# Patient Record
Sex: MALE | Race: OTHER | NOT HISPANIC OR LATINO | URBAN - METROPOLITAN AREA
[De-identification: names, ages, dates, MRNs, and addresses within clinical notes are randomized per-mention and may not be internally consistent; named-entity substitution may affect disease eponyms.]

---

## 2019-01-12 ENCOUNTER — EMERGENCY (EMERGENCY)
Facility: HOSPITAL | Age: 84
LOS: 1 days | Discharge: HOME | End: 2019-01-12
Attending: EMERGENCY MEDICINE | Admitting: EMERGENCY MEDICINE
Payer: COMMERCIAL

## 2019-01-12 VITALS
RESPIRATION RATE: 19 BRPM | OXYGEN SATURATION: 97 % | TEMPERATURE: 99 F | DIASTOLIC BLOOD PRESSURE: 83 MMHG | SYSTOLIC BLOOD PRESSURE: 128 MMHG | HEART RATE: 97 BPM

## 2019-01-12 VITALS
HEART RATE: 87 BPM | TEMPERATURE: 99 F | OXYGEN SATURATION: 99 % | RESPIRATION RATE: 18 BRPM | SYSTOLIC BLOOD PRESSURE: 147 MMHG | DIASTOLIC BLOOD PRESSURE: 65 MMHG

## 2019-01-12 LAB
ALBUMIN SERPL ELPH-MCNC: 3.2 G/DL — LOW (ref 3.3–5)
ALP SERPL-CCNC: 80 U/L — SIGNIFICANT CHANGE UP (ref 40–120)
ALT FLD-CCNC: 24 U/L — SIGNIFICANT CHANGE UP (ref 4–41)
ANION GAP SERPL CALC-SCNC: 10 MEQ/L — SIGNIFICANT CHANGE UP (ref 7–14)
APPEARANCE UR: CLEAR — SIGNIFICANT CHANGE UP
APTT BLD: 25.7 SEC — LOW (ref 27.5–36.3)
AST SERPL-CCNC: 20 U/L — SIGNIFICANT CHANGE UP (ref 4–40)
B PERT DNA SPEC QL NAA+PROBE: NOT DETECTED — SIGNIFICANT CHANGE UP
BASE EXCESS BLDV CALC-SCNC: 3.2 MMOL/L — SIGNIFICANT CHANGE UP
BASOPHILS # BLD AUTO: 0.01 K/UL — SIGNIFICANT CHANGE UP (ref 0–0.2)
BASOPHILS NFR BLD AUTO: 0.1 % — SIGNIFICANT CHANGE UP (ref 0–2)
BILIRUB SERPL-MCNC: 0.6 MG/DL — SIGNIFICANT CHANGE UP (ref 0.2–1.2)
BILIRUB UR-MCNC: NEGATIVE — SIGNIFICANT CHANGE UP
BLOOD GAS VENOUS - CREATININE: 0.84 MG/DL — SIGNIFICANT CHANGE UP (ref 0.5–1.3)
BLOOD UR QL VISUAL: NEGATIVE — SIGNIFICANT CHANGE UP
BUN SERPL-MCNC: 18 MG/DL — SIGNIFICANT CHANGE UP (ref 7–23)
C PNEUM DNA SPEC QL NAA+PROBE: NOT DETECTED — SIGNIFICANT CHANGE UP
CALCIUM SERPL-MCNC: 8.4 MG/DL — SIGNIFICANT CHANGE UP (ref 8.4–10.5)
CHLORIDE BLDV-SCNC: 112 MMOL/L — HIGH (ref 96–108)
CHLORIDE SERPL-SCNC: 107 MMOL/L — SIGNIFICANT CHANGE UP (ref 98–107)
CO2 SERPL-SCNC: 24 MMOL/L — SIGNIFICANT CHANGE UP (ref 22–31)
COLOR SPEC: SIGNIFICANT CHANGE UP
CREAT SERPL-MCNC: 1.03 MG/DL — SIGNIFICANT CHANGE UP (ref 0.5–1.3)
EOSINOPHIL # BLD AUTO: 0.19 K/UL — SIGNIFICANT CHANGE UP (ref 0–0.5)
EOSINOPHIL NFR BLD AUTO: 2.1 % — SIGNIFICANT CHANGE UP (ref 0–6)
FLUAV H1 2009 PAND RNA SPEC QL NAA+PROBE: NOT DETECTED — SIGNIFICANT CHANGE UP
FLUAV H1 RNA SPEC QL NAA+PROBE: NOT DETECTED — SIGNIFICANT CHANGE UP
FLUAV H3 RNA SPEC QL NAA+PROBE: NOT DETECTED — SIGNIFICANT CHANGE UP
FLUAV SUBTYP SPEC NAA+PROBE: NOT DETECTED — SIGNIFICANT CHANGE UP
FLUBV RNA SPEC QL NAA+PROBE: NOT DETECTED — SIGNIFICANT CHANGE UP
GAS PNL BLDV: 136 MMOL/L — SIGNIFICANT CHANGE UP (ref 136–146)
GLUCOSE BLDV-MCNC: 96 — SIGNIFICANT CHANGE UP (ref 70–99)
GLUCOSE SERPL-MCNC: 97 MG/DL — SIGNIFICANT CHANGE UP (ref 70–99)
GLUCOSE UR-MCNC: NEGATIVE — SIGNIFICANT CHANGE UP
HADV DNA SPEC QL NAA+PROBE: NOT DETECTED — SIGNIFICANT CHANGE UP
HCO3 BLDV-SCNC: 27 MMOL/L — SIGNIFICANT CHANGE UP (ref 20–27)
HCOV PNL SPEC NAA+PROBE: SIGNIFICANT CHANGE UP
HCT VFR BLD CALC: 39 % — SIGNIFICANT CHANGE UP (ref 39–50)
HCT VFR BLDV CALC: 39.5 % — SIGNIFICANT CHANGE UP (ref 39–51)
HGB BLD-MCNC: 13.1 G/DL — SIGNIFICANT CHANGE UP (ref 13–17)
HGB BLDV-MCNC: 12.8 G/DL — LOW (ref 13–17)
HMPV RNA SPEC QL NAA+PROBE: NOT DETECTED — SIGNIFICANT CHANGE UP
HPIV1 RNA SPEC QL NAA+PROBE: NOT DETECTED — SIGNIFICANT CHANGE UP
HPIV2 RNA SPEC QL NAA+PROBE: NOT DETECTED — SIGNIFICANT CHANGE UP
HPIV3 RNA SPEC QL NAA+PROBE: NOT DETECTED — SIGNIFICANT CHANGE UP
HPIV4 RNA SPEC QL NAA+PROBE: NOT DETECTED — SIGNIFICANT CHANGE UP
IMM GRANULOCYTES NFR BLD AUTO: 0.6 % — SIGNIFICANT CHANGE UP (ref 0–1.5)
INR BLD: 1.1 — SIGNIFICANT CHANGE UP (ref 0.88–1.17)
KETONES UR-MCNC: NEGATIVE — SIGNIFICANT CHANGE UP
LACTATE BLDV-MCNC: 1.4 MMOL/L — SIGNIFICANT CHANGE UP (ref 0.5–2)
LEUKOCYTE ESTERASE UR-ACNC: NEGATIVE — SIGNIFICANT CHANGE UP
LYMPHOCYTES # BLD AUTO: 1.5 K/UL — SIGNIFICANT CHANGE UP (ref 1–3.3)
LYMPHOCYTES # BLD AUTO: 16.9 % — SIGNIFICANT CHANGE UP (ref 13–44)
MCHC RBC-ENTMCNC: 31 PG — SIGNIFICANT CHANGE UP (ref 27–34)
MCHC RBC-ENTMCNC: 33.6 % — SIGNIFICANT CHANGE UP (ref 32–36)
MCV RBC AUTO: 92.2 FL — SIGNIFICANT CHANGE UP (ref 80–100)
MONOCYTES # BLD AUTO: 0.9 K/UL — SIGNIFICANT CHANGE UP (ref 0–0.9)
MONOCYTES NFR BLD AUTO: 10.2 % — SIGNIFICANT CHANGE UP (ref 2–14)
NEUTROPHILS # BLD AUTO: 6.2 K/UL — SIGNIFICANT CHANGE UP (ref 1.8–7.4)
NEUTROPHILS NFR BLD AUTO: 70.1 % — SIGNIFICANT CHANGE UP (ref 43–77)
NITRITE UR-MCNC: NEGATIVE — SIGNIFICANT CHANGE UP
NRBC # FLD: 0 K/UL — LOW (ref 25–125)
PCO2 BLDV: 37 MMHG — LOW (ref 41–51)
PH BLDV: 7.47 PH — HIGH (ref 7.32–7.43)
PH UR: 8 — SIGNIFICANT CHANGE UP (ref 5–8)
PLATELET # BLD AUTO: 113 K/UL — LOW (ref 150–400)
PMV BLD: 9.4 FL — SIGNIFICANT CHANGE UP (ref 7–13)
PO2 BLDV: 41 MMHG — HIGH (ref 35–40)
POTASSIUM BLDV-SCNC: 3.7 MMOL/L — SIGNIFICANT CHANGE UP (ref 3.4–4.5)
POTASSIUM SERPL-MCNC: 4.1 MMOL/L — SIGNIFICANT CHANGE UP (ref 3.5–5.3)
POTASSIUM SERPL-SCNC: 4.1 MMOL/L — SIGNIFICANT CHANGE UP (ref 3.5–5.3)
PROT SERPL-MCNC: 6.5 G/DL — SIGNIFICANT CHANGE UP (ref 6–8.3)
PROT UR-MCNC: NEGATIVE — SIGNIFICANT CHANGE UP
PROTHROM AB SERPL-ACNC: 12.3 SEC — SIGNIFICANT CHANGE UP (ref 9.8–13.1)
RBC # BLD: 4.23 M/UL — SIGNIFICANT CHANGE UP (ref 4.2–5.8)
RBC # FLD: 13.4 % — SIGNIFICANT CHANGE UP (ref 10.3–14.5)
RSV RNA SPEC QL NAA+PROBE: NOT DETECTED — SIGNIFICANT CHANGE UP
RV+EV RNA SPEC QL NAA+PROBE: NOT DETECTED — SIGNIFICANT CHANGE UP
SAO2 % BLDV: 75.3 % — SIGNIFICANT CHANGE UP (ref 60–85)
SODIUM SERPL-SCNC: 141 MMOL/L — SIGNIFICANT CHANGE UP (ref 135–145)
SP GR SPEC: 1.01 — SIGNIFICANT CHANGE UP (ref 1–1.04)
TROPONIN T, HIGH SENSITIVITY: 16 NG/L — SIGNIFICANT CHANGE UP (ref ?–14)
TROPONIN T, HIGH SENSITIVITY: 17 NG/L — SIGNIFICANT CHANGE UP (ref ?–14)
UROBILINOGEN FLD QL: NORMAL — SIGNIFICANT CHANGE UP
WBC # BLD: 8.85 K/UL — SIGNIFICANT CHANGE UP (ref 3.8–10.5)
WBC # FLD AUTO: 8.85 K/UL — SIGNIFICANT CHANGE UP (ref 3.8–10.5)

## 2019-01-12 PROCEDURE — 99284 EMERGENCY DEPT VISIT MOD MDM: CPT

## 2019-01-12 PROCEDURE — 71046 X-RAY EXAM CHEST 2 VIEWS: CPT | Mod: 26

## 2019-01-12 RX ORDER — SODIUM CHLORIDE 9 MG/ML
1000 INJECTION INTRAMUSCULAR; INTRAVENOUS; SUBCUTANEOUS ONCE
Qty: 0 | Refills: 0 | Status: COMPLETED | OUTPATIENT
Start: 2019-01-12 | End: 2019-01-12

## 2019-01-12 RX ADMIN — SODIUM CHLORIDE 500 MILLILITER(S): 9 INJECTION INTRAMUSCULAR; INTRAVENOUS; SUBCUTANEOUS at 18:51

## 2019-01-12 NOTE — ED PROVIDER NOTE - PROGRESS NOTE DETAILS
Patient feels improved. All labs EKG CXR reviewed. After ED evaluation, there is no evidence of acute emergency pathology which would necessitate emergency hospital admission. Patient was advised to follow up with primary care physician as soon as possible and have the doctor review all ED results and arrange appropriate follow up. Patient advised to return to ED at once if symptoms worsen. Patient feels improved. All VS normal. Pt eating and walking to bathroom without problems. All labs EKG CXR reviewed. After ED evaluation, there is no evidence of acute emergency pathology which would necessitate emergency hospital admission. Patient was advised to follow up with primary care physician as soon as possible and have the doctor review all ED results and arrange appropriate follow up. Patient advised to return to ED at once if symptoms worsen. Copies of all test results given to patient.

## 2019-01-12 NOTE — ED PROVIDER NOTE - ATTENDING CONTRIBUTION TO CARE
84 y M with PMH prostate ca dx 2004 presenting with cough, chills, lightheadedness on standing, sharp chest pain worse with inspiration, nausea with diarrhea no vomiting x 1 week, comes to ED bc chest pain is much worse, denies cardiac hx or hx of recent cardiac testing. Completed course of abx several days ago for URI.  On exam: afebrile, VSS, lungs scattered exp rhonchi, heart sounds normal,abdo soft non tender, ext neg. EKG SR with left axis otherwise normal. IMP: Cp productive cough, likely infectious in origin. Plan: Labs cultures EKG CXR nebs troponin reassess

## 2019-01-12 NOTE — ED ADULT NURSE NOTE - OBJECTIVE STATEMENT
Patient is an 85 y/o male a&ox4 BIB EMS with a c/c of cough, chills, lightheadedness and left sided intermittent sharp chest pain that worsens with inspirations.  Patient endorses nausea with diarrhea x1 week.  Patient reported seeking care today as chest pain has worsened.  Patient reports completing a course of abx recently for symptoms of URI.  Patient noted to be tachypneic, airway patent, Sp02 100% on room air.  18 gauge PIV in right MD marie at bedside.

## 2019-01-12 NOTE — ED PROVIDER NOTE - OBJECTIVE STATEMENT
Patient is 84 y M with PMH prostate ca dx 2004 presenting with cough, chills, lightheadedness on standing, sharp chest pain worse with inspiration, nausea with diarrhea no vomiting x 1 week, comes to ED bc chest pain is much worse, denies cardiac hx or hx of recent cardiac testing. Completed course of abx several days ago for URI.   Reports being treated for asthma in the past but denies using breathing treatments  No long plane trips, no recent hospital stays, no recent surgery, no exogenous hormones, no coughing, no personal or family history of clotting problems.     PMD: ly Golden    ROS: Denies fever, palpitations, recent sickness, HA, vision changes,  abdominal pain, dysuria, hematuria, rash, new joint aches, sick contacts, and recent travel. Patient is 84 y M with PMH prostate ca dx 2004 presenting with cough, chills, lightheadedness on standing, sharp chest pain worse with inspiration, nausea with diarrhea no vomiting x 1 week, comes to ED bc chest pain is much worse, denies cardiac hx or hx of recent cardiac testing. Completed course of abx several days ago for URI.   Reports being treated for asthma in the past but denies using breathing treatments  No long plane trips, no recent hospital stays, no recent surgery, no exogenous hormones, no personal or family history of clotting problems.     PMD: ly Golden    ROS: Denies fever, palpitations, recent sickness, HA, vision changes,  abdominal pain, dysuria, hematuria, rash, new joint aches, sick contacts, and recent travel.

## 2019-01-12 NOTE — ED ADULT TRIAGE NOTE - CHIEF COMPLAINT QUOTE
Pt brought in by EMS for chest pain/cough/SOB/nausea/vomiting for the past week.  Pt endorsing dizziness/lightheadedness and chills at home.  PMHx:  prostate CA, sinus surgery

## 2019-01-12 NOTE — ED PROVIDER NOTE - NSFOLLOWUPINSTRUCTIONS_ED_ALL_ED_FT
Rest at home   Drink plenty of liquids  Take tylenol for pain or fever  Return to ER if worse  Follow up with your doctor within 3 days

## 2019-01-12 NOTE — ED ADULT NURSE NOTE - NSIMPLEMENTINTERV_GEN_ALL_ED
Implemented All Fall Risk Interventions:  [place] to call system. Call bell, personal items and telephone within reach. Instruct patient to call for assistance. Room bathroom lighting operational. Non-slip footwear when patient is off stretcher. Physically safe environment: no spills, clutter or unnecessary equipment. Stretcher in lowest position, wheels locked, appropriate side rails in place. Provide visual cue, wrist band, yellow gown, etc. Monitor gait and stability. Monitor for mental status changes and reorient to person, place, and time. Review medications for side effects contributing to fall risk. Reinforce activity limits and safety measures with patient and family.

## 2019-01-14 LAB
BACTERIA UR CULT: SIGNIFICANT CHANGE UP
SPECIMEN SOURCE: SIGNIFICANT CHANGE UP

## 2019-03-29 NOTE — ED ADULT TRIAGE NOTE - NS ED NOTE AC HIGH RISK COUNTRIES
Additional Notes: Patient says he recently took an oral ABx (DOXY) for congestion and this rash occurred after being in the sun. No Detail Level: Detailed Additional Notes: Additional Notes: Cont KETO shampoo TIW. Detail Level: Simple

## 2021-01-17 ENCOUNTER — INPATIENT (INPATIENT)
Facility: HOSPITAL | Age: 86
LOS: 6 days | Discharge: HOME HEALTH | End: 2021-01-24
Attending: HOSPITALIST | Admitting: HOSPITALIST
Payer: MEDICARE

## 2021-01-17 VITALS
OXYGEN SATURATION: 98 % | SYSTOLIC BLOOD PRESSURE: 133 MMHG | DIASTOLIC BLOOD PRESSURE: 75 MMHG | RESPIRATION RATE: 18 BRPM | HEART RATE: 63 BPM

## 2021-01-17 DIAGNOSIS — R41.82 ALTERED MENTAL STATUS, UNSPECIFIED: ICD-10-CM

## 2021-01-17 LAB
ALBUMIN SERPL ELPH-MCNC: 4.1 G/DL — SIGNIFICANT CHANGE UP (ref 3.3–5)
ALP SERPL-CCNC: 116 U/L — SIGNIFICANT CHANGE UP (ref 40–120)
ALT FLD-CCNC: 28 U/L — SIGNIFICANT CHANGE UP (ref 4–41)
ANION GAP SERPL CALC-SCNC: 11 MMOL/L — SIGNIFICANT CHANGE UP (ref 7–14)
APPEARANCE UR: CLEAR — SIGNIFICANT CHANGE UP
APTT BLD: 27.4 SEC — SIGNIFICANT CHANGE UP (ref 27–36.3)
AST SERPL-CCNC: 28 U/L — SIGNIFICANT CHANGE UP (ref 4–40)
BASOPHILS # BLD AUTO: 0.02 K/UL — SIGNIFICANT CHANGE UP (ref 0–0.2)
BASOPHILS NFR BLD AUTO: 0.2 % — SIGNIFICANT CHANGE UP (ref 0–2)
BILIRUB SERPL-MCNC: 0.3 MG/DL — SIGNIFICANT CHANGE UP (ref 0.2–1.2)
BILIRUB UR-MCNC: NEGATIVE — SIGNIFICANT CHANGE UP
BLD GP AB SCN SERPL QL: NEGATIVE — SIGNIFICANT CHANGE UP
BLOOD GAS VENOUS COMPREHENSIVE RESULT: SIGNIFICANT CHANGE UP
BUN SERPL-MCNC: 16 MG/DL — SIGNIFICANT CHANGE UP (ref 7–23)
CALCIUM SERPL-MCNC: 9 MG/DL — SIGNIFICANT CHANGE UP (ref 8.4–10.5)
CHLORIDE SERPL-SCNC: 104 MMOL/L — SIGNIFICANT CHANGE UP (ref 98–107)
CO2 SERPL-SCNC: 25 MMOL/L — SIGNIFICANT CHANGE UP (ref 22–31)
COLOR SPEC: SIGNIFICANT CHANGE UP
CREAT SERPL-MCNC: 1.04 MG/DL — SIGNIFICANT CHANGE UP (ref 0.5–1.3)
DIFF PNL FLD: NEGATIVE — SIGNIFICANT CHANGE UP
EOSINOPHIL # BLD AUTO: 0.45 K/UL — SIGNIFICANT CHANGE UP (ref 0–0.5)
EOSINOPHIL NFR BLD AUTO: 5.6 % — SIGNIFICANT CHANGE UP (ref 0–6)
GLUCOSE SERPL-MCNC: 86 MG/DL — SIGNIFICANT CHANGE UP (ref 70–99)
GLUCOSE UR QL: NEGATIVE — SIGNIFICANT CHANGE UP
HCT VFR BLD CALC: 43.2 % — SIGNIFICANT CHANGE UP (ref 39–50)
HGB BLD-MCNC: 13.7 G/DL — SIGNIFICANT CHANGE UP (ref 13–17)
IANC: 3.37 K/UL — SIGNIFICANT CHANGE UP (ref 1.5–8.5)
IMM GRANULOCYTES NFR BLD AUTO: 0.4 % — SIGNIFICANT CHANGE UP (ref 0–1.5)
INR BLD: 1.02 RATIO — SIGNIFICANT CHANGE UP (ref 0.88–1.16)
KETONES UR-MCNC: NEGATIVE — SIGNIFICANT CHANGE UP
LEUKOCYTE ESTERASE UR-ACNC: NEGATIVE — SIGNIFICANT CHANGE UP
LYMPHOCYTES # BLD AUTO: 3.41 K/UL — HIGH (ref 1–3.3)
LYMPHOCYTES # BLD AUTO: 42.3 % — SIGNIFICANT CHANGE UP (ref 13–44)
MAGNESIUM SERPL-MCNC: 2.2 MG/DL — SIGNIFICANT CHANGE UP (ref 1.6–2.6)
MCHC RBC-ENTMCNC: 29.3 PG — SIGNIFICANT CHANGE UP (ref 27–34)
MCHC RBC-ENTMCNC: 31.7 GM/DL — LOW (ref 32–36)
MCV RBC AUTO: 92.5 FL — SIGNIFICANT CHANGE UP (ref 80–100)
MONOCYTES # BLD AUTO: 0.79 K/UL — SIGNIFICANT CHANGE UP (ref 0–0.9)
MONOCYTES NFR BLD AUTO: 9.8 % — SIGNIFICANT CHANGE UP (ref 2–14)
NEUTROPHILS # BLD AUTO: 3.37 K/UL — SIGNIFICANT CHANGE UP (ref 1.8–7.4)
NEUTROPHILS NFR BLD AUTO: 41.7 % — LOW (ref 43–77)
NITRITE UR-MCNC: NEGATIVE — SIGNIFICANT CHANGE UP
NRBC # BLD: 0 /100 WBCS — SIGNIFICANT CHANGE UP
NRBC # FLD: 0 K/UL — SIGNIFICANT CHANGE UP
PCP SPEC-MCNC: SIGNIFICANT CHANGE UP
PH UR: 8 — SIGNIFICANT CHANGE UP (ref 5–8)
PHOSPHATE SERPL-MCNC: 2.5 MG/DL — SIGNIFICANT CHANGE UP (ref 2.5–4.5)
PLATELET # BLD AUTO: 186 K/UL — SIGNIFICANT CHANGE UP (ref 150–400)
POTASSIUM SERPL-MCNC: 3.7 MMOL/L — SIGNIFICANT CHANGE UP (ref 3.5–5.3)
POTASSIUM SERPL-SCNC: 3.7 MMOL/L — SIGNIFICANT CHANGE UP (ref 3.5–5.3)
PROT SERPL-MCNC: 7.3 G/DL — SIGNIFICANT CHANGE UP (ref 6–8.3)
PROT UR-MCNC: ABNORMAL
PROTHROM AB SERPL-ACNC: 11.7 SEC — SIGNIFICANT CHANGE UP (ref 10.6–13.6)
RBC # BLD: 4.67 M/UL — SIGNIFICANT CHANGE UP (ref 4.2–5.8)
RBC # FLD: 13.1 % — SIGNIFICANT CHANGE UP (ref 10.3–14.5)
RH IG SCN BLD-IMP: POSITIVE — SIGNIFICANT CHANGE UP
SARS-COV-2 RNA SPEC QL NAA+PROBE: SIGNIFICANT CHANGE UP
SODIUM SERPL-SCNC: 140 MMOL/L — SIGNIFICANT CHANGE UP (ref 135–145)
SP GR SPEC: 1.02 — SIGNIFICANT CHANGE UP (ref 1.01–1.02)
TOXICOLOGY SCREEN, DRUGS OF ABUSE, SERUM RESULT: SIGNIFICANT CHANGE UP
TROPONIN T, HIGH SENSITIVITY RESULT: 12 NG/L — SIGNIFICANT CHANGE UP
UROBILINOGEN FLD QL: SIGNIFICANT CHANGE UP
WBC # BLD: 8.07 K/UL — SIGNIFICANT CHANGE UP (ref 3.8–10.5)
WBC # FLD AUTO: 8.07 K/UL — SIGNIFICANT CHANGE UP (ref 3.8–10.5)

## 2021-01-17 PROCEDURE — 70450 CT HEAD/BRAIN W/O DYE: CPT | Mod: 26,59

## 2021-01-17 PROCEDURE — 70498 CT ANGIOGRAPHY NECK: CPT | Mod: 26

## 2021-01-17 PROCEDURE — 99285 EMERGENCY DEPT VISIT HI MDM: CPT

## 2021-01-17 PROCEDURE — 70496 CT ANGIOGRAPHY HEAD: CPT | Mod: 26

## 2021-01-17 NOTE — ED ADULT NURSE REASSESSMENT NOTE - NS ED NURSE REASSESS COMMENT FT1
Pt received from QUIRINO Frederick. Pt a&ox2, unaware to situation. No distress noted. Pt does not recall events that occurred today. Family to be called. Will monitor.

## 2021-01-17 NOTE — ED ADULT NURSE NOTE - OBJECTIVE STATEMENT
Pt. brought in by EMS from home s/p unwitnessed fall at home. As per EMS, pt. family states "he had a fit" then fell on the ground and became altered. As per family, pt. is alert and oriented at baseline. Pt. placed in TRC, placed on CM, NSR at this time. 18G IV obtained in RAC, blood obtained, flushing without resistance, dressing dry and intact. Pt. placed on Zole monitor, brought to CT scan. Report given to primary RN Angelic. Safety precautions obtained.

## 2021-01-17 NOTE — ED PROVIDER NOTE - ATTENDING CONTRIBUTION TO CARE
Attending Attestation: Dr. Malik  I have personally performed a history and physical examination of the patient and discussed management with the resident as well as the patient.  I reviewed the resident's note and agree with the documented findings and plan of care.  I have authored and modified critical sections of the Provider Note, including but not limited to HPI, Physical Exam and MDM. 87yo M presenting after episode of AMS at home.  Screamed then fell off of couch.  Exam reveals lethargic but arousable man, initially entirely unresponsive, with no lateralizing signs.  Otherwise unremarkable. Mental status gradual improving during initial eval. Consider seizure likely, possible CVA, though other causes of encephalopathy need to be ruled out as well.  Will send labs, obtain imaging, and reassess.  neuro eval, likely admit.

## 2021-01-17 NOTE — ED PROVIDER NOTE - OBJECTIVE STATEMENT
87yo here after episode of AMS    Collateral from son:  sitting on chair, started screaming and fell over. No shaking movements. Confused after this fall.  Same thing happened before, received vancomycin for a stomach virus. +Diarrhea.   Walks and Talks at baseline without issue performing ADL's     PCP: Dr. Braulio Jensen  Surgery for prostate cancer 16 years ago, now with incontinince after this 87yo here after episode of AMS    Pt evaluated immediately at bedside upon arrival, minimal response to sternal rub. brought to CT immediately with no significant findings, pt slowly improved during this time now saying name and age. neuro consulted and bloodwork obtained for evaluation of possible seizure.     Collateral from son:  sitting on chair, started screaming and fell over. No shaking movements. Confused after this fall.  Same thing happened last week and went to outside ED, received vancomycin for a stomach virus. +Diarrhea.   Walks and Talks at baseline without issue performing ADL's     PCP: Dr. Braulio Jensen  Surgery for prostate cancer 16 years ago, now with incontinince after this 85 yo M here d/t episode of AMS    Pt evaluated immediately at bedside upon arrival, minimal response to sternal rub. brought to CT immediately with no significant findings, pt slowly improved during this time now saying name and age. neuro consulted and bloodwork obtained for evaluation of possible seizure.     Collateral from son:  While sitting on chair, screamed and fell over. No shaking movements. Confused after this fall, did not normalize at home.  Same thing happened last week and went to outside ED, received "vancomycin" (oral?) for a stomach virus. +Diarrhea.   Walks and Talks at baseline without issue performing ADL's     PCP: Dr. Braulio Jensen  Surgery for prostate cancer 16 years ago, now with incontinince after this 85 yo M here d/t episode of AMS    Pt evaluated immediately at bedside upon arrival, minimal response to sternal rub. brought to CT immediately with no significant findings, pt slowly improved during this time now saying name and age. neuro consulted and bloodwork obtained for evaluation of possible seizure.     Collateral from son:  While sitting on chair, screamed and fell over. No shaking movements. Confused after this fall, did not normalize at home.  Same thing happened last week and went to outside ED, received "vancomycin" (oral?) for a stomach virus. +Diarrhea.   Walks and Talks at baseline without issue performing ADL's  May have had seizure activity last time.     PCP: Dr. Braulio Jensen  Surgery for prostate cancer 16 years ago, now with incontinince after this

## 2021-01-17 NOTE — ED PROVIDER NOTE - NEUROLOGICAL, MLM
Alert and oriented, no focal deficits, no motor or sensory deficits noted. GENERALLYnon focal, moving extremities, but unable to assess fully given AMS.

## 2021-01-17 NOTE — ED ADULT NURSE REASSESSMENT NOTE - NS ED NURSE REASSESS COMMENT FT1
Patient arousable when spoken to. Respirations even and unlabored. Per MAR [id number], Patient okay for transport off tele monitor. Transporter at bedside for transport to floor.

## 2021-01-17 NOTE — ED ADULT NURSE REASSESSMENT NOTE - NS ED NURSE REASSESS COMMENT FT1
Pt back from CT scan, alert to voice, able to converse with staff, stating is feet is cold, given socks and more blankets.

## 2021-01-17 NOTE — CONSULT NOTE ADULT - ASSESSMENT
87yo man with PMH of prostate cancer (s/p resection 16 years ago, now with incontinence) presents to the ED after an episode of AMS with syncope. Patient had a similar event a week ago and was started on vancomycin for diarrhea. Physical exam remarkable for AMS and lethargy. Labs remarkable for elevated lactate and trace proteinuria. 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    Impression: AMS with syncope possibly due to seizure activity with post-ictal confusion, r/o infectious and metabolic triggers or metastatic disease vs. r/o cardiogenic etiology including arrhythmia vs. competing delirium in the setting of post-traumatic pain.    Recommendations:  [] Continuous vEEG  [] MRI brain w/w/o contrast w/epilepsy protocol  [] Telemetry monitoring  [] TTE w/bubble study  [] Will defer starting AEDs pending further workup as seizure-like events may have been provoked  [] Can consider Ativan 2mg for GTCs or seizure activity >3mins  [] Monitor CBC, BMP, lactate, CPK, TSH, B12, folate, Mg, P, Ca, LFTs  [] UA/UCx  [] Rest of infectious/metabolic and cardiac workup as per primary team  [] Pain management as per primary team  [] BP goal normotension  [] BG   [] DVT ppx  [] Frequent reorientation  [] Seizure/fall precautions    Case to be seen and discussed with neurology attending Dr. Schoenberg.

## 2021-01-17 NOTE — ED ADULT TRIAGE NOTE - CHIEF COMPLAINT QUOTE
pt brought in by ems as a notification, change in mental status, responsive to pain following a fall. pt taken directly to trauma c

## 2021-01-17 NOTE — CONSULT NOTE ADULT - SUBJECTIVE AND OBJECTIVE BOX
Neurology  Consult Note  01-17-21    Name:  TENNILLE MAHMOOD; 86y (10-Celestine-1935)    Reason for Admission: Altered mental status    Neurology consult: 85yo man with PMH of prostate cancer (s/p resection 16 years ago, now with incontinence) presents to the ED after an episode of AMS. Patient is a poor historian. Patient reports that earlier in the day he was sitting on the couch when he suddenly had LOC with a fall resulting in head trauma. Patient does not recall events after he regained consciousness. Patient states he had a similar event a week ago, was evaluated at [facility], and was started on vancomycin for a stomach virus and episodes of diarrhea. Patient at this time reports bifrontal headache due to the fall and generalized weakness. 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 Rest of ROS and history was limited as patient states "I don't know, I don't remember."    Review of Systems:  As states in HPI.        PMHx:   Prostate cancer      PFHx:     PSuHx:   No significant past surgical history        Medications:  MEDICATIONS  (STANDING):    MEDICATIONS  (PRN):      Vitals:  T(C): 36.4 (01-17-21 @ 20:45), Max: 36.9 (01-17-21 @ 18:09)  HR: 71 (01-17-21 @ 20:45) (57 - 71)  BP: 144/73 (01-17-21 @ 20:45) (133/75 - 145/67)  RR: 16 (01-17-21 @ 20:45) (16 - 18)  SpO2: 100% (01-17-21 @ 20:45) (98% - 100%)    Physical Examination: limited due to lethargy and pain  Neurologic:  - Mental Status: Alert, awake, oriented to person, place, but not time; Speech is very hypophonic, fluent with intact comprehension  - Cranial Nerves: Visual fields are full to confrontation; Pupils are equal, round, and reactive to light; Extraocular movements are intact without nystagmus, primary gaze; Face appears grossly symmetric; Hearing diminished in the R;   - Motor: Strength is 3/5 with bicep flexion b/l, 2/5 with hip flexion b/l but possibly effort-limited.  - Reflexes: 2+ and symmetric at the biceps and knees. Plantar responses down bilaterally.  - Sensory: Intact throughout to light touch  - Coordination: Patient unable to complete task  - Gait: Deferred    Labs:                        13.7   8.07  )-----------( 186      ( 17 Jan 2021 16:57 )             43.2     01-17    140  |  104  |  16  ----------------------------<  86  3.7   |  25  |  1.04    Ca    9.0      17 Jan 2021 17:00  Phos  2.5     01-17  Mg     2.2     01-17    TPro  7.3  /  Alb  4.1  /  TBili  0.3  /  DBili  x   /  AST  28  /  ALT  28  /  AlkPhos  116  01-17    CAPILLARY BLOOD GLUCOSE        LIVER FUNCTIONS - ( 17 Jan 2021 17:00 )  Alb: 4.1 g/dL / Pro: 7.3 g/dL / ALK PHOS: 116 U/L / ALT: 28 U/L / AST: 28 U/L / GGT: x             PT/INR - ( 17 Jan 2021 16:57 )   PT: 11.7 sec;   INR: 1.02 ratio    PTT - ( 17 Jan 2021 16:57 )  PTT:27.4 sec      Radiology:  CT Head No Cont:  (17 Jan 2021 16:54)    IMPRESSION:    CTA NECK:  No significant stenosis of the cervical carotid arteries based on NASCET criteria. 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 Vertebral arteries are otherwise patent to the skullbase.    CTA HEAD: No high-grade stenosis or occlusion of the major proximal arterial branches. No evidence of an intracranial arterial aneurysm or arteriovenous malformation on CTA.    CT BRAIN: No abnormal intracranial enhancement. 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    If there is continued concern for intracranial metastatic disease, contrast-enhanced MRI of the brain should be considered.     Neurology  Consult Note  01-17-21    Name:  TENNILLE AMHMOOD; 86y (10-Celestine-1935)    Reason for Admission: Altered mental status    Neurology consult: 85yo man with PMH of prostate cancer (s/p resection 16 years ago, now with incontinence) presents to the ED after an episode of AMS. Patient is a poor historian. Patient reports that earlier in the day he was sitting on the couch when he suddenly had LOC with a fall resulting in head trauma. Patient does not recall events after he regained consciousness. Patient states he had a similar event a week ago, was evaluated at [facility], and was started on vancomycin for a stomach virus and episodes of diarrhea. Patient at this time reports bifrontal headache due to the fall and generalized weakness. 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 Rest of ROS and history was limited as patient states "I don't know, I don't remember."    Review of Systems:  As states in HPI.        PMHx:   Prostate cancer      PFHx:     PSuHx:   No significant past surgical history        Medications:  MEDICATIONS  (STANDING):    MEDICATIONS  (PRN):      Vitals:  T(C): 36.4 (01-17-21 @ 20:45), Max: 36.9 (01-17-21 @ 18:09)  HR: 71 (01-17-21 @ 20:45) (57 - 71)  BP: 144/73 (01-17-21 @ 20:45) (133/75 - 145/67)  RR: 16 (01-17-21 @ 20:45) (16 - 18)  SpO2: 100% (01-17-21 @ 20:45) (98% - 100%)    Physical Examination: limited due to lethargy and pain  Neurologic:  - Mental Status: Alert, awake, oriented to person, place, but not time; Speech is very hypophonic, fluent with intact comprehension  - Cranial Nerves: Visual fields are full to confrontation; Pupils are equal, round, and reactive to light; Extraocular movements are intact without nystagmus, primary gaze; Face appears grossly symmetric; Hearing diminished in the R;   - Motor: Strength is 3/5 with bicep flexion b/l, 2/5 with hip flexion b/l but possibly effort-limited. Normal tone throughout. No tremors noted.  - Reflexes: 2+ and symmetric at the biceps and knees. Plantar responses down bilaterally.  - Sensory: Intact throughout to light touch  - Coordination: Patient unable to complete task  - Gait: Deferred    Labs:                        13.7   8.07  )-----------( 186      ( 17 Jan 2021 16:57 )             43.2     01-17    140  |  104  |  16  ----------------------------<  86  3.7   |  25  |  1.04    Ca    9.0      17 Jan 2021 17:00  Phos  2.5     01-17  Mg     2.2     01-17    TPro  7.3  /  Alb  4.1  /  TBili  0.3  /  DBili  x   /  AST  28  /  ALT  28  /  AlkPhos  116  01-17    CAPILLARY BLOOD GLUCOSE        LIVER FUNCTIONS - ( 17 Jan 2021 17:00 )  Alb: 4.1 g/dL / Pro: 7.3 g/dL / ALK PHOS: 116 U/L / ALT: 28 U/L / AST: 28 U/L / GGT: x             PT/INR - ( 17 Jan 2021 16:57 )   PT: 11.7 sec;   INR: 1.02 ratio    PTT - ( 17 Jan 2021 16:57 )  PTT:27.4 sec      Radiology:  CT Head No Cont:  (17 Jan 2021 16:54)    IMPRESSION:    CTA NECK:  No significant stenosis of the cervical carotid arteries based on NASCET criteria. 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 Vertebral arteries are otherwise patent to the skullbase.    CTA HEAD: No high-grade stenosis or occlusion of the major proximal arterial branches. No evidence of an intracranial arterial aneurysm or arteriovenous malformation on CTA.    CT BRAIN: No abnormal intracranial enhancement. 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    If there is continued concern for intracranial metastatic disease, contrast-enhanced MRI of the brain should be considered.

## 2021-01-17 NOTE — ED ADULT NURSE REASSESSMENT NOTE - NS ED NURSE REASSESS COMMENT FT1
Pt received from break coverage RN, AOx3, 20G noted to the RAC. VS as noted. Resting in bed,  breathing even and unlabored, saturating 100% on RA. Safety precautions maintained.

## 2021-01-17 NOTE — ED PROVIDER NOTE - CRITICAL CARE PROVIDED
lacerations direct patient care (not related to procedure)/additional history taking/interpretation of diagnostic studies/documentation/consultation with other physicians

## 2021-01-17 NOTE — ED ADULT NURSE REASSESSMENT NOTE - NS ED NURSE REASSESS COMMENT FT1
Report received from RN Do. Patient sleeping in stretcher, VS as noted. Respirations even and unlabored. Patient arousable when spoken to. Will monitor.

## 2021-01-17 NOTE — ED PROVIDER NOTE - PHYSICAL EXAMINATION
General: minimally responsive states name and age  HEENT: pupils equal and reactive, normal external ears bilaterally   Cardiac: RRR, no MRG appreciated  Resp: lungs clear to auscultation bilaterally, symmetric chest wall rise  Abd: soft, nontender, nondistended,   : no CVA tenderness  Neuro: Moving all extremities  Skin:  normal color for race

## 2021-01-17 NOTE — CONSULT NOTE ADULT - ATTENDING COMMENTS
Neurology consult: 87yo man with PMH of prostate cancer (s/p resection 16 years ago, now with incontinence) presents to the ED after an episode of AMS. Patient is a poor historian. Patient reports that earlier in the day he was sitting on the couch when he suddenly had LOC with a fall resulting in head trauma    mri    eeg   echo

## 2021-01-17 NOTE — ED PROVIDER NOTE - CLINICAL SUMMARY MEDICAL DECISION MAKING FREE TEXT BOX
Ganesh PGY-3:  87yo here after episode of AMS. Ddx includes seizure vs less likely cardiac eitology of syncope, will obtain bloodwork, CT head, consult neuro and reassess for dispo . 87yo M presenting after episode of AMS at home.  Screamed then fell off of couch.  Exam reveals lethargic but arousable man, initially entirely unresponsive, with no lateralizing signs.  Otherwise unremarkable. Mental status gradual improving during initial eval. Consider seizure likely, possible CVA, though other causes of encephalopathy need to be ruled out as well.  Will send labs, obtain imaging, and reassess.  neuro eval, likely admit.

## 2021-01-17 NOTE — ED PROVIDER NOTE - PROGRESS NOTE DETAILS
Kiko, PGY3- sign out from [facility] PGY3. Seen by neuro. Unclear why AMS (which is still present). Neuro imaging unremarkable. Leading differential sz, neuro recommending admission for EEG/MRI. No recommendations for AED at this time. No signs of seizure activity. Dr. LUISITO Gaines accepting.

## 2021-01-18 DIAGNOSIS — Z98.890 OTHER SPECIFIED POSTPROCEDURAL STATES: Chronic | ICD-10-CM

## 2021-01-18 DIAGNOSIS — Z02.9 ENCOUNTER FOR ADMINISTRATIVE EXAMINATIONS, UNSPECIFIED: ICD-10-CM

## 2021-01-18 DIAGNOSIS — G93.40 ENCEPHALOPATHY, UNSPECIFIED: ICD-10-CM

## 2021-01-18 DIAGNOSIS — Z29.9 ENCOUNTER FOR PROPHYLACTIC MEASURES, UNSPECIFIED: ICD-10-CM

## 2021-01-18 DIAGNOSIS — R19.7 DIARRHEA, UNSPECIFIED: ICD-10-CM

## 2021-01-18 DIAGNOSIS — R55 SYNCOPE AND COLLAPSE: ICD-10-CM

## 2021-01-18 LAB
A1C WITH ESTIMATED AVERAGE GLUCOSE RESULT: 5.8 % — HIGH (ref 4–5.6)
ANION GAP SERPL CALC-SCNC: 9 MMOL/L — SIGNIFICANT CHANGE UP (ref 7–14)
BASE EXCESS BLDV CALC-SCNC: 2.4 MMOL/L — HIGH (ref -3–2)
BASOPHILS # BLD AUTO: 0.02 K/UL — SIGNIFICANT CHANGE UP (ref 0–0.2)
BASOPHILS NFR BLD AUTO: 0.3 % — SIGNIFICANT CHANGE UP (ref 0–2)
BLOOD GAS VENOUS - CREATININE: 1 MG/DL — SIGNIFICANT CHANGE UP (ref 0.5–1.3)
BLOOD GAS VENOUS COMPREHENSIVE RESULT: SIGNIFICANT CHANGE UP
BUN SERPL-MCNC: 15 MG/DL — SIGNIFICANT CHANGE UP (ref 7–23)
CALCIUM SERPL-MCNC: 9.1 MG/DL — SIGNIFICANT CHANGE UP (ref 8.4–10.5)
CHLORIDE BLDV-SCNC: 109 MMOL/L — HIGH (ref 96–108)
CHLORIDE SERPL-SCNC: 105 MMOL/L — SIGNIFICANT CHANGE UP (ref 98–107)
CHOLEST SERPL-MCNC: 152 MG/DL — SIGNIFICANT CHANGE UP
CK MB BLD-MCNC: 3.1 % — HIGH (ref 0–2.5)
CK MB CFR SERPL CALC: 2.1 NG/ML — SIGNIFICANT CHANGE UP
CK SERPL-CCNC: 67 U/L — SIGNIFICANT CHANGE UP (ref 30–200)
CO2 SERPL-SCNC: 25 MMOL/L — SIGNIFICANT CHANGE UP (ref 22–31)
CREAT SERPL-MCNC: 0.79 MG/DL — SIGNIFICANT CHANGE UP (ref 0.5–1.3)
EOSINOPHIL # BLD AUTO: 0.33 K/UL — SIGNIFICANT CHANGE UP (ref 0–0.5)
EOSINOPHIL NFR BLD AUTO: 5.1 % — SIGNIFICANT CHANGE UP (ref 0–6)
ESTIMATED AVERAGE GLUCOSE: 120 MG/DL — HIGH (ref 68–114)
GAS PNL BLDV: 138 MMOL/L — SIGNIFICANT CHANGE UP (ref 136–146)
GLUCOSE BLDV-MCNC: 98 MG/DL — SIGNIFICANT CHANGE UP (ref 70–99)
GLUCOSE SERPL-MCNC: 98 MG/DL — SIGNIFICANT CHANGE UP (ref 70–99)
HCO3 BLDV-SCNC: 26 MMOL/L — SIGNIFICANT CHANGE UP (ref 20–27)
HCT VFR BLD CALC: 43.4 % — SIGNIFICANT CHANGE UP (ref 39–50)
HCT VFR BLDA CALC: 46.5 % — SIGNIFICANT CHANGE UP (ref 39–51)
HDLC SERPL-MCNC: 81 MG/DL — SIGNIFICANT CHANGE UP
HGB BLD CALC-MCNC: 15.2 G/DL — SIGNIFICANT CHANGE UP (ref 13–17)
HGB BLD-MCNC: 14 G/DL — SIGNIFICANT CHANGE UP (ref 13–17)
IANC: 3.88 K/UL — SIGNIFICANT CHANGE UP (ref 1.5–8.5)
IMM GRANULOCYTES NFR BLD AUTO: 0.3 % — SIGNIFICANT CHANGE UP (ref 0–1.5)
LACTATE BLDV-MCNC: 1.4 MMOL/L — SIGNIFICANT CHANGE UP (ref 0.5–2)
LIPID PNL WITH DIRECT LDL SERPL: 60 MG/DL — SIGNIFICANT CHANGE UP
LYMPHOCYTES # BLD AUTO: 1.6 K/UL — SIGNIFICANT CHANGE UP (ref 1–3.3)
LYMPHOCYTES # BLD AUTO: 24.8 % — SIGNIFICANT CHANGE UP (ref 13–44)
MAGNESIUM SERPL-MCNC: 2.2 MG/DL — SIGNIFICANT CHANGE UP (ref 1.6–2.6)
MCHC RBC-ENTMCNC: 29.4 PG — SIGNIFICANT CHANGE UP (ref 27–34)
MCHC RBC-ENTMCNC: 32.3 GM/DL — SIGNIFICANT CHANGE UP (ref 32–36)
MCV RBC AUTO: 91 FL — SIGNIFICANT CHANGE UP (ref 80–100)
MONOCYTES # BLD AUTO: 0.6 K/UL — SIGNIFICANT CHANGE UP (ref 0–0.9)
MONOCYTES NFR BLD AUTO: 9.3 % — SIGNIFICANT CHANGE UP (ref 2–14)
NEUTROPHILS # BLD AUTO: 3.88 K/UL — SIGNIFICANT CHANGE UP (ref 1.8–7.4)
NEUTROPHILS NFR BLD AUTO: 60.2 % — SIGNIFICANT CHANGE UP (ref 43–77)
NON HDL CHOLESTEROL: 71 MG/DL — SIGNIFICANT CHANGE UP
NRBC # BLD: 0 /100 WBCS — SIGNIFICANT CHANGE UP
NRBC # FLD: 0 K/UL — SIGNIFICANT CHANGE UP
PCO2 BLDV: 48 MMHG — SIGNIFICANT CHANGE UP (ref 41–51)
PH BLDV: 7.37 — SIGNIFICANT CHANGE UP (ref 7.32–7.43)
PHOSPHATE SERPL-MCNC: 3.9 MG/DL — SIGNIFICANT CHANGE UP (ref 2.5–4.5)
PLATELET # BLD AUTO: 168 K/UL — SIGNIFICANT CHANGE UP (ref 150–400)
PO2 BLDV: 48 MMHG — HIGH (ref 35–40)
POTASSIUM BLDV-SCNC: 3.6 MMOL/L — SIGNIFICANT CHANGE UP (ref 3.4–4.5)
POTASSIUM SERPL-MCNC: 3.8 MMOL/L — SIGNIFICANT CHANGE UP (ref 3.5–5.3)
POTASSIUM SERPL-SCNC: 3.8 MMOL/L — SIGNIFICANT CHANGE UP (ref 3.5–5.3)
RBC # BLD: 4.77 M/UL — SIGNIFICANT CHANGE UP (ref 4.2–5.8)
RBC # FLD: 13.2 % — SIGNIFICANT CHANGE UP (ref 10.3–14.5)
SAO2 % BLDV: 80.4 % — SIGNIFICANT CHANGE UP (ref 60–85)
SARS-COV-2 IGG SERPL QL IA: NEGATIVE — SIGNIFICANT CHANGE UP
SARS-COV-2 IGM SERPL IA-ACNC: <0.1 INDEX — SIGNIFICANT CHANGE UP
SODIUM SERPL-SCNC: 139 MMOL/L — SIGNIFICANT CHANGE UP (ref 135–145)
TRIGL SERPL-MCNC: 53 MG/DL — SIGNIFICANT CHANGE UP
TROPONIN T, HIGH SENSITIVITY RESULT: 16 NG/L — SIGNIFICANT CHANGE UP
TSH SERPL-MCNC: 4.18 UIU/ML — SIGNIFICANT CHANGE UP (ref 0.27–4.2)
WBC # BLD: 6.45 K/UL — SIGNIFICANT CHANGE UP (ref 3.8–10.5)
WBC # FLD AUTO: 6.45 K/UL — SIGNIFICANT CHANGE UP (ref 3.8–10.5)

## 2021-01-18 PROCEDURE — 99223 1ST HOSP IP/OBS HIGH 75: CPT

## 2021-01-18 PROCEDURE — 71045 X-RAY EXAM CHEST 1 VIEW: CPT | Mod: 26

## 2021-01-18 RX ORDER — VANCOMYCIN HCL 1 G
0 VIAL (EA) INTRAVENOUS
Qty: 0 | Refills: 0 | DISCHARGE

## 2021-01-18 RX ORDER — SODIUM CHLORIDE 9 MG/ML
3 INJECTION INTRAMUSCULAR; INTRAVENOUS; SUBCUTANEOUS EVERY 8 HOURS
Refills: 0 | Status: DISCONTINUED | OUTPATIENT
Start: 2021-01-18 | End: 2021-01-24

## 2021-01-18 RX ORDER — HEPARIN SODIUM 5000 [USP'U]/ML
5000 INJECTION INTRAVENOUS; SUBCUTANEOUS EVERY 12 HOURS
Refills: 0 | Status: DISCONTINUED | OUTPATIENT
Start: 2021-01-18 | End: 2021-01-24

## 2021-01-18 RX ORDER — ACETAMINOPHEN 500 MG
650 TABLET ORAL ONCE
Refills: 0 | Status: COMPLETED | OUTPATIENT
Start: 2021-01-18 | End: 2021-01-18

## 2021-01-18 RX ORDER — VANCOMYCIN HCL 1 G
125 VIAL (EA) INTRAVENOUS EVERY 6 HOURS
Refills: 0 | Status: COMPLETED | OUTPATIENT
Start: 2021-01-18 | End: 2021-01-18

## 2021-01-18 RX ADMIN — HEPARIN SODIUM 5000 UNIT(S): 5000 INJECTION INTRAVENOUS; SUBCUTANEOUS at 06:10

## 2021-01-18 RX ADMIN — HEPARIN SODIUM 5000 UNIT(S): 5000 INJECTION INTRAVENOUS; SUBCUTANEOUS at 16:50

## 2021-01-18 RX ADMIN — Medication 125 MILLIGRAM(S): at 06:15

## 2021-01-18 RX ADMIN — SODIUM CHLORIDE 3 MILLILITER(S): 9 INJECTION INTRAMUSCULAR; INTRAVENOUS; SUBCUTANEOUS at 22:11

## 2021-01-18 RX ADMIN — SODIUM CHLORIDE 3 MILLILITER(S): 9 INJECTION INTRAMUSCULAR; INTRAVENOUS; SUBCUTANEOUS at 13:58

## 2021-01-18 RX ADMIN — SODIUM CHLORIDE 3 MILLILITER(S): 9 INJECTION INTRAMUSCULAR; INTRAVENOUS; SUBCUTANEOUS at 06:10

## 2021-01-18 RX ADMIN — Medication 125 MILLIGRAM(S): at 10:43

## 2021-01-18 RX ADMIN — Medication 125 MILLIGRAM(S): at 22:11

## 2021-01-18 RX ADMIN — Medication 650 MILLIGRAM(S): at 10:41

## 2021-01-18 RX ADMIN — Medication 125 MILLIGRAM(S): at 16:57

## 2021-01-18 NOTE — H&P ADULT - HISTORY OF PRESENT ILLNESS
85 y/o M with hx of prostate ca, recent admission at [facility] (2 weeks ago) for diarrhea on vancomycin presents with syncope. Patient states he felt like the room was spinning. He then called out to his son and does not remember anything afterwards. Per son, he heard a noise and found the patient on the floor. Patient had LOC for 3-4 minutes. He was confused when he woke up and was not back to baseline by the time EMS arrived. Son did not notice any foaming at mouth or shaking. He states he is not sure whether patient had urinary incontinence because patient wears a diaper and has hx of incontinence. Patient was doing well prior to this episode. Patient was admitted [facility] for a similar fall about 2 weeks ago and was diagnosed with diarrhea. He is finishing the antibiotics and has one more day worth doses left. Denies fever, chills, cough, falls, LOc, chest pain, SOB, abdominal pain, nausea, vomiting, melena, hematochezia or LE edema.     Per RN, patient had a bowel movement which was fully formed.

## 2021-01-18 NOTE — H&P ADULT - ASSESSMENT
85 y/o M with hx of prostate ca, recent admission at [facility] (2 weeks ago) for diarrhea on vancomycin presents with syncope. 87 y/o M with hx of prostate ca, recent admission at [facility] (2 weeks ago) for diarrhea on vancomycin presents with syncope.

## 2021-01-18 NOTE — H&P ADULT - PROBLEM SELECTOR PLAN 2
Currently patient is A&Ox3, and seems to have improvement in mental status  check CXR  monitor fevers  Follow UC  Lactae: 3.0 --> repeat ordered Currently patient is A&Ox3, and seems to have improvement in mental status  Encephalopathy could have been secondary to possible seizure with post ictal phase. Will rule out infectious etiology.  check CXR  monitor for fevers  Follow UC  Lactae: 3.0 --> 1.4

## 2021-01-18 NOTE — H&P ADULT - NSHPLABSRESULTS_GEN_ALL_CORE
EKG s insu bradycardia TWI in AVR, V1, Flat T in III QTC: 447                          13.7   8.07  )-----------( 186      ( 17 Jan 2021 16:57 )             43.2       01-17    140  |  104  |  16  ----------------------------<  86  3.7   |  25  |  1.04    Ca    9.0      17 Jan 2021 17:00  Phos  2.5     01-17  Mg     2.2     01-17    TPro  7.3  /  Alb  4.1  /  TBili  0.3  /  DBili  x   /  AST  28  /  ALT  28  /  AlkPhos  116  01-17    Troponin T, High Sensitivity Result: 12: Rapid changes upward or downward in high-sensitivity troponin levels  strongly suggest acute myocardial injury. Hemolysis may falsely lower  results. Renal impairment may increase results.  Normal: <6 - 14 ng/L  Indeterminate: 15 - 51 ng/L  Elevated:>51 ng/L  Please see "[email or web address] on the [facility] intranet for  more information. ng/L (01.17.21 @ 17:00) I have personally reviewed this patient's labs below:                        13.7   8.07  )-----------( 186      ( 17 Jan 2021 16:57 )             43.2       01-17    140  |  104  |  16  ----------------------------<  86  3.7   |  25  |  1.04    Ca    9.0      17 Jan 2021 17:00  Phos  2.5     01-17  Mg     2.2     01-17    TPro  7.3  /  Alb  4.1  /  TBili  0.3  /  DBili  x   /  AST  28  /  ALT  28  /  AlkPhos  116  01-17    COVID negative     Troponin T, High Sensitivity Result: 12: Rapid changes upward or downward in high-sensitivity troponin levels  strongly suggest acute myocardial injury. Hemolysis may falsely lower  results. Renal impairment may increase results.  Normal: <6 - 14 ng/L  Indeterminate: 15 - 51 ng/L  Elevated:>51 ng/L  Please see "[email or web address] on the [employer] intranet for  more information. ng/L (01.17.21 @ 17:00)    I have personally reviewed this patient's EKG and my independent interpretation is sinus bradycardia @58bpm, q waves in III, AVF, no ischemic changes    CXR ordered and pending     Imaging reviewed below:  IMPRESSION:    CTA NECK:  No significant stenosis of the cervical carotid arteries based on NASCET criteria. 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 Vertebral arteries are otherwise patent to the skull base.    CTA HEAD: No high-grade stenosis or occlusion of the major proximal arterial branches. No evidence of an intracranial arterial aneurysm or arteriovenous malformation on CTA.    CT BRAIN: No abnormal intracranial enhancement. 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    If there is continued concern for intracranial metastatic disease, contrast-enhanced MRI of the brain should be considered.

## 2021-01-18 NOTE — H&P ADULT - ATTENDING COMMENTS
I have personally seen, examined, and participated in the care of this patient.  I have reviewed all pertinent clinical information, including history, physical exam, plan, and the PA's note and agree except as noted.    85 y/o M with hx of prostate ca, recent admission at [facility] (2 weeks ago) for diarrhea on vancomycin presents with syncope.    #Syncope  CTA head: No evidence of an intracranial arterial aneurysm or arteriovenous malformation on CTA.  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    Syncope likely related to orthostatics with continual diarrhea? vs seizure (postictal state) vs cardiac etiology   Pt denies prodromal symptoms of chest pain and SOB though complained of dizziness. Reportedly getting treated for C diff with vanc PO. Previously lethargic and disoriented but now A&Ox3 and more awake.  -f/u neuro recs  -MRI brain with and without contrast  -TTE  -check orthostatics   -monitor on tele  -EEG    #Encephalopathy  Mental status improving, CTH negative. UA negative, obtain CXR. Possible postictal phase. EEG, MRI brain    #Diarrhea  Formed stool today per RN. Per son on vanc PO for diarrhea likely related to C diff. Due for one more day of abx and will continue. Send C diff to confirm if stools meet requirement.    #Prostate Cancer  Prior resection. No longer on flomax or oxybutynin I have personally seen, examined, and participated in the care of this patient.  I have reviewed all pertinent clinical information, including history, physical exam, plan, and the PA's note and agree except as noted.    87 y/o M with hx of prostate ca, recent admission at [facility] (2 weeks ago) for diarrhea on vancomycin presents with syncope.    #Syncope  CTA head: No evidence of an intracranial arterial aneurysm or arteriovenous malformation on CTA.  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    Syncope likely related to orthostatics with continual diarrhea? vs seizure (postictal state) vs cardiac etiology   Pt denies prodromal symptoms of chest pain and SOB though complained of dizziness. Reportedly getting treated for C diff with vanc PO. Previously lethargic and disoriented but now A&Ox3 and more awake.  -f/u neuro recs  -MRI brain with and without contrast  -TTE  -check orthostatics   -monitor on tele  -EEG    #Encephalopathy  Mental status improving, CTH negative. UA negative, obtain CXR. Possible postictal phase. EEG, MRI brain    #Diarrhea  Formed stool today per RN. Per son on vanc PO for diarrhea likely related to C diff. Due for one more day of abx and will continue. Send C diff to confirm if stools meet requirement. Per nursing can put on contact iso for now pending infection control clarification as patient needs to be without 48 hours of diarrhea prior to coming off iso but patient is not sure if diarrhea has resolved when asked, despite having a formed stool here per RN    #Prostate Cancer  Prior resection. No longer on flomax or oxybutynin

## 2021-01-18 NOTE — H&P ADULT - PROBLEM SELECTOR PLAN 1
Admit to tele  check cbc, bmp, a1c, flp, tsh, trend CE  Echo ordered   MR brain with/without contrast ordered   neuro consult appreciated   seizure/fall precautions  CXR ordered  check orthostatics Admit to tele  check cbc, bmp, a1c, flp, tsh, trend CE  Syncope v seizure with post ictal phase. 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   CTH showed: old left occiptal and b/l cerebellar infarcts.   Neuro eval appreciated   Echo ordered   MR brain with/without contrast ordered   seizure/fall precautions  CXR ordered  check orthostatics    < from: CT Angio Head w/ IV Cont (01.17.21 @ 19:35) >        < end of copied text > Admit to tele  check cbc, bmp, a1c, flp, tsh, trend CE  Syncope v seizure with post ictal phase. 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   CTH showed: old left occiptal and b/l cerebellar infarcts.   Neuro eval appreciated   Echo ordered   MR brain with/without contrast ordered   seizure/fall precautions  CXR ordered  check orthostatics  EEG

## 2021-01-18 NOTE — PROGRESS NOTE ADULT - SUBJECTIVE AND OBJECTIVE BOX
Patient is a 86y old  Male who presents with a chief complaint of syncope (2021 01:48)      SUBJECTIVE / OVERNIGHT EVENTS:  patient reports pain in the back of his head where he fell. no other new complaints.   ADDITIONAL REVIEW OF SYSTEMS:    MEDICATIONS  (STANDING):  heparin   Injectable 5000 Unit(s) SubCutaneous every 12 hours  sodium chloride 0.9% lock flush 3 milliLiter(s) IV Push every 8 hours  vancomycin    Solution 125 milliGRAM(s) Oral every 6 hours    MEDICATIONS  (PRN):      CAPILLARY BLOOD GLUCOSE    I&O's Summary    2021 07:01  -  2021 15:48  --------------------------------------------------------  IN: 598 mL / OUT: 400 mL / NET: 198 mL    PHYSICAL EXAM:  Vital Signs Last 24 Hrs  T(C): 36.2 (2021 14:00), Max: 36.9 (2021 18:09)  T(F): 97.1 (2021 14:00), Max: 98.4 (2021 18:09)  HR: 72 (2021 14:00) (57 - 72)  BP: 118/76 (2021 14:00) (118/76 - 148/67)  BP(mean): 90 (2021 16:56) (90 - 90)  RR: 16 (2021 14:00) (16 - 18)  SpO2: 99% (2021 14:00) (98% - 100%)  CONSTITUTIONAL: NAD, well-developed  RESPIRATORY: Normal respiratory effort; lungs are clear to auscultation bilaterally  CARDIOVASCULAR: Regular rate and rhythm, normal S1 and S2, No lower extremity edema  ABDOMEN: Nontender to palpation, normoactive bowel sounds  MUSCLOSKELETAL: no clubbing or cyanosis of digits  PSYCH: A+O to person, place, and time; affect appropriate    LABS:                        14.0   6.45  )-----------( 168      ( 2021 03:08 )             43.4         139  |  105  |  15  ----------------------------<  98  3.8   |  25  |  0.79    Ca    9.1      2021 03:08  Phos  3.9       Mg     2.2         TPro  7.3  /  Alb  4.1  /  TBili  0.3  /  DBili  x   /  AST  28  /  ALT  28  /  AlkPhos  116  -    PT/INR - ( 2021 16:57 )   PT: 11.7 sec;   INR: 1.02 ratio         PTT - ( 2021 16:57 )  PTT:27.4 sec  CARDIAC MARKERS ( 2021 03:08 )  x     / x     / 67 U/L / x     / 2.1 ng/mL      Urinalysis Basic - ( 2021 17:13 )    Color: Light Yellow / Appearance: Clear / S.016 / pH: x  Gluc: x / Ketone: Negative  / Bili: Negative / Urobili: <2 mg/dL   Blood: x / Protein: Trace / Nitrite: Negative   Leuk Esterase: Negative / RBC: x / WBC x   Sq Epi: x / Non Sq Epi: x / Bacteria: Few          RADIOLOGY & ADDITIONAL TESTS:  Results Reviewed:   Imaging Personally Reviewed:  Electrocardiogram Personally Reviewed:    COORDINATION OF CARE:  Care Discussed with Consultants/Other Providers [Y/N]:  Prior or Outpatient Records Reviewed [Y/N]:   Patient is a 86y old  Male who presents with a chief complaint of syncope (2021 01:48)      SUBJECTIVE / OVERNIGHT EVENTS:  patient reports pain in the back of his head where he fell. no other new complaints.   ADDITIONAL REVIEW OF SYSTEMS:    MEDICATIONS  (STANDING):  heparin   Injectable 5000 Unit(s) SubCutaneous every 12 hours  sodium chloride 0.9% lock flush 3 milliLiter(s) IV Push every 8 hours  vancomycin    Solution 125 milliGRAM(s) Oral every 6 hours    MEDICATIONS  (PRN):      CAPILLARY BLOOD GLUCOSE    I&O's Summary    2021 07:01  -  2021 15:48  --------------------------------------------------------  IN: 598 mL / OUT: 400 mL / NET: 198 mL    PHYSICAL EXAM:  Vital Signs Last 24 Hrs  T(C): 36.2 (2021 14:00), Max: 36.9 (2021 18:09)  T(F): 97.1 (2021 14:00), Max: 98.4 (2021 18:09)  HR: 72 (2021 14:00) (57 - 72)  BP: 118/76 (2021 14:00) (118/76 - 148/67)  BP(mean): 90 (2021 16:56) (90 - 90)  RR: 16 (2021 14:00) (16 - 18)  SpO2: 99% (2021 14:00) (98% - 100%)  CONSTITUTIONAL: NAD, pleasant elderly male with EEG leads attached to head  RESPIRATORY: Normal respiratory effort; no wheezing  CARDIOVASCULAR: Regular rate and rhythm, normal S1 and S2, No lower extremity edema  ABDOMEN: Nontender to palpation, normoactive bowel sounds  MUSCLOSKELETAL: no clubbing or cyanosis of digits  NEURO: CN2-12 intact, strength and sensation grossly intact  PSYCH: A+O to person, place, and time; affect appropriate    LABS:                        14.0   6.45  )-----------( 168      ( 2021 03:08 )             43.4         139  |  105  |  15  ----------------------------<  98  3.8   |  25  |  0.79    Ca    9.1      2021 03:08  Phos  3.9       Mg     2.2         TPro  7.3  /  Alb  4.1  /  TBili  0.3  /  DBili  x   /  AST  28  /  ALT  28  /  AlkPhos  116      PT/INR - ( 2021 16:57 )   PT: 11.7 sec;   INR: 1.02 ratio         PTT - ( 2021 16:57 )  PTT:27.4 sec  CARDIAC MARKERS ( 2021 03:08 )  x     / x     / 67 U/L / x     / 2.1 ng/mL      Urinalysis Basic - ( 2021 17:13 )    Color: Light Yellow / Appearance: Clear / S.016 / pH: x  Gluc: x / Ketone: Negative  / Bili: Negative / Urobili: <2 mg/dL   Blood: x / Protein: Trace / Nitrite: Negative   Leuk Esterase: Negative / RBC: x / WBC x   Sq Epi: x / Non Sq Epi: x / Bacteria: Few          RADIOLOGY & ADDITIONAL TESTS:  Results Reviewed:   Imaging Personally Reviewed:  Electrocardiogram Personally Reviewed:    COORDINATION OF CARE:  Care Discussed with Consultants/Other Providers [Y/N]:  Prior or Outpatient Records Reviewed [Y/N]:

## 2021-01-18 NOTE — PROGRESS NOTE ADULT - SUBJECTIVE AND OBJECTIVE BOX
Neurology consult: 85yo man with PMH of prostate cancer (s/p resection 16 years ago, now with incontinence) presents to the ED after an episode of AMS. Patient is a poor historian. Patient reports that earlier in the day he was sitting on the couch when he suddenly had LOC with a fall resulting in head trauma. Patient does not recall events after he regained consciousness. Patient states he had a similar event a week ago, was evaluated at [facility], and was started on vancomycin for a stomach virus and episodes of diarrhea. Patient at this time reports bifrontal headache due to the fall and generalized weakness. 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 Rest of ROS and history was limited as patient states "I don't know, I don't remember."    Review of Systems:  As states in HPI.        PMHx:   Prostate cancer      PFHx:     PSuHx:   No significant past surgical history        Medications:  MEDICATIONS  (STANDING):    MEDICATIONS  (PRN):      Vitals:  T(C): 36.8  HR: 76  BP: 132/70  RR: 16    Physical Examination: limited due to lethargy and pain  Neurologic:  - Mental Status: Alert, awake, oriented to person, place, but not time; Speech is very hypophonic, fluent with intact comprehension  - Cranial Nerves: Visual fields are full to confrontation; Pupils are equal, round, and reactive to light; Extraocular movements are intact without nystagmus, primary gaze; Face appears grossly symmetric; Hearing diminished in the R;   - Motor: Strength is 3/5 with bicep flexion b/l, 2/5 with hip flexion b/l but possibly effort-limited. Normal tone throughout. No tremors noted.  - Reflexes: 2+ and symmetric at the biceps and knees. Plantar responses down bilaterally.  - Sensory: Intact throughout to light touch  - Coordination: Patient unable to complete task  - Gait: Deferred    Labs:                        13.7   8.07  )-----------( 186      ( 17 Jan 2021 16:57 )             43.2     01-17    140  |  104  |  16  ----------------------------<  86  3.7   |  25  |  1.04    Ca    9.0      17 Jan 2021 17:00  Phos  2.5     01-17  Mg     2.2     01-17    TPro  7.3  /  Alb  4.1  /  TBili  0.3  /  DBili  x   /  AST  28  /  ALT  28  /  AlkPhos  116  01-17    CAPILLARY BLOOD GLUCOSE        LIVER FUNCTIONS - ( 17 Jan 2021 17:00 )  Alb: 4.1 g/dL / Pro: 7.3 g/dL / ALK PHOS: 116 U/L / ALT: 28 U/L / AST: 28 U/L / GGT: x             PT/INR - ( 17 Jan 2021 16:57 )   PT: 11.7 sec;   INR: 1.02 ratio    PTT - ( 17 Jan 2021 16:57 )  PTT:27.4 sec      Radiology:  CT Head No Cont:  (17 Jan 2021 16:54)    IMPRESSION:    CTA NECK:  No significant stenosis of the cervical carotid arteries based on NASCET criteria. 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 Vertebral arteries are otherwise patent to the skullbase.    CTA HEAD: No high-grade stenosis or occlusion of the major proximal arterial branches. No evidence of an intracranial arterial aneurysm or arteriovenous malformation on CTA.    CT BRAIN: No abnormal intracranial enhancement. 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    If there is continued concern for intracranial metastatic disease, contrast-enhanced MRI of the brain should be considered.        Assessment and Recommendation:   · Assessment	  85yo man with PMH of prostate cancer (s/p resection 16 years ago, now with incontinence) presents to the ED after an episode of AMS with syncope. Patient had a similar event a week ago and was started on vancomycin for diarrhea. Physical exam remarkable for AMS and lethargy. Labs remarkable for elevated lactate and trace proteinuria. 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    Impression: AMS with syncope possibly due to seizure activity with post-ictal confusion, r/o infectious and metabolic triggers or metastatic disease vs. r/o cardiogenic etiology including arrhythmia vs. competing delirium in the setting of post-traumatic pain.    Recommendations:  [] Continuous vEEG  [] MRI brain w/w/o contrast w/epilepsy protocol  [] Telemetry monitoring  [] TTE w/bubble study  [] Will defer starting AEDs pending further workup as seizure-like events may have been provoked  [] Can consider Ativan 2mg for GTCs or seizure activity >3mins  [] Monitor CBC, BMP, lactate, CPK, TSH, B12, folate, Mg, P, Ca, LFTs  [] UA/UCx  [] Rest of infectious/metabolic and cardiac workup as per primary team  [] Pain management as per primary team  [] BP goal normotension  [] BG   [] DVT ppx  [] Frequent reorientation  [] Seizure/fall precautions    Schoenberg, Laura Gray (MD)  (Signed 18-Jan-2021 07:08)  	Authored: Attending Attestation  	Co-Signer: Consult Note, Referral/Consultation, Subjective and Objective, Assessment and Recommendation      Last Updated: 18-Jan-2021 07:08 by Schoenberg, Laura Gray (MD)

## 2021-01-18 NOTE — H&P ADULT - PROBLEM SELECTOR PLAN 3
Vancomycin started at [facility] for diarrhea two weeks ago (?C-diff)  cont vancomycin PO for 1 more day  per RN, patient's most recent BM was fully formed. Vancomycin started at [facility] for diarrhea two weeks ago/with possible C-diff (family unsure of exact diagnosis. most likely c-diff given antibiotic course)  cont vancomycin PO for 1 more day  per RN, patient's most recent BM was fully formed.  Nursing to send samples for c-diff if the samples meet criteria (loose stools, if patient develops fever). Contact isolation precautions for now

## 2021-01-19 LAB
ANION GAP SERPL CALC-SCNC: 8 MMOL/L — SIGNIFICANT CHANGE UP (ref 7–14)
BUN SERPL-MCNC: 24 MG/DL — HIGH (ref 7–23)
CALCIUM SERPL-MCNC: 8.9 MG/DL — SIGNIFICANT CHANGE UP (ref 8.4–10.5)
CHLORIDE SERPL-SCNC: 105 MMOL/L — SIGNIFICANT CHANGE UP (ref 98–107)
CO2 SERPL-SCNC: 26 MMOL/L — SIGNIFICANT CHANGE UP (ref 22–31)
CREAT SERPL-MCNC: 0.88 MG/DL — SIGNIFICANT CHANGE UP (ref 0.5–1.3)
CULTURE RESULTS: NO GROWTH — SIGNIFICANT CHANGE UP
GLUCOSE SERPL-MCNC: 93 MG/DL — SIGNIFICANT CHANGE UP (ref 70–99)
HCT VFR BLD CALC: 44.8 % — SIGNIFICANT CHANGE UP (ref 39–50)
HGB BLD-MCNC: 14.2 G/DL — SIGNIFICANT CHANGE UP (ref 13–17)
MAGNESIUM SERPL-MCNC: 2.1 MG/DL — SIGNIFICANT CHANGE UP (ref 1.6–2.6)
MCHC RBC-ENTMCNC: 29.3 PG — SIGNIFICANT CHANGE UP (ref 27–34)
MCHC RBC-ENTMCNC: 31.7 GM/DL — LOW (ref 32–36)
MCV RBC AUTO: 92.6 FL — SIGNIFICANT CHANGE UP (ref 80–100)
NRBC # BLD: 0 /100 WBCS — SIGNIFICANT CHANGE UP
NRBC # FLD: 0 K/UL — SIGNIFICANT CHANGE UP
PHOSPHATE SERPL-MCNC: 3.3 MG/DL — SIGNIFICANT CHANGE UP (ref 2.5–4.5)
PLATELET # BLD AUTO: 157 K/UL — SIGNIFICANT CHANGE UP (ref 150–400)
POTASSIUM SERPL-MCNC: 4.2 MMOL/L — SIGNIFICANT CHANGE UP (ref 3.5–5.3)
POTASSIUM SERPL-SCNC: 4.2 MMOL/L — SIGNIFICANT CHANGE UP (ref 3.5–5.3)
RBC # BLD: 4.84 M/UL — SIGNIFICANT CHANGE UP (ref 4.2–5.8)
RBC # FLD: 13.2 % — SIGNIFICANT CHANGE UP (ref 10.3–14.5)
SODIUM SERPL-SCNC: 139 MMOL/L — SIGNIFICANT CHANGE UP (ref 135–145)
SPECIMEN SOURCE: SIGNIFICANT CHANGE UP
WBC # BLD: 5.66 K/UL — SIGNIFICANT CHANGE UP (ref 3.8–10.5)
WBC # FLD AUTO: 5.66 K/UL — SIGNIFICANT CHANGE UP (ref 3.8–10.5)

## 2021-01-19 PROCEDURE — 95720 EEG PHY/QHP EA INCR W/VEEG: CPT

## 2021-01-19 PROCEDURE — 95718 EEG PHYS/QHP 2-12 HR W/VEEG: CPT

## 2021-01-19 PROCEDURE — 99233 SBSQ HOSP IP/OBS HIGH 50: CPT

## 2021-01-19 RX ORDER — ACETAMINOPHEN 500 MG
975 TABLET ORAL ONCE
Refills: 0 | Status: COMPLETED | OUTPATIENT
Start: 2021-01-19 | End: 2021-01-19

## 2021-01-19 RX ADMIN — HEPARIN SODIUM 5000 UNIT(S): 5000 INJECTION INTRAVENOUS; SUBCUTANEOUS at 16:41

## 2021-01-19 RX ADMIN — SODIUM CHLORIDE 3 MILLILITER(S): 9 INJECTION INTRAMUSCULAR; INTRAVENOUS; SUBCUTANEOUS at 05:39

## 2021-01-19 RX ADMIN — SODIUM CHLORIDE 3 MILLILITER(S): 9 INJECTION INTRAMUSCULAR; INTRAVENOUS; SUBCUTANEOUS at 14:28

## 2021-01-19 RX ADMIN — HEPARIN SODIUM 5000 UNIT(S): 5000 INJECTION INTRAVENOUS; SUBCUTANEOUS at 05:39

## 2021-01-19 RX ADMIN — Medication 975 MILLIGRAM(S): at 19:38

## 2021-01-19 RX ADMIN — SODIUM CHLORIDE 3 MILLILITER(S): 9 INJECTION INTRAMUSCULAR; INTRAVENOUS; SUBCUTANEOUS at 21:06

## 2021-01-19 NOTE — PROGRESS NOTE ADULT - SUBJECTIVE AND OBJECTIVE BOX
[facility] Division of Hospital Medicine  Angelic Ocasio MD  Pager [phone]    Patient is a 86y old  Male who presents with a chief complaint of syncope       SUBJECTIVE / OVERNIGHT EVENTS: reporting dizziness this AM; states had 3 BM, soft; vEEG in progress      MEDICATIONS  (STANDING):  heparin   Injectable 5000 Unit(s) SubCutaneous every 12 hours  sodium chloride 0.9% lock flush 3 milliLiter(s) IV Push every 8 hours        PHYSICAL EXAM:  Vital Signs Last 24 Hrs  T(F): 97.4 (19 Jan 2021 05:38), Max: 97.5 (18 Jan 2021 20:22)  HR: 60 (19 Jan 2021 05:38) (60 - 80)  BP: 121/83 (19 Jan 2021 05:38) (113/73 - 135/66)  RR: 16 (19 Jan 2021 05:38) (16 - 16)  SpO2: 99% (19 Jan 2021 05:38) (99% - 100%)    CONSTITUTIONAL: NAD  RESPIRATORY: Normal respiratory effort; lungs are clear to auscultation ant/lat  CARDIOVASCULAR: Regular rate and rhythm; No lower extremity edema;   ABDOMEN: Nontender to palpation, normoactive bowel sounds  MUSCULOSKELETAL: no clubbing or cyanosis of digits;   PSYCH: affect appropriate  NEUROLOGY: no gross sensory deficits       LABS:                        14.2   5.66  )-----------( 157      ( 19 Jan 2021 07:10 )             44.8     BMP P

## 2021-01-19 NOTE — EEG REPORT - NS EEG TEXT BOX
[facility]   COMPREHENSIVE EPILEPSY CENTER   REPORT OF CONTINUOUS VIDEO EEG     [facility]: [address], [place] [address], Ph#: [phone]  [facility]: [address], [place] [address], Ph#: [phone]  Office: [address], [place] [address], [place] [address] Ph#: [phone]    Patient Name: TENNILLE MAHMOOD  Age and : 86y ([birth year])  MRN #: [id number]  Location: [facility] [address]     Start Date: 12:31pm 21  End Date:  8am 21      _____________________________________________________________  TECHNICAL INFORMATION    EEG Recording Technique:  The patient underwent continuous Video-EEG monitoring, using Telemetry System hardware on the XLTek Digital System. EEG and video data were stored on a computer hard drive with important events saved in digital archive files. The material was reviewed by a physician (electroencephalographer / epileptologist) on a daily basis. Luigi and seizure detection algorithms were utilized and reviewed. An EEG Technician attended to the patient, and was available throughout daytime work hours.  The epilepsy center neurologist was available in person or on call 24-hours per day.    EEG Placement and Labeling of Electrodes:  The EEG was performed utilizing 20 channel referential EEG connections (coronal over temporal over parasagittal montage) using all standard 10-20 electrode placements with EKG, with additional electrodes placed in the inferior temporal region using the modified 10-10 montage electrode placements for elective admissions, or if deemed necessary. Recording was at a sampling rate of 256 samples per second per channel. Time synchronized digital video recording was done simultaneously with EEG recording. A low light infrared camera was used for low light recording.     _____________________________________________________________  HISTORY:  Patient is a 86y old  Male who presents with a chief complaint of syncope (2021 09:10)      MEDICATIONS  (STANDING):  heparin   Injectable 5000 Unit(s) SubCutaneous every 12 hours  sodium chloride 0.9% lock flush 3 milliLiter(s) IV Push every 8 hours    _____________________________________________________________  STUDY INTERPRETATION    Background:  The background was continuous, spontaneously variable, reactive, and predominantly consisted of alpha activities.  During wakefulness, the posteriorly dominant rhythm consisted of symmetric, well-modulated 8-9Hz activity,     Sleep:  Drowsiness was characterized by fragmentation, attenuation, and slowing of the background activity.    Sleep was characterized by the presence of symmetric and normal vertex waves, symmetric spindles, and K-complexes.    Background slowing  none (within broad limits of normal)    Focal slowing:   subtle intermittent left frontal theta/delta slowing, polymorphic    Non-epileptiform Paroxysmal Abnormalities:  None    Interictal Epileptiform Abnormalities:   None     Ictal Abnormalities:   No clinical events.  No electrographic seizures.    Artifacts:  Intermittent myogenic and movement artifacts were noted.    ECG:  The heart rate on single channel ECG was predominantly between 55-80BPM.    _____________________________________________________________  EEG CLASSIFICATION/SUMMARY:    Abnormal EEG in the awake, drowsy, and asleep states due to:  -subtle intermittent left frontal theta/delta slowing, polymorphic  _____________________________________________________________  CLINICAL IMPRESSION:    Structural or functional cerebral abnormality in the left frontal region.  No epileptic pattern or seizure seen.      Kiko Cartwright MD PhD  Epilepsy Attending  [facility] [place]

## 2021-01-19 NOTE — EEG REPORT - NS EEG TEXT BOX
[facility]   COMPREHENSIVE EPILEPSY CENTER   REPORT OF CONTINUOUS VIDEO EEG     [facility]: [address], [place] [address], Ph#: [phone]  [facility]: [address], [place] [address], Ph#: [phone]  Office: [address] [place] [address], [place] [address], [place] [address] Ph#: [phone]    Patient Name: TENNILLE MAHMOOD  Age and : 86y ([birth year])  MRN #: [id number]  Location: [place] [id number]     Start Date: 8AM 21  End Date:  2pm 21  Duration: 6hr      _____________________________________________________________  TECHNICAL INFORMATION    EEG Recording Technique:  The patient underwent continuous Video-EEG monitoring, using Telemetry System hardware on the XLTek Digital System. EEG and video data were stored on a computer hard drive with important events saved in digital archive files. The material was reviewed by a physician (electroencephalographer / epileptologist) on a daily basis. Luigi and seizure detection algorithms were utilized and reviewed. An EEG Technician attended to the patient, and was available throughout daytime work hours.  The epilepsy center neurologist was available in person or on call 24-hours per day.    EEG Placement and Labeling of Electrodes:  The EEG was performed utilizing 20 channel referential EEG connections (coronal over temporal over parasagittal montage) using all standard 10-20 electrode placements with EKG, with additional electrodes placed in the inferior temporal region using the modified 10-10 montage electrode placements for elective admissions, or if deemed necessary. Recording was at a sampling rate of 256 samples per second per channel. Time synchronized digital video recording was done simultaneously with EEG recording. A low light infrared camera was used for low light recording.     _____________________________________________________________  HISTORY:  Patient is a 86y old  Male who presents with a chief complaint of syncope (2021 09:10)      MEDICATIONS  (STANDING):  heparin   Injectable 5000 Unit(s) SubCutaneous every 12 hours  sodium chloride 0.9% lock flush 3 milliLiter(s) IV Push every 8 hours    _____________________________________________________________  STUDY INTERPRETATION    Background:  The background was continuous, spontaneously variable, reactive, and predominantly consisted of alpha activities.  During wakefulness, the posteriorly dominant rhythm consisted of symmetric, well-modulated 8-9Hz activity,     Sleep:  Drowsiness was characterized by fragmentation, attenuation, and slowing of the background activity.    Sleep was characterized by the presence of symmetric and normal vertex waves, symmetric spindles, and K-complexes.    Background slowing  none (within broad limits of normal)    Focal slowing:   subtle intermittent left frontal theta/delta slowing, polymorphic    Non-epileptiform Paroxysmal Abnormalities:  None    Interictal Epileptiform Abnormalities:   None     Ictal Abnormalities:   No clinical events.  No electrographic seizures.    Artifacts:  Intermittent myogenic and movement artifacts were noted.    ECG:  The heart rate on single channel ECG was predominantly between 55-80BPM.    _____________________________________________________________  EEG CLASSIFICATION/SUMMARY:    Abnormal EEG in the awake, drowsy, and asleep states due to:  -subtle intermittent left frontal theta/delta slowing, polymorphic  _____________________________________________________________  CLINICAL IMPRESSION:    Structural or functional cerebral abnormality in the left frontal region.  No epileptic pattern or seizure seen.    [place] MD

## 2021-01-19 NOTE — PROGRESS NOTE ADULT - SUBJECTIVE AND OBJECTIVE BOX
Neurology consult: 85yo man with PMH of prostate cancer (s/p resection 16 years ago, now with incontinence) presents to the ED after an episode of AMS. Patient is a poor historian. Patient reports that earlier in the day he was sitting on the couch when he suddenly had LOC with a fall resulting in head trauma. Patient does not recall events after he regained consciousness. Patient states he had a similar event a week ago, was evaluated at [facility], and was started on vancomycin for a stomach virus and episodes of diarrhea. Patient at this time reports bifrontal headache due to the fall and generalized weakness. 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 Rest of ROS and history was limited as patient states "I don't know, I don't remember."    Review of Systems:  As states in HPI.        PMHx:   Prostate cancer      PFHx:     PSuHx:   No significant past surgical history        Medications:  MEDICATIONS  (STANDING):    MEDICATIONS  (PRN):      Vitals:  T(C): 36.6  HR: 72  BP: 138/78  RR: 16    Physical Examination: limited due to lethargy and pain  Neurologic:  - Mental Status: Alert, awake, oriented to person, place, but not time; Speech is very hypophonic, fluent with intact comprehension  - Cranial Nerves: Visual fields are full to confrontation; Pupils are equal, round, and reactive to light; Extraocular movements are intact without nystagmus, primary gaze; Face appears grossly symmetric; Hearing diminished in the R;   - Motor: Strength is 3/5 with bicep flexion b/l, 2/5 with hip flexion b/l but possibly effort-limited. Normal tone throughout. No tremors noted.  - Reflexes: 2+ and symmetric at the biceps and knees. Plantar responses down bilaterally.  - Sensory: Intact throughout to light touch  - Coordination: Patient unable to complete task  - Gait: Deferred    Labs:                        13.7   8.07  )-----------( 186      ( 17 Jan 2021 16:57 )             43.2     01-17    140  |  104  |  16  ----------------------------<  86  3.7   |  25  |  1.04    Ca    9.0      17 Jan 2021 17:00  Phos  2.5     01-17  Mg     2.2     01-17    TPro  7.3  /  Alb  4.1  /  TBili  0.3  /  DBili  x   /  AST  28  /  ALT  28  /  AlkPhos  116  01-17    CAPILLARY BLOOD GLUCOSE        LIVER FUNCTIONS - ( 17 Jan 2021 17:00 )  Alb: 4.1 g/dL / Pro: 7.3 g/dL / ALK PHOS: 116 U/L / ALT: 28 U/L / AST: 28 U/L / GGT: x             PT/INR - ( 17 Jan 2021 16:57 )   PT: 11.7 sec;   INR: 1.02 ratio    PTT - ( 17 Jan 2021 16:57 )  PTT:27.4 sec      Radiology:  CT Head No Cont:  (17 Jan 2021 16:54)    IMPRESSION:    CTA NECK:  No significant stenosis of the cervical carotid arteries based on NASCET criteria. 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 Vertebral arteries are otherwise patent to the skullbase.    CTA HEAD: No high-grade stenosis or occlusion of the major proximal arterial branches. No evidence of an intracranial arterial aneurysm or arteriovenous malformation on CTA.    CT BRAIN: No abnormal intracranial enhancement. 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    If there is continued concern for intracranial metastatic disease, contrast-enhanced MRI of the brain should be considered.        Assessment and Recommendation:   · Assessment	  85yo man with PMH of prostate cancer (s/p resection 16 years ago, now with incontinence) presents to the ED after an episode of AMS with syncope. Patient had a similar event a week ago and was started on vancomycin for diarrhea. Physical exam remarkable for AMS and lethargy. Labs remarkable for elevated lactate and trace proteinuria. 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    Impression: AMS with syncope possibly due to seizure activity with post-ictal confusion, r/o infectious and metabolic triggers or metastatic disease vs. r/o cardiogenic etiology including arrhythmia vs. competing delirium in the setting of post-traumatic pain.    Recommendations:  [] Continuous vEEG  [] MRI brain w/w/o contrast w/epilepsy protocol  [] Telemetry monitoring  [] TTE w/bubble study  [] Will defer starting AEDs pending further workup as seizure-like events may have been provoked  [] Can consider Ativan 2mg for GTCs or seizure activity >3mins  [] Pain management as per primary team  [] BP goal normotension  [] BG   [] DVT ppx  [] Frequent reorientation  [] Seizure/fall precautions    Schoenberg, Laura Gray (MD)

## 2021-01-20 LAB
ANION GAP SERPL CALC-SCNC: 6 MMOL/L — LOW (ref 7–14)
BUN SERPL-MCNC: 20 MG/DL — SIGNIFICANT CHANGE UP (ref 7–23)
CALCIUM SERPL-MCNC: 8.9 MG/DL — SIGNIFICANT CHANGE UP (ref 8.4–10.5)
CHLORIDE SERPL-SCNC: 104 MMOL/L — SIGNIFICANT CHANGE UP (ref 98–107)
CO2 SERPL-SCNC: 27 MMOL/L — SIGNIFICANT CHANGE UP (ref 22–31)
CREAT SERPL-MCNC: 0.8 MG/DL — SIGNIFICANT CHANGE UP (ref 0.5–1.3)
GLUCOSE SERPL-MCNC: 89 MG/DL — SIGNIFICANT CHANGE UP (ref 70–99)
HCT VFR BLD CALC: 43.7 % — SIGNIFICANT CHANGE UP (ref 39–50)
HGB BLD-MCNC: 14 G/DL — SIGNIFICANT CHANGE UP (ref 13–17)
MAGNESIUM SERPL-MCNC: 2.2 MG/DL — SIGNIFICANT CHANGE UP (ref 1.6–2.6)
MCHC RBC-ENTMCNC: 29.5 PG — SIGNIFICANT CHANGE UP (ref 27–34)
MCHC RBC-ENTMCNC: 32 GM/DL — SIGNIFICANT CHANGE UP (ref 32–36)
MCV RBC AUTO: 92 FL — SIGNIFICANT CHANGE UP (ref 80–100)
NRBC # BLD: 0 /100 WBCS — SIGNIFICANT CHANGE UP
NRBC # FLD: 0 K/UL — SIGNIFICANT CHANGE UP
PHOSPHATE SERPL-MCNC: 3.3 MG/DL — SIGNIFICANT CHANGE UP (ref 2.5–4.5)
PLATELET # BLD AUTO: 146 K/UL — LOW (ref 150–400)
POTASSIUM SERPL-MCNC: 4 MMOL/L — SIGNIFICANT CHANGE UP (ref 3.5–5.3)
POTASSIUM SERPL-SCNC: 4 MMOL/L — SIGNIFICANT CHANGE UP (ref 3.5–5.3)
RBC # BLD: 4.75 M/UL — SIGNIFICANT CHANGE UP (ref 4.2–5.8)
RBC # FLD: 13.2 % — SIGNIFICANT CHANGE UP (ref 10.3–14.5)
SODIUM SERPL-SCNC: 137 MMOL/L — SIGNIFICANT CHANGE UP (ref 135–145)
WBC # BLD: 5.49 K/UL — SIGNIFICANT CHANGE UP (ref 3.8–10.5)
WBC # FLD AUTO: 5.49 K/UL — SIGNIFICANT CHANGE UP (ref 3.8–10.5)

## 2021-01-20 PROCEDURE — 99232 SBSQ HOSP IP/OBS MODERATE 35: CPT

## 2021-01-20 RX ORDER — LACTOBACILLUS ACIDOPHILUS 100MM CELL
1 CAPSULE ORAL DAILY
Refills: 0 | Status: DISCONTINUED | OUTPATIENT
Start: 2021-01-20 | End: 2021-01-24

## 2021-01-20 RX ORDER — LACTOBACILLUS ACIDOPH-L.BULGARICUS 1 MILLION CELL CHEWABLE TABLET 1MM CELL
1 TABLET,CHEWABLE ORAL DAILY
Refills: 0 | Status: DISCONTINUED | OUTPATIENT
Start: 2021-01-20 | End: 2021-01-20

## 2021-01-20 RX ADMIN — SODIUM CHLORIDE 3 MILLILITER(S): 9 INJECTION INTRAMUSCULAR; INTRAVENOUS; SUBCUTANEOUS at 04:57

## 2021-01-20 RX ADMIN — SODIUM CHLORIDE 3 MILLILITER(S): 9 INJECTION INTRAMUSCULAR; INTRAVENOUS; SUBCUTANEOUS at 12:50

## 2021-01-20 RX ADMIN — HEPARIN SODIUM 5000 UNIT(S): 5000 INJECTION INTRAVENOUS; SUBCUTANEOUS at 04:58

## 2021-01-20 RX ADMIN — SODIUM CHLORIDE 3 MILLILITER(S): 9 INJECTION INTRAMUSCULAR; INTRAVENOUS; SUBCUTANEOUS at 21:05

## 2021-01-20 RX ADMIN — HEPARIN SODIUM 5000 UNIT(S): 5000 INJECTION INTRAVENOUS; SUBCUTANEOUS at 16:41

## 2021-01-20 RX ADMIN — Medication 1 TABLET(S): at 12:50

## 2021-01-20 NOTE — PROGRESS NOTE ADULT - SUBJECTIVE AND OBJECTIVE BOX
[facility] Division of Hospital Medicine  Angelic Ocasio MD  Pager [phone]    Patient is a 86y old  Male who presents with a chief complaint of syncope      SUBJECTIVE / OVERNIGHT EVENTS: still reports episodes of dizziness with position change; eating well per RN      MEDICATIONS  (STANDING):  heparin   Injectable 5000 Unit(s) SubCutaneous every 12 hours  lactobacillus acidophilus 1 Tablet(s) Oral daily  sodium chloride 0.9% lock flush 3 milliLiter(s) IV Push every 8 hours          PHYSICAL EXAM:  Vital Signs Last 24 Hrs  T(F): 97.6 (20 Jan 2021 04:57), Max: 97.6 (20 Jan 2021 04:57)  HR: 52 (20 Jan 2021 04:57) (52 - 52)  BP: 153/61 (20 Jan 2021 04:57) (153/61 - 153/61)  RR: 16 (20 Jan 2021 09:11) (16 - 18)  SpO2: 100% (20 Jan 2021 09:11) (97% - 100%)    CONSTITUTIONAL: NAD  RESPIRATORY: Normal respiratory effort; lungs are clear to auscultation ant/lat  CARDIOVASCULAR: Regular rate and rhythm; No lower extremity edema;   ABDOMEN: Nontender to palpation, normoactive bowel sounds  MUSCULOSKELETAL:  no joint swelling or tenderness to palpation  PSYCH: affect appropriate  NEUROLOGY: no gross sensory deficits       LABS:                        14.0   5.49  )-----------( 146      ( 20 Jan 2021 08:53 )             43.7     01-20    137  |  104  |  20  ----------------------------<  89  4.0   |  27  |  0.80    Ca    8.9      20 Jan 2021 08:53  Phos  3.3     01-20  Mg     2.2     01-20                Culture - Urine (collected 18 Jan 2021 15:30)  Source: .Urine Catheterized  Final Report (19 Jan 2021 13:07):    No growth

## 2021-01-21 ENCOUNTER — TRANSCRIPTION ENCOUNTER (OUTPATIENT)
Age: 86
End: 2021-01-21

## 2021-01-21 LAB
ANION GAP SERPL CALC-SCNC: 9 MMOL/L — SIGNIFICANT CHANGE UP (ref 7–14)
BUN SERPL-MCNC: 19 MG/DL — SIGNIFICANT CHANGE UP (ref 7–23)
CALCIUM SERPL-MCNC: 9 MG/DL — SIGNIFICANT CHANGE UP (ref 8.4–10.5)
CHLORIDE SERPL-SCNC: 103 MMOL/L — SIGNIFICANT CHANGE UP (ref 98–107)
CO2 SERPL-SCNC: 26 MMOL/L — SIGNIFICANT CHANGE UP (ref 22–31)
CREAT SERPL-MCNC: 0.79 MG/DL — SIGNIFICANT CHANGE UP (ref 0.5–1.3)
GLUCOSE SERPL-MCNC: 95 MG/DL — SIGNIFICANT CHANGE UP (ref 70–99)
HCT VFR BLD CALC: 43.9 % — SIGNIFICANT CHANGE UP (ref 39–50)
HGB BLD-MCNC: 14.3 G/DL — SIGNIFICANT CHANGE UP (ref 13–17)
MAGNESIUM SERPL-MCNC: 2.2 MG/DL — SIGNIFICANT CHANGE UP (ref 1.6–2.6)
MCHC RBC-ENTMCNC: 29.4 PG — SIGNIFICANT CHANGE UP (ref 27–34)
MCHC RBC-ENTMCNC: 32.6 GM/DL — SIGNIFICANT CHANGE UP (ref 32–36)
MCV RBC AUTO: 90.3 FL — SIGNIFICANT CHANGE UP (ref 80–100)
NRBC # BLD: 0 /100 WBCS — SIGNIFICANT CHANGE UP
NRBC # FLD: 0 K/UL — SIGNIFICANT CHANGE UP
PHOSPHATE SERPL-MCNC: 3.6 MG/DL — SIGNIFICANT CHANGE UP (ref 2.5–4.5)
PLATELET # BLD AUTO: 149 K/UL — LOW (ref 150–400)
POTASSIUM SERPL-MCNC: 4.3 MMOL/L — SIGNIFICANT CHANGE UP (ref 3.5–5.3)
POTASSIUM SERPL-SCNC: 4.3 MMOL/L — SIGNIFICANT CHANGE UP (ref 3.5–5.3)
RBC # BLD: 4.86 M/UL — SIGNIFICANT CHANGE UP (ref 4.2–5.8)
RBC # FLD: 13.1 % — SIGNIFICANT CHANGE UP (ref 10.3–14.5)
SODIUM SERPL-SCNC: 138 MMOL/L — SIGNIFICANT CHANGE UP (ref 135–145)
WBC # BLD: 5.44 K/UL — SIGNIFICANT CHANGE UP (ref 3.8–10.5)
WBC # FLD AUTO: 5.44 K/UL — SIGNIFICANT CHANGE UP (ref 3.8–10.5)

## 2021-01-21 PROCEDURE — 99232 SBSQ HOSP IP/OBS MODERATE 35: CPT

## 2021-01-21 PROCEDURE — 93306 TTE W/DOPPLER COMPLETE: CPT | Mod: 26

## 2021-01-21 RX ADMIN — Medication 1 TABLET(S): at 16:38

## 2021-01-21 RX ADMIN — SODIUM CHLORIDE 3 MILLILITER(S): 9 INJECTION INTRAMUSCULAR; INTRAVENOUS; SUBCUTANEOUS at 05:35

## 2021-01-21 RX ADMIN — SODIUM CHLORIDE 3 MILLILITER(S): 9 INJECTION INTRAMUSCULAR; INTRAVENOUS; SUBCUTANEOUS at 12:48

## 2021-01-21 RX ADMIN — SODIUM CHLORIDE 3 MILLILITER(S): 9 INJECTION INTRAMUSCULAR; INTRAVENOUS; SUBCUTANEOUS at 20:12

## 2021-01-21 NOTE — DISCHARGE NOTE PROVIDER - PROVIDER TOKENS
FREE:[LAST:[Dr. Jensen],PHONE:[(   )    -],FAX:[(   )    -]] FREE:[LAST:[Dr. Jensen],PHONE:[(   )    -],FAX:[(   )    -]],FREE:[LAST:[[facility]],PHONE:[phone]],FAX:[(   )    -],ADDRESS:[[address] [place] [address]]] FREE:[LAST:[Dr. Cruz],PHONE:[(   )    -],FAX:[(   )    -]]

## 2021-01-21 NOTE — DISCHARGE NOTE PROVIDER - NSDCMRMEDTOKEN_GEN_ALL_CORE_FT
Rollator:   VANCOMYCIN  MG CAPSULE: 1 cap(s) orally 4 times a day   aspirin 81 mg oral tablet, chewable: 1 tab(s) orally once a day  lactobacillus acidophilus oral capsule: 1 tab(s) orally once a day   meclizine 12.5 mg oral tablet: 1 tab(s) orally every 6 hours, As needed, Dizziness  Rollator:   sodium chloride 0.65% nasal spray: 1 spray(s) nasal every 6 hours, As Needed

## 2021-01-21 NOTE — DISCHARGE NOTE PROVIDER - NSDCCPCAREPLAN_GEN_ALL_CORE_FT
PRINCIPAL DISCHARGE DIAGNOSIS  Diagnosis: AMS (altered mental status)  Assessment and Plan of Treatment: You were evaluated by neurology and underwent an EEG that rules out seizures and a CT head.      SECONDARY DISCHARGE DIAGNOSES  Diagnosis: Diarrhea  Assessment and Plan of Treatment: resolved     PRINCIPAL DISCHARGE DIAGNOSIS  Diagnosis: AMS (altered mental status)  Assessment and Plan of Treatment: You were evaluated by neurology and underwent an EEG that rules out seizures and a CT head.  You were started on aspirin - you have stenosis   It is recommended that your follow up with neurology outpatient      SECONDARY DISCHARGE DIAGNOSES  Diagnosis: Orthostatic hypotension  Assessment and Plan of Treatment: Continue TEDS stocking    Diagnosis: Diarrhea  Assessment and Plan of Treatment: resolved     PRINCIPAL DISCHARGE DIAGNOSIS  Diagnosis: AMS (altered mental status)  Assessment and Plan of Treatment: You were evaluated by neurology and underwent an EEG that rules out seizures and a CT head.  You were started on aspirin - you have stenosis   It is recommended that your follow up with neurology outpatient in 1-2 weeks.      SECONDARY DISCHARGE DIAGNOSES  Diagnosis: Vertigo  Assessment and Plan of Treatment: continue Meclizine as needed for dizziness.    Diagnosis: Orthostatic hypotension  Assessment and Plan of Treatment: Continue TEDS stocking    Diagnosis: Diarrhea  Assessment and Plan of Treatment: resolved     PRINCIPAL DISCHARGE DIAGNOSIS  Diagnosis: AMS (altered mental status)  Assessment and Plan of Treatment: You were evaluated by neurology and underwent an EEG that rules out seizures and a CT head.  You were started on aspirin - you have stenosis      SECONDARY DISCHARGE DIAGNOSES  Diagnosis: Vertigo  Assessment and Plan of Treatment: continue Meclizine as needed for dizziness.  Follow up with ENT, call to schedule an appointment    Diagnosis: Orthostatic hypotension  Assessment and Plan of Treatment: Continue TEDS stocking    Diagnosis: Diarrhea  Assessment and Plan of Treatment: resolved

## 2021-01-21 NOTE — PROGRESS NOTE ADULT - SUBJECTIVE AND OBJECTIVE BOX
[facility] Division of Hospital Medicine  Angelic Ocasio MD  Pager [phone]    Patient is a 86y old  Male who presents with a chief complaint of syncope       SUBJECTIVE / OVERNIGHT EVENTS: still reporting some dizziness with position change      MEDICATIONS  (STANDING):  heparin   Injectable 5000 Unit(s) SubCutaneous every 12 hours  lactobacillus acidophilus 1 Tablet(s) Oral daily  sodium chloride 0.9% lock flush 3 milliLiter(s) IV Push every 8 hours        PHYSICAL EXAM:  Vital Signs Last 24 Hrs  T(F): 97.8 (21 Jan 2021 06:36), Max: 97.8 (21 Jan 2021 06:36)  HR: 54 (21 Jan 2021 06:36) (54 - 54)  BP: 142/76 (21 Jan 2021 06:36) (142/76 - 142/76)  RR: 17 (21 Jan 2021 06:36) (17 - 17)  SpO2: 100% (21 Jan 2021 06:36) (100% - 100%)    CONSTITUTIONAL: NAD  RESPIRATORY: Normal respiratory effort; lungs are clear to auscultation ant/lat  CARDIOVASCULAR: Regular rate and rhythm; No lower extremity edema;   ABDOMEN: Nontender to palpation, normoactive bowel sounds  MUSCULOSKELETAL:  no joint swelling or tenderness to palpation  PSYCH: affect appropriate  NEUROLOGY: no gross sensory deficits       LABS:                        14.3   5.44  )-----------( 149      ( 21 Jan 2021 06:47 )             43.9     01-21    138  |  103  |  19  ----------------------------<  95  4.3   |  26  |  0.79    Ca    9.0      21 Jan 2021 06:47  Phos  3.6     01-21  Mg     2.2     01-21                Culture - Urine (collected 18 Jan 2021 15:30)  Source: .Urine Catheterized  Final Report (19 Jan 2021 13:07):    No growth

## 2021-01-21 NOTE — DISCHARGE NOTE PROVIDER - CARE PROVIDER_API CALL
Dr. Jensen,   Phone: (   )    -  Fax: (   )    -  Follow Up Time:    Dr. Jensen,   Phone: (   )    -  Fax: (   )    -  Follow Up Time:     [facility],   [address] [place] [address]  Phone: [phone]  Fax: (   )    -  Follow Up Time:    Dr. Cruz,   Phone: (   )    -  Fax: (   )    -  Follow Up Time:

## 2021-01-21 NOTE — DISCHARGE NOTE PROVIDER - HOSPITAL COURSE
87yo man with PMH of prostate cancer (s/p resection 16 years ago, now with incontinence) presents to the ED after an episode of AMS with syncope. Neurology consulted. 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 TTE negative. CT Angio Neck noted severe stenosis at the origin of the left vertebral artery and mild stenosis at the origin of the right vertebral artery. Neurology recommended MRI Head for further workup however pt has an implanted urological sphrinter that is not compartible. Pt continued on Aspirin and atorvastin. Pt treated with Meclizine, clinically improved. Pt was evaluated by PT, recommended Home PT, pt received a rollator.

## 2021-01-21 NOTE — DISCHARGE NOTE PROVIDER - NSFOLLOWUPCLINICS_GEN_ALL_ED_FT
[facility] - ENT  Otolaryngology (ENT)  [address] [place] [address]  Phone: [phone]  Fax:   Follow Up Time:

## 2021-01-22 ENCOUNTER — TRANSCRIPTION ENCOUNTER (OUTPATIENT)
Age: 86
End: 2021-01-22

## 2021-01-22 DIAGNOSIS — I95.1 ORTHOSTATIC HYPOTENSION: ICD-10-CM

## 2021-01-22 LAB
ANION GAP SERPL CALC-SCNC: 11 MMOL/L — SIGNIFICANT CHANGE UP (ref 7–14)
BUN SERPL-MCNC: 24 MG/DL — HIGH (ref 7–23)
CALCIUM SERPL-MCNC: 8.9 MG/DL — SIGNIFICANT CHANGE UP (ref 8.4–10.5)
CHLORIDE SERPL-SCNC: 101 MMOL/L — SIGNIFICANT CHANGE UP (ref 98–107)
CO2 SERPL-SCNC: 25 MMOL/L — SIGNIFICANT CHANGE UP (ref 22–31)
CREAT SERPL-MCNC: 1.02 MG/DL — SIGNIFICANT CHANGE UP (ref 0.5–1.3)
GLUCOSE SERPL-MCNC: 89 MG/DL — SIGNIFICANT CHANGE UP (ref 70–99)
HCT VFR BLD CALC: 43.1 % — SIGNIFICANT CHANGE UP (ref 39–50)
HGB BLD-MCNC: 14 G/DL — SIGNIFICANT CHANGE UP (ref 13–17)
MAGNESIUM SERPL-MCNC: 2.2 MG/DL — SIGNIFICANT CHANGE UP (ref 1.6–2.6)
MCHC RBC-ENTMCNC: 29.6 PG — SIGNIFICANT CHANGE UP (ref 27–34)
MCHC RBC-ENTMCNC: 32.5 GM/DL — SIGNIFICANT CHANGE UP (ref 32–36)
MCV RBC AUTO: 91.1 FL — SIGNIFICANT CHANGE UP (ref 80–100)
NRBC # BLD: 0 /100 WBCS — SIGNIFICANT CHANGE UP
NRBC # FLD: 0 K/UL — SIGNIFICANT CHANGE UP
PHOSPHATE SERPL-MCNC: 3.7 MG/DL — SIGNIFICANT CHANGE UP (ref 2.5–4.5)
PLATELET # BLD AUTO: 158 K/UL — SIGNIFICANT CHANGE UP (ref 150–400)
POTASSIUM SERPL-MCNC: 4.4 MMOL/L — SIGNIFICANT CHANGE UP (ref 3.5–5.3)
POTASSIUM SERPL-SCNC: 4.4 MMOL/L — SIGNIFICANT CHANGE UP (ref 3.5–5.3)
RBC # BLD: 4.73 M/UL — SIGNIFICANT CHANGE UP (ref 4.2–5.8)
RBC # FLD: 13.2 % — SIGNIFICANT CHANGE UP (ref 10.3–14.5)
SODIUM SERPL-SCNC: 137 MMOL/L — SIGNIFICANT CHANGE UP (ref 135–145)
WBC # BLD: 5.96 K/UL — SIGNIFICANT CHANGE UP (ref 3.8–10.5)
WBC # FLD AUTO: 5.96 K/UL — SIGNIFICANT CHANGE UP (ref 3.8–10.5)

## 2021-01-22 PROCEDURE — 99233 SBSQ HOSP IP/OBS HIGH 50: CPT

## 2021-01-22 RX ORDER — MECLIZINE HCL 12.5 MG
12.5 TABLET ORAL EVERY 6 HOURS
Refills: 0 | Status: DISCONTINUED | OUTPATIENT
Start: 2021-01-22 | End: 2021-01-24

## 2021-01-22 RX ORDER — ASPIRIN/CALCIUM CARB/MAGNESIUM 324 MG
81 TABLET ORAL DAILY
Refills: 0 | Status: DISCONTINUED | OUTPATIENT
Start: 2021-01-22 | End: 2021-01-24

## 2021-01-22 RX ORDER — SODIUM CHLORIDE 0.65 %
1 AEROSOL, SPRAY (ML) NASAL EVERY 6 HOURS
Refills: 0 | Status: DISCONTINUED | OUTPATIENT
Start: 2021-01-22 | End: 2021-01-24

## 2021-01-22 RX ADMIN — Medication 1 TABLET(S): at 09:01

## 2021-01-22 RX ADMIN — HEPARIN SODIUM 5000 UNIT(S): 5000 INJECTION INTRAVENOUS; SUBCUTANEOUS at 06:07

## 2021-01-22 RX ADMIN — Medication 81 MILLIGRAM(S): at 12:26

## 2021-01-22 RX ADMIN — SODIUM CHLORIDE 3 MILLILITER(S): 9 INJECTION INTRAMUSCULAR; INTRAVENOUS; SUBCUTANEOUS at 21:15

## 2021-01-22 RX ADMIN — SODIUM CHLORIDE 3 MILLILITER(S): 9 INJECTION INTRAMUSCULAR; INTRAVENOUS; SUBCUTANEOUS at 08:55

## 2021-01-22 RX ADMIN — SODIUM CHLORIDE 3 MILLILITER(S): 9 INJECTION INTRAMUSCULAR; INTRAVENOUS; SUBCUTANEOUS at 06:07

## 2021-01-22 RX ADMIN — HEPARIN SODIUM 5000 UNIT(S): 5000 INJECTION INTRAVENOUS; SUBCUTANEOUS at 16:14

## 2021-01-22 NOTE — DISCHARGE NOTE NURSING/CASE MANAGEMENT/SOCIAL WORK - PATIENT PORTAL LINK FT
You can access the FollowMyHealth Patient Portal offered by [facility] by registering at the following website: [email or web address]. By joining [facility]’s FollowMyHealth portal, you will also be able to view your health information using other applications (apps) compatible with our system.

## 2021-01-22 NOTE — PROGRESS NOTE ADULT - SUBJECTIVE AND OBJECTIVE BOX
[facility] Division of Hospital Medicine  Angelic Ocasio MD  Pager [phone]    Patient is a 86y old  Male who presents with a chief complaint of syncope      SUBJECTIVE / OVERNIGHT EVENTS: upon my arrival this AM, pt having similar episode of dizziness that brought him to the hospital while lying in bed; pt had not been out of bed recently; pt unable to describe the feeling he is having, unable to tell me if the room is spinning though I believe it may have been as he kept his eyes closed throughout the episode;  it was shortlived, approx 3-5 min; then pt back to baseline, sitting up, eating breakfast, shortly thereafter walked with PT with rollator      MEDICATIONS  (STANDING):  aspirin  chewable 81 milliGRAM(s) Oral daily  heparin   Injectable 5000 Unit(s) SubCutaneous every 12 hours  lactobacillus acidophilus 1 Tablet(s) Oral daily  sodium chloride 0.9% lock flush 3 milliLiter(s) IV Push every 8 hours    MEDICATIONS  (PRN):  meclizine 12.5 milliGRAM(s) Oral every 6 hours PRN Dizziness      PHYSICAL EXAM:  Vital Signs Last 24 Hrs  T(F): 98 (22 Jan 2021 06:05), Max: 98 (22 Jan 2021 06:05)  HR: 69 (22 Jan 2021 06:05) (64 - 69)  BP: 110/69 (22 Jan 2021 06:05) (110/69 - 117/73)  RR: 16 (22 Jan 2021 06:05) (16 - 16)  SpO2: 100% (22 Jan 2021 06:05) (98% - 100%)    CONSTITUTIONAL: NAD  RESPIRATORY: Normal respiratory effort; lungs are clear to auscultation bilaterally  CARDIOVASCULAR: Regular rate and rhythm; No lower extremity edema;   ABDOMEN: Nontender to palpation, normoactive bowel sounds  MUSCULOSKELETAL: no clubbing or cyanosis of digits;  PSYCH: affect appropriate  NEUROLOGY: no gross sensory deficits       LABS:                        14.0   5.96  )-----------( 158      ( 22 Jan 2021 07:24 )             43.1     01-22    137  |  101  |  24<H>  ----------------------------<  89  4.4   |  25  |  1.02    Ca    8.9      22 Jan 2021 07:24  Phos  3.7     01-22  Mg     2.2     01-22

## 2021-01-23 PROCEDURE — 99232 SBSQ HOSP IP/OBS MODERATE 35: CPT

## 2021-01-23 RX ADMIN — HEPARIN SODIUM 5000 UNIT(S): 5000 INJECTION INTRAVENOUS; SUBCUTANEOUS at 16:59

## 2021-01-23 RX ADMIN — Medication 1 SPRAY(S): at 05:48

## 2021-01-23 RX ADMIN — HEPARIN SODIUM 5000 UNIT(S): 5000 INJECTION INTRAVENOUS; SUBCUTANEOUS at 05:45

## 2021-01-23 RX ADMIN — SODIUM CHLORIDE 3 MILLILITER(S): 9 INJECTION INTRAMUSCULAR; INTRAVENOUS; SUBCUTANEOUS at 05:46

## 2021-01-23 RX ADMIN — Medication 81 MILLIGRAM(S): at 09:05

## 2021-01-23 RX ADMIN — SODIUM CHLORIDE 3 MILLILITER(S): 9 INJECTION INTRAMUSCULAR; INTRAVENOUS; SUBCUTANEOUS at 12:47

## 2021-01-23 RX ADMIN — SODIUM CHLORIDE 3 MILLILITER(S): 9 INJECTION INTRAMUSCULAR; INTRAVENOUS; SUBCUTANEOUS at 20:40

## 2021-01-23 RX ADMIN — Medication 1 TABLET(S): at 09:05

## 2021-01-23 RX ADMIN — Medication 1 SPRAY(S): at 17:18

## 2021-01-23 NOTE — PROGRESS NOTE ADULT - PROBLEM SELECTOR PLAN 3
resolved  completed PO vanco
Vancomycin started at [facility] for diarrhea two weeks ago/with possible C-diff (family unsure of exact diagnosis. most likely c-diff given antibiotic course)  cont vancomycin PO for 1 more day  per RN, patient's most recent BM was fully formed.  Nursing to send samples for c-diff if the samples meet criteria (loose stools, if patient develops fever). Contact isolation precautions for now  1/18  - no c/o diarrhea today
confirm freq of BM
resolved  completed PO vanco

## 2021-01-23 NOTE — PROGRESS NOTE ADULT - PROBLEM SELECTOR PLAN 6
improved with CHICHO stockings  slow position changes
improved with CHICHO stockings  slow position changes

## 2021-01-23 NOTE — PROGRESS NOTE ADULT - ATTENDING COMMENTS
await ECHO  CHICHO stocking to help with orthostatics, careful position changes
pt medically stable for dc  meclizine PRN for home  home PT  recommend outpt ENT eval for vertigo

## 2021-01-23 NOTE — PROGRESS NOTE ADULT - PROBLEM SELECTOR PLAN 1
unclear etiology  pt with episode this AM while lying in bed of same dizziness  w/u so far negative  MRA with severe stenosis of L vertebral artery, neuro f/u for possible connection to this finding with pt's dizziness episodes, start ASA  unable to perform MRI due to implanted urinary sphincter  trial of meclizine  consider ENT eval as outpt for vertigo eval
Admit to tele  check cbc, bmp, a1c, flp, tsh, trend CE  Syncope v seizure with post ictal phase. 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   CTH showed: old left occiptal and b/l cerebellar infarcts.   Neuro eval appreciated   Echo ordered   MR brain with/without contrast ordered   seizure/fall precautions  1/18  - f/u EEG results  - pending MRI, echocardiogram  - neuro input noted  CXR ordered  check orthostatics  EEG
cont tele  check cbc, bmp, a1c, flp, tsh, trend CE  Syncope v seizure with post ictal phase.   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   CTH showed: old left occiptal and b/l cerebellar infarcts.   Neuro eval appreciated   Echo P  MR brain with/without contrast P   seizure/fall precautions  VEEG P  check orthostatics
cont tele  check cbc, bmp, a1c, flp, tsh, trend CE  Syncope v seizure with post ictal phase.   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   CTH showed: old left occiptal and b/l cerebellar infarcts.   Neuro eval appreciated   Echo P  MR brain with/without contrast P   seizure/fall precautions  VEEG with area of slowing c/w area of encephalomalacia  check orthostatics
cont tele  check cbc, bmp, a1c, flp, tsh, trend CE  Syncope v seizure with post ictal phase.   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   CTH showed: old left occiptal and b/l cerebellar infarcts.   Neuro eval appreciated   Echo P  MR brain with/without contrast unable to be done due to implant   seizure/fall precautions  VEEG with area of slowing c/w area of encephalomalacia  check orthostatics
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

## 2021-01-23 NOTE — PROGRESS NOTE ADULT - ASSESSMENT
86 M being managed for poss syncope vs seizure
86 M being managed for poss syncope vs seizure
86 M being managed for syncope / seizure
86 M being managed for poss syncope vs seizure

## 2021-01-23 NOTE — PROGRESS NOTE ADULT - SUBJECTIVE AND OBJECTIVE BOX
[facility] Division of Hospital Medicine  Angelic Ocasio MD  Pager [phone]    Patient is a 86y old  Male who presents with a chief complaint of syncope      SUBJECTIVE / OVERNIGHT EVENTS: no further dizzy episodes; d/w pt unable to get MRI bc of implanted sphincter but neuro does not feel his symptoms are due to vert artery stenosis seen on MRA      MEDICATIONS  (STANDING):  aspirin  chewable 81 milliGRAM(s) Oral daily  heparin   Injectable 5000 Unit(s) SubCutaneous every 12 hours  lactobacillus acidophilus 1 Tablet(s) Oral daily  sodium chloride 0.9% lock flush 3 milliLiter(s) IV Push every 8 hours    MEDICATIONS  (PRN):  meclizine 12.5 milliGRAM(s) Oral every 6 hours PRN Dizziness  sodium chloride 0.65% Nasal 1 Spray(s) Both Nostrils every 6 hours PRN Nasal Congestion        PHYSICAL EXAM:  Vital Signs Last 24 Hrs  T(F): 98.3 (23 Jan 2021 05:44), Max: 98.3 (23 Jan 2021 05:44)  HR: 62 (23 Jan 2021 05:44) (62 - 77)  BP: 113/71 (23 Jan 2021 05:44) (105/63 - 113/71)  RR: 16 (23 Jan 2021 05:44) (16 - 16)  SpO2: 100% (23 Jan 2021 05:44) (100% - 100%)    CONSTITUTIONAL: NAD  RESPIRATORY: Normal respiratory effort; lungs are clear to auscultation ant  CARDIOVASCULAR: Regular rate and rhythm; No lower extremity edema;   ABDOMEN: Nontender to palpation, normoactive bowel sounds  MUSCULOSKELETAL: no clubbing or cyanosis of digits;  PSYCH:  affect appropriate  NEUROLOGY: no gross sensory deficits       LABS:

## 2021-01-23 NOTE — PROGRESS NOTE ADULT - PROBLEM SELECTOR PLAN 2
Currently patient is A&Ox3, and seems to have improvement in mental status  no evidence that this was sz  infectious w/u unremarkable  CXR neg  monitor for fevers  - resolved
Currently patient is A&Ox3, and seems to have improvement in mental status  Encephalopathy could have been secondary to possible seizure with post ictal phase.   infectious w/u unremarkable  CXR  monitor for fevers  - resolved
Currently patient is A&Ox3, and seems to have improvement in mental status  Encephalopathy could have been secondary to possible seizure with post ictal phase.   infectious w/u unremarkable  CXR  monitor for fevers  - resolved
Currently patient is A&Ox3, and seems to have improvement in mental status  Encephalopathy could have been secondary to possible seizure with post ictal phase. Will rule out infectious etiology.  CXR  monitor for fevers  - resolved
Currently patient is A&Ox3, and seems to have improvement in mental status  Encephalopathy could have been secondary to possible seizure with post ictal phase.   infectious w/u unremarkable  CXR  monitor for fevers  - resolved
Currently patient is A&Ox3, and seems to have improvement in mental status  Encephalopathy could have been secondary to possible seizure with post ictal phase. Will rule out infectious etiology.  check CXR  monitor for fevers  Follow UC  Lactae: 3.0 --> 1.4  1/18  - resolved

## 2021-01-23 NOTE — PROGRESS NOTE ADULT - PROBLEM/PLAN-1
Labs normal.  No change to plan.
DISPLAY PLAN FREE TEXT

## 2021-01-23 NOTE — PROGRESS NOTE ADULT - PROBLEM SELECTOR PLAN 4
hep sc for VTE px

## 2021-01-23 NOTE — PROGRESS NOTE ADULT - PROBLEM SELECTOR PLAN 5
1.  Name of PCP:  2.  PCP Contacted on Admission: [ ] Y    [ ] N    3.  PCP contacted at Discharge: [ ] Y    [ ] N    [ ] N/A  4.  Post-Discharge Appointment Date and Location:  5.  Summary of Handoff given to PCP:

## 2021-01-24 VITALS
RESPIRATION RATE: 18 BRPM | OXYGEN SATURATION: 100 % | DIASTOLIC BLOOD PRESSURE: 77 MMHG | SYSTOLIC BLOOD PRESSURE: 115 MMHG

## 2021-01-24 PROCEDURE — 99239 HOSP IP/OBS DSCHRG MGMT >30: CPT

## 2021-01-24 RX ORDER — ASPIRIN/CALCIUM CARB/MAGNESIUM 324 MG
1 TABLET ORAL
Qty: 30 | Refills: 0
Start: 2021-01-24

## 2021-01-24 RX ORDER — LACTOBACILLUS ACIDOPHILUS 100MM CELL
1 CAPSULE ORAL
Qty: 30 | Refills: 0
Start: 2021-01-24

## 2021-01-24 RX ORDER — VANCOMYCIN HCL 1 G
1 VIAL (EA) INTRAVENOUS
Qty: 0 | Refills: 0 | DISCHARGE

## 2021-01-24 RX ORDER — SODIUM CHLORIDE 0.65 %
1 AEROSOL, SPRAY (ML) NASAL
Qty: 1 | Refills: 0
Start: 2021-01-24

## 2021-01-24 RX ORDER — MECLIZINE HCL 12.5 MG
1 TABLET ORAL
Qty: 30 | Refills: 0
Start: 2021-01-24

## 2021-01-24 RX ADMIN — SODIUM CHLORIDE 3 MILLILITER(S): 9 INJECTION INTRAMUSCULAR; INTRAVENOUS; SUBCUTANEOUS at 05:32

## 2021-01-24 RX ADMIN — Medication 81 MILLIGRAM(S): at 08:36

## 2021-01-24 RX ADMIN — Medication 1 SPRAY(S): at 05:34

## 2021-01-24 RX ADMIN — Medication 12.5 MILLIGRAM(S): at 05:33

## 2021-01-24 RX ADMIN — SODIUM CHLORIDE 3 MILLILITER(S): 9 INJECTION INTRAMUSCULAR; INTRAVENOUS; SUBCUTANEOUS at 09:45

## 2021-01-24 RX ADMIN — HEPARIN SODIUM 5000 UNIT(S): 5000 INJECTION INTRAVENOUS; SUBCUTANEOUS at 05:33

## 2021-01-24 RX ADMIN — Medication 1 TABLET(S): at 08:36

## 2021-01-24 NOTE — CHART NOTE - NSCHARTNOTEFT_GEN_A_CORE
Call placed to patient urologist to confirm implanted sphincter. Urology office could not confirm make or model of implanted device.   Patient reports he uses a trigger button in order to use the sphincter.   Patient recommended for MRI brain by neurology.   As per [facility] MRI supervisor and further discussion with radiology attendings - not appropriate for MRI at this time due to implanted device.  Neurology aware. Per neurology start asa, order placed.
spoke with son and daughter in law this AM about dc for pt  son and daughter in law state pt lives alone and they cannot stay with him for a few days because they work  when I inquired what the long term plan is for dad, the daughter in law states he needs 24 hr nursing care at home  informed daughter in law that pt likely will not qualify for 24 hr care as he is ambulatory and able to manage his daily needs  advised family this should have been mentioned when discussed with SW yesterday about medicaid application as this is difficult to initiate on the weekend  pt will remain in hospital for CM eval for poss home care  pt is medically stable for dc home  36 min spent with dc planning
87yo man with PMH of prostate cancer (s/p resection 16 years ago, now with incontinence) presents to the ED after an episode of AMS with syncope.    EEG w/ L. 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 V1 stenosis  TTE negative    Impression: LOC events of unclear etiology. EEG w/o evidence of seizures. 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 Nontheless, will rec MRI brain to rule out new infarct.     Plan:   [] MR brain w/o contrast  [] Continue ASA, Atorvastatin 80mg (titrate to LDL < 70)   [] F/u w/ Stroke Neurology clinic at [phone]; [address] [place] [address]
pt seen and examined by me  informed pt plan for dc today  asked to wait until Monday to speak to the  for medicaid  pt informed that medicaid referral was made and he will get a call, no reason to stay in the hospital for this  no further episodes of dizziness  will dc with meclizine for episodes  pt to f/u with ENT for poss etiology for vertigo  medically stable for dc home  36 min spent with dc planning
spoke with son, Caden  number put in provider handoff  son gave number for REINA Montanez to clarify implant to see if MRI can be done  update given that w/u still in progress but only finding is stenosis L vertebral artery, await neuro f/u

## 2021-03-18 ENCOUNTER — APPOINTMENT (OUTPATIENT)
Dept: OPHTHALMOLOGY | Facility: CLINIC | Age: 86
End: 2021-03-18
Payer: MEDICARE

## 2021-03-18 ENCOUNTER — NON-APPOINTMENT (OUTPATIENT)
Age: 86
End: 2021-03-18

## 2021-03-18 PROCEDURE — 92250 FUNDUS PHOTOGRAPHY W/I&R: CPT

## 2021-03-18 PROCEDURE — 92004 COMPRE OPH EXAM NEW PT 1/>: CPT

## 2021-03-22 ENCOUNTER — APPOINTMENT (OUTPATIENT)
Dept: OPHTHALMOLOGY | Facility: CLINIC | Age: 86
End: 2021-03-22

## 2021-04-03 ENCOUNTER — NON-APPOINTMENT (OUTPATIENT)
Age: 86
End: 2021-04-03

## 2021-04-03 ENCOUNTER — APPOINTMENT (OUTPATIENT)
Dept: OPHTHALMOLOGY | Facility: CLINIC | Age: 86
End: 2021-04-03
Payer: MEDICARE

## 2021-04-03 PROCEDURE — 92136 OPHTHALMIC BIOMETRY: CPT

## 2021-04-03 PROCEDURE — 92012 INTRM OPH EXAM EST PATIENT: CPT

## 2021-04-03 PROCEDURE — 92133 CPTRZD OPH DX IMG PST SGM ON: CPT

## 2021-04-28 ENCOUNTER — APPOINTMENT (OUTPATIENT)
Dept: OPHTHALMOLOGY | Facility: EYE CENTER | Age: 86
End: 2021-04-28

## 2021-04-29 ENCOUNTER — APPOINTMENT (OUTPATIENT)
Dept: OPHTHALMOLOGY | Facility: CLINIC | Age: 86
End: 2021-04-29

## 2021-05-11 ENCOUNTER — NON-APPOINTMENT (OUTPATIENT)
Age: 86
End: 2021-05-11

## 2021-05-11 ENCOUNTER — APPOINTMENT (OUTPATIENT)
Dept: OPHTHALMOLOGY | Facility: CLINIC | Age: 86
End: 2021-05-11
Payer: MEDICARE

## 2021-05-11 PROCEDURE — 99072 ADDL SUPL MATRL&STAF TM PHE: CPT

## 2021-05-11 PROCEDURE — 92012 INTRM OPH EXAM EST PATIENT: CPT

## 2021-05-21 ENCOUNTER — NON-APPOINTMENT (OUTPATIENT)
Age: 86
End: 2021-05-21

## 2021-05-21 ENCOUNTER — APPOINTMENT (OUTPATIENT)
Dept: OPHTHALMOLOGY | Facility: EYE CENTER | Age: 86
End: 2021-05-21
Payer: MEDICARE

## 2021-05-21 PROCEDURE — 66984 XCAPSL CTRC RMVL W/O ECP: CPT | Mod: RT

## 2021-05-22 ENCOUNTER — APPOINTMENT (OUTPATIENT)
Dept: OPHTHALMOLOGY | Facility: CLINIC | Age: 86
End: 2021-05-22
Payer: MEDICARE

## 2021-05-22 ENCOUNTER — NON-APPOINTMENT (OUTPATIENT)
Age: 86
End: 2021-05-22

## 2021-05-22 PROCEDURE — 99024 POSTOP FOLLOW-UP VISIT: CPT

## 2021-05-25 ENCOUNTER — APPOINTMENT (OUTPATIENT)
Dept: OPHTHALMOLOGY | Facility: CLINIC | Age: 86
End: 2021-05-25
Payer: MEDICARE

## 2021-05-25 ENCOUNTER — NON-APPOINTMENT (OUTPATIENT)
Age: 86
End: 2021-05-25

## 2021-05-25 PROCEDURE — 99024 POSTOP FOLLOW-UP VISIT: CPT

## 2021-06-18 ENCOUNTER — NON-APPOINTMENT (OUTPATIENT)
Age: 86
End: 2021-06-18

## 2021-06-18 ENCOUNTER — APPOINTMENT (OUTPATIENT)
Dept: OPHTHALMOLOGY | Facility: EYE CENTER | Age: 86
End: 2021-06-18
Payer: MEDICARE

## 2021-06-18 PROCEDURE — 66982 XCAPSL CTRC RMVL CPLX WO ECP: CPT | Mod: 79,LT

## 2021-06-24 ENCOUNTER — APPOINTMENT (OUTPATIENT)
Dept: OPHTHALMOLOGY | Facility: CLINIC | Age: 86
End: 2021-06-24
Payer: MEDICARE

## 2021-06-24 ENCOUNTER — NON-APPOINTMENT (OUTPATIENT)
Age: 86
End: 2021-06-24

## 2021-06-24 PROCEDURE — 99024 POSTOP FOLLOW-UP VISIT: CPT

## 2021-07-19 ENCOUNTER — APPOINTMENT (OUTPATIENT)
Dept: OPHTHALMOLOGY | Facility: CLINIC | Age: 86
End: 2021-07-19
Payer: MEDICARE

## 2021-07-19 ENCOUNTER — NON-APPOINTMENT (OUTPATIENT)
Age: 86
End: 2021-07-19

## 2021-07-19 PROCEDURE — 92015 DETERMINE REFRACTIVE STATE: CPT | Mod: NC

## 2021-07-19 PROCEDURE — 99024 POSTOP FOLLOW-UP VISIT: CPT

## 2021-11-08 NOTE — ED ADULT NURSE NOTE - NS ED NOTE  TALK SOMEONE YN
Mother wanted to know what the next step was for allergies.    Appointment made with allergist.    Becca Linton RN on 11/8/2021 at 8:14 AM    
No

## 2022-06-28 ENCOUNTER — TRANSCRIPTION ENCOUNTER (OUTPATIENT)
Age: 87
End: 2022-06-28

## 2022-06-28 ENCOUNTER — RESULT REVIEW (OUTPATIENT)
Age: 87
End: 2022-06-28

## 2022-06-28 ENCOUNTER — INPATIENT (INPATIENT)
Facility: HOSPITAL | Age: 87
LOS: 7 days | Discharge: NURSING, REHAB OR LONG-TERM CARE | End: 2022-07-06
Attending: UROLOGY | Admitting: UROLOGY
Payer: MEDICARE

## 2022-06-28 VITALS
HEART RATE: 77 BPM | DIASTOLIC BLOOD PRESSURE: 61 MMHG | SYSTOLIC BLOOD PRESSURE: 116 MMHG | OXYGEN SATURATION: 100 % | TEMPERATURE: 99 F | RESPIRATION RATE: 18 BRPM

## 2022-06-28 DIAGNOSIS — Z96.0 PRESENCE OF UROGENITAL IMPLANTS: Chronic | ICD-10-CM

## 2022-06-28 DIAGNOSIS — Z98.890 OTHER SPECIFIED POSTPROCEDURAL STATES: Chronic | ICD-10-CM

## 2022-06-28 DIAGNOSIS — N49.2 INFLAMMATORY DISORDERS OF SCROTUM: ICD-10-CM

## 2022-06-28 LAB
ALBUMIN SERPL ELPH-MCNC: 4.2 G/DL — SIGNIFICANT CHANGE UP (ref 3.3–5)
ALP SERPL-CCNC: 127 U/L — HIGH (ref 40–120)
ALT FLD-CCNC: 50 U/L — HIGH (ref 4–41)
ANION GAP SERPL CALC-SCNC: 16 MMOL/L — HIGH (ref 7–14)
AST SERPL-CCNC: 36 U/L — SIGNIFICANT CHANGE UP (ref 4–40)
B PERT DNA SPEC QL NAA+PROBE: SIGNIFICANT CHANGE UP
B PERT+PARAPERT DNA PNL SPEC NAA+PROBE: SIGNIFICANT CHANGE UP
BASOPHILS # BLD AUTO: 0.02 K/UL — SIGNIFICANT CHANGE UP (ref 0–0.2)
BASOPHILS NFR BLD AUTO: 0.2 % — SIGNIFICANT CHANGE UP (ref 0–2)
BILIRUB SERPL-MCNC: 0.4 MG/DL — SIGNIFICANT CHANGE UP (ref 0.2–1.2)
BLD GP AB SCN SERPL QL: NEGATIVE — SIGNIFICANT CHANGE UP
BORDETELLA PARAPERTUSSIS (RAPRVP): SIGNIFICANT CHANGE UP
BUN SERPL-MCNC: 14 MG/DL — SIGNIFICANT CHANGE UP (ref 7–23)
C PNEUM DNA SPEC QL NAA+PROBE: SIGNIFICANT CHANGE UP
CALCIUM SERPL-MCNC: 9.3 MG/DL — SIGNIFICANT CHANGE UP (ref 8.4–10.5)
CHLORIDE SERPL-SCNC: 100 MMOL/L — SIGNIFICANT CHANGE UP (ref 98–107)
CO2 SERPL-SCNC: 20 MMOL/L — LOW (ref 22–31)
CREAT SERPL-MCNC: 1.28 MG/DL — SIGNIFICANT CHANGE UP (ref 0.5–1.3)
EGFR: 55 ML/MIN/1.73M2 — LOW
EOSINOPHIL # BLD AUTO: 0.08 K/UL — SIGNIFICANT CHANGE UP (ref 0–0.5)
EOSINOPHIL NFR BLD AUTO: 0.7 % — SIGNIFICANT CHANGE UP (ref 0–6)
FLUAV SUBTYP SPEC NAA+PROBE: SIGNIFICANT CHANGE UP
FLUBV RNA SPEC QL NAA+PROBE: SIGNIFICANT CHANGE UP
GLUCOSE SERPL-MCNC: 133 MG/DL — HIGH (ref 70–99)
HADV DNA SPEC QL NAA+PROBE: SIGNIFICANT CHANGE UP
HCOV 229E RNA SPEC QL NAA+PROBE: SIGNIFICANT CHANGE UP
HCOV HKU1 RNA SPEC QL NAA+PROBE: SIGNIFICANT CHANGE UP
HCOV NL63 RNA SPEC QL NAA+PROBE: SIGNIFICANT CHANGE UP
HCOV OC43 RNA SPEC QL NAA+PROBE: SIGNIFICANT CHANGE UP
HCT VFR BLD CALC: 34.7 % — LOW (ref 39–50)
HGB BLD-MCNC: 11.7 G/DL — LOW (ref 13–17)
HMPV RNA SPEC QL NAA+PROBE: SIGNIFICANT CHANGE UP
HPIV1 RNA SPEC QL NAA+PROBE: SIGNIFICANT CHANGE UP
HPIV2 RNA SPEC QL NAA+PROBE: SIGNIFICANT CHANGE UP
HPIV3 RNA SPEC QL NAA+PROBE: SIGNIFICANT CHANGE UP
HPIV4 RNA SPEC QL NAA+PROBE: SIGNIFICANT CHANGE UP
IANC: 8.16 K/UL — HIGH (ref 1.8–7.4)
IMM GRANULOCYTES NFR BLD AUTO: 0.4 % — SIGNIFICANT CHANGE UP (ref 0–1.5)
LYMPHOCYTES # BLD AUTO: 1.42 K/UL — SIGNIFICANT CHANGE UP (ref 1–3.3)
LYMPHOCYTES # BLD AUTO: 13.3 % — SIGNIFICANT CHANGE UP (ref 13–44)
M PNEUMO DNA SPEC QL NAA+PROBE: SIGNIFICANT CHANGE UP
MCHC RBC-ENTMCNC: 29.7 PG — SIGNIFICANT CHANGE UP (ref 27–34)
MCHC RBC-ENTMCNC: 33.7 GM/DL — SIGNIFICANT CHANGE UP (ref 32–36)
MCV RBC AUTO: 88.1 FL — SIGNIFICANT CHANGE UP (ref 80–100)
MONOCYTES # BLD AUTO: 0.96 K/UL — HIGH (ref 0–0.9)
MONOCYTES NFR BLD AUTO: 9 % — SIGNIFICANT CHANGE UP (ref 2–14)
NEUTROPHILS # BLD AUTO: 8.16 K/UL — HIGH (ref 1.8–7.4)
NEUTROPHILS NFR BLD AUTO: 76.4 % — SIGNIFICANT CHANGE UP (ref 43–77)
NRBC # BLD: 0 /100 WBCS — SIGNIFICANT CHANGE UP
NRBC # FLD: 0 K/UL — SIGNIFICANT CHANGE UP
PLATELET # BLD AUTO: 228 K/UL — SIGNIFICANT CHANGE UP (ref 150–400)
POTASSIUM SERPL-MCNC: 3.5 MMOL/L — SIGNIFICANT CHANGE UP (ref 3.5–5.3)
POTASSIUM SERPL-SCNC: 3.5 MMOL/L — SIGNIFICANT CHANGE UP (ref 3.5–5.3)
PROT SERPL-MCNC: 8.6 G/DL — HIGH (ref 6–8.3)
RAPID RVP RESULT: SIGNIFICANT CHANGE UP
RBC # BLD: 3.94 M/UL — LOW (ref 4.2–5.8)
RBC # FLD: 13 % — SIGNIFICANT CHANGE UP (ref 10.3–14.5)
RH IG SCN BLD-IMP: POSITIVE — SIGNIFICANT CHANGE UP
RSV RNA SPEC QL NAA+PROBE: SIGNIFICANT CHANGE UP
RV+EV RNA SPEC QL NAA+PROBE: SIGNIFICANT CHANGE UP
SARS-COV-2 RNA SPEC QL NAA+PROBE: SIGNIFICANT CHANGE UP
SODIUM SERPL-SCNC: 136 MMOL/L — SIGNIFICANT CHANGE UP (ref 135–145)
WBC # BLD: 10.68 K/UL — HIGH (ref 3.8–10.5)
WBC # FLD AUTO: 10.68 K/UL — HIGH (ref 3.8–10.5)

## 2022-06-28 PROCEDURE — 88300 SURGICAL PATH GROSS: CPT | Mod: 26,59

## 2022-06-28 PROCEDURE — 76857 US EXAM PELVIC LIMITED: CPT | Mod: 26

## 2022-06-28 PROCEDURE — 88304 TISSUE EXAM BY PATHOLOGIST: CPT | Mod: 26

## 2022-06-28 PROCEDURE — 76870 US EXAM SCROTUM: CPT | Mod: 26

## 2022-06-28 PROCEDURE — 99291 CRITICAL CARE FIRST HOUR: CPT

## 2022-06-28 RX ORDER — VANCOMYCIN HCL 1 G
1000 VIAL (EA) INTRAVENOUS ONCE
Refills: 0 | Status: COMPLETED | OUTPATIENT
Start: 2022-06-28 | End: 2022-06-28

## 2022-06-28 RX ORDER — LIDOCAINE HCL 20 MG/ML
10 VIAL (ML) INJECTION ONCE
Refills: 0 | Status: COMPLETED | OUTPATIENT
Start: 2022-06-28 | End: 2022-06-28

## 2022-06-28 RX ORDER — ACETAMINOPHEN 500 MG
650 TABLET ORAL ONCE
Refills: 0 | Status: COMPLETED | OUTPATIENT
Start: 2022-06-28 | End: 2022-06-28

## 2022-06-28 RX ORDER — PIPERACILLIN AND TAZOBACTAM 4; .5 G/20ML; G/20ML
3.38 INJECTION, POWDER, LYOPHILIZED, FOR SOLUTION INTRAVENOUS ONCE
Refills: 0 | Status: COMPLETED | OUTPATIENT
Start: 2022-06-28 | End: 2022-06-28

## 2022-06-28 RX ORDER — MORPHINE SULFATE 50 MG/1
4 CAPSULE, EXTENDED RELEASE ORAL ONCE
Refills: 0 | Status: DISCONTINUED | OUTPATIENT
Start: 2022-06-28 | End: 2022-06-28

## 2022-06-28 RX ADMIN — PIPERACILLIN AND TAZOBACTAM 200 GRAM(S): 4; .5 INJECTION, POWDER, LYOPHILIZED, FOR SOLUTION INTRAVENOUS at 19:57

## 2022-06-28 RX ADMIN — Medication 250 MILLIGRAM(S): at 21:57

## 2022-06-28 RX ADMIN — Medication 650 MILLIGRAM(S): at 19:58

## 2022-06-28 RX ADMIN — Medication 10 MILLILITER(S): at 19:23

## 2022-06-28 RX ADMIN — MORPHINE SULFATE 4 MILLIGRAM(S): 50 CAPSULE, EXTENDED RELEASE ORAL at 20:51

## 2022-06-28 NOTE — ED PROVIDER NOTE - PROGRESS NOTE DETAILS
Son PGY-2: Unable to place whiting 20 Fr and 18 Fr coude by myself and Dr. Hsu. Urology consulted for acute retention requiring whiting placement. Per urology provider eval, there appears to be a ?device that needs to be turned off prior to whiting placement, recommends US testicles. Currently pending CT a/p extended to scrotum. Low suspicion for kristen's but will cover with zosyn given rectal temp 100.8. Morphine 4mg ordered for discomfort. Son PGY-2: Per urology patient to go to OR for drainage of abscess in scrotum. Already given zosyn, will add on vanc. Blood cultures pending. Will send rest of pre-ops.

## 2022-06-28 NOTE — H&P ADULT - NSICDXPASTMEDICALHX_GEN_ALL_CORE_FT
PAST MEDICAL HISTORY:  Afib     GERD (gastroesophageal reflux disease)     HTN (hypertension)     Hyperlipidemia     Prostate cancer      PAST MEDICAL HISTORY:  Afib     Cerebrovascular accident (CVA)     GERD (gastroesophageal reflux disease)     HTN (hypertension)     Hyperlipidemia     Prostate cancer 18 years ago

## 2022-06-28 NOTE — H&P ADULT - NSICDXPASTSURGICALHX_GEN_ALL_CORE_FT
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

## 2022-06-28 NOTE — H&P ADULT - GENITOURINARY COMMENTS
Hypopigmented scrotal skin,  left testicular tenderness, palpable device in left groin/scrotum Hypopigmented scrotal skin c/w vitiligo,  left testicular tenderness, palpable device in left groin/scrotum

## 2022-06-28 NOTE — ED PROVIDER NOTE - ATTENDING CONTRIBUTION TO CARE
86 yo m afib  on doac, htn, hld, prostate ca presents with abd pain, dysuria. reports having whiting removed 1 day ago unclear why it was initially placed. patient also reports chills and found to be febrile at time of my eval. exam as above. no crepitus to scrotum. in urinary retention unable to pass whiting. prosthetic in scrotum.  r/o kristen's. plan: urology consult, us, ct, abx labs,  admit

## 2022-06-28 NOTE — ED PROVIDER NOTE - PHYSICAL EXAMINATION
Gen - Uncomfortable appearing but nontoxic; A+Ox3   HEENT - NCAT, EOMI, moist mucous membranes  Neck - supple  Resp - CTAB, no increased WOB  CV -  RRR, no m/r/g  Abd - soft, ND, mildly tender over suprapubic abdomen; no guarding or rebound  Back - no CVA tenderness  MSK - 5/5 strength and FROM b/l UE and LE  Extrem - no LE edema/erythema/tenderness  Neuro - no focal motor or sensation deficits Gen - Uncomfortable appearing but nontoxic; A+Ox3   HEENT - NCAT, EOMI, moist mucous membranes  Neck - supple  Resp - CTAB, no increased WOB  CV -  RRR, no m/r/g  Abd - soft, ND, mildly tender over suprapubic abdomen; no guarding or rebound  Back - no CVA tenderness  MSK - 5/5 strength and FROM b/l UE and LE  Extrem - no LE edema/erythema/tenderness  Neuro - no focal motor or sensation deficits   (chaperone Dr. Hsu) - hypopigmentation, swelling, erythema to scrotum, minimally tender

## 2022-06-28 NOTE — H&P ADULT - ATTENDING COMMENTS
Pt in retention with scrotal abscess  unable to access pump- will need at least I&D scrotum with exploration,   removal of pump/tubing  full explant to follow after acute management  with decompression of AUS, will be able to place whiting, or will need SP tube  d/w son and pt, will proceed

## 2022-06-28 NOTE — H&P ADULT - PROBLEM SELECTOR PLAN 1
Admit under Dr Hoenig  Antibiotics  F/U urine and blood cultures  NPO  For scrotal exploration Admit under Dr Hoenig  Antibiotics  F/U urine and blood cultures  NPO  For scrotal exploration and likely removal of pump to AUS device

## 2022-06-28 NOTE — ED PROVIDER NOTE - CLINICAL SUMMARY MEDICAL DECISION MAKING FREE TEXT BOX
87 year old male with history of Afib on Eliquis, HTN, HLD, prostate CA per chart, presenting with abdominal pain and dysuria x 1d s/p whiting removal yesterday in @ urology clinic. 87 year old male with history of Afib on Eliquis, HTN, HLD, prostate CA per chart, presenting with abdominal pain and dysuria x 1d s/p whiting removal yesterday in @ urology clinic. Appears uncomfortable here but nontoxic, vitals wnl, on exam has scrotal redness/swelling, unclear chronicity (patient states he has had for years). Per POCUS, +urinary retention, will need whiting placed, will attempt but may need urological consult for coude placement given difficult hx

## 2022-06-28 NOTE — H&P ADULT - HISTORY OF PRESENT ILLNESS
87 y.o male with PMHx of CVA, HTN, Vertigo, GERD, Prostate cancer many years ago with a device placed after the surgery (son did not know the details) multiple recent UTI, recently admitted at [facility] for UTI, discharged with a whiting catheter that was removed by Dr iHrsch yesterday, presented to the ED with abdominal pain and retention. He denies any fever, nausea, vomiting.  In ED he was febrile to 87 y.o male with PMHx of CVA, HTN, Vertigo, GERD, Prostate cancer many years ago with a device placed after the surgery (son did not know the details) multiple recent UTI, recently admitted at [facility] for UTI, discharged with a whiting catheter that was removed by Dr Hirsch yesterday, presented to the ED with abdominal pain and retention. He denies any fever, nausea, vomiting.  In ED he was febrile to nearly 101.

## 2022-06-28 NOTE — ED ADULT TRIAGE NOTE - CHIEF COMPLAINT QUOTE
pt coming fropm home. pt c/o dysuria since this am.  pt had whiting removed yesterday and has pain ever since.  PMH: CVA 1 month ago

## 2022-06-28 NOTE — H&P ADULT - ASSESSMENT
87 y.o male presented to the ED with abdominal pain and difficulty urinating. He had a whiting that was removed yesterday by Dr Hirsch.

## 2022-06-28 NOTE — ED PROVIDER NOTE - OBJECTIVE STATEMENT
87 year old male with history of Afib on Eliquis, HTN, HLD, prostate CA per chart, presenting with abdominal pain and dysuria x 1d. States having whiting catheter removed yesterday @ [facility] urology office, has since not been able to urinate well since, feels burning pain with dribbling. Last attempt to void 2hr pta, minimal urine output. No fevers or chills. Uncomfortable here.

## 2022-06-29 DIAGNOSIS — C61 MALIGNANT NEOPLASM OF PROSTATE: ICD-10-CM

## 2022-06-29 DIAGNOSIS — E78.5 HYPERLIPIDEMIA, UNSPECIFIED: ICD-10-CM

## 2022-06-29 DIAGNOSIS — I48.91 UNSPECIFIED ATRIAL FIBRILLATION: ICD-10-CM

## 2022-06-29 DIAGNOSIS — I10 ESSENTIAL (PRIMARY) HYPERTENSION: ICD-10-CM

## 2022-06-29 DIAGNOSIS — I63.9 CEREBRAL INFARCTION, UNSPECIFIED: ICD-10-CM

## 2022-06-29 LAB
-  CTX-M RESISTANCE MARKER: SIGNIFICANT CHANGE UP
-  ESBL: SIGNIFICANT CHANGE UP
ANION GAP SERPL CALC-SCNC: 13 MMOL/L — SIGNIFICANT CHANGE UP (ref 7–14)
BUN SERPL-MCNC: 12 MG/DL — SIGNIFICANT CHANGE UP (ref 7–23)
CALCIUM SERPL-MCNC: 9.5 MG/DL — SIGNIFICANT CHANGE UP (ref 8.4–10.5)
CHLORIDE SERPL-SCNC: 102 MMOL/L — SIGNIFICANT CHANGE UP (ref 98–107)
CO2 SERPL-SCNC: 23 MMOL/L — SIGNIFICANT CHANGE UP (ref 22–31)
CREAT SERPL-MCNC: 1.05 MG/DL — SIGNIFICANT CHANGE UP (ref 0.5–1.3)
E COLI DNA BLD POS QL NAA+NON-PROBE: SIGNIFICANT CHANGE UP
EGFR: 69 ML/MIN/1.73M2 — SIGNIFICANT CHANGE UP
GLUCOSE SERPL-MCNC: 160 MG/DL — HIGH (ref 70–99)
GRAM STN FLD: SIGNIFICANT CHANGE UP
HCT VFR BLD CALC: 36.8 % — LOW (ref 39–50)
HGB BLD-MCNC: 11.9 G/DL — LOW (ref 13–17)
MAGNESIUM SERPL-MCNC: 2.2 MG/DL — SIGNIFICANT CHANGE UP (ref 1.6–2.6)
MCHC RBC-ENTMCNC: 29.5 PG — SIGNIFICANT CHANGE UP (ref 27–34)
MCHC RBC-ENTMCNC: 32.3 GM/DL — SIGNIFICANT CHANGE UP (ref 32–36)
MCV RBC AUTO: 91.3 FL — SIGNIFICANT CHANGE UP (ref 80–100)
METHOD TYPE: SIGNIFICANT CHANGE UP
NRBC # BLD: 0 /100 WBCS — SIGNIFICANT CHANGE UP
NRBC # FLD: 0 K/UL — SIGNIFICANT CHANGE UP
PHOSPHATE SERPL-MCNC: 3.5 MG/DL — SIGNIFICANT CHANGE UP (ref 2.5–4.5)
PLATELET # BLD AUTO: 200 K/UL — SIGNIFICANT CHANGE UP (ref 150–400)
POTASSIUM SERPL-MCNC: 4.2 MMOL/L — SIGNIFICANT CHANGE UP (ref 3.5–5.3)
POTASSIUM SERPL-SCNC: 4.2 MMOL/L — SIGNIFICANT CHANGE UP (ref 3.5–5.3)
RBC # BLD: 4.03 M/UL — LOW (ref 4.2–5.8)
RBC # FLD: 13.1 % — SIGNIFICANT CHANGE UP (ref 10.3–14.5)
SODIUM SERPL-SCNC: 138 MMOL/L — SIGNIFICANT CHANGE UP (ref 135–145)
WBC # BLD: 10.84 K/UL — HIGH (ref 3.8–10.5)
WBC # FLD AUTO: 10.84 K/UL — HIGH (ref 3.8–10.5)

## 2022-06-29 PROCEDURE — 99223 1ST HOSP IP/OBS HIGH 75: CPT

## 2022-06-29 PROCEDURE — 51702 INSERT TEMP BLADDER CATH: CPT | Mod: 59

## 2022-06-29 PROCEDURE — 54700 I&D EPDIDYMS TSTIS&/SCROT SP: CPT | Mod: LT

## 2022-06-29 PROCEDURE — 55120 REMOVAL OF SCROTUM LESION: CPT

## 2022-06-29 RX ORDER — VANCOMYCIN HCL 1 G
1000 VIAL (EA) INTRAVENOUS EVERY 8 HOURS
Refills: 0 | Status: DISCONTINUED | OUTPATIENT
Start: 2022-06-29 | End: 2022-06-29

## 2022-06-29 RX ORDER — APIXABAN 2.5 MG/1
5 TABLET, FILM COATED ORAL
Refills: 0 | Status: DISCONTINUED | OUTPATIENT
Start: 2022-06-29 | End: 2022-07-06

## 2022-06-29 RX ORDER — ACETAMINOPHEN 500 MG
800 TABLET ORAL ONCE
Refills: 0 | Status: DISCONTINUED | OUTPATIENT
Start: 2022-06-29 | End: 2022-06-29

## 2022-06-29 RX ORDER — OMEPRAZOLE 10 MG/1
0 CAPSULE, DELAYED RELEASE ORAL
Qty: 0 | Refills: 0 | DISCHARGE

## 2022-06-29 RX ORDER — HEPARIN SODIUM 5000 [USP'U]/ML
5000 INJECTION INTRAVENOUS; SUBCUTANEOUS EVERY 8 HOURS
Refills: 0 | Status: DISCONTINUED | OUTPATIENT
Start: 2022-06-29 | End: 2022-06-29

## 2022-06-29 RX ORDER — PANTOPRAZOLE SODIUM 20 MG/1
40 TABLET, DELAYED RELEASE ORAL
Refills: 0 | Status: DISCONTINUED | OUTPATIENT
Start: 2022-06-29 | End: 2022-07-06

## 2022-06-29 RX ORDER — ATORVASTATIN CALCIUM 80 MG/1
40 TABLET, FILM COATED ORAL AT BEDTIME
Refills: 0 | Status: DISCONTINUED | OUTPATIENT
Start: 2022-06-29 | End: 2022-07-06

## 2022-06-29 RX ORDER — LOSARTAN POTASSIUM 100 MG/1
50 TABLET, FILM COATED ORAL DAILY
Refills: 0 | Status: DISCONTINUED | OUTPATIENT
Start: 2022-06-29 | End: 2022-07-01

## 2022-06-29 RX ORDER — ONDANSETRON 8 MG/1
4 TABLET, FILM COATED ORAL ONCE
Refills: 0 | Status: DISCONTINUED | OUTPATIENT
Start: 2022-06-29 | End: 2022-06-29

## 2022-06-29 RX ORDER — PIPERACILLIN AND TAZOBACTAM 4; .5 G/20ML; G/20ML
3.38 INJECTION, POWDER, LYOPHILIZED, FOR SOLUTION INTRAVENOUS EVERY 8 HOURS
Refills: 0 | Status: DISCONTINUED | OUTPATIENT
Start: 2022-06-29 | End: 2022-06-30

## 2022-06-29 RX ORDER — ASPIRIN/CALCIUM CARB/MAGNESIUM 324 MG
81 TABLET ORAL DAILY
Refills: 0 | Status: DISCONTINUED | OUTPATIENT
Start: 2022-06-29 | End: 2022-07-06

## 2022-06-29 RX ORDER — OXYBUTYNIN CHLORIDE 5 MG
0 TABLET ORAL
Qty: 0 | Refills: 0 | DISCHARGE

## 2022-06-29 RX ORDER — ACETAMINOPHEN 500 MG
650 TABLET ORAL EVERY 6 HOURS
Refills: 0 | Status: DISCONTINUED | OUTPATIENT
Start: 2022-06-29 | End: 2022-07-06

## 2022-06-29 RX ORDER — AMLODIPINE BESYLATE 2.5 MG/1
5 TABLET ORAL DAILY
Refills: 0 | Status: DISCONTINUED | OUTPATIENT
Start: 2022-06-29 | End: 2022-06-29

## 2022-06-29 RX ORDER — MECLIZINE HCL 12.5 MG
12.5 TABLET ORAL DAILY
Refills: 0 | Status: DISCONTINUED | OUTPATIENT
Start: 2022-06-29 | End: 2022-07-06

## 2022-06-29 RX ADMIN — AMLODIPINE BESYLATE 5 MILLIGRAM(S): 2.5 TABLET ORAL at 05:52

## 2022-06-29 RX ADMIN — PIPERACILLIN AND TAZOBACTAM 25 GRAM(S): 4; .5 INJECTION, POWDER, LYOPHILIZED, FOR SOLUTION INTRAVENOUS at 07:19

## 2022-06-29 RX ADMIN — Medication 81 MILLIGRAM(S): at 12:00

## 2022-06-29 RX ADMIN — ATORVASTATIN CALCIUM 40 MILLIGRAM(S): 80 TABLET, FILM COATED ORAL at 22:27

## 2022-06-29 RX ADMIN — Medication 12.5 MILLIGRAM(S): at 19:07

## 2022-06-29 RX ADMIN — PIPERACILLIN AND TAZOBACTAM 25 GRAM(S): 4; .5 INJECTION, POWDER, LYOPHILIZED, FOR SOLUTION INTRAVENOUS at 15:05

## 2022-06-29 RX ADMIN — APIXABAN 5 MILLIGRAM(S): 2.5 TABLET, FILM COATED ORAL at 17:24

## 2022-06-29 RX ADMIN — Medication 650 MILLIGRAM(S): at 18:15

## 2022-06-29 RX ADMIN — LOSARTAN POTASSIUM 50 MILLIGRAM(S): 100 TABLET, FILM COATED ORAL at 05:51

## 2022-06-29 RX ADMIN — APIXABAN 5 MILLIGRAM(S): 2.5 TABLET, FILM COATED ORAL at 05:51

## 2022-06-29 RX ADMIN — PANTOPRAZOLE SODIUM 40 MILLIGRAM(S): 20 TABLET, DELAYED RELEASE ORAL at 07:19

## 2022-06-29 RX ADMIN — Medication 250 MILLIGRAM(S): at 05:51

## 2022-06-29 RX ADMIN — Medication 650 MILLIGRAM(S): at 17:24

## 2022-06-29 RX ADMIN — PIPERACILLIN AND TAZOBACTAM 25 GRAM(S): 4; .5 INJECTION, POWDER, LYOPHILIZED, FOR SOLUTION INTRAVENOUS at 22:27

## 2022-06-29 NOTE — CONSULT NOTE ADULT - SUBJECTIVE AND OBJECTIVE BOX
?PMD Dr. Ramsey    Patient is a 87y old  Male who presents with a chief complaint of Scrotal abscess (2022 07:52)    HPI: 87M CVA, HTN, Vertigo, GERD, Prostate cancer many years ago with a device placed after the surgery (son did not know the details) multiple recent UTI, recently admitted at [facility] for UTI, discharged with a whiting catheter that was removed by Dr Hirsch yesterday, presented to the ED with abdominal pain and retention. He denies any fever, nausea, vomiting.    Allergies: No Known Allergies    HOME MEDICATIONS:   ....    MEDICATIONS  (STANDING):  amLODIPine   Tablet 5 milliGRAM(s) Oral daily  apixaban 5 milliGRAM(s) Oral two times a day  aspirin enteric coated 81 milliGRAM(s) Oral daily  atorvastatin 40 milliGRAM(s) Oral at bedtime  losartan 50 milliGRAM(s) Oral daily  pantoprazole    Tablet 40 milliGRAM(s) Oral before breakfast  piperacillin/tazobactam IVPB.. 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, lives with son, uses walker  no tob/alcohol/drugs    FAMILY HISTORY:  mother  "normal sickness"  father  stroke  [x ] No pertinent family history in first degree relatives     REVIEW OF SYSTEMS:  CONSTITUTIONAL: No fever, weight loss, or fatigue  EYES: No eye pain, visual disturbances, or discharge  ENMT:  No difficulty hearing, tinnitus, vertigo; No sinus or throat pain  NECK: No pain or stiffness  BREASTS: No pain, masses, or nipple discharge  RESPIRATORY: No cough, wheezing, chills or hemoptysis; No shortness of breath  CARDIOVASCULAR: No chest pain, palpitations, dizziness, or leg swelling  GASTROINTESTINAL: No abdominal or epigastric pain. No nausea, vomiting, or hematemesis; No diarrhea or constipation. No melena or hematochezia.  GENITOURINARY: per HPI  NEUROLOGICAL: No headaches, memory loss, loss of strength, numbness, or tremors  SKIN: No itching, burning, rashes, or lesions   LYMPH NODES: No enlarged glands  ENDOCRINE: No heat or cold intolerance; No hair loss  MUSCULOSKELETAL: No muscle or back pain  PSYCHIATRIC: No depression, anxiety, mood swings, or difficulty sleeping  HEME/LYMPH: No easy bruising, or bleeding gums  ALLERGY AND IMMUNOLOGIC: No hives or eczema  [  ] All other ROS negative  [  ] Unable to obtain due to poor mental status    Vital Signs Last 24 Hrs  T(C): 36.5 (2022 10:26), Max: 38.2 (2022 19:49)  T(F): 97.7 (2022 10:), Max: 100.8 (2022 19:49)  HR: 79 (2022 10:) (62 - 82)  BP: 105/66 (2022 10:) (95/63 - 127/92)  BP(mean): 69 (2022 04:30) (68 - 100)  RR: 17 (2022 10:) (12 - 20)  SpO2: 98% (2022 10:26) (94% - 100%)    PHYSICAL EXAM:  GENERAL: thin, frail, elderly man, sitting up in bed, NAD  very [place]  HEAD:  Atraumatic, Normocephalic  EYES: EOMI, PERRLA, conjunctiva and sclera clear  ENMT: Moist mucous membranes  NECK: Supple, No JVD  RESPIRATORY: Clear to auscultation bilaterally; No rales, rhonchi, wheezing, or rubs  CARDIOVASCULAR: Regular rate and rhythm; No murmurs, rubs, or gallops  GASTROINTESTINAL: Soft, Nontender, Nondistended; Bowel sounds present  GENITOURINARY: Not examined  EXTREMITIES:  2+ Peripheral Pulses, No clubbing, cyanosis, or edema  NERVOUS SYSTEM:  nonfocal  HEME/LYMPH: No lymphadenopathy noted  SKIN: No rashes or lesions  PSYCH: calm, appropriate, oriented self, [facility], year "", [place]    LABS:                        11.9   10.84 )-----------( 200      ( 2022 09:31 )             36.8     06-    138  |  102  |  12  ----------------------------<  160<H>  4.2   |  23  |  1.05    Ca    9.5      2022 09:31  Phos  3.5       Mg     2.20         TPro  8.6<H>  /  Alb  4.2  /  TBili  0.4  /  DBili  x   /  AST  36  /  ALT  50<H>  /  AlkPhos  127<H>      RADIOLOGY & ADDITIONAL STUDIES:    EKG:   Personally Reviewed:  [ ] YES     Imaging:   Personally Reviewed:  [ ] YES               Consultant(s) notes reviewed:    Care Discussed with Consultant(s)/Other Providers: urology re overall care PMD Dr. Carter [phone]    Patient is a 87y old  Male who presents with a chief complaint of Scrotal abscess (2022 07:52)    HPI: 87M CVA, HTN, Vertigo, GERD, Prostate cancer many years ago with a device placed after the surgery (son did not know the details) multiple recent UTI, recently admitted at [facility] for UTI, discharged with a whiting catheter that was removed by Dr Hirsch yesterday, presented to the ED with abdominal pain and retention. He denies any fever, nausea, vomiting.    Allergies: No Known Allergies    HOME MEDICATIONS:   ASA 81  Eliquis 5 bid  atorvastatin 40  tamsulosin 0.8hs  pantoprazole 20  losartan 50    MEDICATIONS  (STANDING):  amLODIPine   Tablet 5 milliGRAM(s) Oral daily  apixaban 5 milliGRAM(s) Oral two times a day  aspirin enteric coated 81 milliGRAM(s) Oral daily  atorvastatin 40 milliGRAM(s) Oral at bedtime  losartan 50 milliGRAM(s) Oral daily  pantoprazole    Tablet 40 milliGRAM(s) Oral before breakfast  piperacillin/tazobactam IVPB.. 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, uses walker  no tob/alcohol/drugs    FAMILY HISTORY:  mother  "normal sickness"  father  stroke  [x ] No pertinent family history in first degree relatives     REVIEW OF SYSTEMS:  CONSTITUTIONAL: No fever, weight loss, or fatigue  EYES: No eye pain, visual disturbances, or discharge  ENMT:  No difficulty hearing, tinnitus, vertigo; No sinus or throat pain  NECK: No pain or stiffness  BREASTS: No pain, masses, or nipple discharge  RESPIRATORY: No cough, wheezing, chills or hemoptysis; No shortness of breath  CARDIOVASCULAR: No chest pain, palpitations, dizziness, or leg swelling  GASTROINTESTINAL: No abdominal or epigastric pain. No nausea, vomiting, or hematemesis; No diarrhea or constipation. No melena or hematochezia.  GENITOURINARY: per HPI  NEUROLOGICAL: No headaches, memory loss, loss of strength, numbness, or tremors  SKIN: No itching, burning, rashes, or lesions   LYMPH NODES: No enlarged glands  ENDOCRINE: No heat or cold intolerance; No hair loss  MUSCULOSKELETAL: No muscle or back pain  PSYCHIATRIC: No depression, anxiety, mood swings, or difficulty sleeping  HEME/LYMPH: No easy bruising, or bleeding gums  ALLERGY AND IMMUNOLOGIC: No hives or eczema  [  ] All other ROS negative  [  ] Unable to obtain due to poor mental status    Vital Signs Last 24 Hrs  T(C): 36.5 (2022 10:26), Max: 38.2 (2022 19:49)  T(F): 97.7 (2022 10:26), Max: 100.8 (2022 19:49)  HR: 79 (2022 10:26) (62 - 82)  BP: 105/66 (2022 10:26) (95/63 - 127/92)  BP(mean): 69 (2022 04:30) (68 - 100)  RR: 17 (2022 10:26) (12 - 20)  SpO2: 98% (2022 10:26) (94% - 100%)    PHYSICAL EXAM:  GENERAL: thin, frail, elderly man, sitting up in bed, NAD  very [place]  HEAD:  Atraumatic, Normocephalic  EYES: EOMI, PERRLA, conjunctiva and sclera clear  ENMT: Moist mucous membranes  NECK: Supple, No JVD  RESPIRATORY: Clear to auscultation bilaterally; No rales, rhonchi, wheezing, or rubs  CARDIOVASCULAR: Regular rate and rhythm; No murmurs, rubs, or gallops  GASTROINTESTINAL: Soft, Nontender, Nondistended; Bowel sounds present  GENITOURINARY: Not examined  EXTREMITIES:  2+ Peripheral Pulses, No clubbing, cyanosis, or edema  NERVOUS SYSTEM:  nonfocal  HEME/LYMPH: No lymphadenopathy noted  SKIN: No rashes or lesions  PSYCH: calm, appropriate, oriented self, [facility], year "23", [place]    LABS:                        11.9   10.84 )-----------( 200      ( 2022 09:31 )             36.8         138  |  102  |  12  ----------------------------<  160<H>  4.2   |  23  |  1.05    Ca    9.5      2022 09:31  Phos  3.5       Mg     2.20         TPro  8.6<H>  /  Alb  4.2  /  TBili  0.4  /  DBili  x   /  AST  36  /  ALT  50<H>  /  AlkPhos  127<H>      RADIOLOGY & ADDITIONAL STUDIES:    EKG:   Personally Reviewed:  [ ] YES     Imaging:   Personally Reviewed:  [ ] YES               Consultant(s) notes reviewed:    Care Discussed with Consultant(s)/Other Providers: urology re overall care

## 2022-06-29 NOTE — PROGRESS NOTE ADULT - SUBJECTIVE AND OBJECTIVE BOX
POD #1  Afeb 111/57 62 100%RA    Pt has no c/o  Abd- soft NT ND   Scrotum - penrose X 2; dressings intact  Alexis 800

## 2022-06-29 NOTE — PROGRESS NOTE ADULT - ASSESSMENT
A/P: 87y Male s/p scrotal exploration, I&D, partial removal of AUS  DVT prophylaxis/OOB  Incentive spirometry  Strict I&O's  Analgesia and antiemetics as needed  Diet  AM labs

## 2022-06-29 NOTE — PROGRESS NOTE ADULT - SUBJECTIVE AND OBJECTIVE BOX
Post op Check    Pt seen and examined c/o pain in the scrotum.  Denies SOB/CP/N/V.     Vital Signs Last 24 Hrs  T(C): 36.8 (29 Jun 2022 05:25), Max: 38.2 (28 Jun 2022 19:49)  T(F): 98.2 (29 Jun 2022 05:25), Max: 100.8 (28 Jun 2022 19:49)  HR: 62 (29 Jun 2022 05:25) (62 - 82)  BP: 111/57 (29 Jun 2022 05:25) (95/63 - 127/92)  BP(mean): 69 (29 Jun 2022 04:30) (68 - 100)  RR: 18 (29 Jun 2022 05:25) (12 - 20)  SpO2: 100% (29 Jun 2022 05:25) (94% - 100%)    I&O's Summary    28 Jun 2022 07:01  -  29 Jun 2022 06:32  --------------------------------------------------------  IN: 100 mL / OUT: 800 mL / NET: -700 mL        Physical Exam  Gen: NAD  Pulm: No respiratory distress  CV: RRR, no JVD  Abd: Soft, NT, ND  : + kerlix dressing in scrotum mildly saturated, + scrotal support                          11.7   10.68 )-----------( 228      ( 28 Jun 2022 20:10 )             34.7       06-28    136  |  100  |  14  ----------------------------<  133<H>  3.5   |  20<L>  |  1.28    Ca    9.3      28 Jun 2022 20:10    TPro  8.6<H>  /  Alb  4.2  /  TBili  0.4  /  DBili  x   /  AST  36  /  ALT  50<H>  /  AlkPhos  127<H>  06-28

## 2022-06-29 NOTE — PROGRESS NOTE ADULT - ASSESSMENT
A/P: 87y Male s/p scrotal exploration, I&D, partial removal of AUS; has penroses X2  POD#1 - no events last nite post-op  Plan:  - OOB  - P.T. consult  - labs  - cultures  - cont zosyn/vanco

## 2022-06-29 NOTE — PATIENT PROFILE ADULT - FALL HARM RISK - HARM RISK INTERVENTIONS
Assistance with ambulation/Assistance OOB with selected safe patient handling equipment/Communicate Risk of Fall with Harm to all staff/Discuss with provider need for PT consult/Monitor gait and stability/Provide patient with walking aids - walker, cane, crutches/Reinforce activity limits and safety measures with patient and family/Sit up slowly, dangle for a short time, stand at bedside before walking/Tailored Fall Risk Interventions/Use of alarms - bed, chair and/or voice tab/Visual Cue: Yellow wristband and red socks/Bed in lowest position, wheels locked, appropriate side rails in place/Call bell, personal items and telephone in reach/Instruct patient to call for assistance before getting out of bed or chair/Non-slip footwear when patient is out of bed/[place] to call system/Physically safe environment - no spills, clutter or unnecessary equipment/Purposeful Proactive Rounding/Room/bathroom lighting operational, light cord in reach

## 2022-06-29 NOTE — PHYSICAL THERAPY INITIAL EVALUATION ADULT - ADDITIONAL COMMENTS
Pt lives in an apartment with his son, +4-5 steps to enter, uses rolling walker at baseline for mobility and requires assistance from his son for ADLs.    Pt left semi-supine in bed, all lines intact, call bell in reach, in NAD. RN aware.

## 2022-06-29 NOTE — PATIENT PROFILE ADULT - FUNCTIONAL ASSESSMENT - BASIC MOBILITY 6.
3-calculated by average/Not able to assess (calculate score using [facility] averaging method)

## 2022-06-29 NOTE — BRIEF OPERATIVE NOTE - NSICDXBRIEFPREOP_GEN_ALL_CORE_FT
PRE-OP DIAGNOSIS:  Scrotal abscess 29-Jun-2022 01:33:30  Elo Gibson  Infection associated with urinary sphincter implant, initial encounter 29-Jun-2022 01:33:57  Elo Gibson

## 2022-06-29 NOTE — PHYSICAL THERAPY INITIAL EVALUATION ADULT - GENERAL OBSERVATIONS, REHAB EVAL
Pt received sitting on toilet for bowel movement with RN, in NAD. Agreeable to participate in PT evaluation.

## 2022-06-29 NOTE — CONSULT NOTE ADULT - PROBLEM SELECTOR RECOMMENDATION 9
s/p surgery as noted above  - on zosyn; s/p vanco x2 doses - f/u trough in am  - f/u blood and urine culture

## 2022-06-29 NOTE — PHYSICAL THERAPY INITIAL EVALUATION ADULT - PERTINENT HX OF CURRENT PROBLEM, REHAB EVAL
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 6/28 underwent scrotal exploration, I&D left scrotal abscess and debridement of pseudocapsule,

## 2022-06-29 NOTE — BRIEF OPERATIVE NOTE - NSICDXBRIEFPOSTOP_GEN_ALL_CORE_FT
POST-OP DIAGNOSIS:  Scrotal abscess 29-Jun-2022 01:34:00  Elo Gibson  Infection associated with urinary sphincter implant, initial encounter 29-Jun-2022 01:34:06  Elo Gibson

## 2022-06-29 NOTE — CONSULT NOTE ADULT - NSCONSULTADDITIONALINFOA_GEN_ALL_CORE
d/w daughter in law Winifred [phone] - she read his medicine bottles off to me.   PMD Dr. Carter [phone]

## 2022-06-29 NOTE — BRIEF OPERATIVE NOTE - OPERATION/FINDINGS
Scrotal exploration. Incision and drainage of left scrotal abscess and debridement of pseudocapsule   Explantation of AUS pump device   Placement of two penrose drains and interrupted closure of dartos

## 2022-06-30 DIAGNOSIS — N17.9 ACUTE KIDNEY FAILURE, UNSPECIFIED: ICD-10-CM

## 2022-06-30 LAB
ALBUMIN SERPL ELPH-MCNC: 3.2 G/DL — LOW (ref 3.3–5)
ALP SERPL-CCNC: 94 U/L — SIGNIFICANT CHANGE UP (ref 40–120)
ALT FLD-CCNC: 29 U/L — SIGNIFICANT CHANGE UP (ref 4–41)
ANION GAP SERPL CALC-SCNC: 12 MMOL/L — SIGNIFICANT CHANGE UP (ref 7–14)
AST SERPL-CCNC: 20 U/L — SIGNIFICANT CHANGE UP (ref 4–40)
BILIRUB SERPL-MCNC: 0.2 MG/DL — SIGNIFICANT CHANGE UP (ref 0.2–1.2)
BUN SERPL-MCNC: 18 MG/DL — SIGNIFICANT CHANGE UP (ref 7–23)
CALCIUM SERPL-MCNC: 8.7 MG/DL — SIGNIFICANT CHANGE UP (ref 8.4–10.5)
CHLORIDE SERPL-SCNC: 106 MMOL/L — SIGNIFICANT CHANGE UP (ref 98–107)
CO2 SERPL-SCNC: 20 MMOL/L — LOW (ref 22–31)
CREAT SERPL-MCNC: 1.36 MG/DL — HIGH (ref 0.5–1.3)
EGFR: 51 ML/MIN/1.73M2 — LOW
GLUCOSE SERPL-MCNC: 102 MG/DL — HIGH (ref 70–99)
GRAM STN FLD: SIGNIFICANT CHANGE UP
HCT VFR BLD CALC: 31.8 % — LOW (ref 39–50)
HGB BLD-MCNC: 10.5 G/DL — LOW (ref 13–17)
LACTATE SERPL-SCNC: 1.9 MMOL/L — SIGNIFICANT CHANGE UP (ref 0.5–2)
MCHC RBC-ENTMCNC: 29.2 PG — SIGNIFICANT CHANGE UP (ref 27–34)
MCHC RBC-ENTMCNC: 33 GM/DL — SIGNIFICANT CHANGE UP (ref 32–36)
MCV RBC AUTO: 88.6 FL — SIGNIFICANT CHANGE UP (ref 80–100)
NRBC # BLD: 0 /100 WBCS — SIGNIFICANT CHANGE UP
NRBC # FLD: 0 K/UL — SIGNIFICANT CHANGE UP
PLATELET # BLD AUTO: 193 K/UL — SIGNIFICANT CHANGE UP (ref 150–400)
POTASSIUM SERPL-MCNC: 4.2 MMOL/L — SIGNIFICANT CHANGE UP (ref 3.5–5.3)
POTASSIUM SERPL-SCNC: 4.2 MMOL/L — SIGNIFICANT CHANGE UP (ref 3.5–5.3)
PROT SERPL-MCNC: 7 G/DL — SIGNIFICANT CHANGE UP (ref 6–8.3)
RBC # BLD: 3.59 M/UL — LOW (ref 4.2–5.8)
RBC # FLD: 13.2 % — SIGNIFICANT CHANGE UP (ref 10.3–14.5)
SODIUM SERPL-SCNC: 138 MMOL/L — SIGNIFICANT CHANGE UP (ref 135–145)
VANCOMYCIN FLD-MCNC: 8.5 UG/ML — SIGNIFICANT CHANGE UP
WBC # BLD: 11.23 K/UL — HIGH (ref 3.8–10.5)
WBC # FLD AUTO: 11.23 K/UL — HIGH (ref 3.8–10.5)

## 2022-06-30 PROCEDURE — 99233 SBSQ HOSP IP/OBS HIGH 50: CPT

## 2022-06-30 PROCEDURE — 99024 POSTOP FOLLOW-UP VISIT: CPT

## 2022-06-30 RX ORDER — SODIUM CHLORIDE 9 MG/ML
1000 INJECTION, SOLUTION INTRAVENOUS
Refills: 0 | Status: DISCONTINUED | OUTPATIENT
Start: 2022-06-30 | End: 2022-07-05

## 2022-06-30 RX ORDER — ERTAPENEM SODIUM 1 G/1
1000 INJECTION, POWDER, LYOPHILIZED, FOR SOLUTION INTRAMUSCULAR; INTRAVENOUS EVERY 24 HOURS
Refills: 0 | Status: DISCONTINUED | OUTPATIENT
Start: 2022-06-30 | End: 2022-07-06

## 2022-06-30 RX ORDER — SODIUM CHLORIDE 9 MG/ML
250 INJECTION, SOLUTION INTRAVENOUS ONCE
Refills: 0 | Status: COMPLETED | OUTPATIENT
Start: 2022-06-30 | End: 2022-06-30

## 2022-06-30 RX ADMIN — APIXABAN 5 MILLIGRAM(S): 2.5 TABLET, FILM COATED ORAL at 17:58

## 2022-06-30 RX ADMIN — SODIUM CHLORIDE 250 MILLILITER(S): 9 INJECTION, SOLUTION INTRAVENOUS at 15:04

## 2022-06-30 RX ADMIN — ERTAPENEM SODIUM 120 MILLIGRAM(S): 1 INJECTION, POWDER, LYOPHILIZED, FOR SOLUTION INTRAMUSCULAR; INTRAVENOUS at 15:04

## 2022-06-30 RX ADMIN — Medication 81 MILLIGRAM(S): at 15:04

## 2022-06-30 RX ADMIN — PANTOPRAZOLE SODIUM 40 MILLIGRAM(S): 20 TABLET, DELAYED RELEASE ORAL at 06:05

## 2022-06-30 RX ADMIN — APIXABAN 5 MILLIGRAM(S): 2.5 TABLET, FILM COATED ORAL at 06:05

## 2022-06-30 RX ADMIN — SODIUM CHLORIDE 60 MILLILITER(S): 9 INJECTION, SOLUTION INTRAVENOUS at 18:00

## 2022-06-30 RX ADMIN — ATORVASTATIN CALCIUM 40 MILLIGRAM(S): 80 TABLET, FILM COATED ORAL at 21:45

## 2022-06-30 RX ADMIN — PIPERACILLIN AND TAZOBACTAM 25 GRAM(S): 4; .5 INJECTION, POWDER, LYOPHILIZED, FOR SOLUTION INTRAVENOUS at 06:05

## 2022-06-30 NOTE — PROGRESS NOTE ADULT - ASSESSMENT
87M CVA, HTN, Vertigo, GERD, Prostate cancer many years ago with a device placed after the surgery (son did not know the details) multiple recent UTI, recently admitted at [facility] for UTI, discharged with a whiting catheter that was removed by Dr Hirsch yesterday, presented with abdominal pain and retention.  6/28 underwent scrotal exploration, I&D left scrotal abscess and debridement of pseudocapsule, explantation of AUS pump device, placement of two penrose drains and interrupted closure of dartos    +ESBL Ecoli bacteremia

## 2022-06-30 NOTE — PROGRESS NOTE ADULT - SUBJECTIVE AND OBJECTIVE BOX
[facility] Division of Hospital Medicine  Loreta Woodard MD  Pager (M-F, 8A-5P):  In-house pager [phone]; Long-range pager [phone]  Other Times:  Please page Hospitalist in Charge -  In-house pager [phone]    Patient is a 87y old  Male who presents with a chief complaint of Scrotal abscess (30 Jun 2022 06:35)      SUBJECTIVE / OVERNIGHT EVENTS:  Blood culture ESBL  Pt seen earlier resting in bed, very weak, denies much discomfort from wound. No chest pain, SOB.  ADDITIONAL REVIEW OF SYSTEMS:    MEDICATIONS  (STANDING):  apixaban 5 milliGRAM(s) Oral two times a day  aspirin enteric coated 81 milliGRAM(s) Oral daily  atorvastatin 40 milliGRAM(s) Oral at bedtime  ertapenem  IVPB 1000 milliGRAM(s) IV Intermittent every 24 hours  losartan 50 milliGRAM(s) Oral daily  pantoprazole    Tablet 40 milliGRAM(s) Oral before breakfast    MEDICATIONS  (PRN):  acetaminophen     Tablet .. 650 milliGRAM(s) Oral every 6 hours PRN Mild Pain (1 - 3)  meclizine 12.5 milliGRAM(s) Oral daily PRN Dizziness      CAPILLARY BLOOD GLUCOSE        I&O's Summary    29 Jun 2022 07:01  -  30 Jun 2022 07:00  --------------------------------------------------------  IN: 0 mL / OUT: 1450 mL / NET: -1450 mL    30 Jun 2022 07:01  -  30 Jun 2022 12:31  --------------------------------------------------------  IN: 0 mL / OUT: 300 mL / NET: -300 mL        PHYSICAL EXAM:  Vital Signs Last 24 Hrs  T(C): 36.7 (30 Jun 2022 09:00), Max: 36.8 (29 Jun 2022 21:21)  T(F): 98 (30 Jun 2022 09:00), Max: 98.3 (30 Jun 2022 05:17)  HR: 58 (30 Jun 2022 09:00) (55 - 63)  BP: 100/45 (30 Jun 2022 09:00) (91/51 - 114/52)  BP(mean): --  RR: 16 (30 Jun 2022 09:00) (16 - 17)  SpO2: 100% (30 Jun 2022 09:00) (99% - 100%)      GENERAL: thin, frail, elderly man, sitting up in bed, NAD  very [place]  NECK: Supple, No JVD  RESPIRATORY: Clear to auscultation bilaterally; No rales, rhonchi, wheezing, or rubs  CARDIOVASCULAR: Regular rate and rhythm; No murmurs, rubs, or gallops  GASTROINTESTINAL: Soft, Nontender, Nondistended; Bowel sounds present  GENITOURINARY: wound covered  EXTREMITIES:  2+ Peripheral Pulses, No clubbing, cyanosis, or edema  NERVOUS SYSTEM:  nonfocal  HEME/LYMPH: No lymphadenopathy noted  SKIN: No rashes or lesions  PSYCH: calm, appropriate, oriented self, [facility], year "23", [place]    LABS:                        10.5   11.23 )-----------( 193      ( 30 Jun 2022 05:29 )             31.8     06-30    138  |  106  |  18  ----------------------------<  102<H>  4.2   |  20<L>  |  1.36<H>    Ca    8.7      30 Jun 2022 05:29  Phos  3.5     06-29  Mg     2.20     06-29    TPro  7.0  /  Alb  3.2<L>  /  TBili  0.2  /  DBili  x   /  AST  20  /  ALT  29  /  AlkPhos  94  06-30    Culture - Urine (collected 29 Jun 2022 00:36)  Source: OR Collect bladder urine  Preliminary Report (30 Jun 2022 09:33):    Numerous Gram Negative Rods    Culture - Abscess with Gram Stain (collected 29 Jun 2022 00:36)  Source: .Abscess #1. scrotal  Gram Stain (30 Jun 2022 00:29):    Numerous polymorphonuclear leukocytes seen per low power field    Numerous Gram Negative Rods seen per oil power field    Culture - Blood (collected 28 Jun 2022 20:10)  Source: .Blood Blood-Venous  Gram Stain (29 Jun 2022 14:52):    Growth in aerobic bottle: Gram Negative Rods  Preliminary Report (30 Jun 2022 10:18):    Growth in aerobic bottle: Escherichia coli    ***Blood Panel PCR results on this specimen are available    approximately 3 hours after the Gram stain result.***    Gram stain, PCR, and/or culture results may not always    correspond due to difference in methodologies.    ************************************************************    This PCR assay was performed by multiplex PCR. This    Assay tests for 66 bacterial and resistance gene targets.    Please refer to the [facility] test directory    at [email or web address] for details.  Organism: Blood Culture PCR (29 Jun 2022 17:51)  Organism: Blood Culture PCR (29 Jun 2022 17:51)    Culture - Blood (collected 28 Jun 2022 19:59)  Source: .Blood Blood-Peripheral  Gram Stain (30 Jun 2022 05:34):    Growth in anaerobic bottle: Gram Negative Rods  Preliminary Report (30 Jun 2022 05:34):    Growth in anaerobic bottle: Gram Negative Rods        RADIOLOGY & ADDITIONAL TESTS:  Results Reviewed:   Imaging Personally Reviewed:  Electrocardiogram Personally Reviewed:    COORDINATION OF CARE:  Care Discussed with Consultants/Other Providers [Y/N]: urology re overall care  Prior or Outpatient Records Reviewed [Y/N]:

## 2022-06-30 NOTE — PHARMACOTHERAPY INTERVENTION NOTE - COMMENTS
87 M with ESBL E. 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    Recommendation(s):  1) Consider changing FROM Pip/tazo to ertapenem 1 gram IV q24h.    Lisa Jarrett, PharmD, [facility]  Clinical Pharmacy Specialist, Infectious Diseases  Spectra extension [phone]  .

## 2022-06-30 NOTE — PROGRESS NOTE ADULT - ASSESSMENT
A/P: 87y Male s/p scrotal exploration, I&D, partial removal of AUS; has penroses X2  POD#1 - no events last nite post-op  POD#2 - doing well, fluffs replaced, having flatus, pain well controlled, ESBL bacteremia noted, started ertapenam  Plan:  - OOB  - labs  - cultures  - start ertapenem repeat BCx tomorrow AM

## 2022-06-30 NOTE — PROGRESS NOTE ADULT - PROBLEM SELECTOR PLAN 1
s/p surgery as noted above  - on zosyn; s/p vanco x2 doses --->>> +ESBL Ecoli bacteremia - d/w ID pharmacist - change zosyn to erta, hold further vanco for now -  f/u

## 2022-06-30 NOTE — PROGRESS NOTE ADULT - SUBJECTIVE AND OBJECTIVE BOX
Surgery Progress Note     Subjective/24hour Events:   Patient seen and examined.   No acute events overnight. Doing well. pain controlled. passing flatus  Pain controlled.     Vital Signs:  Vital Signs Last 24 Hrs  T(C): 36.8 (30 Jun 2022 05:17), Max: 36.8 (29 Jun 2022 21:21)  T(F): 98.3 (30 Jun 2022 05:17), Max: 98.3 (30 Jun 2022 05:17)  HR: 61 (30 Jun 2022 05:17) (55 - 79)  BP: 94/51 (30 Jun 2022 05:17) (91/51 - 114/52)  BP(mean): --  RR: 16 (30 Jun 2022 05:17) (16 - 17)  SpO2: 100% (30 Jun 2022 05:17) (98% - 100%)    CAPILLARY BLOOD GLUCOSE          I&O's Detail    28 Jun 2022 07:01  -  29 Jun 2022 07:00  --------------------------------------------------------  IN:    Oral Fluid: 100 mL  Total IN: 100 mL    OUT:    Indwelling Catheter - Urethral (mL): 1800 mL  Total OUT: 1800 mL    Total NET: -1700 mL      29 Jun 2022 07:01  -  30 Jun 2022 06:35  --------------------------------------------------------  IN:  Total IN: 0 mL    OUT:    Indwelling Catheter - Urethral (mL): 1450 mL  Total OUT: 1450 mL    Total NET: -1450 mL          MEDICATIONS  (STANDING):  apixaban 5 milliGRAM(s) Oral two times a day  aspirin enteric coated 81 milliGRAM(s) Oral daily  atorvastatin 40 milliGRAM(s) Oral at bedtime  losartan 50 milliGRAM(s) Oral daily  pantoprazole    Tablet 40 milliGRAM(s) Oral before breakfast  piperacillin/tazobactam IVPB.. 3.375 Gram(s) IV Intermittent every 8 hours    MEDICATIONS  (PRN):  acetaminophen     Tablet .. 650 milliGRAM(s) Oral every 6 hours PRN Mild Pain (1 - 3)  meclizine 12.5 milliGRAM(s) Oral daily PRN Dizziness      Physical Exam:  Gen: NAD.  Lungs: Non labored breathing.   Ab: Soft, nontender, nondistended.   : penrose in place, skin clean      Labs:    06-29    138  |  102  |  12  ----------------------------<  160<H>  4.2   |  23  |  1.05    Ca    9.5      29 Jun 2022 09:31  Phos  3.5     06-29  Mg     2.20     06-29    TPro  8.6<H>  /  Alb  4.2  /  TBili  0.4  /  DBili  x   /  AST  36  /  ALT  50<H>  /  AlkPhos  127<H>  06-28    LIVER FUNCTIONS - ( 28 Jun 2022 20:10 )  Alb: 4.2 g/dL / Pro: 8.6 g/dL / ALK PHOS: 127 U/L / ALT: 50 U/L / AST: 36 U/L / GGT: x                                 11.9   10.84 )-----------( 200      ( 29 Jun 2022 09:31 )             36.8          Surgery Progress Note     Subjective/24hour Events:   Patient seen and examined.   No acute events overnight. Doing well. pain controlled. passing flatus  Pain controlled.     Vital Signs:  Vital Signs Last 24 Hrs  T(C): 36.8 (30 Jun 2022 05:17), Max: 36.8 (29 Jun 2022 21:21)  T(F): 98.3 (30 Jun 2022 05:17), Max: 98.3 (30 Jun 2022 05:17)  HR: 61 (30 Jun 2022 05:17) (55 - 79)  BP: 94/51 (30 Jun 2022 05:17) (91/51 - 114/52)  BP(mean): --  RR: 16 (30 Jun 2022 05:17) (16 - 17)  SpO2: 100% (30 Jun 2022 05:17) (98% - 100%)    CAPILLARY BLOOD GLUCOSE          I&O's Detail    28 Jun 2022 07:01  -  29 Jun 2022 07:00  --------------------------------------------------------  IN:    Oral Fluid: 100 mL  Total IN: 100 mL    OUT:    Indwelling Catheter - Urethral (mL): 1800 mL  Total OUT: 1800 mL    Total NET: -1700 mL      29 Jun 2022 07:01  -  30 Jun 2022 06:35  --------------------------------------------------------  IN:  Total IN: 0 mL    OUT:    Indwelling Catheter - Urethral (mL): 1450 mL  Total OUT: 1450 mL    Total NET: -1450 mL          MEDICATIONS  (STANDING):  apixaban 5 milliGRAM(s) Oral two times a day  aspirin enteric coated 81 milliGRAM(s) Oral daily  atorvastatin 40 milliGRAM(s) Oral at bedtime  losartan 50 milliGRAM(s) Oral daily  pantoprazole    Tablet 40 milliGRAM(s) Oral before breakfast  piperacillin/tazobactam IVPB.. 3.375 Gram(s) IV Intermittent every 8 hours    MEDICATIONS  (PRN):  acetaminophen     Tablet .. 650 milliGRAM(s) Oral every 6 hours PRN Mild Pain (1 - 3)  meclizine 12.5 milliGRAM(s) Oral daily PRN Dizziness      Physical Exam:  Gen: NAD.  Lungs: Non labored breathing.   Ab: Soft, nontender, nondistended.   : penrose in place, skin with no additional purulent drainage.       Labs:    06-29    138  |  102  |  12  ----------------------------<  160<H>  4.2   |  23  |  1.05    Ca    9.5      29 Jun 2022 09:31  Phos  3.5     06-29  Mg     2.20     06-29    TPro  8.6<H>  /  Alb  4.2  /  TBili  0.4  /  DBili  x   /  AST  36  /  ALT  50<H>  /  AlkPhos  127<H>  06-28    LIVER FUNCTIONS - ( 28 Jun 2022 20:10 )  Alb: 4.2 g/dL / Pro: 8.6 g/dL / ALK PHOS: 127 U/L / ALT: 50 U/L / AST: 36 U/L / GGT: x                                 11.9   10.84 )-----------( 200      ( 29 Jun 2022 09:31 )             36.8

## 2022-07-01 LAB
-  AMIKACIN: SIGNIFICANT CHANGE UP
-  AMPICILLIN/SULBACTAM: SIGNIFICANT CHANGE UP
-  AMPICILLIN: SIGNIFICANT CHANGE UP
-  AZTREONAM: SIGNIFICANT CHANGE UP
-  CEFAZOLIN: SIGNIFICANT CHANGE UP
-  CEFEPIME: SIGNIFICANT CHANGE UP
-  CEFTRIAXONE: SIGNIFICANT CHANGE UP
-  CIPROFLOXACIN: SIGNIFICANT CHANGE UP
-  ERTAPENEM: SIGNIFICANT CHANGE UP
-  GENTAMICIN: SIGNIFICANT CHANGE UP
-  IMIPENEM: SIGNIFICANT CHANGE UP
-  LEVOFLOXACIN: SIGNIFICANT CHANGE UP
-  MEROPENEM: SIGNIFICANT CHANGE UP
-  PIPERACILLIN/TAZOBACTAM: SIGNIFICANT CHANGE UP
-  TOBRAMYCIN: SIGNIFICANT CHANGE UP
-  TRIMETHOPRIM/SULFAMETHOXAZOLE: SIGNIFICANT CHANGE UP
ANION GAP SERPL CALC-SCNC: 11 MMOL/L — SIGNIFICANT CHANGE UP (ref 7–14)
BUN SERPL-MCNC: 20 MG/DL — SIGNIFICANT CHANGE UP (ref 7–23)
CALCIUM SERPL-MCNC: 8.7 MG/DL — SIGNIFICANT CHANGE UP (ref 8.4–10.5)
CHLORIDE SERPL-SCNC: 104 MMOL/L — SIGNIFICANT CHANGE UP (ref 98–107)
CO2 SERPL-SCNC: 24 MMOL/L — SIGNIFICANT CHANGE UP (ref 22–31)
CREAT SERPL-MCNC: 1.05 MG/DL — SIGNIFICANT CHANGE UP (ref 0.5–1.3)
CULTURE RESULTS: SIGNIFICANT CHANGE UP
EGFR: 69 ML/MIN/1.73M2 — SIGNIFICANT CHANGE UP
GLUCOSE SERPL-MCNC: 91 MG/DL — SIGNIFICANT CHANGE UP (ref 70–99)
HCT VFR BLD CALC: 32.4 % — LOW (ref 39–50)
HGB BLD-MCNC: 10.8 G/DL — LOW (ref 13–17)
MCHC RBC-ENTMCNC: 30 PG — SIGNIFICANT CHANGE UP (ref 27–34)
MCHC RBC-ENTMCNC: 33.3 GM/DL — SIGNIFICANT CHANGE UP (ref 32–36)
MCV RBC AUTO: 90 FL — SIGNIFICANT CHANGE UP (ref 80–100)
METHOD TYPE: SIGNIFICANT CHANGE UP
NRBC # BLD: 0 /100 WBCS — SIGNIFICANT CHANGE UP
NRBC # FLD: 0 K/UL — SIGNIFICANT CHANGE UP
ORGANISM # SPEC MICROSCOPIC CNT: SIGNIFICANT CHANGE UP
PLATELET # BLD AUTO: 211 K/UL — SIGNIFICANT CHANGE UP (ref 150–400)
POTASSIUM SERPL-MCNC: 4 MMOL/L — SIGNIFICANT CHANGE UP (ref 3.5–5.3)
POTASSIUM SERPL-SCNC: 4 MMOL/L — SIGNIFICANT CHANGE UP (ref 3.5–5.3)
RBC # BLD: 3.6 M/UL — LOW (ref 4.2–5.8)
RBC # FLD: 13.5 % — SIGNIFICANT CHANGE UP (ref 10.3–14.5)
SODIUM SERPL-SCNC: 139 MMOL/L — SIGNIFICANT CHANGE UP (ref 135–145)
SPECIMEN SOURCE: SIGNIFICANT CHANGE UP
WBC # BLD: 8.08 K/UL — SIGNIFICANT CHANGE UP (ref 3.8–10.5)
WBC # FLD AUTO: 8.08 K/UL — SIGNIFICANT CHANGE UP (ref 3.8–10.5)

## 2022-07-01 PROCEDURE — 99232 SBSQ HOSP IP/OBS MODERATE 35: CPT

## 2022-07-01 PROCEDURE — 99222 1ST HOSP IP/OBS MODERATE 55: CPT | Mod: GC

## 2022-07-01 RX ADMIN — ERTAPENEM SODIUM 120 MILLIGRAM(S): 1 INJECTION, POWDER, LYOPHILIZED, FOR SOLUTION INTRAMUSCULAR; INTRAVENOUS at 15:56

## 2022-07-01 RX ADMIN — APIXABAN 5 MILLIGRAM(S): 2.5 TABLET, FILM COATED ORAL at 05:30

## 2022-07-01 RX ADMIN — SODIUM CHLORIDE 60 MILLILITER(S): 9 INJECTION, SOLUTION INTRAVENOUS at 21:26

## 2022-07-01 RX ADMIN — SODIUM CHLORIDE 60 MILLILITER(S): 9 INJECTION, SOLUTION INTRAVENOUS at 16:00

## 2022-07-01 RX ADMIN — Medication 81 MILLIGRAM(S): at 15:56

## 2022-07-01 RX ADMIN — ATORVASTATIN CALCIUM 40 MILLIGRAM(S): 80 TABLET, FILM COATED ORAL at 21:27

## 2022-07-01 RX ADMIN — PANTOPRAZOLE SODIUM 40 MILLIGRAM(S): 20 TABLET, DELAYED RELEASE ORAL at 05:30

## 2022-07-01 RX ADMIN — APIXABAN 5 MILLIGRAM(S): 2.5 TABLET, FILM COATED ORAL at 18:02

## 2022-07-01 NOTE — PROGRESS NOTE ADULT - ASSESSMENT
A/P: 87y Male s/p scrotal exploration, I&D, partial removal of AUS 6/28/2022; has penroses X2    POD#1 - no events last nite post-op  POD#2 - doing well, fluffs replaced, having flatus, pain well controlled, ESBL bacteremia noted, started ertapenem, OR cx mod Ecoli  POD#3 - remains afebrile, wound stable, minimal drainage no surrounding cellulitis    Plan:  - AM labs reviewed  - add ensure  - F/u cultures  - cont ertapenem  - f/u repeat BCx   - PT consult  - f/u medicine  - whiting plan TBD  - DVT prophy, IS, OOB, ambulate

## 2022-07-01 NOTE — CONSULT NOTE ADULT - ASSESSMENT
86 y/o M PMHx CVA, prostate CA, h/o AUS placement, presented with abdominal pain. Found to have scrotal abscess, now s/p I&D and partial removal of AUS pump on 6/29. C/b ESBL E.coli bacteremia.     Impression:  #ESBL E.Coli Bacteremia - 2/2  source w/ scrotal abscess and infected AUS pump    Recs:  - c/w ertapenem 1G IV q24H  - plan for 10 day course through 7/9/22  - weekly CBC/CMP while on IV abx    Plan discussed with primary team    Mejia Pearce MD  Infectious Disease Fellow  Available on [employer] Teams  Before 9AM or after 5PM: [phone]  
87M CVA, HTN, Vertigo, GERD, Prostate cancer many years ago with a device placed after the surgery (son did not know the details) multiple recent UTI, recently admitted at [facility] for UTI, discharged with a whiting catheter that was removed by Dr Hirsch yesterday, presented with abdominal pain and retention.  6/28 underwent scrotal exploration, I&D left scrotal abscess and debridement of pseudocapsule, explantation of AUS pump device, placement of two penrose drains and interrupted closure of dartos

## 2022-07-01 NOTE — CONSULT NOTE ADULT - ATTENDING COMMENTS
87 year old with history of prostrate cancer  Had urethral stricture with pump}    Presented with abdominal pain  Found to have esbl bacteremia    Had draiange of scrotal abscess  with removal of foreign body    Plan for 10 day course of ertapenem through 7/9/2022  Weekly cbc, bmp     Call ID service with questions

## 2022-07-01 NOTE — PROGRESS NOTE ADULT - SUBJECTIVE AND OBJECTIVE BOX
Overnight events:  Afebrile    Subjective:  Pt states feels weak, otherwise no complaints    Objective:    Vital signs  T(C): , Max: 36.8 (06-30-22 @ 17:52)  HR: 65 (07-01-22 @ 05:27)  BP: 92/51 (07-01-22 @ 05:27)  SpO2: 96% (07-01-22 @ 05:27)  Wt(kg): --    Output   Alexis: 750 yellow  stool x 1  06-30 @ 07:01  -  07-01 @ 07:00  --------------------------------------------------------  IN: 0 mL / OUT: 1326 mL / NET: -1326 mL        Gen: Thin, NAD  Abd: soft, nontender  : scrotum without edema, resolving erythema, penrose in place with drainage on dressings, no crepitus    Labs                        10.8   8.08  )-----------( 211      ( 01 Jul 2022 05:23 )             32.4     01 Jul 2022 05:23    139    |  104    |  20     ----------------------------<  91     4.0     |  24     |  1.05     Ca    8.7        01 Jul 2022 05:23  Phos  3.5       29 Jun 2022 09:31  Mg     2.20      29 Jun 2022 09:31        ORCx: Culture - Urine (06.29.22 @ 00:36)    Specimen Source: OR Collect bladder urine    Culture Results:   Numerous Gram Negative Rods  Gram Stain (06.29.22 @ 00:36)    Culture Results:   Moderate Escherichia coli    Culture - Blood (06.28.22 @ 20:10)    -  CTX-M Resistance Marker: Detec    -  ESBL: Detec    -  Escherichia coli: Detec    Gram Stain:   Growth in aerobic bottle: Gram Negative Rods    Specimen Source: .Blood Blood-Venous    Organism: Blood Culture PCR    Culture Results:   Growth in aerobic bottle: Escherichia coli  ***Blood Panel PCR results on this specimen are available  approximately 3 hours after the Gram stain result.***  Gram stain, PCR, and/or culture results may not always  correspond due to difference in methodologies.  ************************************************************  This PCR assay was performed by multiplex PCR. This  Assay tests for 66 bacterial and resistance gene targets.  Please refer to the [facility] test directory  at [email or web address] for details.    Organism Identification: Blood Culture PCR    Method Type: PCR

## 2022-07-01 NOTE — PROGRESS NOTE ADULT - PROBLEM SELECTOR PLAN 1
s/p surgery as noted above  - on zosyn; s/p vanco x2 doses --->>> +ESBL Ecoli bacteremia - d/w ID pharmacist - change zosyn to erta, hold further vanco for now -  f/u s/p surgery as noted above  +ESBL Ecoli bacteremia - d/w ID pharmacist - changed zosyn to erta, hold further vanco for now -  f/u cultures, repeat blood cx sent 7/1  abscess GNR, Ecoli

## 2022-07-01 NOTE — CONSULT NOTE ADULT - SUBJECTIVE AND OBJECTIVE BOX
Patient is a 87y old  Male who presents with a chief complaint of Scrotal abscess (01 Jul 2022 09:12)    HPI:  87 y.o male with PMHx of CVA, HTN, Vertigo, GERD, Prostate cancer many years ago with a device placed after the surgery (son did not know the details) multiple recent UTI, recently admitted at [facility] for UTI, discharged with a whiting catheter that was removed by Dr Hirsch yesterday, presented to the ED with abdominal pain and retention. He denies any fever, nausea, vomiting.  In ED he was febrile to nearly 101. (28 Jun 2022 22:29)       REVIEW OF SYSTEMS  [  ] ROS unobtainable because:    [  ] All other systems negative except as noted below    Constitutional:  [ ] fever [ ] chills  [ ] weight loss  [ ]night sweat  [ ]poor appetite/PO intake [ ]fatigue   Skin:  [ ] rash [ ] phlebitis	  Eyes: [ ] icterus [ ] pain  [ ] discharge	  ENMT: [ ] sore throat  [ ] thrush [ ] ulcers [ ] exudates [ ]anosmia  Respiratory: [ ] dyspnea [ ] hemoptysis [ ] cough [ ] sputum	  Cardiovascular:  [ ] chest pain [ ] palpitations [ ] edema	  Gastrointestinal:  [ ] nausea [ ] vomiting [ ] diarrhea [ ] constipation [ ] pain	  Genitourinary:  [ ] dysuria [ ] frequency [ ] hematuria [ ] discharge [ ] flank pain  [ ] incontinence  Musculoskeletal:  [ ] myalgias [ ] arthralgias [ ] arthritis  [ ] back pain  Neurological:  [ ] headache [ ] weakness [ ] seizures  [ ] confusion/altered mental status    prior hospital charts reviewed [V]  primary team notes reviewed [V]  other consultant notes reviewed [V]    PAST MEDICAL & SURGICAL HISTORY:  Prostate cancer  18 years ago  Afib  HTN (hypertension)  Hyperlipidemia  GERD (gastroesophageal reflux disease  Cerebrovascular accident (CVA)  History of prostate surgery  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   25 mL/Hr IV Intermittent (06-30-22 @ 06:05)   25 mL/Hr IV Intermittent (06-29-22 @ 22:27)   25 mL/Hr IV Intermittent (06-29-22 @ 15:05)   25 mL/Hr IV Intermittent (06-29-22 @ 07:19)    piperacillin/tazobactam IVPB...   200 mL/Hr IV Intermittent (06-28-22 @ 19:57)    vancomycin  IVPB   250 mL/Hr IV Intermittent (06-29-22 @ 05:51)    vancomycin  IVPB.   250 mL/Hr IV Intermittent (06-28-22 @ 21:57)    MEDICATIONS  (STANDING):  acetaminophen     Tablet .. 650 every 6 hours PRN  apixaban 5 two times a day  aspirin enteric coated 81 daily  atorvastatin 40 at bedtime  meclizine 12.5 daily PRN  pantoprazole    Tablet 40 before breakfast      VITALS:  Vital Signs Last 24 Hrs  T(F): 97.5 (07-01-22 @ 13:56), Max: 100.8 (06-28-22 @ 19:49)  Vital Signs Last 24 Hrs  HR: 73 (07-01-22 @ 13:56) (61 - 73)  BP: 102/58 (07-01-22 @ 13:56) (92/51 - 114/67)  RR: 17 (07-01-22 @ 13:56)  SpO2: 100% (07-01-22 @ 13:56) (96% - 100%)  Wt(kg): --    PHYSICAL EXAM:      Labs:                        10.8   8.08  )-----------( 211      ( 01 Jul 2022 05:23 )             32.4     07-01    139  |  104  |  20  ----------------------------<  91  4.0   |  24  |  1.05    Ca    8.7      01 Jul 2022 05:23    TPro  7.0  /  Alb  3.2<L>  /  TBili  0.2  /  DBili  x   /  AST  20  /  ALT  29  /  AlkPhos  94  06-30      WBC Trend:  WBC Count: 8.08 (07-01-22 @ 05:23)  WBC Count: 11.23 (06-30-22 @ 05:29)  WBC Count: 10.84 (06-29-22 @ 09:31)  WBC Count: 10.68 (06-28-22 @ 20:10)    Auto Neutrophil #: 8.16 K/uL (06-28-22 @ 20:10)    Auto Eosinophil %: 0.7 % (06-28-22 @ 20:10)    MICROBIOLOGY:  Vancomycin Level, Random: 8.5 (06-30 @ 05:29)    Culture - Urine (collected 29 Jun 2022 00:36)  Source: OR Collect bladder urine  Preliminary Report:    Numerous Gram Negative Rods    Culture - Abscess with Gram Stain (collected 29 Jun 2022 00:36)  Source: .Abscess #1. scrotal    Culture - Blood (collected 28 Jun 2022 20:10)  Source: .Blood Blood-Venous  Final Report:    Growth in aerobic bottle: Escherichia coli ESBL    ***Blood Panel PCR results on this specimen are available    approximately 3 hours after the Gram stain result.***    Gram stain, PCR, and/or culture results may not always    correspond due to difference in methodologies.    ************************************************************    This PCR assay was performed by multiplex PCR. This    Assay tests for 66 bacterial and resistance gene targets.    Please refer to the [facility] Labs test directory    at [email or web address] for details.  Organism: Blood Culture PCR  Escherichia coli ESBL  Organism: Escherichia coli ESBL    Sensitivities:      -  Amikacin: S <=16      -  Ampicillin: R >16 These ampicillin results predict results for amoxicillin      -  Ampicillin/Sulbactam: R <=4/2 Enterobacter, Klebsiella aerogenes, Citrobacter, and Serratia may develop resistance during prolonged therapy (3-4 days)      -  Aztreonam: R 16      -  Cefazolin: R >16 Enterobacter, Klebsiella aerogenes, Citrobacter, and Serratia may develop resistance during prolonged therapy (3-4 days)      -  Cefepime: R >16      -  Ceftriaxone: R >32 Enterobacter, Klebsiella aerogenes, Citrobacter, and Serratia may develop resistance during prolonged therapy      -  Ciprofloxacin: R >2      -  Ertapenem: S <=0.5      -  Gentamicin: S <=2      -  Imipenem: S <=1      -  Levofloxacin: R >4      -  Meropenem: S <=1      -  Piperacillin/Tazobactam: R <=8      -  Tobramycin: S <=2      -  Trimethoprim/Sulfamethoxazole: R >2/38      Method Type: DAYNA  Organism: Blood Culture PCR    Sensitivities:      -  CTX-M Resistance Marker: Detec      -  ESBL: Detec      -  Escherichia coli: Detec      Method Type: PCR    Culture - Blood (collected 28 Jun 2022 19:59)  Source: .Blood Blood-Peripheral  Preliminary Report:    Growth in anaerobic bottle: Escherichia coli    Susceptibility to follow. [id number]    Culture - Urine (collected 18 Jan 2021 15:30)  Source: .Urine Catheterized  Final Report:    No growth    Rapid RVP Result: NotDetec (06-28 @ 20:57)    RADIOLOGY:  imaging below personally reviewed    < from: US Urinary Bladder (06.28.22 @ 21:46) >  IMPRESSION:  Bladder volume 416.7 mL.  < end of copied text >    < from: US Testicles (06.28.22 @ 21:35) >  IMPRESSION:  Fluid collection surrounding the artificial urinary sphincter measuring   up to 3.9 cm with increased surrounding vascularity concerning for   infection.  Enlarged and hyperemic left epididymal tail concerning for epididymitis.  Multiple cystic lesions within the bilateral testicles, likely sequela of   the artificial urinary sphincter.  < end of copied text >     Patient is a 87y old  Male who presents with a chief complaint of Scrotal abscess (01 Jul 2022 09:12)    HPI:  87 y.o male with PMHx of CVA, HTN, Vertigo, GERD, Prostate cancer many years ago with a device placed after the surgery (son did not know the details) multiple recent UTI, recently admitted at [facility] for UTI, discharged with a whiting catheter that was removed by Dr Hirsch yesterday, presented to the ED with abdominal pain and retention. He denies any fever, nausea, vomiting.  In ED he was febrile to nearly 101.     Found to have scrotal abscess, s/p I&D by urology with explantation of AUS device. Now with ESBL ecoli bacteremia.        REVIEW OF SYSTEMS  [  ] ROS unobtainable because:    [ x ] All other systems negative except as noted below    Constitutional:  [ ] fever [ ] chills  [ ] weight loss  [ ]night sweat  [ ]poor appetite/PO intake [ ]fatigue   Skin:  [ ] rash [ ] phlebitis	  Eyes: [ ] icterus [ ] pain  [ ] discharge	  ENMT: [ ] sore throat  [ ] thrush [ ] ulcers [ ] exudates [ ]anosmia  Respiratory: [ ] dyspnea [ ] hemoptysis [ ] cough [ ] sputum	  Cardiovascular:  [ ] chest pain [ ] palpitations [ ] edema	  Gastrointestinal:  [ ] nausea [ ] vomiting [ ] diarrhea [ ] constipation [ ] pain	  Genitourinary:  [ ] dysuria [ ] frequency [ ] hematuria [ ] discharge [ ] flank pain  [ ] incontinence  Musculoskeletal:  [ ] myalgias [ ] arthralgias [ ] arthritis  [ ] back pain  Neurological:  [ ] headache [ ] weakness [ ] seizures  [ ] confusion/altered mental status    prior hospital charts reviewed [V]  primary team notes reviewed [V]  other consultant notes reviewed [V]    PAST MEDICAL & SURGICAL HISTORY:  Prostate cancer  18 years ago  Afib  HTN (hypertension)  Hyperlipidemia  GERD (gastroesophageal reflux disease  Cerebrovascular accident (CVA)  History of prostate surgery  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   25 mL/Hr IV Intermittent (06-30-22 @ 06:05)   25 mL/Hr IV Intermittent (06-29-22 @ 22:27)   25 mL/Hr IV Intermittent (06-29-22 @ 15:05)   25 mL/Hr IV Intermittent (06-29-22 @ 07:19)    piperacillin/tazobactam IVPB...   200 mL/Hr IV Intermittent (06-28-22 @ 19:57)    vancomycin  IVPB   250 mL/Hr IV Intermittent (06-29-22 @ 05:51)    vancomycin  IVPB.   250 mL/Hr IV Intermittent (06-28-22 @ 21:57)    MEDICATIONS  (STANDING):  acetaminophen     Tablet .. 650 every 6 hours PRN  apixaban 5 two times a day  aspirin enteric coated 81 daily  atorvastatin 40 at bedtime  meclizine 12.5 daily PRN  pantoprazole    Tablet 40 before breakfast      VITALS:  Vital Signs Last 24 Hrs  T(F): 97.5 (07-01-22 @ 13:56), Max: 100.8 (06-28-22 @ 19:49)  Vital Signs Last 24 Hrs  HR: 73 (07-01-22 @ 13:56) (61 - 73)  BP: 102/58 (07-01-22 @ 13:56) (92/51 - 114/67)  RR: 17 (07-01-22 @ 13:56)  SpO2: 100% (07-01-22 @ 13:56) (96% - 100%)  Wt(kg): --    PHYSICAL EXAM:  Constitutional: non-toxic, no distress  HEAD/EYES: anicteric  ENT:  supple, no mucositis  Cardiovascular:   normal S1, S2  Respiratory:  clear BS bilaterally  GI:  soft, non-tender, normal bowel sounds  :  no whiting, +scrotal swelling w/ penrose drain in place  Musculoskeletal:  no joint swelling  Neurologic: awake and alert, DRUMMOND, no focal findings  Skin:  no rash  Psychiatric:  calm, cooperative     Labs:                        10.8   8.08  )-----------( 211      ( 01 Jul 2022 05:23 )             32.4     07-01    139  |  104  |  20  ----------------------------<  91  4.0   |  24  |  1.05    Ca    8.7      01 Jul 2022 05:23    TPro  7.0  /  Alb  3.2<L>  /  TBili  0.2  /  DBili  x   /  AST  20  /  ALT  29  /  AlkPhos  94  06-30      WBC Trend:  WBC Count: 8.08 (07-01-22 @ 05:23)  WBC Count: 11.23 (06-30-22 @ 05:29)  WBC Count: 10.84 (06-29-22 @ 09:31)  WBC Count: 10.68 (06-28-22 @ 20:10)    Auto Neutrophil #: 8.16 K/uL (06-28-22 @ 20:10)    Auto Eosinophil %: 0.7 % (06-28-22 @ 20:10)    MICROBIOLOGY:  Vancomycin Level, Random: 8.5 (06-30 @ 05:29)    Culture - Urine (collected 29 Jun 2022 00:36)  Source: OR Collect bladder urine  Preliminary Report:    Numerous Gram Negative Rods    Culture - Abscess with Gram Stain (collected 29 Jun 2022 00:36)  Source: .Abscess #1. scrotal    Culture - Blood (collected 28 Jun 2022 20:10)  Source: .Blood Blood-Venous  Final Report:    Growth in aerobic bottle: Escherichia coli ESBL    ***Blood Panel PCR results on this specimen are available    approximately 3 hours after the Gram stain result.***    Gram stain, PCR, and/or culture results may not always    correspond due to difference in methodologies.    ************************************************************    This PCR assay was performed by multiplex PCR. This    Assay tests for 66 bacterial and resistance gene targets.    Please refer to the [facility] Labs test directory    at [email or web address] for details.  Organism: Blood Culture PCR  Escherichia coli ESBL  Organism: Escherichia coli ESBL    Sensitivities:      -  Amikacin: S <=16      -  Ampicillin: R >16 These ampicillin results predict results for amoxicillin      -  Ampicillin/Sulbactam: R <=4/2 Enterobacter, Klebsiella aerogenes, Citrobacter, and Serratia may develop resistance during prolonged therapy (3-4 days)      -  Aztreonam: R 16      -  Cefazolin: R >16 Enterobacter, Klebsiella aerogenes, Citrobacter, and Serratia may develop resistance during prolonged therapy (3-4 days)      -  Cefepime: R >16      -  Ceftriaxone: R >32 Enterobacter, Klebsiella aerogenes, Citrobacter, and Serratia may develop resistance during prolonged therapy      -  Ciprofloxacin: R >2      -  Ertapenem: S <=0.5      -  Gentamicin: S <=2      -  Imipenem: S <=1      -  Levofloxacin: R >4      -  Meropenem: S <=1      -  Piperacillin/Tazobactam: R <=8      -  Tobramycin: S <=2      -  Trimethoprim/Sulfamethoxazole: R >2/38      Method Type: DAYNA  Organism: Blood Culture PCR    Sensitivities:      -  CTX-M Resistance Marker: Detec      -  ESBL: Detec      -  Escherichia coli: Detec      Method Type: PCR    Culture - Blood (collected 28 Jun 2022 19:59)  Source: .Blood Blood-Peripheral  Preliminary Report:    Growth in anaerobic bottle: Escherichia coli    Susceptibility to follow. [id number]    Culture - Urine (collected 18 Jan 2021 15:30)  Source: .Urine Catheterized  Final Report:    No growth    Rapid RVP Result: NotDetec (06-28 @ 20:57)    RADIOLOGY:  imaging below personally reviewed    < from: US Urinary Bladder (06.28.22 @ 21:46) >  IMPRESSION:  Bladder volume 416.7 mL.  < end of copied text >    < from: US Testicles (06.28.22 @ 21:35) >  IMPRESSION:  Fluid collection surrounding the artificial urinary sphincter measuring   up to 3.9 cm with increased surrounding vascularity concerning for   infection.  Enlarged and hyperemic left epididymal tail concerning for epididymitis.  Multiple cystic lesions within the bilateral testicles, likely sequela of   the artificial urinary sphincter.  < end of copied text >

## 2022-07-01 NOTE — PROGRESS NOTE ADULT - PROBLEM SELECTOR PLAN 2
baseline Cr 0.8, bp on low side - bolus, IVFs - f/u BP/renal function closely baseline Cr 0.8, up to 1.36, bp on low side, lactate 1.9 - improving  - c/w gentle IVFs  - holding bp meds

## 2022-07-01 NOTE — PROGRESS NOTE ADULT - SUBJECTIVE AND OBJECTIVE BOX
[facility] Division of Hospital Medicine  Loreta Woodard MD  Pager (M-F, 8A-5P):  In-house pager [phone]; Long-range pager [phone]  Other Times:  Please page Hospitalist in Charge -  In-house pager [phone]    Patient is a 87y old  Male who presents with a chief complaint of Scrotal abscess (01 Jul 2022 07:21)    SUBJECTIVE / OVERNIGHT EVENTS:  No problems reported over night.   ADDITIONAL REVIEW OF SYSTEMS:    MEDICATIONS  (STANDING):  apixaban 5 milliGRAM(s) Oral two times a day  aspirin enteric coated 81 milliGRAM(s) Oral daily  atorvastatin 40 milliGRAM(s) Oral at bedtime  ertapenem  IVPB 1000 milliGRAM(s) IV Intermittent every 24 hours  lactated ringers. 1000 milliLiter(s) (60 mL/Hr) IV Continuous <Continuous>  pantoprazole    Tablet 40 milliGRAM(s) Oral before breakfast    MEDICATIONS  (PRN):  acetaminophen     Tablet .. 650 milliGRAM(s) Oral every 6 hours PRN Mild Pain (1 - 3)  meclizine 12.5 milliGRAM(s) Oral daily PRN Dizziness    I&O's Summary    30 Jun 2022 07:01  -  01 Jul 2022 07:00  --------------------------------------------------------  IN: 0 mL / OUT: 1326 mL / NET: -1326 mL    PHYSICAL EXAM:  Vital Signs Last 24 Hrs  T(C): 36.7 (01 Jul 2022 05:27), Max: 36.8 (30 Jun 2022 17:52)  T(F): 98.1 (01 Jul 2022 05:27), Max: 98.3 (30 Jun 2022 17:52)  HR: 65 (01 Jul 2022 05:27) (60 - 67)  BP: 92/51 (01 Jul 2022 05:27) (92/51 - 114/67)  BP(mean): --  RR: 18 (01 Jul 2022 05:27) (16 - 18)  SpO2: 96% (01 Jul 2022 05:27) (96% - 100%)    GENERAL: thin, frail, elderly man, sitting up in bed, NAD  very [place]  NECK: Supple, No JVD  RESPIRATORY: Clear to auscultation bilaterally; No rales, rhonchi, wheezing, or rubs  CARDIOVASCULAR: Regular rate and rhythm; No murmurs, rubs, or gallops  GASTROINTESTINAL: Soft, Nontender, Nondistended; Bowel sounds present  GENITOURINARY: wound covered  EXTREMITIES:  2+ Peripheral Pulses, No clubbing, cyanosis, or edema  NERVOUS SYSTEM:  nonfocal  HEME/LYMPH: No lymphadenopathy noted  SKIN: No rashes or lesions  PSYCH: calm, appropriate, oriented self, [facility], year "23", Radha    LABS:                        10.8   8.08  )-----------( 211      ( 01 Jul 2022 05:23 )             32.4     07-01    139  |  104  |  20  ----------------------------<  91  4.0   |  24  |  1.05    Ca    8.7      01 Jul 2022 05:23  Phos  3.5     06-29  Mg     2.20     06-29    TPro  7.0  /  Alb  3.2<L>  /  TBili  0.2  /  DBili  x   /  AST  20  /  ALT  29  /  AlkPhos  94  06-30    Culture - Urine (collected 29 Jun 2022 00:36)  Source: OR Collect bladder urine  Preliminary Report (30 Jun 2022 09:33):    Numerous Gram Negative Rods    Culture - Abscess with Gram Stain (collected 29 Jun 2022 00:36)  Source: .Abscess #1. scrotal  Gram Stain (30 Jun 2022 00:29):    Numerous polymorphonuclear leukocytes seen per low power field    Numerous Gram Negative Rods seen per oil power field    Culture - Blood (collected 28 Jun 2022 20:10)  Source: .Blood Blood-Venous  Gram Stain (29 Jun 2022 14:52):    Growth in aerobic bottle: Gram Negative Rods  Preliminary Report (30 Jun 2022 10:18):    Growth in aerobic bottle: Escherichia coli    ***Blood Panel PCR results on this specimen are available    approximately 3 hours after the Gram stain result.***    Gram stain, PCR, and/or culture results may not always    correspond due to difference in methodologies.    ************************************************************    This PCR assay was performed by multiplex PCR. This    Assay tests for 66 bacterial and resistance gene targets.    Please refer to the [facility] Labs test directory    at [email or web address] for details.  Organism: Blood Culture PCR (29 Jun 2022 17:51)  Organism: Blood Culture PCR (29 Jun 2022 17:51)    Culture - Blood (collected 28 Jun 2022 19:59)  Source: .Blood Blood-Peripheral  Gram Stain (30 Jun 2022 05:34):    Growth in anaerobic bottle: Gram Negative Rods  Preliminary Report (30 Jun 2022 05:34):    Growth in anaerobic bottle: Gram Negative Rods    RADIOLOGY & ADDITIONAL TESTS:  Results Reviewed:   Imaging Personally Reviewed:  Electrocardiogram Personally Reviewed:    COORDINATION OF CARE:  Care Discussed with Consultants/Other Providers [Y/N]: urology re overall care  Prior or Outpatient Records Reviewed [Y/N]:   [facility] Division of Hospital Medicine  Loreta Woodard MD  Pager (M-F, 8A-5P):  In-house pager [phone]; Long-range pager [phone]  Other Times:  Please page Hospitalist in Charge -  In-house pager [phone]    Patient is a 87y old  Male who presents with a chief complaint of Scrotal abscess (01 Jul 2022 07:21)    SUBJECTIVE / OVERNIGHT EVENTS:  No problems reported over night. Very weak, sitting up in chair. RN reports has vertigo, dizzy when walking.  ADDITIONAL REVIEW OF SYSTEMS:    MEDICATIONS  (STANDING):  apixaban 5 milliGRAM(s) Oral two times a day  aspirin enteric coated 81 milliGRAM(s) Oral daily  atorvastatin 40 milliGRAM(s) Oral at bedtime  ertapenem  IVPB 1000 milliGRAM(s) IV Intermittent every 24 hours  lactated ringers. 1000 milliLiter(s) (60 mL/Hr) IV Continuous <Continuous>  pantoprazole    Tablet 40 milliGRAM(s) Oral before breakfast    MEDICATIONS  (PRN):  acetaminophen     Tablet .. 650 milliGRAM(s) Oral every 6 hours PRN Mild Pain (1 - 3)  meclizine 12.5 milliGRAM(s) Oral daily PRN Dizziness    I&O's Summary    30 Jun 2022 07:01  -  01 Jul 2022 07:00  --------------------------------------------------------  IN: 0 mL / OUT: 1326 mL / NET: -1326 mL    PHYSICAL EXAM:  Vital Signs Last 24 Hrs  T(C): 36.7 (01 Jul 2022 05:27), Max: 36.8 (30 Jun 2022 17:52)  T(F): 98.1 (01 Jul 2022 05:27), Max: 98.3 (30 Jun 2022 17:52)  HR: 65 (01 Jul 2022 05:27) (60 - 67)  BP: 92/51 (01 Jul 2022 05:27) (92/51 - 114/67)  BP(mean): --  RR: 18 (01 Jul 2022 05:27) (16 - 18)  SpO2: 96% (01 Jul 2022 05:27) (96% - 100%)    GENERAL: thin, frail, elderly man, resting in chair  very [place]  NECK: Supple, No JVD  RESPIRATORY: Clear to auscultation bilaterally; No rales, rhonchi, wheezing, or rubs  CARDIOVASCULAR: Regular rate and rhythm; No murmurs, rubs, or gallops  GASTROINTESTINAL: Soft, Nontender, Nondistended; Bowel sounds present  GENITOURINARY: wound covered  EXTREMITIES:  2+ Peripheral Pulses, No clubbing, cyanosis, or edema  NERVOUS SYSTEM: poor vision and hearing, unsteady gait  SKIN: No rashes or lesions  PSYCH: calm, appropriate    LABS:                        10.8   8.08  )-----------( 211      ( 01 Jul 2022 05:23 )             32.4     07-01    139  |  104  |  20  ----------------------------<  91  4.0   |  24  |  1.05    Ca    8.7      01 Jul 2022 05:23  Phos  3.5     06-29  Mg     2.20     06-29    TPro  7.0  /  Alb  3.2<L>  /  TBili  0.2  /  DBili  x   /  AST  20  /  ALT  29  /  AlkPhos  94  06-30    Culture - Urine (collected 29 Jun 2022 00:36)  Source: OR Collect bladder urine  Preliminary Report (30 Jun 2022 09:33):    Numerous Gram Negative Rods    Culture - Abscess with Gram Stain (collected 29 Jun 2022 00:36)  Source: .Abscess #1. scrotal  Gram Stain (30 Jun 2022 00:29):    Numerous polymorphonuclear leukocytes seen per low power field    Numerous Gram Negative Rods seen per oil power field    Culture - Blood (collected 28 Jun 2022 20:10)  Source: .Blood Blood-Venous  Gram Stain (29 Jun 2022 14:52):    Growth in aerobic bottle: Gram Negative Rods  Preliminary Report (30 Jun 2022 10:18):    Growth in aerobic bottle: Escherichia coli    ***Blood Panel PCR results on this specimen are available    approximately 3 hours after the Gram stain result.***    Gram stain, PCR, and/or culture results may not always    correspond due to difference in methodologies.    ************************************************************    This PCR assay was performed by multiplex PCR. This    Assay tests for 66 bacterial and resistance gene targets.    Please refer to the [facility] Labs test directory    at [email or web address] for details.  Organism: Blood Culture PCR (29 Jun 2022 17:51)  Organism: Blood Culture PCR (29 Jun 2022 17:51)    Culture - Blood (collected 28 Jun 2022 19:59)  Source: .Blood Blood-Peripheral  Gram Stain (30 Jun 2022 05:34):    Growth in anaerobic bottle: Gram Negative Rods  Preliminary Report (30 Jun 2022 05:34):    Growth in anaerobic bottle: Gram Negative Rods    RADIOLOGY & ADDITIONAL TESTS:  Results Reviewed:   Imaging Personally Reviewed:  Electrocardiogram Personally Reviewed:    COORDINATION OF CARE:  Care Discussed with Consultants/Other Providers [Y/N]: urology re overall care  Prior or Outpatient Records Reviewed [Y/N]:

## 2022-07-01 NOTE — PROGRESS NOTE ADULT - ASSESSMENT
87M CVA, HTN, Vertigo, GERD, Prostate cancer many years ago with a device placed after the surgery (son did not know the details) multiple recent UTI, recently admitted at [facility] for UTI, discharged with a whiting catheter that was removed by Dr Hirsch yesterday, presented with abdominal pain and retention.  6/28 underwent scrotal exploration, I&D left scrotal abscess and debridement of pseudocapsule, explantation of AUS pump device, placement of two penrose drains and interrupted closure of dartos    +ESBL Ecoli bacteremia

## 2022-07-02 LAB
ANION GAP SERPL CALC-SCNC: 11 MMOL/L — SIGNIFICANT CHANGE UP (ref 7–14)
BUN SERPL-MCNC: 24 MG/DL — HIGH (ref 7–23)
CALCIUM SERPL-MCNC: 8.8 MG/DL — SIGNIFICANT CHANGE UP (ref 8.4–10.5)
CHLORIDE SERPL-SCNC: 101 MMOL/L — SIGNIFICANT CHANGE UP (ref 98–107)
CO2 SERPL-SCNC: 24 MMOL/L — SIGNIFICANT CHANGE UP (ref 22–31)
CREAT SERPL-MCNC: 0.93 MG/DL — SIGNIFICANT CHANGE UP (ref 0.5–1.3)
CULTURE RESULTS: SIGNIFICANT CHANGE UP
EGFR: 80 ML/MIN/1.73M2 — SIGNIFICANT CHANGE UP
GLUCOSE SERPL-MCNC: 84 MG/DL — SIGNIFICANT CHANGE UP (ref 70–99)
HCT VFR BLD CALC: 35.8 % — LOW (ref 39–50)
HGB BLD-MCNC: 11.4 G/DL — LOW (ref 13–17)
MCHC RBC-ENTMCNC: 28.9 PG — SIGNIFICANT CHANGE UP (ref 27–34)
MCHC RBC-ENTMCNC: 31.8 GM/DL — LOW (ref 32–36)
MCV RBC AUTO: 90.6 FL — SIGNIFICANT CHANGE UP (ref 80–100)
NRBC # BLD: 0 /100 WBCS — SIGNIFICANT CHANGE UP
NRBC # FLD: 0 K/UL — SIGNIFICANT CHANGE UP
PLATELET # BLD AUTO: 225 K/UL — SIGNIFICANT CHANGE UP (ref 150–400)
POTASSIUM SERPL-MCNC: 4.1 MMOL/L — SIGNIFICANT CHANGE UP (ref 3.5–5.3)
POTASSIUM SERPL-SCNC: 4.1 MMOL/L — SIGNIFICANT CHANGE UP (ref 3.5–5.3)
RBC # BLD: 3.95 M/UL — LOW (ref 4.2–5.8)
RBC # FLD: 13.3 % — SIGNIFICANT CHANGE UP (ref 10.3–14.5)
SODIUM SERPL-SCNC: 136 MMOL/L — SIGNIFICANT CHANGE UP (ref 135–145)
SPECIMEN SOURCE: SIGNIFICANT CHANGE UP
WBC # BLD: 6.86 K/UL — SIGNIFICANT CHANGE UP (ref 3.8–10.5)
WBC # FLD AUTO: 6.86 K/UL — SIGNIFICANT CHANGE UP (ref 3.8–10.5)

## 2022-07-02 PROCEDURE — 99233 SBSQ HOSP IP/OBS HIGH 50: CPT

## 2022-07-02 PROCEDURE — 99232 SBSQ HOSP IP/OBS MODERATE 35: CPT

## 2022-07-02 RX ADMIN — SODIUM CHLORIDE 60 MILLILITER(S): 9 INJECTION, SOLUTION INTRAVENOUS at 21:18

## 2022-07-02 RX ADMIN — Medication 81 MILLIGRAM(S): at 13:03

## 2022-07-02 RX ADMIN — APIXABAN 5 MILLIGRAM(S): 2.5 TABLET, FILM COATED ORAL at 17:54

## 2022-07-02 RX ADMIN — PANTOPRAZOLE SODIUM 40 MILLIGRAM(S): 20 TABLET, DELAYED RELEASE ORAL at 05:08

## 2022-07-02 RX ADMIN — ERTAPENEM SODIUM 120 MILLIGRAM(S): 1 INJECTION, POWDER, LYOPHILIZED, FOR SOLUTION INTRAMUSCULAR; INTRAVENOUS at 15:26

## 2022-07-02 RX ADMIN — APIXABAN 5 MILLIGRAM(S): 2.5 TABLET, FILM COATED ORAL at 05:07

## 2022-07-02 RX ADMIN — ATORVASTATIN CALCIUM 40 MILLIGRAM(S): 80 TABLET, FILM COATED ORAL at 21:17

## 2022-07-02 NOTE — PROGRESS NOTE ADULT - PROBLEM SELECTOR PLAN 1
s/p surgery as noted above  +ESBL Ecoli bacteremia - d/w ID pharmacist - changed zosyn to erta, hold further vanco for now -  f/u cultures, repeat blood cx sent 7/1  abscess GNR, Ecoli

## 2022-07-02 NOTE — PROGRESS NOTE ADULT - ASSESSMENT
87M CVA, HTN, Vertigo, GERD, Prostate cancer many years ago with a device placed after the surgery (son did not know the details) multiple recent UTI, recently admitted at [facility] for UTI, discharged with a whiting catheter that was removed by Dr Hirsch yesterday, presented with abdominal pain and retention.  6/28 underwent scrotal exploration, I&D left scrotal abscess and debridement of pseudocapsule, explantation of AUS pump device, placement of two penrose drains and interrupted closure of dartos    +ESBL Ecoli bacteremia

## 2022-07-02 NOTE — PROGRESS NOTE ADULT - SUBJECTIVE AND OBJECTIVE BOX
Overnight events:  None, voiding with PVRs <150    Subjective:  Pt offers no complaints    Objective:    Vital signs  T(C): , Max: 36.4 (07-01-22 @ 10:30)  HR: 65 (07-02-22 @ 05:56)  BP: 112/62 (07-02-22 @ 05:56)  SpO2: 100% (07-02-22 @ 05:56)  Wt(kg): --    Output   Void: 300  07-01 @ 07:01  -  07-02 @ 07:00  --------------------------------------------------------  IN: 0 mL / OUT: 550 mL / NET: -550 mL        Gen: NAD  Abd: soft, nontender  : penrose in place, resolving erythema, no crepitus, mild edema    Labs                        11.4   6.86  )-----------( 225      ( 02 Jul 2022 06:42 )             35.8     02 Jul 2022 06:42    136    |  101    |  24     ----------------------------<  84     4.1     |  24     |  0.93     Ca    8.8        02 Jul 2022 06:42

## 2022-07-02 NOTE — PROGRESS NOTE ADULT - SUBJECTIVE AND OBJECTIVE BOX
[facility] Division of Hospital Medicine  Braulio Blood MD  In House Pager [phone]    Patient is a 87y old  Male who presents with a chief complaint of Scrotal abscess (02 Jul 2022 08:04)      SUBJECTIVE / OVERNIGHT EVENTS:  NAEO, Patient seen and examined at bedside, no acute complaints today.   No fever, no chills, no SOB, no CP, no n/v/d, no abd pain, no dysuria      MEDICATIONS  (STANDING):  apixaban 5 milliGRAM(s) Oral two times a day  aspirin enteric coated 81 milliGRAM(s) Oral daily  atorvastatin 40 milliGRAM(s) Oral at bedtime  ertapenem  IVPB 1000 milliGRAM(s) IV Intermittent every 24 hours  lactated ringers. 1000 milliLiter(s) (60 mL/Hr) IV Continuous <Continuous>  pantoprazole    Tablet 40 milliGRAM(s) Oral before breakfast    MEDICATIONS  (PRN):  acetaminophen     Tablet .. 650 milliGRAM(s) Oral every 6 hours PRN Mild Pain (1 - 3)  meclizine 12.5 milliGRAM(s) Oral daily PRN Dizziness    CAPILLARY BLOOD GLUCOSE        I&O's Summary    01 Jul 2022 07:01  -  02 Jul 2022 07:00  --------------------------------------------------------  IN: 0 mL / OUT: 550 mL / NET: -550 mL    02 Jul 2022 07:01  -  02 Jul 2022 13:58  --------------------------------------------------------  IN: 0 mL / OUT: 1 mL / NET: -1 mL        PHYSICAL EXAM:  Vital Signs Last 24 Hrs  T(C): 36.4 (02 Jul 2022 10:30), Max: 36.4 (01 Jul 2022 17:50)  T(F): 97.6 (02 Jul 2022 10:30), Max: 97.6 (01 Jul 2022 17:50)  HR: 60 (02 Jul 2022 10:30) (60 - 74)  BP: 121/66 (02 Jul 2022 10:30) (104/56 - 121/66)  BP(mean): --  RR: 18 (02 Jul 2022 10:30) (17 - 18)  SpO2: 100% (02 Jul 2022 10:30) (98% - 100%)    Gen: NAD; sitting in bed comfortably   Pulm: no accessory muscle use; lungs clear on auscultation bilaterally; no wheezing or crackles.   Cards: RRR, nl S1/S2; no LE edema; no JVD  Abd: non-distended; soft and NT on exam; +bs  Ext: TOM; no joint effusion or tenderness in upper and lower extremities; no cyanosis  Neuro: Awake and Alert; non-focal; moving all extremities.   Skin: no new rashes; warm to touch;     LABS:                        11.4   6.86  )-----------( 225      ( 02 Jul 2022 06:42 )             35.8     07-02    136  |  101  |  24<H>  ----------------------------<  84  4.1   |  24  |  0.93    Ca    8.8      02 Jul 2022 06:42                Culture - Blood (collected 01 Jul 2022 05:23)  Source: .Blood Blood  Preliminary Report (02 Jul 2022 09:01):    No growth to date.        RADIOLOGY & ADDITIONAL TESTS:  Results Reviewed: Y  Imaging Personally Reviewed: Y  Electrocardiogram Personally Reviewed: Y    COORDINATION OF CARE:  Care Discussed with Consultants/Other Providers [Y/N]: Y  Prior or Outpatient Records Reviewed [Y/N]: Y

## 2022-07-02 NOTE — PROGRESS NOTE ADULT - ASSESSMENT
A/P: 87y Male s/p scrotal exploration, I&D, partial removal of AUS 6/28/2022; has penroses X2    POD#1 - no events last nite post-op  POD#2 - doing well, fluffs replaced, having flatus, pain well controlled, ESBL bacteremia noted, started ertapenem, OR cx mod Ecoli  POD#3 - remains afebrile, wound stable, minimal drainage no surrounding cellulitis, lateral penrose removed, whiting removed voiding with PVRs <150, ID consult obtained as all cultures grew ESBL Ecoli  POD#4 - remains afebrile, tolerating diet, scrotum stable with mild erythema/edema    Plan:  - AM labs reviewed  - F/u repeat Bcs  - cont ertapenem through 7/9  - PT consult recs rehab  - f/u ID  - f/u medicine  - DVT prophy, IS, OOB, ambulate

## 2022-07-02 NOTE — PROGRESS NOTE ADULT - PROBLEM SELECTOR PLAN 2
baseline Cr 0.8, up to 1.36, bp on low side, lactate 1.9 - improving  - c/w gentle IVFs  - holding bp meds

## 2022-07-03 PROCEDURE — 99233 SBSQ HOSP IP/OBS HIGH 50: CPT

## 2022-07-03 PROCEDURE — 99232 SBSQ HOSP IP/OBS MODERATE 35: CPT

## 2022-07-03 RX ADMIN — PANTOPRAZOLE SODIUM 40 MILLIGRAM(S): 20 TABLET, DELAYED RELEASE ORAL at 05:26

## 2022-07-03 RX ADMIN — Medication 650 MILLIGRAM(S): at 10:28

## 2022-07-03 RX ADMIN — SODIUM CHLORIDE 60 MILLILITER(S): 9 INJECTION, SOLUTION INTRAVENOUS at 10:28

## 2022-07-03 RX ADMIN — SODIUM CHLORIDE 100 MILLILITER(S): 9 INJECTION, SOLUTION INTRAVENOUS at 16:14

## 2022-07-03 RX ADMIN — ERTAPENEM SODIUM 120 MILLIGRAM(S): 1 INJECTION, POWDER, LYOPHILIZED, FOR SOLUTION INTRAMUSCULAR; INTRAVENOUS at 15:33

## 2022-07-03 RX ADMIN — Medication 81 MILLIGRAM(S): at 15:34

## 2022-07-03 RX ADMIN — APIXABAN 5 MILLIGRAM(S): 2.5 TABLET, FILM COATED ORAL at 05:26

## 2022-07-03 RX ADMIN — APIXABAN 5 MILLIGRAM(S): 2.5 TABLET, FILM COATED ORAL at 18:30

## 2022-07-03 RX ADMIN — Medication 650 MILLIGRAM(S): at 11:28

## 2022-07-03 RX ADMIN — ATORVASTATIN CALCIUM 40 MILLIGRAM(S): 80 TABLET, FILM COATED ORAL at 22:39

## 2022-07-03 NOTE — PROGRESS NOTE ADULT - SUBJECTIVE AND OBJECTIVE BOX
Subjective  Feeling well this morning. Ambulating with PT. Tolerating Diet.     Objective    Vital signs  T(F): , Max: 98.4 (07-02-22 @ 18:08)  HR: 74 (07-03-22 @ 10:05)  BP: 96/51 (07-03-22 @ 10:05)  SpO2: 100% (07-03-22 @ 10:05)  Wt(kg): --    Output     OUT:    Voided (mL): 775 mL  Total OUT: 775 mL    Total NET: -775 mL          Gen: NAD  Abd: soft, nontender, nondistended  : penrose dislodged, resolving erythema, no crepitus, mild edema    Labs      07-02 @ 06:42    WBC 6.86  / Hct 35.8  / SCr 0.93         Culture - Blood (collected 07-01-22 @ 05:23)  Source: .Blood Blood  Preliminary Report (07-02-22 @ 09:01):    No growth to date.    Culture - Urine (collected 06-29-22 @ 00:36)  Source: OR Collect bladder urine  Final Report (07-01-22 @ 17:45):    Numerous Escherichia coli ESBL  Organism: Escherichia coli ESBL (07-01-22 @ 17:45)  Organism: Escherichia coli ESBL (07-01-22 @ 17:45)      -  Amikacin: S <=16      -  Amoxicillin/Clavulanic Acid: S <=8/4 Consider reserving for cystitis when ampicillin/sulbactam is resistant      -  Ampicillin: R >16 These ampicillin results predict results for amoxicillin      -  Ampicillin/Sulbactam: R 16/8 Enterobacter, Klebsiella aerogenes, Citrobacter, and Serratia may develop resistance during prolonged therapy (3-4 days)      -  Aztreonam: R 16      -  Cefazolin: R >16 (MIC_CL_COM_ENTERIC_CEFAZU) For uncomplicated UTI with K. pneumoniae, E. coli, or P. mirablis: DAYNA <=16 is sensitive and DAYNA >=32 is resistant. This also predicts results for oral agents cefaclor, cefdinir, cefpodoxime, cefprozil, cefuroxime axetil, cephalexin and locarbef for uncomplicated UTI. Note that some isolates may be susceptible to these agents while testing resistant to cefazolin.      -  Cefepime: R >16      -  Ceftriaxone: R >32 Enterobacter, Klebsiella aerogenes, Citrobacter, and Serratia may develop resistance during prolonged therapy      -  Ciprofloxacin: R >2      -  Ertapenem: S <=0.5      -  Gentamicin: S <=2      -  Imipenem: S <=1      -  Levofloxacin: R >4      -  Meropenem: S <=1      -  Piperacillin/Tazobactam: R <=8      -  Tigecycline: S <=2      -  Tobramycin: S <=2      -  Trimethoprim/Sulfamethoxazole: R >2/38      Method Type: DAYNA    Culture - Abscess with Gram Stain (collected 06-29-22 @ 00:36)  Source: .Abscess #1. scrotal  Gram Stain (06-30-22 @ 00:29):    Numerous polymorphonuclear leukocytes seen per low power field    Numerous Gram Negative Rods seen per oil power field  Preliminary Report (07-02-22 @ 12:27):    Moderate Escherichia coli ESBL    See previous culture [id number]    Culture - Blood (collected 06-28-22 @ 20:10)  Source: .Blood Blood-Venous  Gram Stain (06-29-22 @ 14:52):    Growth in aerobic bottle: Gram Negative Rods  Final Report (07-01-22 @ 13:54):    Growth in aerobic bottle: Escherichia coli ESBL    ***Blood Panel PCR results on this specimen are available    approximately 3 hours after the Gram stain result.***    Gram stain, PCR, and/or culture results may not always    correspond due to difference in methodologies.    ************************************************************    This PCR assay was performed by multiplex PCR. This    Assay tests for 66 bacterial and resistance gene targets.    Please refer to the [facility] test directory    at [email or web address] for details.  Organism: Blood Culture PCR  Escherichia coli ESBL (07-01-22 @ 13:54)  Organism: Escherichia coli ESBL (07-01-22 @ 13:54)      -  Amikacin: S <=16      -  Ampicillin: R >16 These ampicillin results predict results for amoxicillin      -  Ampicillin/Sulbactam: R <=4/2 Enterobacter, Klebsiella aerogenes, Citrobacter, and Serratia may develop resistance during prolonged therapy (3-4 days)      -  Aztreonam: R 16      -  Cefazolin: R >16 Enterobacter, Klebsiella aerogenes, Citrobacter, and Serratia may develop resistance during prolonged therapy (3-4 days)      -  Cefepime: R >16      -  Ceftriaxone: R >32 Enterobacter, Klebsiella aerogenes, Citrobacter, and Serratia may develop resistance during prolonged therapy      -  Ciprofloxacin: R >2      -  Ertapenem: S <=0.5      -  Gentamicin: S <=2      -  Imipenem: S <=1      -  Levofloxacin: R >4      -  Meropenem: S <=1      -  Piperacillin/Tazobactam: R <=8      -  Tobramycin: S <=2      -  Trimethoprim/Sulfamethoxazole: R >2/38      Method Type: DAYNA  Organism: Blood Culture PCR (07-01-22 @ 13:54)      -  CTX-M Resistance Marker: Detec      -  ESBL: Detec      -  Escherichia coli: Detec      Method Type: PCR    Culture - Blood (collected 06-28-22 @ 19:59)  Source: .Blood Blood-Peripheral  Gram Stain (06-30-22 @ 05:34):    Growth in anaerobic bottle: Gram Negative Rods  Final Report (07-02-22 @ 11:05):    Growth in anaerobic bottle: Escherichia coli ESBL    Susceptibility to follow. [id number]        Urine Cx: ?  Blood Cx: ?    Imaging

## 2022-07-03 NOTE — PROGRESS NOTE ADULT - PROBLEM SELECTOR PLAN 1
s/p surgery as noted above  +ESBL Ecoli bacteremia - d/w ID pharmacist - changed zosyn to erta, hold further vanco for now -  f/u cultures, repeat blood cx sent 7/1 - NGTD.   abscess GNR, Ecoli

## 2022-07-03 NOTE — PROGRESS NOTE ADULT - PROBLEM SELECTOR PLAN 2
baseline Cr 0.8, up to 1.36, bp on low side, lactate 1.9 - improving  - c/w gentle IVFs  - holding bp meds  - resolved

## 2022-07-03 NOTE — PROGRESS NOTE ADULT - ASSESSMENT
A/P: 87y Male s/p scrotal exploration, I&D, partial removal of AUS 6/28/2022; has penroses X2    POD#1 - no events last nite post-op  POD#2 - doing well, fluffs replaced, having flatus, pain well controlled, ESBL bacteremia noted, started ertapenem, OR cx mod Ecoli  POD#3 - remains afebrile, wound stable, minimal drainage no surrounding cellulitis, lateral penrose removed, whiting removed voiding with PVRs <150, ID consult obtained as all cultures grew ESBL Ecoli  POD#4 - remains afebrile, tolerating diet, scrotum stable with mild erythema/edema  POD#5- remains afebrile, tolerating diet, penrose removed, scrotum stable    Plan:  - F/u repeat Bcs  - cont ertapenem through 7/9  - PT consult recs rehab  - f/u ID  - f/u medicine  - DVT prophy, IS, OOB, ambulate

## 2022-07-03 NOTE — PROGRESS NOTE ADULT - SUBJECTIVE AND OBJECTIVE BOX
[facility] Division of Hospital Medicine  Braulio Blood MD  In House Pager [phone]    Patient is a 87y old  Male who presents with a chief complaint of Scrotal abscess (02 Jul 2022 13:57)      SUBJECTIVE / OVERNIGHT EVENTS:  NAEO, Patient seen and examined at bedside, no acute complaints today.   No fever, no chills, no SOB, no CP, no n/v/d, no abd pain, no dysuria      MEDICATIONS  (STANDING):  apixaban 5 milliGRAM(s) Oral two times a day  aspirin enteric coated 81 milliGRAM(s) Oral daily  atorvastatin 40 milliGRAM(s) Oral at bedtime  ertapenem  IVPB 1000 milliGRAM(s) IV Intermittent every 24 hours  lactated ringers. 1000 milliLiter(s) (60 mL/Hr) IV Continuous <Continuous>  pantoprazole    Tablet 40 milliGRAM(s) Oral before breakfast    MEDICATIONS  (PRN):  acetaminophen     Tablet .. 650 milliGRAM(s) Oral every 6 hours PRN Mild Pain (1 - 3)  meclizine 12.5 milliGRAM(s) Oral daily PRN Dizziness    CAPILLARY BLOOD GLUCOSE        I&O's Summary    02 Jul 2022 07:01  -  03 Jul 2022 07:00  --------------------------------------------------------  IN: 0 mL / OUT: 776 mL / NET: -776 mL        PHYSICAL EXAM:  Vital Signs Last 24 Hrs  T(C): 36.4 (03 Jul 2022 07:30), Max: 36.9 (02 Jul 2022 18:08)  T(F): 97.6 (03 Jul 2022 07:30), Max: 98.4 (02 Jul 2022 18:08)  HR: 66 (03 Jul 2022 07:30) (60 - 80)  BP: 103/57 (03 Jul 2022 07:30) (96/59 - 121/66)  BP(mean): --  RR: 17 (03 Jul 2022 07:30) (17 - 18)  SpO2: 100% (03 Jul 2022 07:30) (100% - 100%)    Gen: NAD; sitting in bed comfortably   Pulm: no accessory muscle use; lungs clear on auscultation bilaterally; no wheezing or crackles.   Cards: RRR, nl S1/S2; no LE edema; no JVD  Abd: non-distended; soft and NT on exam; +bs  Ext: TOM; no joint effusion or tenderness in upper and lower extremities; no cyanosis  Neuro: Awake and Alert; non-focal; moving all extremities.   Skin: no new rashes; warm to touch;     LABS:                        11.4   6.86  )-----------( 225      ( 02 Jul 2022 06:42 )             35.8     07-02    136  |  101  |  24<H>  ----------------------------<  84  4.1   |  24  |  0.93    Ca    8.8      02 Jul 2022 06:42                Culture - Blood (collected 01 Jul 2022 05:23)  Source: .Blood Blood  Preliminary Report (02 Jul 2022 09:01):    No growth to date.        RADIOLOGY & ADDITIONAL TESTS:  Results Reviewed: Y  Imaging Personally Reviewed: Y  Electrocardiogram Personally Reviewed: Y    COORDINATION OF CARE:  Care Discussed with Consultants/Other Providers [Y/N]: Y  Prior or Outpatient Records Reviewed [Y/N]: ANTONIA

## 2022-07-03 NOTE — PROGRESS NOTE ADULT - ASSESSMENT
87M CVA, HTN, Vertigo, GERD, Prostate cancer many years ago with a device placed after the surgery (son did not know the details) multiple recent UTI, recently admitted at [facility] for UTI, discharged with a whiting catheter that was removed by Dr Hirsch yesterday, presented with abdominal pain and retention.  6/28 underwent scrotal exploration, I&D left scrotal abscess and debridement of pseudocapsule, explantation of AUS pump device, placement of two penrose drains and interrupted closure of dartos    +ESBL Ecoli bacteremia

## 2022-07-04 PROCEDURE — 99233 SBSQ HOSP IP/OBS HIGH 50: CPT

## 2022-07-04 RX ADMIN — APIXABAN 5 MILLIGRAM(S): 2.5 TABLET, FILM COATED ORAL at 06:20

## 2022-07-04 RX ADMIN — SODIUM CHLORIDE 100 MILLILITER(S): 9 INJECTION, SOLUTION INTRAVENOUS at 10:18

## 2022-07-04 RX ADMIN — APIXABAN 5 MILLIGRAM(S): 2.5 TABLET, FILM COATED ORAL at 18:04

## 2022-07-04 RX ADMIN — ERTAPENEM SODIUM 120 MILLIGRAM(S): 1 INJECTION, POWDER, LYOPHILIZED, FOR SOLUTION INTRAMUSCULAR; INTRAVENOUS at 14:02

## 2022-07-04 RX ADMIN — ATORVASTATIN CALCIUM 40 MILLIGRAM(S): 80 TABLET, FILM COATED ORAL at 21:27

## 2022-07-04 RX ADMIN — PANTOPRAZOLE SODIUM 40 MILLIGRAM(S): 20 TABLET, DELAYED RELEASE ORAL at 06:20

## 2022-07-04 RX ADMIN — Medication 81 MILLIGRAM(S): at 14:02

## 2022-07-04 NOTE — PROVIDER CONTACT NOTE (CRITICAL VALUE NOTIFICATION) - PERSON GIVING RESULT:
Yfn Rizzo, [facility]
Kisha Ruby / [facility] Core lab
Yfn Rizzo, [facility]

## 2022-07-04 NOTE — PROGRESS NOTE ADULT - ASSESSMENT
A/P: 87y Male s/p scrotal exploration, I&D, partial removal of AUS 6/28/2022; has penroses X2    POD#1 - no events last nite post-op  POD#2 - doing well, fluffs replaced, having flatus, pain well controlled, ESBL bacteremia noted, started ertapenem, OR cx mod Ecoli  POD#3 - remains afebrile, wound stable, minimal drainage no surrounding cellulitis, lateral penrose removed, whiting removed voiding with PVRs <150, ID consult obtained as all cultures grew ESBL Ecoli  POD#4 - remains afebrile, tolerating diet, scrotum stable with mild erythema/edema  POD#5- remains afebrile, tolerating diet, penrose removed, scrotum stable  POD#%- afebrile, pain controlled, scrotal exam stable, repeat blood cx ngtd    Plan:  - F/u repeat Bcs ngtd  - cont ertapenem through 7/9  - PT consult recs rehab  - f/u ID  - f/u medicine  - DVT prophy, IS, OOB, ambulate    Patient seen by Dr. Hawk

## 2022-07-04 NOTE — PROGRESS NOTE ADULT - ATTENDING COMMENTS
As above  wound care  Physical therapy
Pt seen and examined with team  Agree with above eval and mgmt plan  Wound care  IV ertapenem
Pt seen and examined  Agree with above eval and mgmt plan  wound care  OOB with assist
Pt seen and examined  agree with plan above  d/c whiting today  can d/c the more lateral penrose, while maintaining the more medial one  awaiting sensitivities and f/u neg blood culture
pt seen and examined  doing well at this time post ID, explant, whiting placement  cont iv abx  await cultures  plan to f/u with Dr. Cline and explant AUS
pt seen and examined  esbl bacteremia of concern- agree with broad spectrum  clinically improving
Walking

## 2022-07-04 NOTE — PROVIDER CONTACT NOTE (CRITICAL VALUE NOTIFICATION) - TEST AND RESULT REPORTED:
Blood cultures x2 from 6/28, both aerobic & anaerobic bottles   Urine culture with moderate E. Coli & ESBL growth  Gram stain with numerous with E. Coli & ESBL growth.
Gram stain with final result with moderate E. Coli & ESBL growth.  Abscess culture with final result with moderate E. Coli & ESBL growth.
Positive blood culture, smear culture and abscess culture

## 2022-07-04 NOTE — PROGRESS NOTE ADULT - SUBJECTIVE AND OBJECTIVE BOX
Patient is a 87y old  Male who presents with a chief complaint of Scrotal abscess (04 Jul 2022 09:39)      INTERVAL HPI/OVERNIGHT EVENTS:  Seen by me this afternoon, feeling ok, a bit weak, not happy as getting IVF's    Review of Systems: 12 point review of systems otherwise negative    MEDICATIONS  (STANDING):  apixaban 5 milliGRAM(s) Oral two times a day  aspirin enteric coated 81 milliGRAM(s) Oral daily  atorvastatin 40 milliGRAM(s) Oral at bedtime  ertapenem  IVPB 1000 milliGRAM(s) IV Intermittent every 24 hours  lactated ringers. 1000 milliLiter(s) (100 mL/Hr) IV Continuous <Continuous>  pantoprazole    Tablet 40 milliGRAM(s) Oral before breakfast    MEDICATIONS  (PRN):  acetaminophen     Tablet .. 650 milliGRAM(s) Oral every 6 hours PRN Mild Pain (1 - 3)  meclizine 12.5 milliGRAM(s) Oral daily PRN Dizziness      Allergies    No Known Allergies    Intolerances          Vital Signs Last 24 Hrs  T(C): 36.7 (04 Jul 2022 13:46), Max: 36.9 (03 Jul 2022 17:44)  T(F): 98.1 (04 Jul 2022 13:46), Max: 98.4 (03 Jul 2022 17:44)  HR: 90 (04 Jul 2022 13:46) (58 - 90)  BP: 109/59 (04 Jul 2022 13:46) (91/58 - 119/74)  BP(mean): --  RR: 17 (04 Jul 2022 13:46) (16 - 17)  SpO2: 100% (04 Jul 2022 13:46) (100% - 100%)  CAPILLARY BLOOD GLUCOSE          07-03 @ 07:01 - 07-04 @ 07:00  --------------------------------------------------------  IN: 720 mL / OUT: 253 mL / NET: 467 mL    07-04 @ 07:01 - 07-04 @ 17:14  --------------------------------------------------------  IN: 480 mL / OUT: 150 mL / NET: 330 mL        Physical Exam:    Daily     Daily   General:  Well appearing, NAD, cachetic  HEENT:  Nonicteric, PERRLA  CV:  RRR, no murmur, no JVD  Lungs:  CTA B/L, no wheezes, rales, rhonchi  Abdomen:  Soft, non-tender, no distended, positive BS  Extremities:  2+ pulses, no c/c, no edema  Skin:  Warm and dry, no rashes  :  No whiting  Neuro:  AAOx3, non-focal, CN II-XII grossly intact  No Restraints    LABS:                  RADIOLOGY & ADDITIONAL TESTS:  Reviewed by me

## 2022-07-04 NOTE — PROGRESS NOTE ADULT - SUBJECTIVE AND OBJECTIVE BOX
Subjective    Patient seen and examined. States he feels well, pain controlled, tolerating diet.     Objective    Vital signs  T(F): , Max: 98.4 (07-03-22 @ 17:44)  HR: 58 (07-04-22 @ 05:54)  BP: 119/74 (07-04-22 @ 05:54)  SpO2: 100% (07-04-22 @ 05:54)  Wt(kg): --    Output     07-03 @ 07:01  -  07-04 @ 07:00  --------------------------------------------------------  IN: 720 mL / OUT: 253 mL / NET: 467 mL        General: NAD  Abdomen: soft/non-tender/non-distended  : left scrotal incision open at skin without drainage, surrounding edematous scrotal skin stable. penroses out.     Labs    Culture - Blood in AM (07.01.22 @ 05:23)    Specimen Source: .Blood Blood    Culture Results:   No growth to date.    Culture - Urine (06.29.22 @ 00:36)    -  Trimethoprim/Sulfamethoxazole: R >2/38    -  Ertapenem: S <=0.5    -  Gentamicin: S <=2    -  Imipenem: S <=1    -  Levofloxacin: R >4    -  Amikacin: S <=16    -  Meropenem: S <=1    -  Piperacillin/Tazobactam: R <=8    -  Tigecycline: S <=2    -  Tobramycin: S <=2    -  Amoxicillin/Clavulanic Acid: S <=8/4 Consider reserving for cystitis when ampicillin/sulbactam is resistant    -  Ampicillin: R >16 These ampicillin results predict results for amoxicillin    -  Ampicillin/Sulbactam: R 16/8 Enterobacter, Klebsiella aerogenes, Citrobacter, and Serratia may develop resistance during prolonged therapy (3-4 days)    -  Aztreonam: R 16    -  Cefazolin: R >16 (MIC_CL_COM_ENTERIC_CEFAZU) For uncomplicated UTI with K. pneumoniae, E. coli, or P. mirablis: DAYNA <=16 is sensitive and DAYNA >=32 is resistant. This also predicts results for oral agents cefaclor, cefdinir, cefpodoxime, cefprozil, cefuroxime axetil, cephalexin and locarbef for uncomplicated UTI. Note that some isolates may be susceptible to these agents while testing resistant to cefazolin.    -  Cefepime: R >16    -  Ceftriaxone: R >32 Enterobacter, Klebsiella aerogenes, Citrobacter, and Serratia may develop resistance during prolonged therapy    -  Ciprofloxacin: R >2    Specimen Source: OR Collect bladder urine    Culture Results:   Numerous Escherichia coli ESBL    Organism Identification: Escherichia coli ESBL    Organism: Escherichia coli ESBL    Method Type: DAYNA    Culture - Abscess with Gram Stain (06.29.22 @ 00:36)    Gram Stain:   Numerous polymorphonuclear leukocytes seen per low power field  Numerous Gram Negative Rods seen per oil power field    Specimen Source: .Abscess #1. scrotal    Culture Results:   Moderate Escherichia coli ESBL  See previous culture [id number]    Imaging: no new imaging for review

## 2022-07-04 NOTE — PROVIDER CONTACT NOTE (CRITICAL VALUE NOTIFICATION) - SITUATION
Gram stain with final result with moderate E. Coli & ESBL growth.  Abscess culture with final result with moderate E. Coli & ESBL growth.
Received call from Lab for critical value: Positive blood culture, smear culture and abscess culture
Blood cultures x2 from 6/28, both aerobic & anaerobic bottles   Urine culture with moderate E. Coli & ESBL growth  Gram stain with numerous with E. Coli & ESBL growth.

## 2022-07-04 NOTE — PROGRESS NOTE ADULT - PROBLEM SELECTOR PLAN 1
s/p surgery as noted above  +ESBL Ecoli bacteremia - d/w ID pharmacist - changed zosyn to erta, hold further vanco for now -  f/u cultures, repeat blood cx sent 7/1 - NGTD.   abscess GNR, ESBL E.coli  -C/w IV ertapenem thru 7/9

## 2022-07-04 NOTE — PROVIDER CONTACT NOTE (CRITICAL VALUE NOTIFICATION) - ASSESSMENT
Pt. A&OX4. Bedbound. 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 B/L arm precaution maintained, On room air. V/S stable.
Gram stain with final result with moderate E. Coli & ESBL growth.  Abscess culture with final result with moderate E. Coli & ESBL growth.
Blood cultures x2 from 6/28, both aerobic & anaerobic bottles   Urine culture with moderate E. Coli & ESBL growth  Gram stain with numerous with E. Coli & ESBL growth.

## 2022-07-04 NOTE — PROGRESS NOTE ADULT - PROBLEM SELECTOR PLAN 6
home losartan 50qd - stopped  - BP remains on low side (pt is asymptomatic) so will continue to hold losartan for now

## 2022-07-04 NOTE — PROVIDER CONTACT NOTE (CRITICAL VALUE NOTIFICATION) - BACKGROUND
Pt admitted with scrotal ex lap POD #6
Recent CVA, Pt has L arm collection of abscess, painful, +3 edema, being treated with IV abtx.
Pt admitted with scrotal ex lap POD #5

## 2022-07-04 NOTE — PROGRESS NOTE ADULT - ASSESSMENT
87M CVA, HTN, Vertigo, GERD, Prostate cancer many years ago with a device placed after the surgery (son did not know the details) multiple recent UTI, recently admitted at [facility] for UTI, discharged with a whiting catheter that was removed by Dr Hirsch yesterday, presented with abdominal pain and retention.  6/28 underwent scrotal exploration, I&D left scrotal abscess and debridement of pseudocapsule, explantation of AUS pump device, placement of two penrose drains and interrupted closure of dartos    +ESBL Ecoli bacteremia

## 2022-07-05 LAB — SARS-COV-2 RNA SPEC QL NAA+PROBE: SIGNIFICANT CHANGE UP

## 2022-07-05 PROCEDURE — 99232 SBSQ HOSP IP/OBS MODERATE 35: CPT

## 2022-07-05 RX ADMIN — Medication 81 MILLIGRAM(S): at 13:05

## 2022-07-05 RX ADMIN — ATORVASTATIN CALCIUM 40 MILLIGRAM(S): 80 TABLET, FILM COATED ORAL at 21:28

## 2022-07-05 RX ADMIN — APIXABAN 5 MILLIGRAM(S): 2.5 TABLET, FILM COATED ORAL at 05:11

## 2022-07-05 RX ADMIN — PANTOPRAZOLE SODIUM 40 MILLIGRAM(S): 20 TABLET, DELAYED RELEASE ORAL at 05:11

## 2022-07-05 RX ADMIN — APIXABAN 5 MILLIGRAM(S): 2.5 TABLET, FILM COATED ORAL at 19:15

## 2022-07-05 RX ADMIN — ERTAPENEM SODIUM 120 MILLIGRAM(S): 1 INJECTION, POWDER, LYOPHILIZED, FOR SOLUTION INTRAMUSCULAR; INTRAVENOUS at 15:25

## 2022-07-05 NOTE — PROGRESS NOTE ADULT - SUBJECTIVE AND OBJECTIVE BOX
POD #8  Afeb 100/56 68 100%RA    Pt has no c/o  Abd- soft NT ND   Scrotum - wound C&D  [place] [facility] - 500   POD #7  Afeb 100/56 68 100%RA    Pt has no c/o  Abd- soft NT ND   Scrotum - wound C&D  [place] [facility] - 500

## 2022-07-05 NOTE — PROGRESS NOTE ADULT - PROBLEM SELECTOR PLAN 1
s/p surgery as noted above  +ESBL Ecoli bacteremia - d/w ID pharmacist - changed zosyn to erta, hold further vanco for now -  f/u cultures, repeat blood cx sent 7/1 - NGTD.   abscess GNR, ESBL E.coli  -C/w IV ertapenem thru 7/9 s/p surgery as noted above  +ESBL Ecoli bacteremia - repeat blood cx 7/1 - NGTD.   abscess GNR, ESBL E.coli  -C/w IV ertapenem thru 7/9

## 2022-07-05 NOTE — PROGRESS NOTE ADULT - PROBLEM SELECTOR PLAN 2
baseline Cr 0.8, up to 1.36, bp on low side, lactate 1.9 - improving  - c/w gentle IVFs  - holding bp meds  - RESOLVED baseline Cr 0.8, up to 1.36  - s/p gentle IVFs  - holding bp meds  resolved

## 2022-07-05 NOTE — PROGRESS NOTE ADULT - ASSESSMENT
A/P: 87y Male s/p scrotal exploration, I&D, partial removal of AUS 6/28/2022; has penroses X2    POD#1 - no events last nite post-op  POD#2 - doing well, fluffs replaced, having flatus, pain well controlled, ESBL bacteremia noted, started ertapenem, OR cx mod Ecoli  POD#3 - remains afebrile, wound stable, minimal drainage no surrounding cellulitis, lateral penrose removed, whiting removed voiding with PVRs <150, ID consult obtained as all cultures grew ESBL Ecoli  POD#4 - remains afebrile, tolerating diet, scrotum stable with mild erythema/edema  POD#5- remains afebrile, tolerating diet, penrose removed, scrotum stable  POD#6- afebrile, pain controlled, scrotal exam stable, repeat blood cx ngtd  POD#8 - scrotum clean, penroses out; incontinent ([place] cath)  Plan:  - cont ertapenem through 7/9  - PT consult recs rehab  - f/u ID  - f/u medicine   A/P: 87y Male s/p scrotal exploration, I&D, partial removal of AUS 6/28/2022; has penroses X2    POD#1 - no events last nite post-op  POD#2 - doing well, fluffs replaced, having flatus, pain well controlled, ESBL bacteremia noted, started ertapenem, OR cx mod Ecoli  POD#3 - remains afebrile, wound stable, minimal drainage no surrounding cellulitis, lateral penrose removed, whiting removed voiding with PVRs <150, ID consult obtained as all cultures grew ESBL Ecoli  POD#4 - remains afebrile, tolerating diet, scrotum stable with mild erythema/edema  POD#5- remains afebrile, tolerating diet, penrose removed, scrotum stable  POD#6- afebrile, pain controlled, scrotal exam stable, repeat blood cx ngtd  POD#7 - scrotum clean, penroses out; incontinent ([place] cath)  Plan:  - cont ertapenem through 7/9  - PT consult recs rehab  - f/u ID  - f/u medicine

## 2022-07-05 NOTE — PROGRESS NOTE ADULT - SUBJECTIVE AND OBJECTIVE BOX
[facility] Division of Hospital Medicine  Loreta Woodard MD  Pager (M-F, 8A-5P):  In-house pager [phone]; Long-range pager [phone]  Other Times:  Please page Hospitalist in Charge -  In-house pager [phone]    Patient is a 87y old  Male who presents with a chief complaint of Scrotal abscess (05 Jul 2022 07:20)      SUBJECTIVE / OVERNIGHT EVENTS:  No problems reported over night.   ADDITIONAL REVIEW OF SYSTEMS:    MEDICATIONS  (STANDING):  apixaban 5 milliGRAM(s) Oral two times a day  aspirin enteric coated 81 milliGRAM(s) Oral daily  atorvastatin 40 milliGRAM(s) Oral at bedtime  ertapenem  IVPB 1000 milliGRAM(s) IV Intermittent every 24 hours  pantoprazole    Tablet 40 milliGRAM(s) Oral before breakfast    MEDICATIONS  (PRN):  acetaminophen     Tablet .. 650 milliGRAM(s) Oral every 6 hours PRN Mild Pain (1 - 3)  meclizine 12.5 milliGRAM(s) Oral daily PRN Dizziness      CAPILLARY BLOOD GLUCOSE        I&O's Summary    04 Jul 2022 07:01  -  05 Jul 2022 07:00  --------------------------------------------------------  IN: 1290 mL / OUT: 350 mL / NET: 940 mL    05 Jul 2022 07:01  -  05 Jul 2022 11:26  --------------------------------------------------------  IN: 0 mL / OUT: 500 mL / NET: -500 mL        PHYSICAL EXAM:  Vital Signs Last 24 Hrs  T(C): 36.8 (05 Jul 2022 10:25), Max: 36.8 (04 Jul 2022 21:22)  T(F): 98.2 (05 Jul 2022 10:25), Max: 98.3 (04 Jul 2022 21:22)  HR: 80 (05 Jul 2022 10:25) (68 - 90)  BP: 102/61 (05 Jul 2022 10:25) (100/57 - 119/71)  BP(mean): --  RR: 18 (05 Jul 2022 10:25) (16 - 18)  SpO2: 97% (05 Jul 2022 10:25) (97% - 100%)    CONSTITUTIONAL: NAD, well-developed, well-groomed  EYES: PERRLA; conjunctiva and sclera clear  ENMT: Moist oral mucosa, no pharyngeal injection or exudates; normal dentition  NECK: Supple, no palpable masses; no thyromegaly  RESPIRATORY: Normal respiratory effort; lungs are clear to auscultation bilaterally  CARDIOVASCULAR: Regular rate and rhythm, normal S1 and S2, no murmur/rub/gallop; No lower extremity edema; Peripheral pulses are 2+ bilaterally  ABDOMEN: Nontender to palpation, normoactive bowel sounds, no rebound/guarding; No hepatosplenomegaly  MUSCLOSKELETAL:  Normal gait; no clubbing or cyanosis of digits; no joint swelling or tenderness to palpation  PSYCH: A+O to person, place, and time; affect appropriate  NEUROLOGY: CN 2-12 are intact and symmetric; no gross sensory deficits;   SKIN: No rashes; no palpable lesions    LABS:                      RADIOLOGY & ADDITIONAL TESTS:  Results Reviewed:   Imaging Personally Reviewed:  Electrocardiogram Personally Reviewed:    COORDINATION OF CARE:  Care Discussed with Consultants/Other Providers [Y/N]: urology re overall care  Prior or Outpatient Records Reviewed [Y/N]:   [facility] Division of Hospital Medicine  Loreta Woodard MD  Pager (M-F, 8A-5P):  In-house pager [phone]; Long-range pager [phone]  Other Times:  Please page Hospitalist in Charge -  In-house pager [phone]    Patient is a 87y old  Male who presents with a chief complaint of Scrotal abscess (05 Jul 2022 07:20)      SUBJECTIVE / OVERNIGHT EVENTS:  No problems reported over night. Seen earlier, voice stronger. Asking to get up to go to BR. Pain improved. Still weak.  ADDITIONAL REVIEW OF SYSTEMS:    MEDICATIONS  (STANDING):  apixaban 5 milliGRAM(s) Oral two times a day  aspirin enteric coated 81 milliGRAM(s) Oral daily  atorvastatin 40 milliGRAM(s) Oral at bedtime  ertapenem  IVPB 1000 milliGRAM(s) IV Intermittent every 24 hours  pantoprazole    Tablet 40 milliGRAM(s) Oral before breakfast    MEDICATIONS  (PRN):  acetaminophen     Tablet .. 650 milliGRAM(s) Oral every 6 hours PRN Mild Pain (1 - 3)  meclizine 12.5 milliGRAM(s) Oral daily PRN Dizziness      CAPILLARY BLOOD GLUCOSE        I&O's Summary    04 Jul 2022 07:01  -  05 Jul 2022 07:00  --------------------------------------------------------  IN: 1290 mL / OUT: 350 mL / NET: 940 mL    05 Jul 2022 07:01  -  05 Jul 2022 11:26  --------------------------------------------------------  IN: 0 mL / OUT: 500 mL / NET: -500 mL        PHYSICAL EXAM:  Vital Signs Last 24 Hrs  T(C): 36.8 (05 Jul 2022 10:25), Max: 36.8 (04 Jul 2022 21:22)  T(F): 98.2 (05 Jul 2022 10:25), Max: 98.3 (04 Jul 2022 21:22)  HR: 80 (05 Jul 2022 10:25) (68 - 90)  BP: 102/61 (05 Jul 2022 10:25) (100/57 - 119/71)  BP(mean): --  RR: 18 (05 Jul 2022 10:25) (16 - 18)  SpO2: 97% (05 Jul 2022 10:25) (97% - 100%)    GENERAL: thin, frail, elderly man, resting in bed  very [place]  RESPIRATORY: Clear to auscultation bilaterally; No rales, rhonchi, wheezing, or rubs  CARDIOVASCULAR: Regular rate and rhythm; No murmurs, rubs, or gallops  GASTROINTESTINAL: Soft, Nontender, Nondistended; Bowel sounds present  GENITOURINARY: +condom catheter  EXTREMITIES:  2+ Peripheral Pulses, No clubbing, cyanosis, or edema  NERVOUS SYSTEM: poor vision and hearing, unsteady gait  SKIN: No rashes or lesions  PSYCH: calm, appropriate    LABS:    RADIOLOGY & ADDITIONAL TESTS:  Results Reviewed:   Imaging Personally Reviewed:  Electrocardiogram Personally Reviewed:    COORDINATION OF CARE:  Care Discussed with Consultants/Other Providers [Y/N]: urology re overall care  Prior or Outpatient Records Reviewed [Y/N]:

## 2022-07-05 NOTE — PROGRESS NOTE ADULT - ASSESSMENT
87M CVA, HTN, Vertigo, GERD, Prostate cancer many years ago with a device placed after the surgery (son did not know the details) multiple recent UTI, recently admitted at [facility] for UTI, discharged with a whiting catheter that was removed by Dr Hirsch yesterday, presented with abdominal pain and retention.  6/28 underwent scrotal exploration, I&D left scrotal abscess and debridement of pseudocapsule, explantation of AUS pump device, placement of two penrose drains and interrupted closure of dartos    +ESBL Ecoli bacteremia

## 2022-07-06 ENCOUNTER — TRANSCRIPTION ENCOUNTER (OUTPATIENT)
Age: 87
End: 2022-07-06

## 2022-07-06 VITALS
HEART RATE: 78 BPM | OXYGEN SATURATION: 100 % | RESPIRATION RATE: 17 BRPM | DIASTOLIC BLOOD PRESSURE: 62 MMHG | TEMPERATURE: 99 F | SYSTOLIC BLOOD PRESSURE: 100 MMHG

## 2022-07-06 LAB
CULTURE RESULTS: SIGNIFICANT CHANGE UP
SPECIMEN SOURCE: SIGNIFICANT CHANGE UP

## 2022-07-06 PROCEDURE — 99232 SBSQ HOSP IP/OBS MODERATE 35: CPT

## 2022-07-06 RX ORDER — LOSARTAN POTASSIUM 100 MG/1
1 TABLET, FILM COATED ORAL
Qty: 0 | Refills: 0 | DISCHARGE

## 2022-07-06 RX ORDER — ACETAMINOPHEN 500 MG
2 TABLET ORAL
Qty: 0 | Refills: 0 | DISCHARGE
Start: 2022-07-06

## 2022-07-06 RX ORDER — AZTREONAM 2 G
1 VIAL (EA) INJECTION
Qty: 0 | Refills: 0 | DISCHARGE

## 2022-07-06 RX ORDER — ERTAPENEM SODIUM 1 G/1
1 INJECTION, POWDER, LYOPHILIZED, FOR SOLUTION INTRAMUSCULAR; INTRAVENOUS
Qty: 0 | Refills: 0 | DISCHARGE
Start: 2022-07-06

## 2022-07-06 RX ORDER — APIXABAN 2.5 MG/1
1 TABLET, FILM COATED ORAL
Qty: 0 | Refills: 0 | DISCHARGE
Start: 2022-07-06

## 2022-07-06 RX ORDER — TAMSULOSIN HYDROCHLORIDE 0.4 MG/1
1 CAPSULE ORAL
Qty: 0 | Refills: 0 | DISCHARGE

## 2022-07-06 RX ORDER — MECLIZINE HCL 12.5 MG
1 TABLET ORAL
Qty: 0 | Refills: 0 | DISCHARGE
Start: 2022-07-06

## 2022-07-06 RX ADMIN — APIXABAN 5 MILLIGRAM(S): 2.5 TABLET, FILM COATED ORAL at 05:23

## 2022-07-06 RX ADMIN — APIXABAN 5 MILLIGRAM(S): 2.5 TABLET, FILM COATED ORAL at 18:23

## 2022-07-06 RX ADMIN — ERTAPENEM SODIUM 120 MILLIGRAM(S): 1 INJECTION, POWDER, LYOPHILIZED, FOR SOLUTION INTRAMUSCULAR; INTRAVENOUS at 14:58

## 2022-07-06 RX ADMIN — PANTOPRAZOLE SODIUM 40 MILLIGRAM(S): 20 TABLET, DELAYED RELEASE ORAL at 05:23

## 2022-07-06 RX ADMIN — Medication 81 MILLIGRAM(S): at 12:06

## 2022-07-06 NOTE — PROGRESS NOTE ADULT - PROBLEM SELECTOR PROBLEM 2
CRYSTAL (acute kidney injury)

## 2022-07-06 NOTE — DISCHARGE NOTE NURSING/CASE MANAGEMENT/SOCIAL WORK - PATIENT PORTAL LINK FT
You can access the FollowMyHealth Patient Portal offered by [facility] by registering at the following website: [email or web address] By joining [facility]’s FollowMyHealth portal, you will also be able to view your health information using other applications (apps) compatible with our system.

## 2022-07-06 NOTE — PROGRESS NOTE ADULT - PROBLEM SELECTOR PLAN 7
c/w statin      # C/w eliquis for DVT PPx  # BMI of 19 --> Underweight      D/w  c/w statin    # C/w eliquis for DVT PPx  # BMI of 19 --> Underweight

## 2022-07-06 NOTE — DISCHARGE NOTE PROVIDER - HOSPITAL COURSE
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; has penroses X2    POD#1 - no events last nite post-op  POD#2 - doing well, fluffs replaced, having flatus, pain well controlled, ESBL bacteremia noted, started ertapenem, OR cx mod Ecoli  POD#3 - remains afebrile, wound stable, minimal drainage no surrounding cellulitis, lateral penrose removed, whiting removed voiding with PVRs <150, ID consult obtained as all cultures grew ESBL Ecoli  POD#4 - remains afebrile, tolerating diet, scrotum stable with mild erythema/edema  POD#5- remains afebrile, tolerating diet, penrose removed, scrotum stable  POD#6- afebrile, pain controlled, scrotal exam stable, repeat blood cx ngtd  POD#7 - scrotum clean, penroses out; incontinent ([place] cath)  POD#8 - no events  Plan:  - cont ertapenem through 7/9  - PT consult recs rehab  Pt will need to f/u with Dr. Cline to have the rest of the artificial urinary sphincter removed

## 2022-07-06 NOTE — DISCHARGE NOTE PROVIDER - NSDCMRMEDTOKEN_GEN_ALL_CORE_FT
acetaminophen 325 mg oral tablet: 2 tab(s) orally every 6 hours, As needed, Mild Pain (1 - 3)  apixaban 5 mg oral tablet: 1 tab(s) orally 2 times a day  aspirin 81 mg oral tablet, chewable: 1 tab(s) orally once a day  atorvastatin 40 mg oral tablet: 1 tab(s) orally once a day  ertapenem 1 g injection: 1 gram(s) injectable once a day  Last dose July 9  meclizine 12.5 mg oral tablet: 1 tab(s) orally once a day, As needed, Dizziness  pantoprazole 20 mg oral delayed release tablet: 1 tab(s) orally once a day

## 2022-07-06 NOTE — PROGRESS NOTE ADULT - REASON FOR ADMISSION
Scrotal abscess

## 2022-07-06 NOTE — DISCHARGE NOTE PROVIDER - CARE PROVIDER_API CALL
Edward Cline)  Urology  [address] [place] [address], [place] [address]  Phone: [phone]  Fax: [phone]  Follow Up Time:

## 2022-07-06 NOTE — PROGRESS NOTE ADULT - SUBJECTIVE AND OBJECTIVE BOX
POD #8  Afeb 100/50 69 100%RA    Pt has no c/o  Abd- soft NT ND; + flatus +BS  Incontinent 225cc  Scrotum - wound C&D

## 2022-07-06 NOTE — PROGRESS NOTE ADULT - PROVIDER SPECIALTY LIST ADULT
Urology
Hospitalist

## 2022-07-06 NOTE — PROGRESS NOTE ADULT - PROBLEM SELECTOR PLAN 1
s/p surgery as noted above  +ESBL Ecoli bacteremia - repeat blood cx 7/1 - NGTD.   abscess GNR, ESBL E.coli  -C/w IV ertapenem thru 7/9 s/p surgery as noted above  +ESBL Ecoli bacteremia - repeat blood cx negative  abscess GNR, ESBL E.coli  -C/w IV ertapenem thru 7/9

## 2022-07-06 NOTE — DISCHARGE NOTE PROVIDER - NSDCCPCAREPLAN_GEN_ALL_CORE_FT
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 Cline to have the rest of the sphincter removed

## 2022-07-06 NOTE — PROGRESS NOTE ADULT - ASSESSMENT
A/P: 87y Male s/p scrotal exploration, I&D, partial removal of AUS 6/28/2022; has penroses X2    POD#1 - no events last nite post-op  POD#2 - doing well, fluffs replaced, having flatus, pain well controlled, ESBL bacteremia noted, started ertapenem, OR cx mod Ecoli  POD#3 - remains afebrile, wound stable, minimal drainage no surrounding cellulitis, lateral penrose removed, whiting removed voiding with PVRs <150, ID consult obtained as all cultures grew ESBL Ecoli  POD#4 - remains afebrile, tolerating diet, scrotum stable with mild erythema/edema  POD#5- remains afebrile, tolerating diet, penrose removed, scrotum stable  POD#6- afebrile, pain controlled, scrotal exam stable, repeat blood cx ngtd  POD#7 - scrotum clean, penroses out; incontinent ([place] cath)  POD#8 - no events  Plan:  - cont ertapenem through 7/9  - PT consult recs rehab  - f/u ID  - f/u medicine

## 2022-07-06 NOTE — DISCHARGE NOTE NURSING/CASE MANAGEMENT/SOCIAL WORK - NSDCPNINST_GEN_ALL_CORE
Maintain incision clean and dry, call MD with any unimproved signs of infection such as fever, increased redness or drainage from site.

## 2022-07-06 NOTE — DISCHARGE NOTE NURSING/CASE MANAGEMENT/SOCIAL WORK - NSDCPEFALRISK_GEN_ALL_CORE
For information on Fall & Injury Prevention, visit: [email or web address] OR  [email or web address] OR  https://www.cdc.gov/steadi/patient.html

## 2022-07-06 NOTE — PROGRESS NOTE ADULT - PROBLEM SELECTOR PLAN 4
c/w ASA  family reports had stroke last month, treated at other hospital, has rollator and cane.

## 2022-07-06 NOTE — PROGRESS NOTE ADULT - SUBJECTIVE AND OBJECTIVE BOX
[facility] Division of Hospital Medicine  Loreta Woodard MD  Pager (M-F, 8A-5P):  In-house pager [phone]; Long-range pager [phone]  Other Times:  Please page Hospitalist in Charge -  In-house pager [phone]    Patient is a 87y old  Male who presents with a chief complaint of Scrotal abscess (06 Jul 2022 07:40)    SUBJECTIVE / OVERNIGHT EVENTS:  No problems reported over night.   ADDITIONAL REVIEW OF SYSTEMS:    MEDICATIONS  (STANDING):  apixaban 5 milliGRAM(s) Oral two times a day  aspirin enteric coated 81 milliGRAM(s) Oral daily  atorvastatin 40 milliGRAM(s) Oral at bedtime  ertapenem  IVPB 1000 milliGRAM(s) IV Intermittent every 24 hours  pantoprazole    Tablet 40 milliGRAM(s) Oral before breakfast    MEDICATIONS  (PRN):  acetaminophen     Tablet .. 650 milliGRAM(s) Oral every 6 hours PRN Mild Pain (1 - 3)  meclizine 12.5 milliGRAM(s) Oral daily PRN Dizziness    I&O's Summary    05 Jul 2022 07:01  -  06 Jul 2022 07:00  --------------------------------------------------------  IN: 0 mL / OUT: 1075 mL / NET: -1075 mL    PHYSICAL EXAM:  Vital Signs Last 24 Hrs  T(C): 36.6 (06 Jul 2022 06:00), Max: 36.9 (05 Jul 2022 18:01)  T(F): 97.8 (06 Jul 2022 06:00), Max: 98.5 (05 Jul 2022 22:00)  HR: 69 (06 Jul 2022 06:00) (63 - 80)  BP: 100/51 (06 Jul 2022 06:00) (100/51 - 106/58)  BP(mean): --  RR: 17 (06 Jul 2022 06:00) (17 - 18)  SpO2: 100% (06 Jul 2022 06:00) (97% - 100%)    GENERAL: thin, frail, elderly man, resting in bed  very [place]  RESPIRATORY: Clear to auscultation bilaterally; No rales, rhonchi, wheezing, or rubs  CARDIOVASCULAR: Regular rate and rhythm; No murmurs, rubs, or gallops  GASTROINTESTINAL: Soft, Nontender, Nondistended; Bowel sounds present  GENITOURINARY: +condom catheter  EXTREMITIES:  2+ Peripheral Pulses, No clubbing, cyanosis, or edema  NERVOUS SYSTEM: poor vision and hearing, unsteady gait  SKIN: No rashes or lesions  PSYCH: calm, appropriate    LABS:    RADIOLOGY & ADDITIONAL TESTS:  Results Reviewed:   Imaging Personally Reviewed:  Electrocardiogram Personally Reviewed:    COORDINATION OF CARE:  Care Discussed with Consultants/Other Providers [Y/N]: urology re overall care  Prior or Outpatient Records Reviewed [Y/N]:   [facility] Division of Hospital Medicine  Loreta Woodard MD  Pager (M-F, 8A-5P):  In-house pager [phone]; Long-range pager [phone]  Other Times:  Please page Hospitalist in Charge -  In-house pager [phone]    Patient is a 87y old  Male who presents with a chief complaint of Scrotal abscess (06 Jul 2022 07:40)    SUBJECTIVE / OVERNIGHT EVENTS:  No problems reported over night. Seen earlier sleeping in bed, wakes to voice. Weak, mild headache. No other complaints.  ADDITIONAL REVIEW OF SYSTEMS:    MEDICATIONS  (STANDING):  apixaban 5 milliGRAM(s) Oral two times a day  aspirin enteric coated 81 milliGRAM(s) Oral daily  atorvastatin 40 milliGRAM(s) Oral at bedtime  ertapenem  IVPB 1000 milliGRAM(s) IV Intermittent every 24 hours  pantoprazole    Tablet 40 milliGRAM(s) Oral before breakfast    MEDICATIONS  (PRN):  acetaminophen     Tablet .. 650 milliGRAM(s) Oral every 6 hours PRN Mild Pain (1 - 3)  meclizine 12.5 milliGRAM(s) Oral daily PRN Dizziness    I&O's Summary    05 Jul 2022 07:01  -  06 Jul 2022 07:00  --------------------------------------------------------  IN: 0 mL / OUT: 1075 mL / NET: -1075 mL    PHYSICAL EXAM:  Vital Signs Last 24 Hrs  T(C): 36.6 (06 Jul 2022 06:00), Max: 36.9 (05 Jul 2022 18:01)  T(F): 97.8 (06 Jul 2022 06:00), Max: 98.5 (05 Jul 2022 22:00)  HR: 69 (06 Jul 2022 06:00) (63 - 80)  BP: 100/51 (06 Jul 2022 06:00) (100/51 - 106/58)  BP(mean): --  RR: 17 (06 Jul 2022 06:00) (17 - 18)  SpO2: 100% (06 Jul 2022 06:00) (97% - 100%)    GENERAL: thin, frail, elderly man, resting in bed  very [place]  RESPIRATORY: Clear to auscultation bilaterally; No rales, rhonchi, wheezing, or rubs  CARDIOVASCULAR: Regular rate and rhythm; No murmurs, rubs, or gallops  GASTROINTESTINAL: Soft, Nontender, Nondistended; Bowel sounds present  EXTREMITIES:  2+ Peripheral Pulses, No clubbing, cyanosis, or edema  NERVOUS SYSTEM: poor vision and hearing, unsteady gait  SKIN: No rashes or lesions  PSYCH: calm, appropriate    LABS:    RADIOLOGY & ADDITIONAL TESTS:  Results Reviewed:   Imaging Personally Reviewed:  Electrocardiogram Personally Reviewed:    COORDINATION OF CARE:  Care Discussed with Consultants/Other Providers [Y/N]: urology re overall care  Prior or Outpatient Records Reviewed [Y/N]:

## 2022-07-06 NOTE — PROGRESS NOTE ADULT - PROBLEM SELECTOR PLAN 5
c/w Eliquis.
c/w Eliquis for chronic atrial fibrillation
c/w Eliquis for chronic atrial fibrillation
c/w Eliquis.
c/w Eliquis.
c/w Eliquis for chronic atrial fibrillation
c/w Eliquis.

## 2022-07-06 NOTE — PROGRESS NOTE ADULT - ASSESSMENT
87M CVA, HTN, Vertigo, GERD, Prostate cancer many years ago with a device placed after the surgery (son did not know the details) multiple recent UTI, recently admitted at [facility] for UTI, discharged with a whiting catheter that was removed by Dr Hirsch yesterday, presented with abdominal pain and retention.  6/28 underwent scrotal exploration, I&D left scrotal abscess and debridement of pseudocapsule, explantation of AUS pump device, placement of two penrose drains and interrupted closure of dartos    +ESBL Ecoli bacteremia

## 2022-07-06 NOTE — PROGRESS NOTE ADULT - PROBLEM SELECTOR PROBLEM 1
Scrotal abscess

## 2022-07-06 NOTE — DISCHARGE NOTE NURSING/CASE MANAGEMENT/SOCIAL WORK - NSDPDISTO_GEN_ALL_CORE
Pt  with scrotal incision clean and dry. VS stable Afebrile. pt with positive bowel sounds yuliana po diet. seen by MD and cleared for Dc to Rehab as per safe DC plan./Sub-Acute rehab

## 2022-07-15 PROBLEM — I63.9 CEREBRAL INFARCTION, UNSPECIFIED: Chronic | Status: ACTIVE | Noted: 2022-06-28

## 2022-07-15 PROBLEM — E78.5 HYPERLIPIDEMIA, UNSPECIFIED: Chronic | Status: ACTIVE | Noted: 2022-06-28

## 2022-07-15 PROBLEM — I48.91 UNSPECIFIED ATRIAL FIBRILLATION: Chronic | Status: ACTIVE | Noted: 2022-06-28

## 2022-07-15 PROBLEM — I10 ESSENTIAL (PRIMARY) HYPERTENSION: Chronic | Status: ACTIVE | Noted: 2022-06-28

## 2022-07-15 PROBLEM — K21.9 GASTRO-ESOPHAGEAL REFLUX DISEASE WITHOUT ESOPHAGITIS: Chronic | Status: ACTIVE | Noted: 2022-06-28

## 2022-07-31 ENCOUNTER — INPATIENT (INPATIENT)
Facility: HOSPITAL | Age: 87
LOS: 7 days | Discharge: NURSING, REHAB OR LONG-TERM CARE | End: 2022-08-08
Attending: UROLOGY | Admitting: UROLOGY

## 2022-07-31 VITALS
TEMPERATURE: 98 F | DIASTOLIC BLOOD PRESSURE: 55 MMHG | OXYGEN SATURATION: 95 % | SYSTOLIC BLOOD PRESSURE: 140 MMHG | RESPIRATION RATE: 17 BRPM | HEIGHT: 64 IN | HEART RATE: 108 BPM

## 2022-07-31 DIAGNOSIS — Z96.0 PRESENCE OF UROGENITAL IMPLANTS: Chronic | ICD-10-CM

## 2022-07-31 DIAGNOSIS — Z98.890 OTHER SPECIFIED POSTPROCEDURAL STATES: Chronic | ICD-10-CM

## 2022-07-31 DIAGNOSIS — N49.2 INFLAMMATORY DISORDERS OF SCROTUM: ICD-10-CM

## 2022-07-31 DIAGNOSIS — T88.8XXA OTHER SPECIFIED COMPLICATIONS OF SURGICAL AND MEDICAL CARE, NOT ELSEWHERE CLASSIFIED, INITIAL ENCOUNTER: ICD-10-CM

## 2022-07-31 LAB
ALBUMIN SERPL ELPH-MCNC: 3.6 G/DL — SIGNIFICANT CHANGE UP (ref 3.3–5)
ALP SERPL-CCNC: 116 U/L — SIGNIFICANT CHANGE UP (ref 40–120)
ALT FLD-CCNC: 69 U/L — HIGH (ref 4–41)
ANION GAP SERPL CALC-SCNC: 15 MMOL/L — HIGH (ref 7–14)
APPEARANCE UR: ABNORMAL
AST SERPL-CCNC: 63 U/L — HIGH (ref 4–40)
BACTERIA # UR AUTO: ABNORMAL
BASE EXCESS BLDV CALC-SCNC: 1.4 MMOL/L — SIGNIFICANT CHANGE UP (ref -2–3)
BILIRUB SERPL-MCNC: 0.8 MG/DL — SIGNIFICANT CHANGE UP (ref 0.2–1.2)
BILIRUB UR-MCNC: NEGATIVE — SIGNIFICANT CHANGE UP
BLOOD GAS VENOUS COMPREHENSIVE RESULT: SIGNIFICANT CHANGE UP
BUN SERPL-MCNC: 26 MG/DL — HIGH (ref 7–23)
CALCIUM SERPL-MCNC: 9 MG/DL — SIGNIFICANT CHANGE UP (ref 8.4–10.5)
CHLORIDE BLDV-SCNC: 101 MMOL/L — SIGNIFICANT CHANGE UP (ref 96–108)
CHLORIDE SERPL-SCNC: 98 MMOL/L — SIGNIFICANT CHANGE UP (ref 98–107)
CO2 BLDV-SCNC: 26 MMOL/L — SIGNIFICANT CHANGE UP (ref 22–26)
CO2 SERPL-SCNC: 20 MMOL/L — LOW (ref 22–31)
COLOR SPEC: YELLOW — SIGNIFICANT CHANGE UP
COMMENT - URINE: SIGNIFICANT CHANGE UP
CREAT SERPL-MCNC: 1.11 MG/DL — SIGNIFICANT CHANGE UP (ref 0.5–1.3)
DIFF PNL FLD: ABNORMAL
EGFR: 65 ML/MIN/1.73M2 — SIGNIFICANT CHANGE UP
EPI CELLS # UR: 2 /HPF — SIGNIFICANT CHANGE UP (ref 0–5)
GAS PNL BLDV: 132 MMOL/L — LOW (ref 136–145)
GLUCOSE BLDV-MCNC: 126 MG/DL — HIGH (ref 70–99)
GLUCOSE SERPL-MCNC: 126 MG/DL — HIGH (ref 70–99)
GLUCOSE UR QL: NEGATIVE — SIGNIFICANT CHANGE UP
HCO3 BLDV-SCNC: 25 MMOL/L — SIGNIFICANT CHANGE UP (ref 22–29)
HCT VFR BLD CALC: 40.1 % — SIGNIFICANT CHANGE UP (ref 39–50)
HCT VFR BLDA CALC: 39 % — SIGNIFICANT CHANGE UP (ref 39–51)
HGB BLD CALC-MCNC: 13.1 G/DL — SIGNIFICANT CHANGE UP (ref 13–17)
HGB BLD-MCNC: 13 G/DL — SIGNIFICANT CHANGE UP (ref 13–17)
HYALINE CASTS # UR AUTO: 1 /LPF — SIGNIFICANT CHANGE UP (ref 0–7)
KETONES UR-MCNC: NEGATIVE — SIGNIFICANT CHANGE UP
LACTATE BLDV-MCNC: 2.5 MMOL/L — HIGH (ref 0.5–2)
LACTATE SERPL-SCNC: 2.7 MMOL/L — HIGH (ref 0.5–2)
LEUKOCYTE ESTERASE UR-ACNC: ABNORMAL
MCHC RBC-ENTMCNC: 28.9 PG — SIGNIFICANT CHANGE UP (ref 27–34)
MCHC RBC-ENTMCNC: 32.4 GM/DL — SIGNIFICANT CHANGE UP (ref 32–36)
MCV RBC AUTO: 89.1 FL — SIGNIFICANT CHANGE UP (ref 80–100)
NITRITE UR-MCNC: POSITIVE
NRBC # BLD: 0 /100 WBCS — SIGNIFICANT CHANGE UP
NRBC # FLD: 0 K/UL — SIGNIFICANT CHANGE UP
PCO2 BLDV: 35 MMHG — LOW (ref 42–55)
PH BLDV: 7.46 — HIGH (ref 7.32–7.43)
PH UR: 7 — SIGNIFICANT CHANGE UP (ref 5–8)
PLATELET # BLD AUTO: 193 K/UL — SIGNIFICANT CHANGE UP (ref 150–400)
PO2 BLDV: 55 MMHG — SIGNIFICANT CHANGE UP
POTASSIUM BLDV-SCNC: 4.2 MMOL/L — SIGNIFICANT CHANGE UP (ref 3.5–5.1)
POTASSIUM SERPL-MCNC: 4.2 MMOL/L — SIGNIFICANT CHANGE UP (ref 3.5–5.3)
POTASSIUM SERPL-SCNC: 4.2 MMOL/L — SIGNIFICANT CHANGE UP (ref 3.5–5.3)
PROT SERPL-MCNC: 8.1 G/DL — SIGNIFICANT CHANGE UP (ref 6–8.3)
PROT UR-MCNC: ABNORMAL
RBC # BLD: 4.5 M/UL — SIGNIFICANT CHANGE UP (ref 4.2–5.8)
RBC # FLD: 14.4 % — SIGNIFICANT CHANGE UP (ref 10.3–14.5)
RBC CASTS # UR COMP ASSIST: 12 /HPF — HIGH (ref 0–4)
SAO2 % BLDV: 85.4 % — SIGNIFICANT CHANGE UP
SARS-COV-2 RNA SPEC QL NAA+PROBE: SIGNIFICANT CHANGE UP
SODIUM SERPL-SCNC: 133 MMOL/L — LOW (ref 135–145)
SP GR SPEC: 1.02 — SIGNIFICANT CHANGE UP (ref 1–1.05)
TROPONIN T, HIGH SENSITIVITY RESULT: 15 NG/L — SIGNIFICANT CHANGE UP
UROBILINOGEN FLD QL: ABNORMAL
WBC # BLD: 11.07 K/UL — HIGH (ref 3.8–10.5)
WBC # FLD AUTO: 11.07 K/UL — HIGH (ref 3.8–10.5)
WBC UR QL: >720 /HPF — HIGH (ref 0–5)

## 2022-07-31 PROCEDURE — 76870 US EXAM SCROTUM: CPT | Mod: 26

## 2022-07-31 PROCEDURE — 99285 EMERGENCY DEPT VISIT HI MDM: CPT

## 2022-07-31 PROCEDURE — 71045 X-RAY EXAM CHEST 1 VIEW: CPT | Mod: 26

## 2022-07-31 PROCEDURE — 76857 US EXAM PELVIC LIMITED: CPT | Mod: 26

## 2022-07-31 PROCEDURE — 74176 CT ABD & PELVIS W/O CONTRAST: CPT | Mod: 26,MA

## 2022-07-31 PROCEDURE — 99233 SBSQ HOSP IP/OBS HIGH 50: CPT

## 2022-07-31 RX ORDER — ERTAPENEM SODIUM 1 G/1
1000 INJECTION, POWDER, LYOPHILIZED, FOR SOLUTION INTRAMUSCULAR; INTRAVENOUS EVERY 24 HOURS
Refills: 0 | Status: DISCONTINUED | OUTPATIENT
Start: 2022-08-01 | End: 2022-08-08

## 2022-07-31 RX ORDER — SODIUM CHLORIDE 9 MG/ML
1000 INJECTION INTRAMUSCULAR; INTRAVENOUS; SUBCUTANEOUS ONCE
Refills: 0 | Status: COMPLETED | OUTPATIENT
Start: 2022-07-31 | End: 2022-07-31

## 2022-07-31 RX ORDER — KETOROLAC TROMETHAMINE 30 MG/ML
15 SYRINGE (ML) INJECTION ONCE
Refills: 0 | Status: DISCONTINUED | OUTPATIENT
Start: 2022-07-31 | End: 2022-07-31

## 2022-07-31 RX ORDER — CEFEPIME 1 G/1
2000 INJECTION, POWDER, FOR SOLUTION INTRAMUSCULAR; INTRAVENOUS ONCE
Refills: 0 | Status: COMPLETED | OUTPATIENT
Start: 2022-07-31 | End: 2022-07-31

## 2022-07-31 RX ORDER — MECLIZINE HCL 12.5 MG
12.5 TABLET ORAL DAILY
Refills: 0 | Status: DISCONTINUED | OUTPATIENT
Start: 2022-07-31 | End: 2022-08-08

## 2022-07-31 RX ORDER — OXYCODONE HYDROCHLORIDE 5 MG/1
5 TABLET ORAL EVERY 6 HOURS
Refills: 0 | Status: DISCONTINUED | OUTPATIENT
Start: 2022-07-31 | End: 2022-08-01

## 2022-07-31 RX ORDER — PANTOPRAZOLE SODIUM 20 MG/1
40 TABLET, DELAYED RELEASE ORAL
Refills: 0 | Status: DISCONTINUED | OUTPATIENT
Start: 2022-07-31 | End: 2022-08-08

## 2022-07-31 RX ORDER — HEPARIN SODIUM 5000 [USP'U]/ML
5000 INJECTION INTRAVENOUS; SUBCUTANEOUS EVERY 8 HOURS
Refills: 0 | Status: DISCONTINUED | OUTPATIENT
Start: 2022-07-31 | End: 2022-08-01

## 2022-07-31 RX ORDER — VANCOMYCIN HCL 1 G
1000 VIAL (EA) INTRAVENOUS ONCE
Refills: 0 | Status: COMPLETED | OUTPATIENT
Start: 2022-07-31 | End: 2022-07-31

## 2022-07-31 RX ORDER — ERTAPENEM SODIUM 1 G/1
1000 INJECTION, POWDER, LYOPHILIZED, FOR SOLUTION INTRAMUSCULAR; INTRAVENOUS ONCE
Refills: 0 | Status: COMPLETED | OUTPATIENT
Start: 2022-07-31 | End: 2022-07-31

## 2022-07-31 RX ORDER — SODIUM CHLORIDE 9 MG/ML
2100 INJECTION, SOLUTION INTRAVENOUS ONCE
Refills: 0 | Status: COMPLETED | OUTPATIENT
Start: 2022-07-31 | End: 2022-07-31

## 2022-07-31 RX ORDER — ATORVASTATIN CALCIUM 80 MG/1
40 TABLET, FILM COATED ORAL AT BEDTIME
Refills: 0 | Status: DISCONTINUED | OUTPATIENT
Start: 2022-07-31 | End: 2022-08-08

## 2022-07-31 RX ORDER — SODIUM CHLORIDE 9 MG/ML
1000 INJECTION INTRAMUSCULAR; INTRAVENOUS; SUBCUTANEOUS
Refills: 0 | Status: DISCONTINUED | OUTPATIENT
Start: 2022-07-31 | End: 2022-08-02

## 2022-07-31 RX ORDER — ACETAMINOPHEN 500 MG
1000 TABLET ORAL ONCE
Refills: 0 | Status: COMPLETED | OUTPATIENT
Start: 2022-07-31 | End: 2022-07-31

## 2022-07-31 RX ORDER — ASPIRIN/CALCIUM CARB/MAGNESIUM 324 MG
81 TABLET ORAL DAILY
Refills: 0 | Status: DISCONTINUED | OUTPATIENT
Start: 2022-07-31 | End: 2022-08-08

## 2022-07-31 RX ORDER — ACETAMINOPHEN 500 MG
650 TABLET ORAL EVERY 6 HOURS
Refills: 0 | Status: DISCONTINUED | OUTPATIENT
Start: 2022-07-31 | End: 2022-08-05

## 2022-07-31 RX ADMIN — SODIUM CHLORIDE 2100 MILLILITER(S): 9 INJECTION, SOLUTION INTRAVENOUS at 15:34

## 2022-07-31 RX ADMIN — Medication 250 MILLIGRAM(S): at 21:32

## 2022-07-31 RX ADMIN — Medication 400 MILLIGRAM(S): at 15:35

## 2022-07-31 RX ADMIN — CEFEPIME 100 MILLIGRAM(S): 1 INJECTION, POWDER, FOR SOLUTION INTRAMUSCULAR; INTRAVENOUS at 15:34

## 2022-07-31 RX ADMIN — ERTAPENEM SODIUM 120 MILLIGRAM(S): 1 INJECTION, POWDER, LYOPHILIZED, FOR SOLUTION INTRAMUSCULAR; INTRAVENOUS at 16:32

## 2022-07-31 RX ADMIN — SODIUM CHLORIDE 1000 MILLILITER(S): 9 INJECTION INTRAMUSCULAR; INTRAVENOUS; SUBCUTANEOUS at 21:32

## 2022-07-31 RX ADMIN — Medication 15 MILLIGRAM(S): at 21:05

## 2022-07-31 NOTE — ED PROVIDER NOTE - PROGRESS NOTE DETAILS
Cong Devi PGY-1: Discussed patient with urology and will come to see him. Patient is also more oriented now. States he does not have any chest pain or shoulder pain. He states he mainly has pain on the left lateral abdomen and testicles. Cong Devi PGY-1: Discussed patient with urology, will come to see him. Patient is also more oriented now. States he does not have any chest pain or shoulder pain. He states he mainly has pain on the left lateral abdomen and testicles. Cong Devi PGY-1: Patient with UTI. Ertapenem added due to prior UTi coverage. Discussed patient with urology, concern for possible new scrotal abscess. Testicular ultrasound ordered. Patient to be admitted uro versus medicine. Cong Devi PGY-1: Patient with UTI. Ertapenem added due to prior UTi coverage. Discussed patient with urology, concern for possible new scrotal abscess vs infected AUS vs urosepsis. Testicular ultrasound ordered. Patient to be admitted, uro versus medicine.

## 2022-07-31 NOTE — ED PROVIDER NOTE - CLINICAL SUMMARY MEDICAL DECISION MAKING FREE TEXT BOX
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 Patient is febrile to 102.5 and tachy. Urology consulted and patient will have sepsis work-up. 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 Patient is febrile to 102.5 and tachy. Urology consulted and patient will have sepsis work-up. ACS rule out.

## 2022-07-31 NOTE — ED PROVIDER NOTE - OBJECTIVE STATEMENT
88 y/o M PMHx HTN, artificial urinary sphincter, recent admissions for I&D for scrotal abscess and partial removal of urinary sphincter. Patient repeatedly asking for his son and patient not cooperating with interview. However, discussed patient with [place] [facility] and state he was having chest pain radiating to the right arm since this morning in addition to SOB. 88 y/o M PMHx HTN, artificial urinary sphincter, recent admission for I&D for scrotal abscess and partial removal of urinary sphincter. Patient repeatedly asking for his son and patient not cooperating with interview. However, discussed patient with [facility] and state he was having chest pain radiating to the right arm since this morning in addition to SOB. They gave him nitroglycerin which did not provide relief and called EMS.

## 2022-07-31 NOTE — ED PROVIDER NOTE - ATTENDING CONTRIBUTION TO CARE
I (Christos) agree with above, I performed a history and physical. Counseled clari medical staff, physician assistant, and/or medical student on medical decision making as documented. Medical decisions and treatment interventions were made in real time during the patient encounter. Additionally and/or with the following exceptions: The patient presented to the ED with pain in is scrotum, history limited but says it is hurting similar to last time. As per chart review the patient has an artificial sphincter after urologic surgery. During his ed stay he spiked a fever and was covered empirically with antibiotics. The patient did have tenderness to palpation in the scrotum and there was concern for abscess. complete blood count within normal limits , complete metabolic panel within normal limits except for mildly elevated lft's. The patient was signed out to the oncoming attending pending the following: ct imaging and urologic consult with definite plan for admission. The patient's condition was not amenable to outpatient treatment due either the lack of feasibility of outpatient care coordination, possibility for further decompensation with adverse outcome if discharge, or treatments and diagnostic  modalities only available during an inpatient hospitalization.

## 2022-07-31 NOTE — ED ADULT NURSE NOTE - OBJECTIVE STATEMENT
pt received spot 11. pt A+O1-2, coming from a nursing home, c/o generalized abd pain. abd soft nondistended, +tenderness generalized. respirations even and unlabored. vs as noted. pt received spot 11. pt A+Ox 2, coming from a nursing home, c/o generalized abd pain. abd soft nondistended, +tenderness generalized. respirations even and unlabored. vs as noted. pt feels warm to touch. rectal temp 102.5, MD aware. skin warm, dry and intact. IVSL in place. labs drawn and sent. medicated as ordered. awaiting further orders. will monitor.

## 2022-07-31 NOTE — H&P ADULT - HISTORY OF PRESENT ILLNESS
87 y.o male with PMHx of CVA, HTN, Vertigo, GERD, Prostate cancer many years ago with a device placed after the surgery, multiple recent UTI, s/p partial removal of urinary sphincter was to sent to the ER due to fever 87 y.o male with PMHx of CVA, HTN, Vertigo, GERD, Prostate cancer many years ago, s/p urinary sphincter multiple recent UTI, s/p partial removal of urinary sphincter was to sent to the ER due to fever. Patient was recently admitted for an abscess surrounding the sphincter and underwent exploration.  87 y.o male with PMHx of CVA, HTN, Vertigo, GERD, Prostate cancer many years ago, s/p urinary sphincter multiple recent UTI, s/p partial removal of urinary sphincter was to sent to the ER due to fever. Patient was recently admitted for an abscess surrounding the sphincter and underwent exploration late in June 2022 with Dr. Hoenig. He took the patient back emergently since the AUS was inflated and the patient could not urinate. The patient was then admitted and treated with IV antibiotics and discharged to [facility] on 7/2 with the plan to follow up with our reconstructive specialist Dr. Cline for full removal of the AUS. The patient never followed up and presented to the ED today complaining of suprapubic discomfort and scrotal tenderness along with fevers. Denies any pain with urination or frequency and says that he has been able to void without a catheter at the nursing home. He has not followed up with any physician between discharge and today.

## 2022-07-31 NOTE — ED PROVIDER NOTE - NSICDXPASTSURGICALHX_GEN_ALL_CORE_FT
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

## 2022-07-31 NOTE — ED PROVIDER NOTE - NSICDXPASTMEDICALHX_GEN_ALL_CORE_FT
PAST MEDICAL HISTORY:  Afib     Cerebrovascular accident (CVA)     GERD (gastroesophageal reflux disease)     HTN (hypertension)     Hyperlipidemia     Prostate cancer 18 years ago

## 2022-07-31 NOTE — ED PROVIDER NOTE - PHYSICAL EXAMINATION
Gen: Oriented to person. Non-toxic appearing.  HEENT: Normocephalic and atraumatic. PERRL, EOMI, no nasal discharge, mucous membranes moist, no scleral icterus.  CV: Regular rate and rhythm, No significant lower extremity edema. Radial and DP pulses present and symmetrical. Capillary refill less than 2 seconds.  Resp: Normal effort and rate. CTAB, no rales, rhonchi, or wheezes.  GI: Abdomen soft, non-distended, non tender to palpation. No masses appreciated. Tenderness to palpation of right pelvi.  MSK: No open wounds and no bruising. No CVAT bilaterally.  : bilateral scrotal swelling, tenderness to palpation of right testicle and penile shaft. No discharge or bleeding.  Neuro: Following commands, speaking in full sentences, moving extremities spontaneously. CN II-XII intact. Strength and sensation symmetric and intact throughout. Gait normal. Gen: Oriented to person. Non-toxic appearing.  HEENT: Normocephalic and atraumatic. PERRL, EOMI, no nasal discharge, mucous membranes moist, no scleral icterus.  CV: Regular rate and rhythm, No significant lower extremity edema. Radial and DP pulses present and symmetrical. Capillary refill less than 2 seconds.  Resp: Normal effort and rate. CTAB, no rales, rhonchi, or wheezes.  GI: Abdomen soft, non-distended, non tender to palpation. No masses appreciated. Tenderness to palpation of right pelvis  MSK: No open wounds and no bruising. No CVAT bilaterally.  : bilateral scrotal swelling, tenderness to palpation of right testicle and penile shaft. No discharge or bleeding.  Neuro: moving extremities spontaneously. Strength and sensation symmetric and intact throughout.

## 2022-07-31 NOTE — H&P ADULT - NEGATIVE GENERAL SYMPTOMS
-At risk for malabsorption of vitamins/minerals secondary to malabsorption and restriction of intake from their procedure  -Currently taking adequate postop bariatric surgery vitamin supplementation-no  -Last set of bariatric labs completed on May 206 and are not within acceptable limits   -Next set of bariatric labs ordered for approximately 1 month     She is only taking one regular mvi, one calcium, extra D and extra Vitamin B12 but notes she is not consistent-advised on importance of same  I am providing her with written instructions on what she should be taking-advised to take these for at least one month prior to getting her labs done 
-s/p Darion-En-Y Gastric Bypass with Dr Marcos Rider on 3/7/2011  Overall doing Well with weight loss but unfortunately is smoking and drinking alcohol daily  She is here with right sided abdominal pain and epigastric abdominal pain  She has been lost to follow-up to our office for 2 years  Initial:   Current: 157 5 lb  EWL: 73% which is very good weight loss overall  Sudheer: current  Current BMI is Body mass index is 29 28 kg/m²  Tolerating a regular diet-yes but has been having intermittent epigastric pain with meals   Eating at least 60 grams of protein per day-has been inconsistent with her intakes  Following 30/60 minute rule with liquids-no  Drinking at least 64 ounces of fluid per day-yes  Drinking carbonated beverages-no  Sufficient exercise-yes  Using NSAIDs regularly-no  Using nicotine-yes  Using alcohol-yes    She notes she is smoking "clove cigars with nicotine" up to 3 per day and is drinking at least 5 alcoholic shots most days of the week-  Advised on effect of alcohol post-surgery and also effect of smoking with gastric pouch  Advised on importance of abstinence 
By history-and reports drinking hard liquor up to 5 shots/day  Will have  contact her for follow-up 
She also complains of a separate right upper quadrant focalized abdominal pain and "bulge" that occurs particularly when she gags when brushing her teeth  On exam the area that tends to cause her pain is around her right mid-incision site and is concerning for a possible incisional hernia  Pain is not worsened with meals and per her description reduces with massaging the area  I was unable to appreciate this on exam  She had no pain on palpation  And Hernandez's sign was negative/no rebound or guarding  She notes she is getting a CT scan of abdomen today for evaluation of this  She has had a consultation with Dr Maite Lara for this  Per patient she indicated since she has already been established with Dr Josiah Villalobos she feels if she needs surgery for this she would want him to do it  Advised that either Dr Maite Lara or Dr Josiah Villalobos can address this if she potentially needs a hernia repair  I am scheduling upper endoscopy to rule out ulcer  And then follow-up with Dr Josiah Villalobos and he can review both EGD and CT results if/as needed  Will ask our  to also reach out to her regarding alcohol intakes and smoking 
She is complaining of a few week history of  Intermittent epigastric abdominal pain which is worse with meals and worse with drinking alcohol  She notes pain has improved at times with taking peptobismol  Unfortunately she indicates she drinks around 5 alcoholic shots/day and is smoking clove cigars(nicotine) up to 3 per day  She denies use of NSAIDs  No associated fever,chills, nausea or vomiting  On exam she was mildly tender to palpation to epigastric area without rebound or guarding  Concern is for ulcer and advised on importance of smoking cessation and avoiding alcohol  Advised continued use could be life-altering and advised of effect after gastric surgery 
no fever/no chills

## 2022-07-31 NOTE — H&P ADULT - NSHPLABSRESULTS_GEN_ALL_CORE
11.6   11.47 )-----------( 168      ( 01 Aug 2022 00:07 )             33.6   08-    133<L>  |  100  |  26<H>  ----------------------------<  151<H>  4.1   |  21<L>  |  1.12    Ca    8.4      01 Aug 2022 00:07    TPro  8.1  /  Alb  3.6  /  TBili  0.8  /  DBili  x   /  AST  63<H>  /  ALT  69<H>  /  AlkPhos  116    Urinalysis Basic - ( 2022 15:46 )    Color: Yellow / Appearance: Turbid / S.021 / pH: x  Gluc: x / Ketone: Negative  / Bili: Negative / Urobili: 3 mg/dL   Blood: x / Protein: 100 mg/dL / Nitrite: Positive   Leuk Esterase: Large / RBC: 12 /HPF / WBC >720 /HPF   Sq Epi: x / Non Sq Epi: 2 /HPF / Bacteria: Many    < from: US Testicles (22 @ 19:42) >    IMPRESSION:    Scrotal ultrasound: Redemonstration of fluid collection surrounding the   artificial urinary sphincter with increased vascularity concerning for   abscess, appearing more internally complex than on prior ultrasound of   2022.    Epididymitis.    Multiple testicular cysts bilaterally.    Bladder ultrasound: No sonographic evidence of abnormality in the bladder.    < end of copied text >    < from: CT Abdomen and Pelvis No Cont (22 @ 17:30) >    IMPRESSION:    Limited evaluation of the abdomen and pelvis without IV contrast.  Nonspecific inflammatory changes and complex fluid surrounds the   artificial urinary device in the left inguinal region near the scrotum.    < end of copied text >

## 2022-07-31 NOTE — H&P ADULT - PROBLEM SELECTOR PLAN 1
Admit under Dr Jaime  F/U urine and blood cultures  IVF  Diet  Pain management  Antibiotics  Labs in AM

## 2022-07-31 NOTE — H&P ADULT - ASSESSMENT
87 y.o male was sent to the ED due to fever 87 y.o male s/p scrotal exploration on 6/29 due to fluid collection around the sphincter presented with fever and CT showed a fluid collection around the sphincter

## 2022-07-31 NOTE — ED ADULT TRIAGE NOTE - CHIEF COMPLAINT QUOTE
pt c/o lower abd pain radiating to the testicles. as per EMS, pt has an artifical urinary sphincter d/2 prostate CA.

## 2022-08-01 ENCOUNTER — APPOINTMENT (OUTPATIENT)
Dept: UROLOGY | Facility: CLINIC | Age: 87
End: 2022-08-01

## 2022-08-01 DIAGNOSIS — Z29.9 ENCOUNTER FOR PROPHYLACTIC MEASURES, UNSPECIFIED: ICD-10-CM

## 2022-08-01 DIAGNOSIS — L02.91 CUTANEOUS ABSCESS, UNSPECIFIED: ICD-10-CM

## 2022-08-01 DIAGNOSIS — I63.9 CEREBRAL INFARCTION, UNSPECIFIED: ICD-10-CM

## 2022-08-01 DIAGNOSIS — Z86.79 PERSONAL HISTORY OF OTHER DISEASES OF THE CIRCULATORY SYSTEM: ICD-10-CM

## 2022-08-01 DIAGNOSIS — I48.20 CHRONIC ATRIAL FIBRILLATION, UNSPECIFIED: ICD-10-CM

## 2022-08-01 LAB
ANION GAP SERPL CALC-SCNC: 12 MMOL/L — SIGNIFICANT CHANGE UP (ref 7–14)
BUN SERPL-MCNC: 26 MG/DL — HIGH (ref 7–23)
CALCIUM SERPL-MCNC: 8.4 MG/DL — SIGNIFICANT CHANGE UP (ref 8.4–10.5)
CHLORIDE SERPL-SCNC: 100 MMOL/L — SIGNIFICANT CHANGE UP (ref 98–107)
CO2 SERPL-SCNC: 21 MMOL/L — LOW (ref 22–31)
CREAT SERPL-MCNC: 1.12 MG/DL — SIGNIFICANT CHANGE UP (ref 0.5–1.3)
EGFR: 64 ML/MIN/1.73M2 — SIGNIFICANT CHANGE UP
GLUCOSE SERPL-MCNC: 151 MG/DL — HIGH (ref 70–99)
HCT VFR BLD CALC: 33.6 % — LOW (ref 39–50)
HGB BLD-MCNC: 11.6 G/DL — LOW (ref 13–17)
LACTATE SERPL-SCNC: 1.6 MMOL/L — SIGNIFICANT CHANGE UP (ref 0.5–2)
MCHC RBC-ENTMCNC: 30.3 PG — SIGNIFICANT CHANGE UP (ref 27–34)
MCHC RBC-ENTMCNC: 34.5 GM/DL — SIGNIFICANT CHANGE UP (ref 32–36)
MCV RBC AUTO: 87.7 FL — SIGNIFICANT CHANGE UP (ref 80–100)
NRBC # BLD: 0 /100 WBCS — SIGNIFICANT CHANGE UP
NRBC # FLD: 0 K/UL — SIGNIFICANT CHANGE UP
PLATELET # BLD AUTO: 168 K/UL — SIGNIFICANT CHANGE UP (ref 150–400)
POTASSIUM SERPL-MCNC: 4.1 MMOL/L — SIGNIFICANT CHANGE UP (ref 3.5–5.3)
POTASSIUM SERPL-SCNC: 4.1 MMOL/L — SIGNIFICANT CHANGE UP (ref 3.5–5.3)
RBC # BLD: 3.83 M/UL — LOW (ref 4.2–5.8)
RBC # FLD: 14.3 % — SIGNIFICANT CHANGE UP (ref 10.3–14.5)
SODIUM SERPL-SCNC: 133 MMOL/L — LOW (ref 135–145)
WBC # BLD: 11.47 K/UL — HIGH (ref 3.8–10.5)
WBC # FLD AUTO: 11.47 K/UL — HIGH (ref 3.8–10.5)

## 2022-08-01 PROCEDURE — 99233 SBSQ HOSP IP/OBS HIGH 50: CPT

## 2022-08-01 PROCEDURE — 99223 1ST HOSP IP/OBS HIGH 75: CPT | Mod: GC

## 2022-08-01 RX ORDER — SODIUM CHLORIDE 9 MG/ML
1000 INJECTION INTRAMUSCULAR; INTRAVENOUS; SUBCUTANEOUS ONCE
Refills: 0 | Status: COMPLETED | OUTPATIENT
Start: 2022-08-01 | End: 2022-08-01

## 2022-08-01 RX ORDER — APIXABAN 2.5 MG/1
5 TABLET, FILM COATED ORAL
Refills: 0 | Status: DISCONTINUED | OUTPATIENT
Start: 2022-08-01 | End: 2022-08-02

## 2022-08-01 RX ADMIN — ERTAPENEM SODIUM 120 MILLIGRAM(S): 1 INJECTION, POWDER, LYOPHILIZED, FOR SOLUTION INTRAMUSCULAR; INTRAVENOUS at 16:31

## 2022-08-01 RX ADMIN — SODIUM CHLORIDE 100 MILLILITER(S): 9 INJECTION INTRAMUSCULAR; INTRAVENOUS; SUBCUTANEOUS at 00:37

## 2022-08-01 RX ADMIN — OXYCODONE HYDROCHLORIDE 5 MILLIGRAM(S): 5 TABLET ORAL at 02:01

## 2022-08-01 RX ADMIN — HEPARIN SODIUM 5000 UNIT(S): 5000 INJECTION INTRAVENOUS; SUBCUTANEOUS at 13:50

## 2022-08-01 RX ADMIN — ATORVASTATIN CALCIUM 40 MILLIGRAM(S): 80 TABLET, FILM COATED ORAL at 21:36

## 2022-08-01 RX ADMIN — HEPARIN SODIUM 5000 UNIT(S): 5000 INJECTION INTRAVENOUS; SUBCUTANEOUS at 06:58

## 2022-08-01 RX ADMIN — SODIUM CHLORIDE 1000 MILLILITER(S): 9 INJECTION INTRAMUSCULAR; INTRAVENOUS; SUBCUTANEOUS at 00:50

## 2022-08-01 RX ADMIN — Medication 81 MILLIGRAM(S): at 11:31

## 2022-08-01 RX ADMIN — HEPARIN SODIUM 5000 UNIT(S): 5000 INJECTION INTRAVENOUS; SUBCUTANEOUS at 00:38

## 2022-08-01 RX ADMIN — ATORVASTATIN CALCIUM 40 MILLIGRAM(S): 80 TABLET, FILM COATED ORAL at 00:38

## 2022-08-01 RX ADMIN — APIXABAN 5 MILLIGRAM(S): 2.5 TABLET, FILM COATED ORAL at 21:35

## 2022-08-01 RX ADMIN — PANTOPRAZOLE SODIUM 40 MILLIGRAM(S): 20 TABLET, DELAYED RELEASE ORAL at 06:57

## 2022-08-01 NOTE — CONSULT NOTE ADULT - ASSESSMENT
87 y.o male with PMHx of CVA, HTN, Vertigo, GERD, Prostate cancer many years ago, s/p urinary sphincter multiple recent UTI, s/p partial removal of urinary sphincter, recently admitted for sphincter abscess s/p IV ABx and exploration 6/2022 presents from [facility] with fever and scrotal tenderness. Found to have sepsis 2/2 fluid collection around AUS and epididymitis c/f persistent abscess.

## 2022-08-01 NOTE — CONSULT NOTE ADULT - PROBLEM SELECTOR RECOMMENDATION 2
hypotensive overnight requiring fluid bolus, now improved this morning to SBP 110s  Hold antihypertensives

## 2022-08-01 NOTE — PROGRESS NOTE ADULT - ASSESSMENT
87 y.o male with PMHx of CVA, HTN, Afib, Vertigo, GERD, Prostate CA many years ago, s/p urinary sphincter multiple recent UTI, s/p partial removal of urinary sphincter 6/29/2022 for an abscess surrounding the sphincter presented to the ED 7/31 c/o suprapubic discomfort and scrotal tenderness along with fevers, tmax in .5, hypotensive responded to fluid bolus.  Denies any pain with urination or frequency and says that he has been able to void without a catheter at the nursing home. He has not followed up with any physician since d/c.  Pt admitted and started on ertapenem.    8/1: remains afebrile, no complaints    Plan:  -continue ertapenem  -scrotal elevation  -f/u medicine  -f/u medicine  -hold eliquis for now  -DVT prophy, IS, OOB, ambulate

## 2022-08-01 NOTE — CONSULT NOTE ADULT - SUBJECTIVE AND OBJECTIVE BOX
CHIEF COMPLAINT: Patient is a 87y old  Male who presents with a chief complaint of Scrotal abscess (01 Aug 2022 07:54)      HPI: HPI:  87 y.o male with PMHx of CVA, HTN, Vertigo, GERD, Prostate cancer many years ago, s/p urinary sphincter multiple recent UTI, s/p partial removal of urinary sphincter was to sent to the ER due to fever. Patient was recently admitted for an abscess surrounding the sphincter and underwent exploration late in 2022 with Dr. Hoenig. He took the patient back emergently since the AUS was inflated and the patient could not urinate. The patient was then admitted and treated with IV antibiotics and discharged to [facility] on  with the plan to follow up with our reconstructive specialist Dr. Cline for full removal of the AUS. The patient never followed up and presented to the ED today complaining of suprapubic discomfort and scrotal tenderness along with fevers. Denies any pain with urination or frequency and says that he has been able to void without a catheter at the nursing home. He has not followed up with any physician between discharge and today.  (2022 21:56)    Medicine consulted for management of Blood pressure, hx of Afib and CVA. Tmax in ED of 102.5 but afebrile overnight. Patient reports feeling generalized weakness and dizziness. Overnight required IVF bolus due to SBP being in 90s. Denies chest pain but has mild SOB, no N/V, no discomfort around whiting site.    Pain Symptoms if applicable:             	                         none	   mild         moderate         severe  	                            0	    1-3	     4-6	         7-10  Pain:  Location:	  Modifying factors:	  Associated symptoms:	    Allergies    No Known Allergies    Intolerances        HOME MEDICATIONS: [x] Reviewed    MEDICATIONS  (STANDING):  aspirin  chewable 81 milliGRAM(s) Oral daily  atorvastatin 40 milliGRAM(s) Oral at bedtime  ertapenem  IVPB 1000 milliGRAM(s) IV Intermittent every 24 hours  heparin   Injectable 5000 Unit(s) SubCutaneous every 8 hours  pantoprazole    Tablet 40 milliGRAM(s) Oral before breakfast  sodium chloride 0.9%. 1000 milliLiter(s) (100 mL/Hr) IV Continuous <Continuous>    MEDICATIONS  (PRN):  acetaminophen     Tablet .. 650 milliGRAM(s) Oral every 6 hours PRN Temp greater or equal to 38C (100.4F)  meclizine 12.5 milliGRAM(s) Oral daily PRN Dizziness  oxyCODONE    IR 5 milliGRAM(s) Oral every 6 hours PRN Severe Pain (7 - 10), Moderate pain (4-6)      PAST MEDICAL & SURGICAL HISTORY:  Prostate cancer  18 years ago      Afib      HTN (hypertension)      Hyperlipidemia      GERD (gastroesophageal reflux disease)      Cerebrovascular accident (CVA)      History of prostate surgery      Status post implantation of artificial urinary sphincter  16 years ago      [X ] Reviewed     SOCIAL HISTORY:  [x] Substance abuse: denies  [x] Tobacco: former smoker, quit >20 yrs ago  [x] Alcohol use: former ETOH use, quote >20 yrs ago  lives with son and his family    FAMILY HISTORY:  [x] No pertinent family history in first degree relatives     REVIEW OF SYSTEMS:    [x] All other ROS negative  [  ] Unable to obtain due to poor mental status    Vital Signs Last 24 Hrs  T(C): 36.7 (01 Aug 2022 09:38), Max: 39.2 (2022 14:47)  T(F): 98.1 (01 Aug 2022 09:38), Max: 102.5 (2022 14:47)  HR: 93 (01 Aug 2022 09:38) (82 - 120)  BP: 100/56 (01 Aug 2022 09:38) (89/57 - 140/55)  BP(mean): --  RR: 18 (01 Aug 2022 09:38) (17 - 30)  SpO2: 96% (01 Aug 2022 09:38) (95% - 100%)    Parameters below as of 01 Aug 2022 09:38  Patient On (Oxygen Delivery Method): room air        PHYSICAL EXAM:    GENERAL: NAD, elderly man, on room air  HEAD:  Atraumatic, Normocephalic  EYES: EOMI, PERRLA, conjunctiva and sclera clear  ENMT: Moist mucous membranes  NECK: Supple, No JVD  RESPIRATORY: Clear to auscultation bilaterally; No rales, rhonchi, wheezing, or rubs  CARDIOVASCULAR: s1/s2, no murmurs appreciated, No murmurs, rubs, or gallops appreciated  GASTROINTESTINAL: Soft, Nontender, Nondistended; Bowel sounds present  GENITOURINARY: + whiting with yellow urine, scrotum with vitiligo  EXTREMITIES:  WWP, no edema  NERVOUS SYSTEM:  Awake, alert, No gross deficits  PSYCH: calm cooperative  SKIN: No rashes or lesions; Incisions C/D/I    LABS:                        11.6   11.47 )-----------( 168      ( 01 Aug 2022 00:07 )             33.6     08    133<L>  |  100  |  26<H>  ----------------------------<  151<H>  4.1   |  21<L>  |  1.12    Ca    8.4      01 Aug 2022 00:07    TPro  8.1  /  Alb  3.6  /  TBili  0.8  /  DBili  x   /  AST  63<H>  /  ALT  69<H>  /  AlkPhos  116        Urinalysis Basic - ( 2022 15:46 )    Color: Yellow / Appearance: Turbid / S.021 / pH: x  Gluc: x / Ketone: Negative  / Bili: Negative / Urobili: 3 mg/dL   Blood: x / Protein: 100 mg/dL / Nitrite: Positive   Leuk Esterase: Large / RBC: 12 /HPF / WBC >720 /HPF   Sq Epi: x / Non Sq Epi: 2 /HPF / Bacteria: Many      CAPILLARY BLOOD GLUCOSE          RADIOLOGY & ADDITIONAL STUDIES:    EKG:   Personally Reviewed:  [x] YES     Imaging:   Personally Reviewed:  [x] YES               [ ] Consultant(s) Notes Reviewed  [x] Care Discussed with Consultants/Other Providers:

## 2022-08-01 NOTE — PROGRESS NOTE ADULT - SUBJECTIVE AND OBJECTIVE BOX
Overnight events:  Received fluid bolus for low BP overnight, BP better this AM, remains afebrile    Subjective:  Pt offers no complaints    Objective:    Vital signs  T(C): , Max: 39.2 (07-31-22 @ 14:47)  HR: 88 (08-01-22 @ 07:10)  BP: 115/61 (08-01-22 @ 07:10)  SpO2: 96% (08-01-22 @ 07:10)  Wt(kg): --    Output   Alexis: 450  07-31 @ 07:01  -  08-01 @ 07:00  --------------------------------------------------------  IN: 0 mL / OUT: 450 mL / NET: -450 mL        Gen: NAD  Abd: soft, nontender  : left hemiscrotum with vitiligo, indurated, slight erythema, no fluctuance, nontender, no crepitus    Labs                        11.6   11.47 )-----------( 168      ( 01 Aug 2022 00:07 )             33.6     01 Aug 2022 00:07    133    |  100    |  26     ----------------------------<  151    4.1     |  21     |  1.12     Ca    8.4        01 Aug 2022 00:07        Urine Cx: not sent from ED  Blood Cx: pending    Imaging:  < from: US Urinary Bladder (07.31.22 @ 19:42) >  FINDINGS:    RIGHT:  Right testis: 4.5 cm x 1.6 cm x  3.1 cm. Multiple testicular cysts,   largest measuring 0.9 x 0.7 x 0.9 cm. Normal arterial and venous blood   flow pattern.  Right epididymis: Thickened and hyperemic. Small epididymal head cysts.  Right hydrocele: Small.  Right varicocele: None.    LEFT:  Left testis: 4.6 cm x 1.8 cm x 3.3 cm. Multiple testicular cysts, largest   measuring 1.1 x 1.4 x 1.4 cm. Normal arterial and venous blood flow   pattern.  Left epididymis: Thickened and hyperemic. Multiple epididymal head cysts.  Left hydrocele: Small  Left varicocele: None.    MISCELLANEOUS: Complex fluid collection with internal echoes/debris   measuring 3.7 x 1.8 x 2.0 cm is seen surrounding the artificial urinary   sphincter with increased vascularity, similar to prior ultrasound on   6/28/2022.  BLADDER: Anechoic fluid-filled bladder without echogenic stones or debris.    IMPRESSION:    Scrotal ultrasound: Redemonstration of fluid collection surrounding the   artificial urinary sphincter with increased vascularity concerning for   abscess, appearing more internally complex than on prior ultrasound of   6/28/2022.    Epididymitis.    Multiple testicular cysts bilaterally.    Bladder ultrasound: No sonographic evidence of abnormality in the bladder.    < from: CT Abdomen and Pelvis No Cont (07.31.22 @ 17:30) >  FINDINGS:  LOWER CHEST: Coronary artery calcifications. Trace bibasilar subsegmental   atelectasis.    Limited in evaluation secondary to lack of IV contrast.    LIVER: Within normal limits.  BILE DUCTS: Normal caliber.  GALLBLADDER: Within normal limits.  SPLEEN: Within normal limits.  PANCREAS: Within normal limits.  ADRENALS: Nonspecific thickening of both adrenal glands.  KIDNEYS/URETERS: No renal stones or hydronephrosis.    BLADDER: Within normal limits.  REPRODUCTIVE ORGANS: Complex fluid and inflammatory change surrounding   the artificial urinary device inthe left inguinal region near the   scrotum. No focal well-defined collection is visualized, however this is   limited without the use of IV contrast..    BOWEL: No bowel obstruction. Diffuse colonic diverticulosis without   diverticulitis. Appendix is normal.  PERITONEUM: No ascites.  VESSELS: Atherosclerotic changes.  RETROPERITONEUM/LYMPH NODES: No lymphadenopathy.  ABDOMINAL WALL: Tiny fat-containing umbilical hernia.  BONES: Degenerative changes.    IMPRESSION:    Limited evaluation of the abdomen and pelvis without IV contrast.  Nonspecific inflammatory changes and complex fluid surrounds the   artificial urinary device in the left inguinal region near the scrotum.

## 2022-08-01 NOTE — CONSULT NOTE ADULT - PROBLEM SELECTOR RECOMMENDATION 9
Sepsis present on admission with Tmax 102.5 in ED, HR 110s, source likely AUS abscess  continue ertapenem  C/w indwelling whiting per Urology team  f/u Bcx, Ucx  Previous cx from blood, urine and scrotal abscess: ESBL Ecoli  Pain control: on oxycodone PRN, add bowel regimen

## 2022-08-01 NOTE — PATIENT PROFILE ADULT - FALL HARM RISK - HARM RISK INTERVENTIONS
Assistance OOB with selected safe patient handling equipment/Communicate Risk of Fall with Harm to all staff/Monitor for mental status changes/Move patient closer to nurses' station/Reinforce activity limits and safety measures with patient and family/Reorient to person, place and time as needed/Tailored Fall Risk Interventions/Toileting schedule using arm’s reach rule for commode and bathroom/Use of alarms - bed, chair and/or voice tab/Visual Cue: Yellow wristband and red socks/Bed in lowest position, wheels locked, appropriate side rails in place/Call bell, personal items and telephone in reach/Instruct patient to call for assistance before getting out of bed or chair/Non-slip footwear when patient is out of bed/[place] to call system/Physically safe environment - no spills, clutter or unnecessary equipment/Purposeful Proactive Rounding/Room/bathroom lighting operational, light cord in reach

## 2022-08-02 LAB
ANION GAP SERPL CALC-SCNC: 9 MMOL/L — SIGNIFICANT CHANGE UP (ref 7–14)
BUN SERPL-MCNC: 12 MG/DL — SIGNIFICANT CHANGE UP (ref 7–23)
CALCIUM SERPL-MCNC: 8.4 MG/DL — SIGNIFICANT CHANGE UP (ref 8.4–10.5)
CHLORIDE SERPL-SCNC: 105 MMOL/L — SIGNIFICANT CHANGE UP (ref 98–107)
CO2 SERPL-SCNC: 24 MMOL/L — SIGNIFICANT CHANGE UP (ref 22–31)
CREAT SERPL-MCNC: 0.92 MG/DL — SIGNIFICANT CHANGE UP (ref 0.5–1.3)
EGFR: 81 ML/MIN/1.73M2 — SIGNIFICANT CHANGE UP
GLUCOSE SERPL-MCNC: 74 MG/DL — SIGNIFICANT CHANGE UP (ref 70–99)
HCT VFR BLD CALC: 32.4 % — LOW (ref 39–50)
HGB BLD-MCNC: 10.8 G/DL — LOW (ref 13–17)
MCHC RBC-ENTMCNC: 29.7 PG — SIGNIFICANT CHANGE UP (ref 27–34)
MCHC RBC-ENTMCNC: 33.3 GM/DL — SIGNIFICANT CHANGE UP (ref 32–36)
MCV RBC AUTO: 89 FL — SIGNIFICANT CHANGE UP (ref 80–100)
NRBC # BLD: 0 /100 WBCS — SIGNIFICANT CHANGE UP
NRBC # FLD: 0 K/UL — SIGNIFICANT CHANGE UP
PLATELET # BLD AUTO: 176 K/UL — SIGNIFICANT CHANGE UP (ref 150–400)
POTASSIUM SERPL-MCNC: 4 MMOL/L — SIGNIFICANT CHANGE UP (ref 3.5–5.3)
POTASSIUM SERPL-SCNC: 4 MMOL/L — SIGNIFICANT CHANGE UP (ref 3.5–5.3)
RBC # BLD: 3.64 M/UL — LOW (ref 4.2–5.8)
RBC # FLD: 14.4 % — SIGNIFICANT CHANGE UP (ref 10.3–14.5)
SODIUM SERPL-SCNC: 138 MMOL/L — SIGNIFICANT CHANGE UP (ref 135–145)
WBC # BLD: 9.83 K/UL — SIGNIFICANT CHANGE UP (ref 3.8–10.5)
WBC # FLD AUTO: 9.83 K/UL — SIGNIFICANT CHANGE UP (ref 3.8–10.5)

## 2022-08-02 PROCEDURE — 93306 TTE W/DOPPLER COMPLETE: CPT | Mod: 26

## 2022-08-02 PROCEDURE — 93010 ELECTROCARDIOGRAM REPORT: CPT | Mod: 76

## 2022-08-02 PROCEDURE — 99233 SBSQ HOSP IP/OBS HIGH 50: CPT

## 2022-08-02 RX ORDER — ENOXAPARIN SODIUM 100 MG/ML
40 INJECTION SUBCUTANEOUS EVERY 24 HOURS
Refills: 0 | Status: DISCONTINUED | OUTPATIENT
Start: 2022-08-02 | End: 2022-08-06

## 2022-08-02 RX ADMIN — ENOXAPARIN SODIUM 40 MILLIGRAM(S): 100 INJECTION SUBCUTANEOUS at 22:37

## 2022-08-02 RX ADMIN — APIXABAN 5 MILLIGRAM(S): 2.5 TABLET, FILM COATED ORAL at 05:52

## 2022-08-02 RX ADMIN — ERTAPENEM SODIUM 120 MILLIGRAM(S): 1 INJECTION, POWDER, LYOPHILIZED, FOR SOLUTION INTRAMUSCULAR; INTRAVENOUS at 18:05

## 2022-08-02 RX ADMIN — SODIUM CHLORIDE 100 MILLILITER(S): 9 INJECTION INTRAMUSCULAR; INTRAVENOUS; SUBCUTANEOUS at 12:24

## 2022-08-02 RX ADMIN — PANTOPRAZOLE SODIUM 40 MILLIGRAM(S): 20 TABLET, DELAYED RELEASE ORAL at 05:52

## 2022-08-02 RX ADMIN — ATORVASTATIN CALCIUM 40 MILLIGRAM(S): 80 TABLET, FILM COATED ORAL at 22:36

## 2022-08-02 RX ADMIN — SODIUM CHLORIDE 100 MILLILITER(S): 9 INJECTION INTRAMUSCULAR; INTRAVENOUS; SUBCUTANEOUS at 18:05

## 2022-08-02 RX ADMIN — Medication 81 MILLIGRAM(S): at 12:24

## 2022-08-02 NOTE — PROGRESS NOTE ADULT - ASSESSMENT
87 y.o male with PMHx of CVA, HTN, Afib, Vertigo, GERD, Prostate CA many years ago, s/p urinary sphincter multiple recent UTI, s/p partial removal of urinary sphincter 6/29/2022 for an abscess surrounding the sphincter presented to the ED 7/31 c/o suprapubic discomfort and scrotal tenderness along with fevers, tmax in .5, hypotensive responded to fluid bolus.  Denies any pain with urination or frequency and says that he has been able to void without a catheter at the nursing home. He has not followed up with any physician since d/c.  Pt admitted and started on ertapenem.    8/1: remains afebrile, no complaints  8/2 - no events  Plan:  -continue ertapenem  -scrotal elevation  -f/u medicine  -hold eliquis for now

## 2022-08-02 NOTE — PROGRESS NOTE ADULT - SUBJECTIVE AND OBJECTIVE BOX
No new events  Afeb 101/62 89 100%RA    Pt has no c/o  Abd- soft NT ND   Alexis 1400  Scrotum - some induration; some tenderness

## 2022-08-02 NOTE — PROGRESS NOTE ADULT - SUBJECTIVE AND OBJECTIVE BOX
Merlin Mathew, MD   Hospitalist  Pager #[phone]    PROGRESS NOTE:     Patient is a 87y old  Male who presents with a chief complaint of Scrotal abscess (02 Aug 2022 07:33)      SUBJECTIVE / OVERNIGHT EVENTS: NEON   Tmax 99.1   Patient feels better- feels he has more energy and can move around better   Denies any fevers or chills  Has some abdominal pain   Tolerating PO    ADDITIONAL REVIEW OF SYSTEMS:    MEDICATIONS  (STANDING):  aspirin  chewable 81 milliGRAM(s) Oral daily  atorvastatin 40 milliGRAM(s) Oral at bedtime  ertapenem  IVPB 1000 milliGRAM(s) IV Intermittent every 24 hours  pantoprazole    Tablet 40 milliGRAM(s) Oral before breakfast  sodium chloride 0.9%. 1000 milliLiter(s) (100 mL/Hr) IV Continuous <Continuous>    MEDICATIONS  (PRN):  acetaminophen     Tablet .. 650 milliGRAM(s) Oral every 6 hours PRN Temp greater or equal to 38C (100.4F)  meclizine 12.5 milliGRAM(s) Oral daily PRN Dizziness  oxyCODONE    IR 5 milliGRAM(s) Oral every 6 hours PRN Severe Pain (7 - 10), Moderate pain (4-6)      CAPILLARY BLOOD GLUCOSE        I&O's Summary    01 Aug 2022 07:01  -  02 Aug 2022 07:00  --------------------------------------------------------  IN: 0 mL / OUT: 3500 mL / NET: -3500 mL        PHYSICAL EXAM:  Vital Signs Last 24 Hrs  T(C): 36.8 (02 Aug 2022 09:41), Max: 37.7 (01 Aug 2022 19:34)  T(F): 98.2 (02 Aug 2022 09:41), Max: 99.8 (01 Aug 2022 19:34)  HR: 77 (02 Aug 2022 09:41) (77 - 112)  BP: 101/57 (02 Aug 2022 09:41) (93/55 - 113/61)  BP(mean): --  RR: 17 (02 Aug 2022 09:41) (16 - 17)  SpO2: 98% (02 Aug 2022 09:41) (96% - 100%)    Parameters below as of 02 Aug 2022 09:41  Patient On (Oxygen Delivery Method): room air        CONSTITUTIONAL: NAD, well-developed male   RESPIRATORY: Normal respiratory effort; lungs are clear to auscultation bilaterally  CARDIOVASCULAR: Regular rate and rhythm, normal S1 and S2, no murmur/rub/gallop; No lower extremity edema; Peripheral pulses are 2+ bilaterally  ABDOMEN: Nontender to palpation, normoactive bowel sounds, no rebound/guarding; No hepatosplenomegaly  : + Alexis in place   MUSCULOSKELETAL no clubbing or cyanosis of digits; no joint swelling or tenderness to palpation  PSYCH: A+O to person, place not time- thought it was 2023     LABS:                        10.8   9.83  )-----------( 176      ( 02 Aug 2022 07:21 )             32.4     08-    138  |  105  |  12  ----------------------------<  74  4.0   |  24  |  0.92    Ca    8.4      02 Aug 2022 07:21    TPro  8.1  /  Alb  3.6  /  TBili  0.8  /  DBili  x   /  AST  63<H>  /  ALT  69<H>  /  AlkPhos  116  07          Urinalysis Basic - ( 2022 15:46 )    Color: Yellow / Appearance: Turbid / S.021 / pH: x  Gluc: x / Ketone: Negative  / Bili: Negative / Urobili: 3 mg/dL   Blood: x / Protein: 100 mg/dL / Nitrite: Positive   Leuk Esterase: Large / RBC: 12 /HPF / WBC >720 /HPF   Sq Epi: x / Non Sq Epi: 2 /HPF / Bacteria: Many        Culture - Blood (collected 2022 15:10)  Source: .Blood Blood-Peripheral  Preliminary Report (01 Aug 2022 19:01):    No growth to date.    Culture - Blood (collected 2022 15:00)  Source: .Blood Blood-Peripheral  Preliminary Report (01 Aug 2022 19:01):    No growth to date.        RADIOLOGY & ADDITIONAL TESTS:  Results Reviewed:   Imaging Personally Reviewed:  Electrocardiogram Personally Reviewed:    COORDINATION OF CARE:  Care Discussed with Consultants/Other Providers [Y/N]:  Prior or Outpatient Records Reviewed [Y/N]:

## 2022-08-02 NOTE — PROGRESS NOTE ADULT - ASSESSMENT
87 y.o male with PMHx of CVA, HTN, Vertigo, GERD, Prostate cancer many years ago, s/p urinary sphincter multiple recent UTI, s/p partial removal of urinary sphincter, recently admitted for sphincter abscess s/p IV ABx and exploration 6/2022 presents from [facility] with fever and scrotal tenderness. Found to have sepsis 2/2 fluid collection around AUS and epididymitis c/f persistent abscess.

## 2022-08-03 ENCOUNTER — TRANSCRIPTION ENCOUNTER (OUTPATIENT)
Age: 87
End: 2022-08-03

## 2022-08-03 LAB
BLD GP AB SCN SERPL QL: NEGATIVE — SIGNIFICANT CHANGE UP
CULTURE RESULTS: NO GROWTH — SIGNIFICANT CHANGE UP
RH IG SCN BLD-IMP: POSITIVE — SIGNIFICANT CHANGE UP
SPECIMEN SOURCE: SIGNIFICANT CHANGE UP

## 2022-08-03 PROCEDURE — 99233 SBSQ HOSP IP/OBS HIGH 50: CPT

## 2022-08-03 PROCEDURE — 99233 SBSQ HOSP IP/OBS HIGH 50: CPT | Mod: 57

## 2022-08-03 RX ORDER — SODIUM CHLORIDE 9 MG/ML
1000 INJECTION, SOLUTION INTRAVENOUS
Refills: 0 | Status: DISCONTINUED | OUTPATIENT
Start: 2022-08-03 | End: 2022-08-05

## 2022-08-03 RX ADMIN — PANTOPRAZOLE SODIUM 40 MILLIGRAM(S): 20 TABLET, DELAYED RELEASE ORAL at 07:33

## 2022-08-03 RX ADMIN — ERTAPENEM SODIUM 120 MILLIGRAM(S): 1 INJECTION, POWDER, LYOPHILIZED, FOR SOLUTION INTRAMUSCULAR; INTRAVENOUS at 17:19

## 2022-08-03 RX ADMIN — SODIUM CHLORIDE 100 MILLILITER(S): 9 INJECTION, SOLUTION INTRAVENOUS at 21:31

## 2022-08-03 RX ADMIN — ENOXAPARIN SODIUM 40 MILLIGRAM(S): 100 INJECTION SUBCUTANEOUS at 21:37

## 2022-08-03 RX ADMIN — Medication 81 MILLIGRAM(S): at 13:09

## 2022-08-03 RX ADMIN — ATORVASTATIN CALCIUM 40 MILLIGRAM(S): 80 TABLET, FILM COATED ORAL at 21:32

## 2022-08-03 NOTE — PROGRESS NOTE ADULT - ASSESSMENT
87 y.o male with PMHx of CVA, HTN, Vertigo, GERD, Prostate cancer many years ago, s/p urinary sphincter multiple recent UTI, s/p partial removal of urinary sphincter, recently admitted for sphincter abscess s/p IV ABx and exploration 6/2022 presents from [facility] with fever and scrotal tenderness. Found to have sepsis 2/2 fluid collection around AUS and epididymitis c/f persistent abscess.

## 2022-08-03 NOTE — PROGRESS NOTE ADULT - SUBJECTIVE AND OBJECTIVE BOX
Merlin Mathew, MD   Hospitalist  Pager #[phone]    PROGRESS NOTE:     Patient is a 87y old  Male who presents with a chief complaint of Scrotal abscess (03 Aug 2022 08:38)      SUBJECTIVE / OVERNIGHT EVENTS: NEON   Patient feels okay, no current complaints, denies any pain. Understands surgery is tomorrow   Denies any F/C, n/V, is tolerating PO.     ADDITIONAL REVIEW OF SYSTEMS:    MEDICATIONS  (STANDING):  aspirin  chewable 81 milliGRAM(s) Oral daily  atorvastatin 40 milliGRAM(s) Oral at bedtime  enoxaparin Injectable 40 milliGRAM(s) SubCutaneous every 24 hours  ertapenem  IVPB 1000 milliGRAM(s) IV Intermittent every 24 hours  pantoprazole    Tablet 40 milliGRAM(s) Oral before breakfast    MEDICATIONS  (PRN):  acetaminophen     Tablet .. 650 milliGRAM(s) Oral every 6 hours PRN Temp greater or equal to 38C (100.4F)  meclizine 12.5 milliGRAM(s) Oral daily PRN Dizziness  oxyCODONE    IR 5 milliGRAM(s) Oral every 6 hours PRN Severe Pain (7 - 10), Moderate pain (4-6)      CAPILLARY BLOOD GLUCOSE        I&O's Summary    02 Aug 2022 07:01  -  03 Aug 2022 07:00  --------------------------------------------------------  IN: 0 mL / OUT: 2600 mL / NET: -2600 mL        PHYSICAL EXAM:  Vital Signs Last 24 Hrs  T(C): 36.6 (03 Aug 2022 09:04), Max: 37.5 (02 Aug 2022 17:06)  T(F): 97.8 (03 Aug 2022 09:04), Max: 99.5 (02 Aug 2022 17:06)  HR: 76 (03 Aug 2022 09:04) (76 - 85)  BP: 112/54 (03 Aug 2022 09:04) (106/63 - 112/54)  BP(mean): --  RR: 18 (03 Aug 2022 09:04) (18 - 19)  SpO2: 100% (03 Aug 2022 09:04) (98% - 100%)    Parameters below as of 03 Aug 2022 09:04  Patient On (Oxygen Delivery Method): room air        CONSTITUTIONAL: NAD, well-developed elderly male, thin   RESPIRATORY: Normal respiratory effort; lungs are clear to auscultation bilaterally  CARDIOVASCULAR: Regular rate and rhythm, normal S1 and S2, no murmur/rub/gallop; No lower extremity edema; Peripheral pulses are 2+ bilaterally  ABDOMEN: Nontender to palpation, normoactive bowel sounds, no rebound/guarding; No hepatosplenomegaly  : + Alexis in place, penile and scrotal hypopigmentation   MUSCULOSKELETAL no clubbing or cyanosis of digits; no joint swelling or tenderness to palpation  PSYCH: A+O to person, place; affect appropriate    LABS:                        10.8   9.83  )-----------( 176      ( 02 Aug 2022 07:21 )             32.4     08-02    138  |  105  |  12  ----------------------------<  74  4.0   |  24  |  0.92    Ca    8.4      02 Aug 2022 07:21                Culture - Urine (collected 02 Aug 2022 10:05)  Source: Catheterized Catheterized  Final Report (03 Aug 2022 09:54):    No growth    Culture - Blood (collected 31 Jul 2022 15:10)  Source: .Blood Blood-Peripheral  Preliminary Report (01 Aug 2022 19:01):    No growth to date.    Culture - Blood (collected 31 Jul 2022 15:00)  Source: .Blood Blood-Peripheral  Preliminary Report (01 Aug 2022 19:01):    No growth to date.        RADIOLOGY & ADDITIONAL TESTS:  Results Reviewed:   Imaging Personally Reviewed:  Electrocardiogram Personally Reviewed:    COORDINATION OF CARE:  Care Discussed with Consultants/Other Providers [Y/N]:  Prior or Outpatient Records Reviewed [Y/N]:

## 2022-08-03 NOTE — PROGRESS NOTE ADULT - SUBJECTIVE AND OBJECTIVE BOX
No new events  Afeb 106/63 76 99%RA    Pt has no c/o  Abd- soft NT ND   Alexis 700  Scrotum - no changes

## 2022-08-03 NOTE — PROGRESS NOTE ADULT - ASSESSMENT
87 y.o male with PMHx of CVA, HTN, Afib, Vertigo, GERD, Prostate CA many years ago, s/p urinary sphincter multiple recent UTI, s/p partial removal of urinary sphincter 6/29/2022 for an abscess surrounding the sphincter presented to the ED 7/31 c/o suprapubic discomfort and scrotal tenderness along with fevers, tmax in .5, hypotensive responded to fluid bolus.  Denies any pain with urination or frequency and says that he has been able to void without a catheter at the nursing home. He has not followed up with any physician since d/c.  Pt admitted and started on ertapenem.    8/1: remains afebrile, no complaints  8/2 - no events  8/3 - no events; OR tomorrw to remove sphincter  Plan:  -continue ertapenem  -scrotal elevation  -f/u medicine  -hold eliquis for now  - NPO p MN  - check echo

## 2022-08-04 ENCOUNTER — RESULT REVIEW (OUTPATIENT)
Age: 87
End: 2022-08-04

## 2022-08-04 LAB — SARS-COV-2 RNA SPEC QL NAA+PROBE: SIGNIFICANT CHANGE UP

## 2022-08-04 PROCEDURE — 52000 CYSTOURETHROSCOPY: CPT | Mod: 59,78

## 2022-08-04 PROCEDURE — 88300 SURGICAL PATH GROSS: CPT | Mod: 26

## 2022-08-04 PROCEDURE — 53446 REMOVE URO SPHINCTER: CPT | Mod: 78

## 2022-08-04 PROCEDURE — 55110 EXPLORE SCROTUM: CPT | Mod: 78

## 2022-08-04 PROCEDURE — 99232 SBSQ HOSP IP/OBS MODERATE 35: CPT

## 2022-08-04 RX ORDER — FENTANYL CITRATE 50 UG/ML
50 INJECTION INTRAVENOUS
Refills: 0 | Status: DISCONTINUED | OUTPATIENT
Start: 2022-08-04 | End: 2022-08-04

## 2022-08-04 RX ORDER — SODIUM CHLORIDE 9 MG/ML
1000 INJECTION, SOLUTION INTRAVENOUS
Refills: 0 | Status: DISCONTINUED | OUTPATIENT
Start: 2022-08-04 | End: 2022-08-06

## 2022-08-04 RX ORDER — FENTANYL CITRATE 50 UG/ML
25 INJECTION INTRAVENOUS
Refills: 0 | Status: DISCONTINUED | OUTPATIENT
Start: 2022-08-04 | End: 2022-08-04

## 2022-08-04 RX ADMIN — Medication 81 MILLIGRAM(S): at 21:20

## 2022-08-04 RX ADMIN — SODIUM CHLORIDE 75 MILLILITER(S): 9 INJECTION, SOLUTION INTRAVENOUS at 19:26

## 2022-08-04 RX ADMIN — ATORVASTATIN CALCIUM 40 MILLIGRAM(S): 80 TABLET, FILM COATED ORAL at 21:20

## 2022-08-04 RX ADMIN — ERTAPENEM SODIUM 120 MILLIGRAM(S): 1 INJECTION, POWDER, LYOPHILIZED, FOR SOLUTION INTRAMUSCULAR; INTRAVENOUS at 21:20

## 2022-08-04 RX ADMIN — ENOXAPARIN SODIUM 40 MILLIGRAM(S): 100 INJECTION SUBCUTANEOUS at 21:20

## 2022-08-04 NOTE — PROGRESS NOTE ADULT - SUBJECTIVE AND OBJECTIVE BOX
Subjective  86 y/o M hospital day#5 for scrotal abscess in the presence of an AUS. NAEO. Pain is well controlled. OOB. Tolerating diet. Passing flatus and having BM. Voiding well. Denies fevers, chills, nausea, vomiting, SOB, CP.    Objective    Vital signs  T(F): , Max: 99 (08-04-22 @ 01:50)  HR: 64 (08-04-22 @ 08:42)  BP: 114/59 (08-04-22 @ 08:42)  SpO2: 100% (08-04-22 @ 08:42)  Wt(kg): --    Output     OUT:    Indwelling Catheter - Urethral (mL): 1225 mL    Voided (mL): 200 mL  Total OUT: 1425 mL    Total NET: -1425 mL          Gen: NAD  Abd: soft, nontender, nondistended  : whiting secured in place, draining CYU. Scrotal exam unchanged.           Culture - Urine (collected 08-02-22 @ 10:05)  Source: Catheterized Catheterized  Final Report (08-03-22 @ 09:54):    No growth    Culture - Blood (collected 07-31-22 @ 15:10)  Source: .Blood Blood-Peripheral  Preliminary Report (08-01-22 @ 19:01):    No growth to date.    Culture - Blood (collected 07-31-22 @ 15:00)  Source: .Blood Blood-Peripheral  Preliminary Report (08-01-22 @ 19:01):    No growth to date.

## 2022-08-04 NOTE — PROGRESS NOTE ADULT - ASSESSMENT
87 y.o male with PMHx of CVA, HTN, Afib, Vertigo, GERD, Prostate CA many years ago, s/p urinary sphincter multiple recent UTI, s/p partial removal of urinary sphincter 6/29/2022 for an abscess surrounding the sphincter presented to the ED 7/31 c/o suprapubic discomfort and scrotal tenderness along with fevers, tmax in .5, hypotensive responded to fluid bolus.  Denies any pain with urination or frequency and says that he has been able to void without a catheter at the nursing home. He has not followed up with any physician since d/c.  Pt admitted and started on ertapenem.    8/1: remains afebrile, no complaints  8/2 - no events  8/3 - no events; OR tomorrow to remove sphincter  8/4 - No events; OR today to remove AUS; medicine cleared for surgery  Plan:  -continue ertapenem  -scrotal elevation  -hold eliquis for now  - NPO until after his procedure

## 2022-08-04 NOTE — PROGRESS NOTE ADULT - ASSESSMENT
87 y.o male with PMHx of CVA, HTN, Vertigo, GERD, Prostate cancer many years ago, s/p urinary sphincter multiple recent UTI, s/p partial removal of urinary sphincter, recently admitted for sphincter abscess s/p IV ABx and exploration 6/2022 presents from [facility] with fever and scrotal tenderness. Found to have sepsis 2/2 fluid collection around AUS and epididymitis c/f persistent abscess plan for OR to remove AUS 8/4.

## 2022-08-04 NOTE — PROGRESS NOTE ADULT - SUBJECTIVE AND OBJECTIVE BOX
Merlin Mathew, MD   Hospitalist  Pager #[phone]    PROGRESS NOTE:     Patient is a 87y old  Male who presents with a chief complaint of Scrotal abscess (04 Aug 2022 09:35)      SUBJECTIVE / OVERNIGHT EVENTS:  NEON   Patient has no complaints, awaiting surgery   Denies any N/V, has some pain. Feels cold in the room, denies any fevers.     ADDITIONAL REVIEW OF SYSTEMS:    MEDICATIONS  (STANDING):  aspirin  chewable 81 milliGRAM(s) Oral daily  atorvastatin 40 milliGRAM(s) Oral at bedtime  dextrose 5% + sodium chloride 0.9%. 1000 milliLiter(s) (100 mL/Hr) IV Continuous <Continuous>  enoxaparin Injectable 40 milliGRAM(s) SubCutaneous every 24 hours  ertapenem  IVPB 1000 milliGRAM(s) IV Intermittent every 24 hours  pantoprazole    Tablet 40 milliGRAM(s) Oral before breakfast    MEDICATIONS  (PRN):  acetaminophen     Tablet .. 650 milliGRAM(s) Oral every 6 hours PRN Temp greater or equal to 38C (100.4F)  meclizine 12.5 milliGRAM(s) Oral daily PRN Dizziness  oxyCODONE    IR 5 milliGRAM(s) Oral every 6 hours PRN Severe Pain (7 - 10), Moderate pain (4-6)      CAPILLARY BLOOD GLUCOSE        I&O's Summary    03 Aug 2022 07:01  -  04 Aug 2022 07:00  --------------------------------------------------------  IN: 0 mL / OUT: 1425 mL / NET: -1425 mL        PHYSICAL EXAM:  Vital Signs Last 24 Hrs  T(C): 37.1 (04 Aug 2022 08:42), Max: 37.2 (04 Aug 2022 01:50)  T(F): 98.8 (04 Aug 2022 08:42), Max: 99 (04 Aug 2022 01:50)  HR: 64 (04 Aug 2022 08:42) (64 - 87)  BP: 114/59 (04 Aug 2022 08:42) (108/87 - 124/64)  BP(mean): --  RR: 16 (04 Aug 2022 08:42) (16 - 18)  SpO2: 100% (04 Aug 2022 08:42) (96% - 100%)    Parameters below as of 04 Aug 2022 08:42  Patient On (Oxygen Delivery Method): room air    CONSTITUTIONAL: NAD, well-developed elderly male, thin   RESPIRATORY: Normal respiratory effort; lungs are clear to auscultation bilaterally  CARDIOVASCULAR: Regular rate and rhythm, normal S1 and S2, no murmur/rub/gallop; No lower extremity edema; Peripheral pulses are 2+ bilaterally  ABDOMEN: Nontender to palpation, normoactive bowel sounds, no rebound/guarding; No hepatosplenomegaly  : + Alexis in place, penile and scrotal hypopigmentation   MUSCULOSKELETAL no clubbing or cyanosis of digits; no joint swelling or tenderness to palpation  PSYCH: A+O to person, place; affect appropriate    LABS:                    Culture - Urine (collected 02 Aug 2022 10:05)  Source: Catheterized Catheterized  Final Report (03 Aug 2022 09:54):    No growth        RADIOLOGY & ADDITIONAL TESTS:  Results Reviewed:   Imaging Personally Reviewed:  Electrocardiogram Personally Reviewed:    COORDINATION OF CARE:  Care Discussed with Consultants/Other Providers [Y/N]:  Prior or Outpatient Records Reviewed [Y/N]:

## 2022-08-04 NOTE — PROGRESS NOTE ADULT - SUBJECTIVE AND OBJECTIVE BOX
Note    Post op Check    s/p : AUS explant of resevoir    Pt seen / examined without complaints pain controlled    Vital Signs Last 24 Hrs  T(C): 36.7 (04 Aug 2022 19:00), Max: 37.3 (04 Aug 2022 18:00)  T(F): 98.1 (04 Aug 2022 19:00), Max: 99.2 (04 Aug 2022 18:00)  HR: 70 (04 Aug 2022 19:00) (61 - 78)  BP: 117/63 (04 Aug 2022 19:00) (101/58 - 143/63)  BP(mean): 76 (04 Aug 2022 19:00) (68 - 82)  RR: 14 (04 Aug 2022 19:00) (14 - 22)  SpO2: 99% (04 Aug 2022 19:00) (96% - 100%)    Parameters below as of 04 Aug 2022 16:55  Patient On (Oxygen Delivery Method): room air        I&O's Summary    03 Aug 2022 07:01  -  04 Aug 2022 07:00  --------------------------------------------------------  IN: 0 mL / OUT: 1425 mL / NET: -1425 mL    04 Aug 2022 07:01  -  04 Aug 2022 20:08  --------------------------------------------------------  IN: 740 mL / OUT: 440 mL / NET: 300 mL        PHYSICAL EXAM:       Constitutional: awake alert     Respiratory: no resp distress    Cardiovascular: RR    Gastrointestinal: Soft, NT ND    Genitourinary: Alexis in place yellow scrotal dsg intact minimal drainage    Extremities: + venodynes

## 2022-08-05 DIAGNOSIS — G93.40 ENCEPHALOPATHY, UNSPECIFIED: ICD-10-CM

## 2022-08-05 LAB
ANION GAP SERPL CALC-SCNC: 10 MMOL/L — SIGNIFICANT CHANGE UP (ref 7–14)
BUN SERPL-MCNC: 13 MG/DL — SIGNIFICANT CHANGE UP (ref 7–23)
CALCIUM SERPL-MCNC: 8.8 MG/DL — SIGNIFICANT CHANGE UP (ref 8.4–10.5)
CHLORIDE SERPL-SCNC: 104 MMOL/L — SIGNIFICANT CHANGE UP (ref 98–107)
CO2 SERPL-SCNC: 23 MMOL/L — SIGNIFICANT CHANGE UP (ref 22–31)
CREAT SERPL-MCNC: 0.78 MG/DL — SIGNIFICANT CHANGE UP (ref 0.5–1.3)
CULTURE RESULTS: SIGNIFICANT CHANGE UP
CULTURE RESULTS: SIGNIFICANT CHANGE UP
EGFR: 87 ML/MIN/1.73M2 — SIGNIFICANT CHANGE UP
GLUCOSE SERPL-MCNC: 111 MG/DL — HIGH (ref 70–99)
HCT VFR BLD CALC: 37 % — LOW (ref 39–50)
HGB BLD-MCNC: 12 G/DL — LOW (ref 13–17)
MAGNESIUM SERPL-MCNC: 2.1 MG/DL — SIGNIFICANT CHANGE UP (ref 1.6–2.6)
MCHC RBC-ENTMCNC: 29.3 PG — SIGNIFICANT CHANGE UP (ref 27–34)
MCHC RBC-ENTMCNC: 32.4 GM/DL — SIGNIFICANT CHANGE UP (ref 32–36)
MCV RBC AUTO: 90.2 FL — SIGNIFICANT CHANGE UP (ref 80–100)
NIGHT BLUE STAIN TISS: SIGNIFICANT CHANGE UP
NRBC # BLD: 0 /100 WBCS — SIGNIFICANT CHANGE UP
NRBC # FLD: 0 K/UL — SIGNIFICANT CHANGE UP
PHOSPHATE SERPL-MCNC: 3.7 MG/DL — SIGNIFICANT CHANGE UP (ref 2.5–4.5)
PLATELET # BLD AUTO: 208 K/UL — SIGNIFICANT CHANGE UP (ref 150–400)
POTASSIUM SERPL-MCNC: 4.8 MMOL/L — SIGNIFICANT CHANGE UP (ref 3.5–5.3)
POTASSIUM SERPL-SCNC: 4.8 MMOL/L — SIGNIFICANT CHANGE UP (ref 3.5–5.3)
RBC # BLD: 4.1 M/UL — LOW (ref 4.2–5.8)
RBC # FLD: 14.5 % — SIGNIFICANT CHANGE UP (ref 10.3–14.5)
SODIUM SERPL-SCNC: 137 MMOL/L — SIGNIFICANT CHANGE UP (ref 135–145)
SPECIMEN SOURCE: SIGNIFICANT CHANGE UP
WBC # BLD: 10.2 K/UL — SIGNIFICANT CHANGE UP (ref 3.8–10.5)
WBC # FLD AUTO: 10.2 K/UL — SIGNIFICANT CHANGE UP (ref 3.8–10.5)

## 2022-08-05 PROCEDURE — 99233 SBSQ HOSP IP/OBS HIGH 50: CPT

## 2022-08-05 RX ORDER — ACETAMINOPHEN 500 MG
650 TABLET ORAL EVERY 6 HOURS
Refills: 0 | Status: DISCONTINUED | OUTPATIENT
Start: 2022-08-05 | End: 2022-08-08

## 2022-08-05 RX ADMIN — Medication 650 MILLIGRAM(S): at 18:32

## 2022-08-05 RX ADMIN — PANTOPRAZOLE SODIUM 40 MILLIGRAM(S): 20 TABLET, DELAYED RELEASE ORAL at 05:10

## 2022-08-05 RX ADMIN — ATORVASTATIN CALCIUM 40 MILLIGRAM(S): 80 TABLET, FILM COATED ORAL at 21:22

## 2022-08-05 RX ADMIN — SODIUM CHLORIDE 75 MILLILITER(S): 9 INJECTION, SOLUTION INTRAVENOUS at 11:58

## 2022-08-05 RX ADMIN — ERTAPENEM SODIUM 120 MILLIGRAM(S): 1 INJECTION, POWDER, LYOPHILIZED, FOR SOLUTION INTRAMUSCULAR; INTRAVENOUS at 17:15

## 2022-08-05 RX ADMIN — Medication 81 MILLIGRAM(S): at 14:29

## 2022-08-05 NOTE — ADVANCED PRACTICE NURSE CONSULT - RECOMMEDATIONS
Topical Recommendations    Scrotum/penile area: Cleanse with NS, pat dry. Apply Liquid barrier film to anterior midline scrotum at incision site. Place abdominal pad horizontally under scrotum including both Penrose drainages. For midline anterior scrotal incision apply silicone contact layer (adaptic touch) and cover with additional abdominal pad horizontally and secure under penis.  Secure abdominal pads with tape to each other, not to skin, and cover with disposable incontinent briefs, feeding whiting catheter through leg of incontinence brief to prevent additional pressure to penile site. Change BID and PRN when soiled.     Continue low air loss bed therapy, continue heel elevation, continue to turn & reposition per protocol, soft pillow between bony prominences, continue moisture management with barrier creams & single breathable pad, continue measures to decrease friction/shear/pressure.     Plan of care of primary RN Elo Knutson at the bedside.     Please contact Wound Care Service Line if we can be of further assistance (ext [phone]).

## 2022-08-05 NOTE — PROGRESS NOTE ADULT - SUBJECTIVE AND OBJECTIVE BOX
Merlin Mathew, MD   Hospitalist  Pager #[phone]    PROGRESS NOTE:     Patient is a 87y old  Male who presents with a chief complaint of Scrotal abscess (05 Aug 2022 07:27)      SUBJECTIVE / OVERNIGHT EVENTS: NEON   Patient confused this AM per PCA. Upon entering, asked writer if she was graduating today.   Patient oriented to self, place not time  Reports he is hospitalized for a sinus problem   Patient currently denies any F/C, abdominal pain, N/V, lightheadedness     ADDITIONAL REVIEW OF SYSTEMS:    MEDICATIONS  (STANDING):  aspirin  chewable 81 milliGRAM(s) Oral daily  atorvastatin 40 milliGRAM(s) Oral at bedtime  dextrose 5% + sodium chloride 0.9%. 1000 milliLiter(s) (100 mL/Hr) IV Continuous <Continuous>  enoxaparin Injectable 40 milliGRAM(s) SubCutaneous every 24 hours  ertapenem  IVPB 1000 milliGRAM(s) IV Intermittent every 24 hours  lactated ringers. 1000 milliLiter(s) (75 mL/Hr) IV Continuous <Continuous>  pantoprazole    Tablet 40 milliGRAM(s) Oral before breakfast    MEDICATIONS  (PRN):  acetaminophen     Tablet .. 650 milliGRAM(s) Oral every 6 hours PRN Temp greater or equal to 38C (100.4F)  meclizine 12.5 milliGRAM(s) Oral daily PRN Dizziness  oxyCODONE    IR 5 milliGRAM(s) Oral every 6 hours PRN Severe Pain (7 - 10), Moderate pain (4-6)      CAPILLARY BLOOD GLUCOSE        I&O's Summary    04 Aug 2022 07:01  -  05 Aug 2022 07:00  --------------------------------------------------------  IN: 740 mL / OUT: 1290 mL / NET: -550 mL    05 Aug 2022 07:01  -  05 Aug 2022 12:13  --------------------------------------------------------  IN: 0 mL / OUT: 300 mL / NET: -300 mL        PHYSICAL EXAM:  Vital Signs Last 24 Hrs  T(C): 36.8 (05 Aug 2022 09:28), Max: 37.3 (04 Aug 2022 18:00)  T(F): 98.2 (05 Aug 2022 09:28), Max: 99.2 (04 Aug 2022 18:00)  HR: 84 (05 Aug 2022 09:28) (61 - 84)  BP: 114/61 (05 Aug 2022 09:28) (101/58 - 143/63)  BP(mean): 76 (04 Aug 2022 19:00) (68 - 82)  RR: 18 (05 Aug 2022 09:28) (14 - 22)  SpO2: 100% (05 Aug 2022 09:28) (97% - 100%)    Parameters below as of 05 Aug 2022 09:28  Patient On (Oxygen Delivery Method): room air        CONSTITUTIONAL: NAD, well-developed elderly male   RESPIRATORY: Normal respiratory effort; lungs are clear to auscultation bilaterally  CARDIOVASCULAR: Regular rate and rhythm, normal S1 and S2, no murmur/rub/gallop; No lower extremity edema; Peripheral pulses are 2+ bilaterally  ABDOMEN: Nontender to palpation, normoactive bowel sounds, no rebound/guarding; No hepatosplenomegaly  : Penile hypopigmentation, + Alexis, scrotal wound dressing C/D/I, + penrose drains   MUSCULOSKELETAL no clubbing or cyanosis of digits; no joint swelling or tenderness to palpation  PSYCH: A+O to person, place, not time   NEURO: CN 2-12 grossly intact, exam limited by pt ability to follow directions, hard of hearing. 4/5 strength in UE and LE     LABS:                        12.0   10.20 )-----------( 208      ( 05 Aug 2022 07:50 )             37.0     08-05    137  |  104  |  13  ----------------------------<  111<H>  4.8   |  23  |  0.78    Ca    8.8      05 Aug 2022 07:50  Phos  3.7     08-05  Mg     2.10     08-05                Culture - Fungal, Other (collected 04 Aug 2022 14:31)  Source: .Other SCROTAL ABSCESS  Preliminary Report (05 Aug 2022 07:45):    Testing in progress        RADIOLOGY & ADDITIONAL TESTS:  Results Reviewed:   Imaging Personally Reviewed:  Electrocardiogram Personally Reviewed:    COORDINATION OF CARE:  Care Discussed with Consultants/Other Providers [Y/N]:  Prior or Outpatient Records Reviewed [Y/N]:

## 2022-08-05 NOTE — PROGRESS NOTE ADULT - ASSESSMENT
87 y.o male with PMHx of CVA, HTN, Afib, Vertigo, GERD, Prostate CA many years ago, s/p urinary sphincter multiple recent UTI, s/p partial removal of urinary sphincter 6/29/2022 for an abscess surrounding the sphincter presented to the ED 7/31 c/o suprapubic discomfort and scrotal tenderness along with fevers, tmax in .5, hypotensive responded to fluid bolus.  Denies any pain with urination or frequency and says that he has been able to void without a catheter at the nursing home. He has not followed up with any physician since d/c.  Pt admitted and started on ertapenem.    8/1: remains afebrile, no complaints  8/2 - no events  8/3 - no events; OR tomorrow to remove sphincter  8/4 - no events; OR today to remove AUS; medicine cleared for surgery  8/5 - no events; OR yesterday, AUS device fully removed. Diet advanced back to his normal diet  Plan:  - continue ertapenem  - scrotal elevation with daily dressing changes  - maintain whiting  - hold eliquis for now  - continue regular diet  - OOB  - PT consult   87 y.o male with PMHx of CVA, HTN, Afib, Vertigo, GERD, Prostate CA many years ago, s/p urinary sphincter multiple recent UTI, s/p partial removal of urinary sphincter 6/29/2022 for an abscess surrounding the sphincter presented to the ED 7/31 c/o suprapubic discomfort and scrotal tenderness along with fevers, tmax in .5, hypotensive responded to fluid bolus.  Denies any pain with urination or frequency and says that he has been able to void without a catheter at the nursing home. He has not followed up with any physician since d/c.  Pt admitted and started on ertapenem.    8/1: remains afebrile, no complaints  8/2 - no events  8/3 - no events; OR tomorrow to remove sphincter  8/4 - no events; OR today to remove AUS; medicine cleared for surgery  8/5 - no events; OR yesterday, AUS device fully removed. Diet advanced back to his normal diet  Plan:  - continue ertapenem  - scrotal elevation with daily dressing changes  - maintain whiting  - hold eliquis for now  - continue regular diet  - OOB  - possible d/c today or tomorrow  - PT consult

## 2022-08-05 NOTE — PROGRESS NOTE ADULT - ASSESSMENT
87 y.o male with PMHx of CVA, HTN, Vertigo, GERD, Prostate cancer many years ago, s/p urinary sphincter multiple recent UTI, s/p partial removal of urinary sphincter, recently admitted for sphincter abscess s/p IV ABx and exploration 6/2022 presents from [facility] with fever and scrotal tenderness. Found to have sepsis 2/2 fluid collection around AUS and epididymitis c/f persistent abscess s/p AUS removal 8/4.

## 2022-08-05 NOTE — PHYSICAL THERAPY INITIAL EVALUATION ADULT - PERTINENT HX OF CURRENT PROBLEM, REHAB EVAL
86 y/o male with PMHx of CVA, HTN, Afib, Vertigo, GERD, Prostate CA many years ago, s/p urinary sphincter multiple recent UTI, s/p partial removal of urinary sphincter 6/29/2022 for an abscess surrounding the sphincter presented to the ED 7/31 c/o suprapubic discomfort and scrotal tenderness along with fevers and hypotension, now s/p above procedure

## 2022-08-05 NOTE — PROGRESS NOTE ADULT - SUBJECTIVE AND OBJECTIVE BOX
Subjective  86 y/o M now POD#1 s/p explant of AUS. NAEO. Pain is minimal. Tolerating his diet. Passing gas and been OOB. Denies fevers, chills, nausea, vomiting, SOB, CP.      Objective    Vital signs  T(F): , Max: 99.2 (08-04-22 @ 18:00)  HR: 78 (08-05-22 @ 05:05)  BP: 123/71 (08-05-22 @ 05:05)  SpO2: 100% (08-05-22 @ 05:05)  Wt(kg): --    Output     OUT:    Indwelling Catheter - Urethral (mL): 1290 mL  Total OUT: 1290 mL    Total NET: -1290 mL          Gen: NAD  Abd: soft, nontender, nondistended  : whiting secured in place, draining CYU. Scrotal wound is covered with multiple pieces of gauze. Has 4 incisions closed with 2 penrose drains draining serosanguinous fluid.     Labs          Culture - Urine (collected 08-02-22 @ 10:05)  Source: Catheterized Catheterized  Final Report (08-03-22 @ 09:54):    No growth    Culture - Blood (collected 07-31-22 @ 15:10)  Source: .Blood Blood-Peripheral  Preliminary Report (08-01-22 @ 19:01):    No growth to date.    Culture - Blood (collected 07-31-22 @ 15:00)  Source: .Blood Blood-Peripheral  Preliminary Report (08-01-22 @ 19:01):    No growth to date.                                                 Subjective  88 y/o M now POD#1 s/p explant of AUS. NAEO. Pain is minimal. Tolerating his diet. Passing gas and been OOB. Denies fevers, chills, nausea, vomiting, SOB, CP.      Objective    Vital signs  T(F): , Max: 99.2 (08-04-22 @ 18:00)  HR: 78 (08-05-22 @ 05:05)  BP: 123/71 (08-05-22 @ 05:05)  SpO2: 100% (08-05-22 @ 05:05)  Wt(kg): --    Output     OUT:    Indwelling Catheter - Urethral (mL): 1290 mL  Total OUT: 1290 mL    Total NET: -1290 mL          Gen: NAD  Abd: soft, nontender, nondistended  : whiting secured in place, draining CYU. Scrotal wound is covered with multiple pieces of gauze. Has 3 incisions (1 midline scrotal, 1 perineal, 1 left scrotal) closed with 2 penrose drains draining serosanguinous fluid.     Labs          Culture - Urine (collected 08-02-22 @ 10:05)  Source: Catheterized Catheterized  Final Report (08-03-22 @ 09:54):    No growth    Culture - Blood (collected 07-31-22 @ 15:10)  Source: .Blood Blood-Peripheral  Preliminary Report (08-01-22 @ 19:01):    No growth to date.    Culture - Blood (collected 07-31-22 @ 15:00)  Source: .Blood Blood-Peripheral  Preliminary Report (08-01-22 @ 19:01):    No growth to date.                                                 Subjective  88 y/o M now POD#1 s/p explant of AUS, hospital day#6. NAEO. Pain is minimal. Tolerating his diet. Passing gas and been OOB. Denies fevers, chills, nausea, vomiting, SOB, CP.      Objective    Vital signs  T(F): , Max: 99.2 (08-04-22 @ 18:00)  HR: 78 (08-05-22 @ 05:05)  BP: 123/71 (08-05-22 @ 05:05)  SpO2: 100% (08-05-22 @ 05:05)  Wt(kg): --    Output     OUT:    Indwelling Catheter - Urethral (mL): 1290 mL  Total OUT: 1290 mL    Total NET: -1290 mL          Gen: NAD  Abd: soft, nontender, nondistended  : whiting secured in place, draining CYU. Scrotal wound is covered with multiple pieces of gauze. Has 3 incisions (1 midline scrotal, 1 perineal, 1 left scrotal) closed with 2 penrose drains draining serosanguinous fluid.     Labs          Culture - Urine (collected 08-02-22 @ 10:05)  Source: Catheterized Catheterized  Final Report (08-03-22 @ 09:54):    No growth    Culture - Blood (collected 07-31-22 @ 15:10)  Source: .Blood Blood-Peripheral  Preliminary Report (08-01-22 @ 19:01):    No growth to date.    Culture - Blood (collected 07-31-22 @ 15:00)  Source: .Blood Blood-Peripheral  Preliminary Report (08-01-22 @ 19:01):    No growth to date.

## 2022-08-05 NOTE — PHYSICAL THERAPY INITIAL EVALUATION ADULT - GENERAL OBSERVATIONS, REHAB EVAL
Pt seen lying in bed, dressing to scrotal wound, + whiting, pt confused but follows commands, pleasant

## 2022-08-06 LAB
-  AMIKACIN: SIGNIFICANT CHANGE UP
-  AMOXICILLIN/CLAVULANIC ACID: SIGNIFICANT CHANGE UP
-  AMPICILLIN/SULBACTAM: SIGNIFICANT CHANGE UP
-  AMPICILLIN: SIGNIFICANT CHANGE UP
-  AZTREONAM: SIGNIFICANT CHANGE UP
-  CEFAZOLIN: SIGNIFICANT CHANGE UP
-  CEFEPIME: SIGNIFICANT CHANGE UP
-  CEFTRIAXONE: SIGNIFICANT CHANGE UP
-  CIPROFLOXACIN: SIGNIFICANT CHANGE UP
-  ERTAPENEM: SIGNIFICANT CHANGE UP
-  GENTAMICIN: SIGNIFICANT CHANGE UP
-  IMIPENEM: SIGNIFICANT CHANGE UP
-  LEVOFLOXACIN: SIGNIFICANT CHANGE UP
-  MEROPENEM: SIGNIFICANT CHANGE UP
-  PIPERACILLIN/TAZOBACTAM: SIGNIFICANT CHANGE UP
-  TOBRAMYCIN: SIGNIFICANT CHANGE UP
-  TRIMETHOPRIM/SULFAMETHOXAZOLE: SIGNIFICANT CHANGE UP
METHOD TYPE: SIGNIFICANT CHANGE UP

## 2022-08-06 PROCEDURE — 99233 SBSQ HOSP IP/OBS HIGH 50: CPT

## 2022-08-06 PROCEDURE — 71045 X-RAY EXAM CHEST 1 VIEW: CPT | Mod: 26

## 2022-08-06 PROCEDURE — 99223 1ST HOSP IP/OBS HIGH 75: CPT

## 2022-08-06 RX ORDER — APIXABAN 2.5 MG/1
5 TABLET, FILM COATED ORAL
Refills: 0 | Status: DISCONTINUED | OUTPATIENT
Start: 2022-08-06 | End: 2022-08-06

## 2022-08-06 RX ORDER — APIXABAN 2.5 MG/1
2.5 TABLET, FILM COATED ORAL
Refills: 0 | Status: DISCONTINUED | OUTPATIENT
Start: 2022-08-06 | End: 2022-08-08

## 2022-08-06 RX ADMIN — APIXABAN 2.5 MILLIGRAM(S): 2.5 TABLET, FILM COATED ORAL at 17:21

## 2022-08-06 RX ADMIN — PANTOPRAZOLE SODIUM 40 MILLIGRAM(S): 20 TABLET, DELAYED RELEASE ORAL at 05:12

## 2022-08-06 RX ADMIN — ATORVASTATIN CALCIUM 40 MILLIGRAM(S): 80 TABLET, FILM COATED ORAL at 21:16

## 2022-08-06 RX ADMIN — Medication 650 MILLIGRAM(S): at 10:39

## 2022-08-06 RX ADMIN — Medication 81 MILLIGRAM(S): at 12:44

## 2022-08-06 RX ADMIN — ERTAPENEM SODIUM 120 MILLIGRAM(S): 1 INJECTION, POWDER, LYOPHILIZED, FOR SOLUTION INTRAMUSCULAR; INTRAVENOUS at 17:21

## 2022-08-06 RX ADMIN — Medication 650 MILLIGRAM(S): at 09:05

## 2022-08-06 NOTE — CONSULT NOTE ADULT - SUBJECTIVE AND OBJECTIVE BOX
Patient is a 87y old  Male who presents with a chief complaint of Scrotal abscess (06 Aug 2022 08:22)    HPI:  87M with CVA, HTN, Afib, Vertigo, GERD, Prostate CA many years ago, s/p urinary sphincter multiple recent UTI, s/p partial removal of urinary sphincter 6/29/2022 for an abscess surrounding the sphincter presented to the ED 7/31 complaining of suprapubic discomfort and scrotal tenderness along with fevers, admitted for sepsis secondary to scrotal abscess now s/p AUS removal 8/4. ID consulted for antibiotic management.        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 650 every 6 hours PRN  apixaban 5 two times a day  aspirin  chewable 81 daily  atorvastatin 40 at bedtime  meclizine 12.5 daily PRN  oxyCODONE    IR 5 every 6 hours PRN  pantoprazole    Tablet 40 before breakfast    SOCIAL HISTORY:   Denies alcohol, tobacco, recreational drug use  Lives with family, no recent travel      FAMILY HISTORY: HTN    REVIEW OF SYSTEMS  [  ] ROS unobtainable because:    [  ] All other systems negative except as noted below:	    Constitutional:  [ ] fever [ ] chills  [ ] weight loss  [ ] weakness  Skin:  [ ] rash [ ] phlebitis	  Eyes: [ ] icterus [ ] pain  [ ] discharge	  ENMT: [ ] sore throat  [ ] thrush [ ] ulcers [ ] exudates  Respiratory: [ ] dyspnea [ ] hemoptysis [ ] cough [ ] sputum	  Cardiovascular:  [ ] chest pain [ ] palpitations [ ] edema	  Gastrointestinal:  [ ] nausea [ ] vomiting [ ] diarrhea [ ] constipation [ ] pain	  Genitourinary:  [ ] dysuria [ ] frequency [ ] hematuria [ ] discharge [ ] flank pain  [ ] incontinence  Musculoskeletal:  [ ] myalgias [ ] arthralgias [ ] arthritis  [ ] back pain  Neurological:  [ ] headache [ ] seizures  [ ] confusion/altered mental status  Psychiatric:  [ ] anxiety [ ] depression	  Hematology/Lymphatics:  [ ] lymphadenopathy  Endocrine:  [ ] adrenal [ ] thyroid  Allergic/Immunologic:	 [ ] transplant [ ] seasonal    Vital Signs Last 24 Hrs  T(F): 97.5 (08-06-22 @ 08:52), Max: 102.5 (07-31-22 @ 14:47)  Vital Signs Last 24 Hrs  HR: 72 (08-06-22 @ 08:52) (61 - 88)  BP: 125/71 (08-06-22 @ 08:52) (105/56 - 133/70)  RR: 16 (08-06-22 @ 08:52)  SpO2: 100% (08-06-22 @ 08:52) (96% - 100%)  Wt(kg): --    PHYSICAL EXAM:  Constitutional: non-toxic, no distress  HEAD/EYES: anicteric, no conjunctival injection  ENT:  supple, no thrush  Cardiovascular:   normal S1, S2, no murmur, no edema  Respiratory:  clear BS bilaterally, no wheezes, no rales  GI:  soft, non-tender, normal bowel sounds  :  no whiting, no CVA tenderness  Musculoskeletal:  no synovitis, normal ROM  Neurologic: awake and alert, normal strength, no focal findings  Skin:  no rash, no erythema, no phlebitis  Heme/Onc: no lymphadenopathy   Psychiatric:  awake, alert, appropriate mood                            12.0   10.20 )-----------( 208      ( 05 Aug 2022 07:50 )             37.0   08-05    137  |  104  |  13  ----------------------------<  111<H>  4.8   |  23  |  0.78    Ca    8.8      05 Aug 2022 07:50  Phos  3.7     08-05  Mg     2.10     08-05      MICROBIOLOGY:  Culture - Acid Fast - Other w/Smear (collected 04 Aug 2022 14:31)  Source: .Other SCROTAL ABSCESS    Culture - Fungal, Other (collected 04 Aug 2022 14:31)  Source: .Other SCROTAL ABSCESS  Preliminary Report (05 Aug 2022 07:45):    Testing in progress    Culture - Abscess with Gram Stain (collected 04 Aug 2022 14:31)  Source: .Abscess SCROTAL ABSCESS  Preliminary Report (05 Aug 2022 17:23):    Few Escherichia coli    Culture - Urine (collected 02 Aug 2022 10:05)  Source: Catheterized Catheterized  Final Report (03 Aug 2022 09:54):    No growth                  RADIOLOGY:  imaging below personally reviewed and agree with findings Patient is a 87y old  Male who presents with a chief complaint of Scrotal abscess (06 Aug 2022 08:22)    HPI:  87M with CVA, HTN, Afib, Vertigo, GERD, Prostate CA many years ago, s/p urinary sphincter multiple recent UTI, s/p partial removal of urinary sphincter 6/29/2022 for an abscess surrounding the sphincter presented to the ED 7/31 complaining of suprapubic discomfort and scrotal tenderness along with fevers, admitted for sepsis secondary to scrotal abscess now s/p AUS removal 8/4. ID consulted for antibiotic management.    In the ED, patient febrile 102F, but has since been afebrile, VSS 96RA  WBC 11 now 10  BMP wnl, LFT with mild elevation  UA 7/31 grossly positive UTI  CTAP 7/31 with complex fluid collection around the AUS extending L scrotom  CXR clear  COVID negative  BCx 7/31 NGTD  UCx 8/2 NGTD  Abscess Cx 8/4 few ecoli    Prior Cx 6/29 with ESBL ecoli in abscess, BCx 6/28 ESBL ecoli    Patient s/p Vanco x1 and Cefepime x1 (7/31) and has been on ertapenem  IVPB 1000 every 24 hours (7/31 --- ). Patient denies any further fever or chills, improvement of suprapubic pain.        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 650 every 6 hours PRN  apixaban 5 two times a day  aspirin  chewable 81 daily  atorvastatin 40 at bedtime  meclizine 12.5 daily PRN  oxyCODONE    IR 5 every 6 hours PRN  pantoprazole    Tablet 40 before breakfast    SOCIAL HISTORY:   Denies alcohol, tobacco, recreational drug use  Lives with family, no recent travel      FAMILY HISTORY: HTN    REVIEW OF SYSTEMS   [ x] All other systems negative except as noted below:	    Constitutional:  [ ] fever [ ] chills  [ ] weight loss  [ ] weakness  Skin:  [ ] rash [ ] phlebitis	  Eyes: [ ] icterus [ ] pain  [ ] discharge	  ENMT: [ ] sore throat  [ ] thrush [ ] ulcers [ ] exudates  Respiratory: [ ] dyspnea [ ] hemoptysis [ ] cough [ ] sputum	  Cardiovascular:  [ ] chest pain [ ] palpitations [ ] edema	  Gastrointestinal:  [ ] nausea [ ] vomiting [ ] diarrhea [ ] constipation [ ] pain	  Genitourinary:  [x] dysuria [ ] frequency [ ] hematuria [ ] discharge [ ] flank pain  [ ] incontinence  Musculoskeletal:  [ ] myalgias [ ] arthralgias [ ] arthritis  [ ] back pain  Neurological:  [ ] headache [ ] seizures  [ ] confusion/altered mental status  Psychiatric:  [ ] anxiety [ ] depression	  Hematology/Lymphatics:  [ ] lymphadenopathy  Endocrine:  [ ] adrenal [ ] thyroid  Allergic/Immunologic:	 [ ] transplant [ ] seasonal    Vital Signs Last 24 Hrs  T(F): 97.5 (08-06-22 @ 08:52), Max: 102.5 (07-31-22 @ 14:47)  Vital Signs Last 24 Hrs  HR: 72 (08-06-22 @ 08:52) (61 - 88)  BP: 125/71 (08-06-22 @ 08:52) (105/56 - 133/70)  RR: 16 (08-06-22 @ 08:52)  SpO2: 100% (08-06-22 @ 08:52) (96% - 100%)  Wt(kg): --    Physical Exam:  Constitutional:  well preserved, comfortable  Head/Eyes: no icterus, PERRL, EOMI  ENT:  supple; no thrush  LUNGS:  CTA  CVS:  normal S1, S2, no murmur  Abd:  soft, non-tender; non-distended  : +whiting with yellow urine  Ext:  no edema  Vascular:  IV site no erythema tenderness or discharge  MSK:  joints without swelling  Neuro: AAO X 3, non- focal                              12.0   10.20 )-----------( 208      ( 05 Aug 2022 07:50 )             37.0   08-05    137  |  104  |  13  ----------------------------<  111<H>  4.8   |  23  |  0.78    Ca    8.8      05 Aug 2022 07:50  Phos  3.7     08-05  Mg     2.10     08-05      MICROBIOLOGY:  Culture - Acid Fast - Other w/Smear (collected 04 Aug 2022 14:31)  Source: .Other SCROTAL ABSCESS    Culture - Fungal, Other (collected 04 Aug 2022 14:31)  Source: .Other SCROTAL ABSCESS  Preliminary Report (05 Aug 2022 07:45):    Testing in progress    Culture - Abscess with Gram Stain (collected 04 Aug 2022 14:31)  Source: .Abscess SCROTAL ABSCESS  Preliminary Report (05 Aug 2022 17:23):    Few Escherichia coli    Culture - Urine (collected 02 Aug 2022 10:05)  Source: Catheterized Catheterized  Final Report (03 Aug 2022 09:54):    No growth                  RADIOLOGY:  imaging below personally reviewed and agree with findings

## 2022-08-06 NOTE — CONSULT NOTE ADULT - ATTENDING COMMENTS
87 M with CVA, HTN, Afib, Vertigo, GERD, Prostate CA many years ago, s/p urinary sphincter multiple recent UTI, s/p partial removal of urinary sphincter 6/29/2022 for an abscess surrounding the sphincter presented to the ED 7/31 complaining of suprapubic discomfort and scrotal tenderness  Fever, leukocytosis  Recent urinary sphincter which was removed and replaced; now s/p further removal and abscess drainage  Abscess culture with E coli (prior ESBL)  7/31 CT with fluid collection around sphincter  8/4 removal  Overall, Abscess, fever, leukocytosis, E coli  - Ertapenem 1g q 24  - F/U cultures for final antibiotic plan  - Trend fevers/WBC to normal  - Any role for further drainage or procedure per REINA Puckett MD  Contact on TEAMS messaging from 9am - 5pm  From 5pm-9am, on weekends, or if no response call [phone]    I was physically present for the key portions of the evaluation and management service provided. I saw and examined the patient. I agree with the above history, physical, and plan except for any discrepancies which I have documented in “Attending Statements.” Please refer to “Attending Statements” for final plan.

## 2022-08-06 NOTE — CONSULT NOTE ADULT - ASSESSMENT
WORK UP          DIAGNOSIS and IMPRESSION          RECOMMENDATIONS        PT TO BE SEEN. PRELIM NOTE  PENDING RECS. PLEASE WAIT FOR FINAL RECS AFTER DISCUSSION WITH ATTENDING#    Andre Toro DO, PGY-4   ID fellow  [employer] Teams Preferred  After 5pm/weekends call [phone]   WORK UP  WBC 11 now 10  BMP wnl, LFT with mild elevation  UA 7/31 grossly positive UTI  CTAP 7/31 with complex fluid collection around the AUS extending L scrotom  CXR clear  COVID negative  BCx 7/31 NGTD  UCx 8/2 NGTD  Abscess Cx 8/4 few ecoli    Prior Cx 6/29 with ESBL ecoli in abscess, BCx 6/28 ESBL ecoli      DIAGNOSIS and IMPRESSION  87M with CVA, HTN, Afib, Vertigo, GERD, Prostate CA many years ago, s/p urinary sphincter multiple recent UTI, s/p partial removal of urinary sphincter 6/29/2022 for an abscess surrounding the sphincter presented to the ED 7/31 complaining of suprapubic discomfort and scrotal tenderness along with fevers, admitted for sepsis secondary to scrotal abscess now s/p AUS removal 8/4. ID consulted for antibiotic management.    #Scrotal Abscess s/p AUS removal 8/4  #Complicated Cystitis    RECOMMENDATIONS  - patient afebrile with significant improvement of   - c/w ertapenem 1G q24, likely total of 10 days from 7/31  - f/up Surgical path result  - f/up Abscess Cx final results      PT TO BE SEEN. PRELIM NOTE  PENDING RECS. PLEASE WAIT FOR FINAL RECS AFTER DISCUSSION WITH ATTENDING#    Andre Toro DO, PGY-4   ID fellow  [employer] Teams Preferred  After 5pm/weekends call [phone]   WORK UP  WBC 11 now 10  BMP wnl, LFT with mild elevation  UA 7/31 grossly positive UTI  CTAP 7/31 with complex fluid collection around the AUS extending L scrotom  CXR clear  COVID negative  BCx 7/31 NGTD  UCx 8/2 NGTD  Abscess Cx 8/4 few ecoli    Prior Cx 6/29 with ESBL ecoli in abscess, BCx 6/28 ESBL ecoli      DIAGNOSIS and IMPRESSION  87M with CVA, HTN, Afib, Vertigo, GERD, Prostate CA many years ago, s/p urinary sphincter multiple recent UTI, s/p partial removal of urinary sphincter 6/29/2022 for an abscess surrounding the sphincter presented to the ED 7/31 complaining of suprapubic discomfort and scrotal tenderness along with fevers, admitted for sepsis secondary to scrotal abscess now s/p AUS removal 8/4. ID consulted for antibiotic management.    #Scrotal Abscess s/p AUS removal 8/4  #Complicated Cystitis    RECOMMENDATIONS  - patient afebrile with significant improvement of   - c/w ertapenem 1G q24, likely total of 10 days from 7/31  - f/up Surgical path result  - f/up Abscess Cx final results      D/w attending    Andre Toro DO, PGY-4   ID fellow  [employer] Teams Preferred  After 5pm/weekends call [phone]

## 2022-08-06 NOTE — PROGRESS NOTE ADULT - SUBJECTIVE AND OBJECTIVE BOX
Subjective  Denies fevers, pain, nausea, vomiting. No issues overnight. Patient comfortable.  Restarted eliquis this AM.    Objective    Vital signs  T(F): , Max: 98.9 (08-05-22 @ 13:10)  HR: 72 (08-06-22 @ 05:08)  BP: 133/70 (08-06-22 @ 05:08)  SpO2: 96% (08-06-22 @ 05:08)  Wt(kg): --    Output     OUT:    Indwelling Catheter - Urethral (mL): 2900 mL  Total OUT: 2900 mL    Total NET: -2900 mL          Gen: NAD  Abd: soft, nontender, nondistended  : whiting secured in place, draining CYU. Scrotal wound is covered with multiple pieces of gauze. Has 3 incisions (1 midline scrotal, 1 perineal, 1 left scrotal) closed with 2 penrose drains draining serosanguinous fluid.      Labs      08-05 @ 07:50    WBC 10.20 / Hct 37.0  / SCr 0.78         Culture - Acid Fast - Other w/Smear (collected 08-04-22 @ 14:31)  Source: .Other SCROTAL ABSCESS    Culture - Fungal, Other (collected 08-04-22 @ 14:31)  Source: .Other SCROTAL ABSCESS  Preliminary Report (08-05-22 @ 07:45):    Testing in progress    Culture - Abscess with Gram Stain (collected 08-04-22 @ 14:31)  Source: .Abscess SCROTAL ABSCESS  Preliminary Report (08-05-22 @ 17:23):    Few Escherichia coli    Culture - Urine (collected 08-02-22 @ 10:05)  Source: Catheterized Catheterized  Final Report (08-03-22 @ 09:54):    No growth    Culture - Blood (collected 07-31-22 @ 15:10)  Source: .Blood Blood-Peripheral  Final Report (08-05-22 @ 19:01):    No Growth Final    Culture - Blood (collected 07-31-22 @ 15:00)  Source: .Blood Blood-Peripheral  Final Report (08-05-22 @ 19:01):    No Growth Final Subjective  Denies fevers, pain, nausea, vomiting. No issues overnight. Patient comfortable.    Objective    Vital signs  T(F): , Max: 98.9 (08-05-22 @ 13:10)  HR: 72 (08-06-22 @ 05:08)  BP: 133/70 (08-06-22 @ 05:08)  SpO2: 96% (08-06-22 @ 05:08)  Wt(kg): --    Output     OUT:    Indwelling Catheter - Urethral (mL): 2900 mL  Total OUT: 2900 mL    Total NET: -2900 mL          Gen: NAD  Abd: soft, nontender, nondistended  : whiting secured in place, draining CYU. Scrotal wound is covered with multiple pieces of gauze. Has 3 incisions (1 midline scrotal, 1 perineal, 1 left scrotal) closed with 2 penrose drains draining serosanguinous fluid.      Labs      08-05 @ 07:50    WBC 10.20 / Hct 37.0  / SCr 0.78         Culture - Acid Fast - Other w/Smear (collected 08-04-22 @ 14:31)  Source: .Other SCROTAL ABSCESS    Culture - Fungal, Other (collected 08-04-22 @ 14:31)  Source: .Other SCROTAL ABSCESS  Preliminary Report (08-05-22 @ 07:45):    Testing in progress    Culture - Abscess with Gram Stain (collected 08-04-22 @ 14:31)  Source: .Abscess SCROTAL ABSCESS  Preliminary Report (08-05-22 @ 17:23):    Few Escherichia coli    Culture - Urine (collected 08-02-22 @ 10:05)  Source: Catheterized Catheterized  Final Report (08-03-22 @ 09:54):    No growth    Culture - Blood (collected 07-31-22 @ 15:10)  Source: .Blood Blood-Peripheral  Final Report (08-05-22 @ 19:01):    No Growth Final    Culture - Blood (collected 07-31-22 @ 15:00)  Source: .Blood Blood-Peripheral  Final Report (08-05-22 @ 19:01):    No Growth Final

## 2022-08-06 NOTE — PROGRESS NOTE ADULT - ASSESSMENT
87 y.o male with PMHx of CVA, HTN, Vertigo, GERD, Prostate cancer many years ago, s/p urinary sphincter multiple recent UTI, s/p partial removal of urinary sphincter, recently admitted for sphincter abscess s/p IV ABx and exploration 6/2022 presents from [facility] with fever and scrotal tenderness. Found to have sepsis 2/2 fluid collection around AUS and epididymitis c/f persistent abscess s/p AUS removal 8/4.

## 2022-08-06 NOTE — PROGRESS NOTE ADULT - SUBJECTIVE AND OBJECTIVE BOX
[facility] Division of [facility]  Sonia Cline MD  Pager [phone]      Patient is a 87y old  Male who presents with a chief complaint of Scrotal abscess (06 Aug 2022 09:32)      SUBJECTIVE / OVERNIGHT EVENTS:    no fever o/n. no new complaint     ADDITIONAL REVIEW OF SYSTEMS:    RESPIRATORY: No cough, wheezing, chills or hemoptysis; No shortness of breath  CARDIOVASCULAR: No chest pain, palpitations, dizziness, or leg swelling  GASTROINTESTINAL: No abdominal or epigastric pain. No nausea, vomiting, or hematemesis; No diarrhea or constipation. No melena or hematochezia.      MEDICATIONS  (STANDING):  apixaban 2.5 milliGRAM(s) Oral two times a day  aspirin  chewable 81 milliGRAM(s) Oral daily  atorvastatin 40 milliGRAM(s) Oral at bedtime  ertapenem  IVPB 1000 milliGRAM(s) IV Intermittent every 24 hours  pantoprazole    Tablet 40 milliGRAM(s) Oral before breakfast    MEDICATIONS  (PRN):  acetaminophen     Tablet .. 650 milliGRAM(s) Oral every 6 hours PRN Temp greater or equal to 38C (100.4F), Mild Pain (1 - 3)  meclizine 12.5 milliGRAM(s) Oral daily PRN Dizziness  oxyCODONE    IR 5 milliGRAM(s) Oral every 6 hours PRN Severe Pain (7 - 10), Moderate pain (4-6)      CAPILLARY BLOOD GLUCOSE        I&O's Summary    05 Aug 2022 07:01  -  06 Aug 2022 07:00  --------------------------------------------------------  IN: 0 mL / OUT: 2900 mL / NET: -2900 mL    06 Aug 2022 07:01  -  06 Aug 2022 15:24  --------------------------------------------------------  IN: 0 mL / OUT: 595 mL / NET: -595 mL        PHYSICAL EXAM:  Vital Signs Last 24 Hrs  T(C): 36.7 (06 Aug 2022 14:38), Max: 36.9 (05 Aug 2022 18:01)  T(F): 98 (06 Aug 2022 14:38), Max: 98.5 (05 Aug 2022 18:01)  HR: 84 (06 Aug 2022 14:38) (61 - 84)  BP: 104/57 (06 Aug 2022 14:38) (104/57 - 133/70)  BP(mean): --  RR: 17 (06 Aug 2022 14:38) (16 - 17)  SpO2: 100% (06 Aug 2022 14:38) (96% - 100%)    Parameters below as of 06 Aug 2022 14:38  Patient On (Oxygen Delivery Method): room air        CONSTITUTIONAL: NAD,  EYES: PERRLA; conjunctiva and sclera clear  ENMT: Moist oral mucosa, no pharyngeal injection or exudates;   NECK: Supple, no palpable masses;  RESPIRATORY: Normal respiratory effort; lungs are clear to auscultation bilaterally  CARDIOVASCULAR: Regular rate and rhythm, normal S1 and S2, no murmur/rub/gallop; No lower extremity edema; Peripheral pulses are 2+ bilaterally  ABDOMEN: Nontender to palpation, normoactive bowel sounds, no rebound/guarding; whiting in place  MUSCLOSKELETAL:   no clubbing or cyanosis of digits; no joint swelling or tenderness to palpation  PSYCH: A+O to person, place, and time; affect appropriate  NEUROLOGY: CN 2-12 are intact and symmetric; no gross sensory deficits;   SKIN: No rashes;     LABS:                        12.0   10.20 )-----------( 208      ( 05 Aug 2022 07:50 )             37.0     08-05    137  |  104  |  13  ----------------------------<  111<H>  4.8   |  23  |  0.78    Ca    8.8      05 Aug 2022 07:50  Phos  3.7     08-05  Mg     2.10     08-05                Culture - Acid Fast - Other w/Smear (collected 04 Aug 2022 14:31)  Source: .Other SCROTAL ABSCESS  Preliminary Report (06 Aug 2022 15:05):    Culture is being performed.    Culture - Fungal, Other (collected 04 Aug 2022 14:31)  Source: .Other SCROTAL ABSCESS  Preliminary Report (05 Aug 2022 07:45):    Testing in progress    Culture - Abscess with Gram Stain (collected 04 Aug 2022 14:31)  Source: .Abscess SCROTAL ABSCESS  Preliminary Report (05 Aug 2022 17:23):    Few Escherichia coli        RADIOLOGY & ADDITIONAL TESTS:  Results Reviewed:   Imaging Personally Reviewed:  Electrocardiogram Personally Reviewed:    COORDINATION OF CARE:  Care Discussed with Consultants/Other Providers [Y/N]:  Prior or Outpatient Records Reviewed [Y/N]:

## 2022-08-06 NOTE — PROGRESS NOTE ADULT - ASSESSMENT
87 y.o male with PMHx of CVA, HTN, Afib, Vertigo, GERD, Prostate CA many years ago, s/p urinary sphincter multiple recent UTI, s/p partial removal of urinary sphincter 6/29/2022 for an abscess surrounding the sphincter presented to the ED 7/31 c/o suprapubic discomfort and scrotal tenderness along with fevers, tmax in .5, hypotensive responded to fluid bolus.  Denies any pain with urination or frequency and says that he has been able to void without a catheter at the nursing home. He has not followed up with any physician since d/c.  Pt admitted and started on ertapenem.    8/1: remains afebrile, no complaints  8/2 - no events  8/3 - no events; OR tomorrow to remove sphincter  8/4 - no events; OR today to remove AUS; medicine cleared for surgery  8/5 - no events; OR yesterday, AUS device fully removed. Diet advanced back to his normal diet  8/6 - restarted eliquis      Plan:  - continue ertapenem  - scrotal elevation with daily dressing changes  - maintain whiting  - restart eliquis 8/6, continue ASA  - continue regular diet  - OOB  - possible d/c Sunday  - PT consult - rehab   87 y.o male with PMHx of CVA, HTN, Afib, Vertigo, GERD, Prostate CA many years ago, s/p urinary sphincter multiple recent UTI, s/p partial removal of urinary sphincter 6/29/2022 for an abscess surrounding the sphincter presented to the ED 7/31 c/o suprapubic discomfort and scrotal tenderness along with fevers, tmax in .5, hypotensive responded to fluid bolus.  Denies any pain with urination or frequency and says that he has been able to void without a catheter at the nursing home. He has not followed up with any physician since d/c.  Pt admitted and started on ertapenem.    8/1: remains afebrile, no complaints  8/2 - no events  8/3 - no events; OR tomorrow to remove sphincter  8/4 - no events; OR today to remove AUS; medicine cleared for surgery  8/5 - no events; OR yesterday, AUS device fully removed. Diet advanced back to his normal diet  8/6 - no events; restart eliquis      Plan:  - discuss with hospitalist re: eliquis dose (has been taking 5mg BID, per pharmacy should be on 2.5mg BID)  - ID consultation  - continue ertapenem  - scrotal elevation with daily dressing changes  - maintain whiting  - continue ASA  - continue regular diet  - OOB  - possible d/c Sunday  - PT consult - rehab

## 2022-08-07 PROCEDURE — 99232 SBSQ HOSP IP/OBS MODERATE 35: CPT

## 2022-08-07 RX ORDER — LACTOBACILLUS ACIDOPHILUS 100MM CELL
1 CAPSULE ORAL DAILY
Refills: 0 | Status: DISCONTINUED | OUTPATIENT
Start: 2022-08-07 | End: 2022-08-08

## 2022-08-07 RX ADMIN — ERTAPENEM SODIUM 120 MILLIGRAM(S): 1 INJECTION, POWDER, LYOPHILIZED, FOR SOLUTION INTRAMUSCULAR; INTRAVENOUS at 17:07

## 2022-08-07 RX ADMIN — ATORVASTATIN CALCIUM 40 MILLIGRAM(S): 80 TABLET, FILM COATED ORAL at 21:05

## 2022-08-07 RX ADMIN — PANTOPRAZOLE SODIUM 40 MILLIGRAM(S): 20 TABLET, DELAYED RELEASE ORAL at 05:51

## 2022-08-07 RX ADMIN — Medication 81 MILLIGRAM(S): at 12:21

## 2022-08-07 RX ADMIN — APIXABAN 2.5 MILLIGRAM(S): 2.5 TABLET, FILM COATED ORAL at 05:51

## 2022-08-07 RX ADMIN — Medication 1 TABLET(S): at 18:44

## 2022-08-07 RX ADMIN — APIXABAN 2.5 MILLIGRAM(S): 2.5 TABLET, FILM COATED ORAL at 17:07

## 2022-08-07 NOTE — PROGRESS NOTE ADULT - ATTENDING COMMENTS
87 M with CVA, HTN, Afib, Vertigo, GERD, Prostate CA many years ago, s/p urinary sphincter multiple recent UTI, s/p partial removal of urinary sphincter 6/29/2022 for an abscess surrounding the sphincter presented to the ED 7/31 complaining of suprapubic discomfort and scrotal tenderness  Fever, leukocytosis  Recent urinary sphincter which was removed and replaced; now s/p further removal and abscess drainage  Abscess culture with E coli (prior ESBL)  7/31 CT with fluid collection around sphincter  8/4 removal  Culture from abscess with ESBL E coli  Overall, Abscess, fever, leukocytosis, E coli  - Ertapenem 1g q 24  - Poor PO options--anticipate on discharge will need midline/PICC with IV ertapenem as above x10 day course from drainage procedure; okay to place PICC/midline as long as BCXs remain negative any no new signs sepsis in the interim  - Trend fevers/WBC to normal  - Any role for further drainage or procedure per REINA Puckett MD  Contact on TEAMS messaging from 9am - 5pm  From 5pm-9am, on weekends, or if no response call [phone]    I was physically present for the key portions of the evaluation and management service provided. I saw and examined the patient. I agree with the above history, physical, and plan except for any discrepancies which I have documented in “Attending Statements.” Please refer to “Attending Statements” for final plan.
To OR tomorrow for scrotal exploration, excision and removal of infected AUS, washout

## 2022-08-07 NOTE — PROGRESS NOTE ADULT - SUBJECTIVE AND OBJECTIVE BOX
[facility] Division of [facility] Medicine  Sonia Cline MD  Pager [phone]      Patient is a 87y old  Male who presents with a chief complaint of Scrotal abscess (07 Aug 2022 10:18)      SUBJECTIVE / OVERNIGHT EVENTS:    no acute even o/n     ADDITIONAL REVIEW OF SYSTEMS:      RESPIRATORY: No cough, wheezing, chills or hemoptysis; No shortness of breath  CARDIOVASCULAR: No chest pain, palpitations, dizziness, or leg swelling  GASTROINTESTINAL: No abdominal or epigastric pain. No nausea, vomiting, or hematemesis; No diarrhea or constipation. No melena or hematochezia.      MEDICATIONS  (STANDING):  apixaban 2.5 milliGRAM(s) Oral two times a day  aspirin  chewable 81 milliGRAM(s) Oral daily  atorvastatin 40 milliGRAM(s) Oral at bedtime  ertapenem  IVPB 1000 milliGRAM(s) IV Intermittent every 24 hours  lactobacillus acidophilus 1 Tablet(s) Oral daily  pantoprazole    Tablet 40 milliGRAM(s) Oral before breakfast    MEDICATIONS  (PRN):  acetaminophen     Tablet .. 650 milliGRAM(s) Oral every 6 hours PRN Temp greater or equal to 38C (100.4F), Mild Pain (1 - 3)  meclizine 12.5 milliGRAM(s) Oral daily PRN Dizziness  oxyCODONE    IR 5 milliGRAM(s) Oral every 6 hours PRN Severe Pain (7 - 10), Moderate pain (4-6)      CAPILLARY BLOOD GLUCOSE        I&O's Summary    06 Aug 2022 07:01  -  07 Aug 2022 07:00  --------------------------------------------------------  IN: 0 mL / OUT: 1020 mL / NET: -1020 mL        PHYSICAL EXAM:  Vital Signs Last 24 Hrs  T(C): 36.5 (07 Aug 2022 13:40), Max: 37 (06 Aug 2022 21:51)  T(F): 97.7 (07 Aug 2022 13:40), Max: 98.6 (06 Aug 2022 21:51)  HR: 96 (07 Aug 2022 13:40) (70 - 96)  BP: 113/72 (07 Aug 2022 13:40) (103/58 - 115/68)  BP(mean): --  RR: 18 (07 Aug 2022 13:40) (17 - 18)  SpO2: 99% (07 Aug 2022 13:40) (99% - 100%)    Parameters below as of 07 Aug 2022 13:40  Patient On (Oxygen Delivery Method): room air        CONSTITUTIONAL: NAD,  EYES: PERRLA; conjunctiva and sclera clear  ENMT: Moist oral mucosa, no pharyngeal injection or exudates;   NECK: Supple, no palpable masses;  RESPIRATORY: Normal respiratory effort; lungs are clear to auscultation bilaterally  CARDIOVASCULAR: Regular rate and rhythm, normal S1 and S2, no murmur/rub/gallop; No lower extremity edema; Peripheral pulses are 2+ bilaterally  ABDOMEN: Nontender to palpation, normoactive bowel sounds, no rebound/guarding; whiting in place  MUSCLOSKELETAL:   no clubbing or cyanosis of digits; no joint swelling or tenderness to palpation  PSYCH: A+O to person, place, and time; affect appropriate  NEUROLOGY: CN 2-12 are intact and symmetric; no gross sensory deficits;   SKIN: No rashes;     LABS:                      RADIOLOGY & ADDITIONAL TESTS:  Results Reviewed:   Imaging Personally Reviewed:  Electrocardiogram Personally Reviewed:    COORDINATION OF CARE:  Care Discussed with Consultants/Other Providers [Y/N]:  Prior or Outpatient Records Reviewed [Y/N]:

## 2022-08-07 NOTE — PROGRESS NOTE ADULT - SUBJECTIVE AND OBJECTIVE BOX
Follow Up:  No complaints. Afebrile overnight    Interval History/ROS:Patient is a 87y old  Male who presents with a chief complaint of Scrotal abscess (06 Aug 2022 15:23)      REVIEW OF SYSTEMS  [ x ] All other systems negative except as noted below    Constitutional:  [ ] fever [ ] chills  [ ] weight loss  [ ]night sweat  [ ]poor appetite/PO intake [ ]fatigue   Skin:  [ ] rash [ ] phlebitis	  Eyes: [ ] icterus [ ] pain  [ ] discharge	  ENMT: [ ] sore throat  [ ] thrush [ ] ulcers [ ] exudates [ ]anosmia  Respiratory: [ ] dyspnea [ ] hemoptysis [ ] cough [ ] sputum	  Cardiovascular:  [ ] chest pain [ ] palpitations [ ] edema	  Gastrointestinal:  [ ] nausea [ ] vomiting [ ] diarrhea [ ] constipation [ ] pain	  Genitourinary:  [ ] dysuria [ ] frequency [ ] hematuria [ ] discharge [ ] flank pain  [ ] incontinence  Musculoskeletal:  [ ] myalgias [ ] arthralgias [ ] arthritis  [ ] back pain  Neurological:  [ ] headache [ ] weakness [ ] seizures  [ ] confusion/altered mental status    Allergies  No Known Allergies    ANTIMICROBIALS:    ertapenem  IVPB 1000 every 24 hours        OTHER MEDS: MEDICATIONS  (STANDING):  acetaminophen     Tablet .. 650 every 6 hours PRN  apixaban 2.5 two times a day  aspirin  chewable 81 daily  atorvastatin 40 at bedtime  meclizine 12.5 daily PRN  oxyCODONE    IR 5 every 6 hours PRN  pantoprazole    Tablet 40 before breakfast      Vital Signs Last 24 Hrs  T(F): 98 (08-07-22 @ 06:00), Max: 102.5 (07-31-22 @ 14:47)    Vital Signs Last 24 Hrs  HR: 70 (08-07-22 @ 06:00) (70 - 84)  BP: 115/68 (08-07-22 @ 06:00) (104/57 - 125/71)  RR: 17 (08-07-22 @ 06:00)  SpO2: 100% (08-07-22 @ 06:00) (99% - 100%)  Wt(kg): --    EXAM:  Constitutional:  well preserved, comfortable  Head/Eyes: no icterus, PERRL, EOMI  ENT:  supple; no thrush  LUNGS:  CTA  CVS:  normal S1, S2, no murmur  Abd:  soft, non-tender; non-distended  : +whiting with yellow urine  Ext:  no edema  Vascular:  IV site no erythema tenderness or discharge  MSK:  joints without swelling  Neuro: AAO X 3, non- focal            WBC Trend:  WBC Count: 10.20 (08-05-22 @ 07:50)      Creatine Trend:  Creatinine, Serum: 0.78 (08-05)  Creatinine, Serum: 0.92 (08-02)  Creatinine, Serum: 1.12 (08-01)  Creatinine, Serum: 1.11 (07-31)      Liver Biochemical Testing Trend:  Alanine Aminotransferase (ALT/SGPT): 69 *H* (07-31)  Alanine Aminotransferase (ALT/SGPT): 29 (06-30)  Alanine Aminotransferase (ALT/SGPT): 50 *H* (06-28)  Aspartate Aminotransferase (AST/SGOT): 63 (07-31-22 @ 15:46)  Aspartate Aminotransferase (AST/SGOT): 20 (06-30-22 @ 05:29)  Aspartate Aminotransferase (AST/SGOT): 36 (06-28-22 @ 20:10)  Bilirubin Total, Serum: 0.8 (07-31)  Bilirubin Total, Serum: 0.2 (06-30)  Bilirubin Total, Serum: 0.4 (06-28)      Trend LDH    MICROBIOLOGY:    Culture - Acid Fast - Other w/Smear (collected 04 Aug 2022 14:31)  Source: .Other SCROTAL ABSCESS  Preliminary Report:    Culture is being performed.    Culture - Fungal, Other (collected 04 Aug 2022 14:31)  Source: .Other SCROTAL ABSCESS  Preliminary Report:    Testing in progress    Culture - Abscess with Gram Stain (collected 04 Aug 2022 14:31)  Source: .Abscess SCROTAL ABSCESS  Preliminary Report:    Few Escherichia coli ESBL  Organism: Escherichia coli ESBL  Organism: Escherichia coli ESBL    Sensitivities:      -  Amikacin: S <=16      -  Amoxicillin/Clavulanic Acid: S <=8/4      -  Ampicillin: R >16 These ampicillin results predict results for amoxicillin      -  Ampicillin/Sulbactam: R <=4/2 Enterobacter, Klebsiella aerogenes, Citrobacter, and Serratia may develop resistance during prolonged therapy (3-4 days)      -  Aztreonam: R 16      -  Cefazolin: R >16 Enterobacter, Klebsiella aerogenes, Citrobacter, and Serratia may develop resistance during prolonged therapy (3-4 days)      -  Cefepime: R >16      -  Ceftriaxone: R >32 Enterobacter, Klebsiella aerogenes, Citrobacter, and Serratia may develop resistance during prolonged therapy      -  Ciprofloxacin: R >2      -  Ertapenem: S <=0.5      -  Gentamicin: S <=2      -  Imipenem: S <=1      -  Levofloxacin: R >4      -  Meropenem: S <=1      -  Piperacillin/Tazobactam: R <=8      -  Tobramycin: S <=2      -  Trimethoprim/Sulfamethoxazole: R >2/38      Method Type: DAYNA    Culture - Urine (collected 02 Aug 2022 10:05)  Source: Catheterized Catheterized  Final Report:    No growth    Culture - Blood (collected 31 Jul 2022 15:10)  Source: .Blood Blood-Peripheral  Final Report:    No Growth Final    Culture - Blood (collected 31 Jul 2022 15:00)  Source: .Blood Blood-Peripheral  Final Report:    No Growth Final    Culture - Blood (collected 01 Jul 2022 05:23)  Source: .Blood Blood  Final Report:    No Growth Final    Culture - Urine (collected 29 Jun 2022 00:36)  Source: OR Collect bladder urine  Final Report:    Numerous Escherichia coli ESBL  Organism: Escherichia coli ESBL  Organism: Escherichia coli ESBL    Sensitivities:      -  Amikacin: S <=16      -  Amoxicillin/Clavulanic Acid: S <=8/4 Consider reserving for cystitis when ampicillin/sulbactam is resistant      -  Ampicillin: R >16 These ampicillin results predict results for amoxicillin      -  Ampicillin/Sulbactam: R 16/8 Enterobacter, Klebsiella aerogenes, Citrobacter, and Serratia may develop resistance during prolonged therapy (3-4 days)      -  Aztreonam: R 16      -  Cefazolin: R >16 (MIC_CL_COM_ENTERIC_CEFAZU) For uncomplicated UTI with K. pneumoniae, E. coli, or P. mirablis: DAYNA <=16 is sensitive and DAYNA >=32 is resistant. This also predicts results for oral agents cefaclor, cefdinir, cefpodoxime, cefprozil, cefuroxime axetil, cephalexin and locarbef for uncomplicated UTI. Note that some isolates may be susceptible to these agents while testing resistant to cefazolin.      -  Cefepime: R >16      -  Ceftriaxone: R >32 Enterobacter, Klebsiella aerogenes, Citrobacter, and Serratia may develop resistance during prolonged therapy      -  Ciprofloxacin: R >2      -  Ertapenem: S <=0.5      -  Gentamicin: S <=2      -  Imipenem: S <=1      -  Levofloxacin: R >4      -  Meropenem: S <=1      -  Piperacillin/Tazobactam: R <=8      -  Tigecycline: S <=2      -  Tobramycin: S <=2      -  Trimethoprim/Sulfamethoxazole: R >2/38      Method Type: DAYNA    Culture - Abscess with Gram Stain (collected 29 Jun 2022 00:36)  Source: .Abscess #1. scrotal  Final Report:    Moderate Escherichia coli ESBL    See previous culture [id number]    Culture - Blood (collected 28 Jun 2022 20:10)  Source: .Blood Blood-Venous  Final Report:    Growth in aerobic bottle: Escherichia coli ESBL    ***Blood Panel PCR results on this specimen are available    approximately 3 hours after the Gram stain result.***    Gram stain, PCR, and/or culture results may not always    correspond due to difference in methodologies.    ************************************************************    This PCR assay was performed by multiplex PCR. This    Assay tests for 66 bacterial and resistance gene targets.    Please refer to the  Labs test directory    at [email or web address] for details.  Organism: Blood Culture PCR  Escherichia coli ESBL  Organism: Escherichia coli ESBL    Sensitivities:      -  Amikacin: S <=16      -  Ampicillin: R >16 These ampicillin results predict results for amoxicillin      -  Ampicillin/Sulbactam: R <=4/2 Enterobacter, Klebsiella aerogenes, Citrobacter, and Serratia may develop resistance during prolonged therapy (3-4 days)      -  Aztreonam: R 16      -  Cefazolin: R >16 Enterobacter, Klebsiella aerogenes, Citrobacter, and Serratia may develop resistance during prolonged therapy (3-4 days)      -  Cefepime: R >16      -  Ceftriaxone: R >32 Enterobacter, Klebsiella aerogenes, Citrobacter, and Serratia may develop resistance during prolonged therapy      -  Ciprofloxacin: R >2      -  Ertapenem: S <=0.5      -  Gentamicin: S <=2      -  Imipenem: S <=1      -  Levofloxacin: R >4      -  Meropenem: S <=1      -  Piperacillin/Tazobactam: R <=8      -  Tobramycin: S <=2      -  Trimethoprim/Sulfamethoxazole: R >2/38      Method Type: DAYNA  Organism: Blood Culture PCR    Sensitivities:      -  CTX-M Resistance Marker: Detec      -  ESBL: Detec      -  Escherichia coli: Detec      Method Type: PCR      COVID-19 PCR: NotDetec (08-04-22 @ 12:07)  COVID-19 PCR: NotDetec (07-31-22 @ 15:41)  COVID-19 PCR: NotDetec (07-05-22 @ 07:32)    Troponin T, High Sensitivity Result: 15 (07-31)          RADIOLOGY:  imaging below personally reviewed

## 2022-08-07 NOTE — PROGRESS NOTE ADULT - NSPROGADDITIONALINFOA_GEN_ALL_CORE
Updated son Caden 8/3

## 2022-08-07 NOTE — PROGRESS NOTE ADULT - SUBJECTIVE AND OBJECTIVE BOX
Subjective  Patient feeling well this morning. Only complaint is a slight cough, similar to yesterday. Otherwise, pain is controlled. Tolerating Diet.     Objective    Vital signs  T(F): , Max: 98.6 (08-06-22 @ 21:51)  HR: 91 (08-07-22 @ 09:23)  BP: 103/58 (08-07-22 @ 09:23)  SpO2: 100% (08-07-22 @ 09:23)  Wt(kg): --    Output     OUT:    Indwelling Catheter - Urethral (mL): 1020 mL  Total OUT: 1020 mL    Total NET: -1020 mL          Gen: NAD  Abd: soft, nontender, nondistended  : whiting secured in place, draining CYU. Scrotal wound- Has 3 incisions (1 midline scrotal, 1 perineal, 1 left scrotal) closed with 2 penrose drains draining serosanguinous fluid, midline incision with slight opening in the superior portion draining serosanguinous fluid     Labs          Culture - Acid Fast - Other w/Smear (collected 08-04-22 @ 14:31)  Source: .Other SCROTAL ABSCESS  Preliminary Report (08-06-22 @ 15:05):    Culture is being performed.    Culture - Fungal, Other (collected 08-04-22 @ 14:31)  Source: .Other SCROTAL ABSCESS  Preliminary Report (08-05-22 @ 07:45):    Testing in progress    Culture - Abscess with Gram Stain (collected 08-04-22 @ 14:31)  Source: .Abscess SCROTAL ABSCESS  Preliminary Report (08-06-22 @ 18:28):    Few Escherichia coli ESBL  Organism: Escherichia coli ESBL (08-06-22 @ 18:28)  Organism: Escherichia coli ESBL (08-06-22 @ 18:28)      -  Amikacin: S <=16      -  Amoxicillin/Clavulanic Acid: S <=8/4      -  Ampicillin: R >16 These ampicillin results predict results for amoxicillin      -  Ampicillin/Sulbactam: R <=4/2 Enterobacter, Klebsiella aerogenes, Citrobacter, and Serratia may develop resistance during prolonged therapy (3-4 days)      -  Aztreonam: R 16      -  Cefazolin: R >16 Enterobacter, Klebsiella aerogenes, Citrobacter, and Serratia may develop resistance during prolonged therapy (3-4 days)      -  Cefepime: R >16      -  Ceftriaxone: R >32 Enterobacter, Klebsiella aerogenes, Citrobacter, and Serratia may develop resistance during prolonged therapy      -  Ciprofloxacin: R >2      -  Ertapenem: S <=0.5      -  Gentamicin: S <=2      -  Imipenem: S <=1      -  Levofloxacin: R >4      -  Meropenem: S <=1      -  Piperacillin/Tazobactam: R <=8      -  Tobramycin: S <=2      -  Trimethoprim/Sulfamethoxazole: R >2/38      Method Type: DAYNA    Culture - Urine (collected 08-02-22 @ 10:05)  Source: Catheterized Catheterized  Final Report (08-03-22 @ 09:54):    No growth    Culture - Blood (collected 07-31-22 @ 15:10)  Source: .Blood Blood-Peripheral  Final Report (08-05-22 @ 19:01):    No Growth Final    Culture - Blood (collected 07-31-22 @ 15:00)  Source: .Blood Blood-Peripheral  Final Report (08-05-22 @ 19:01):    No Growth Final      < from: Xray Chest 1 View- PORTABLE-Urgent (07.31.22 @ 18:39) >    FINDINGS:    The heart is normal in size.  The lungs are clear.  There is no pneumothorax or pleural effusion.    < end of copied text >

## 2022-08-07 NOTE — PROGRESS NOTE ADULT - ASSESSMENT
87 y.o male with PMHx of CVA, HTN, Afib, Vertigo, GERD, Prostate CA many years ago, s/p urinary sphincter multiple recent UTI, s/p partial removal of urinary sphincter 6/29/2022 for an abscess surrounding the sphincter presented to the ED 7/31 c/o suprapubic discomfort and scrotal tenderness along with fevers, tmax in .5, hypotensive responded to fluid bolus.  Denies any pain with urination or frequency and says that he has been able to void without a catheter at the nursing home. He has not followed up with any physician since d/c.  Pt admitted and started on ertapenem.    8/1: remains afebrile, no complaints  8/2 - no events  8/3 - no events; OR tomorrow to remove sphincter  8/4 - no events; OR today to remove AUS; medicine cleared for surgery  8/5 - no events; OR yesterday, AUS device fully removed. Diet advanced back to his normal diet  8/6 - no events; restart eliquis  8/7- no events, scrotal incision and drains draining serosanguinous. Afebrile.       Plan:  - eliquis dose (has been taking 5mg BID, per pharmacy should be on 2.5mg BID)  - continue ertapenem  - fu ID for d/c antibiotic recs  - scrotal elevation with daily dressing changes per wound care  - maintain whiting  - continue ASA  - continue regular diet  - OOB  - PT consult - rehab

## 2022-08-07 NOTE — PROGRESS NOTE ADULT - ASSESSMENT
WORK UP  UA 7/31 grossly positive UTI  CTAP 7/31 with complex fluid collection around the AUS extending L scrotom  CXR clear  COVID negative  BCx 7/31 NGTD  UCx 8/2 NGTD  Abscess Cx 8/4 ESBL Ecoli    Prior Cx 6/29 with ESBL ecoli in abscess, BCx 6/28 ESBL ecoli      DIAGNOSIS and IMPRESSION  87M with CVA, HTN, Afib, Vertigo, GERD, Prostate CA many years ago, s/p urinary sphincter multiple recent UTI, s/p partial removal of urinary sphincter 6/29/2022 for an abscess surrounding the sphincter presented to the ED 7/31 complaining of suprapubic discomfort and scrotal tenderness along with fevers, admitted for sepsis secondary to scrotal abscess now s/p AUS removal 8/4. ID consulted for antibiotic management.    #Scrotal Abscess s/p AUS removal 8/4  #Complicated Cystitis    RECOMMENDATIONS  - Abscess Cx 8/4: ESBL Ecoli  - c/w ertapenem 1G q24, likely total of 10 days from 7/31  - f/up Urology for any further procedures      PT TO BE SEEN. PRELIM NOTE  PENDING RECS. PLEASE WAIT FOR FINAL RECS AFTER DISCUSSION WITH ATTENDINGAnjali Toro DO, PGY-4   ID fellow  [employer] Teams Preferred  After 5pm/weekends call [phone]

## 2022-08-08 ENCOUNTER — TRANSCRIPTION ENCOUNTER (OUTPATIENT)
Age: 87
End: 2022-08-08

## 2022-08-08 VITALS
TEMPERATURE: 98 F | RESPIRATION RATE: 17 BRPM | HEART RATE: 72 BPM | SYSTOLIC BLOOD PRESSURE: 101 MMHG | OXYGEN SATURATION: 100 % | DIASTOLIC BLOOD PRESSURE: 53 MMHG

## 2022-08-08 LAB — SARS-COV-2 RNA SPEC QL NAA+PROBE: SIGNIFICANT CHANGE UP

## 2022-08-08 PROCEDURE — 99232 SBSQ HOSP IP/OBS MODERATE 35: CPT

## 2022-08-08 RX ORDER — LACTOBACILLUS ACIDOPHILUS 100MM CELL
1 CAPSULE ORAL
Qty: 0 | Refills: 0 | DISCHARGE
Start: 2022-08-08

## 2022-08-08 RX ORDER — APIXABAN 2.5 MG/1
1 TABLET, FILM COATED ORAL
Qty: 60 | Refills: 0
Start: 2022-08-08 | End: 2022-09-06

## 2022-08-08 RX ORDER — ERTAPENEM SODIUM 1 G/1
1 INJECTION, POWDER, LYOPHILIZED, FOR SOLUTION INTRAMUSCULAR; INTRAVENOUS
Qty: 0 | Refills: 0 | DISCHARGE
Start: 2022-08-08

## 2022-08-08 RX ADMIN — APIXABAN 2.5 MILLIGRAM(S): 2.5 TABLET, FILM COATED ORAL at 17:16

## 2022-08-08 RX ADMIN — Medication 1 TABLET(S): at 13:51

## 2022-08-08 RX ADMIN — Medication 81 MILLIGRAM(S): at 13:51

## 2022-08-08 RX ADMIN — ERTAPENEM SODIUM 120 MILLIGRAM(S): 1 INJECTION, POWDER, LYOPHILIZED, FOR SOLUTION INTRAMUSCULAR; INTRAVENOUS at 15:15

## 2022-08-08 RX ADMIN — PANTOPRAZOLE SODIUM 40 MILLIGRAM(S): 20 TABLET, DELAYED RELEASE ORAL at 05:30

## 2022-08-08 RX ADMIN — APIXABAN 2.5 MILLIGRAM(S): 2.5 TABLET, FILM COATED ORAL at 05:30

## 2022-08-08 NOTE — PROGRESS NOTE ADULT - SUBJECTIVE AND OBJECTIVE BOX
POD #4  Afeb 112/66 81 100%RA    Pt has no c/o  Abd- soft NT ND   Scrotum - incision clean; steri's applied  Alexis 850  Penroses X 2

## 2022-08-08 NOTE — PROGRESS NOTE ADULT - PROBLEM SELECTOR PLAN 3
Intermittently hypotensive requiring IVF bolus, SBP now improved   - Hold home anti-HTN agents  - Monitor BP

## 2022-08-08 NOTE — PROGRESS NOTE ADULT - ASSESSMENT
87 M with CVA, HTN, Afib, Vertigo, GERD, Prostate CA many years ago, s/p urinary sphincter multiple recent UTI, s/p partial removal of urinary sphincter 6/29/2022 for an abscess surrounding the sphincter presented to the ED 7/31 complaining of suprapubic discomfort and scrotal tenderness  Fever, leukocytosis  Recent urinary sphincter which was removed and replaced; now s/p complete removal and abscess drainage 8/5  Abscess culture with E coli (prior ESBL)  7/31 CT with fluid collection around sphincter  8/4 removal  Culture from abscess with ESBL E coli  Overall, Abscess, fever, leukocytosis, E coli  - Ertapenem 1g q 24  - Poor PO options--anticipate on discharge will need midline/PICC with IV ertapenem as above x10 day course from drainage procedure; okay to place PICC/midline as long as BCXs remain negative any no new signs sepsis in the interim  - Trend fevers/WBC to normal  - Any role for further drainage or procedure per REINA Puckett MD  Contact on TEAMS messaging from 9am - 5pm  From 5pm-9am, on weekends, or if no response call [phone]

## 2022-08-08 NOTE — ADVANCED PRACTICE NURSE CONSULT - REASON FOR CONSULT
arrow insertion for dc
Patient seen on skin care rounds after wound care referral received for assessment of scrotal abscess s/p artificial urinary sphincter with 2x penrose drains in place and recommendations of topical management of drainage. Patient is a 87 y.o male with PMHx of CVA, HTN, Vertigo, GERD, Prostate cancer many years ago, s/p urinary sphincter multiple recent UTI, s/p partial removal of urinary sphincter, recently admitted for sphincter abscess s/p IV ABx and exploration 6/2022 presents from [facility] with fever and scrotal tenderness. Found to have sepsis 2/2 fluid collection around AUS and epididymitis c/f persistent abscess plan for OR to remove AUS 8/4. 88 y/o M now POD#1 s/p explant of AUS. Chart reviewed: WBC: 10.20, H&H: 12/37, platelets: 208, Pavan 20.

## 2022-08-08 NOTE — DISCHARGE NOTE NURSING/CASE MANAGEMENT/SOCIAL WORK - NSDCPNINST_GEN_ALL_CORE
Follow up with Dr. Jaime. Midline care as instructed. Continue IV antibiotics as instructed. Alexis care as instructed.

## 2022-08-08 NOTE — DISCHARGE NOTE NURSING/CASE MANAGEMENT/SOCIAL WORK - NSDCPEFALRISK_GEN_ALL_CORE
For information on Fall & Injury Prevention, visit: [email or web address] OR  [email or web address] OR  https://www.cdc.gov/steadi/patient.html

## 2022-08-08 NOTE — PROGRESS NOTE ADULT - SUBJECTIVE AND OBJECTIVE BOX
PROGRESS NOTE:     Patient is a 87y old  Male who presents with a chief complaint of Scrotal abscess (08 Aug 2022 08:11)      SUBJECTIVE / OVERNIGHT EVENTS: No new complaints.     ADDITIONAL REVIEW OF SYSTEMS:    MEDICATIONS  (STANDING):  apixaban 2.5 milliGRAM(s) Oral two times a day  aspirin  chewable 81 milliGRAM(s) Oral daily  atorvastatin 40 milliGRAM(s) Oral at bedtime  ertapenem  IVPB 1000 milliGRAM(s) IV Intermittent every 24 hours  lactobacillus acidophilus 1 Tablet(s) Oral daily  pantoprazole    Tablet 40 milliGRAM(s) Oral before breakfast    MEDICATIONS  (PRN):  acetaminophen     Tablet .. 650 milliGRAM(s) Oral every 6 hours PRN Temp greater or equal to 38C (100.4F), Mild Pain (1 - 3)  meclizine 12.5 milliGRAM(s) Oral daily PRN Dizziness      CAPILLARY BLOOD GLUCOSE        I&O's Summary    07 Aug 2022 07:01  -  08 Aug 2022 07:00  --------------------------------------------------------  IN: 0 mL / OUT: 1350 mL / NET: -1350 mL        PHYSICAL EXAM:  Vital Signs Last 24 Hrs  T(C): 36.6 (08 Aug 2022 06:00), Max: 37 (07 Aug 2022 22:24)  T(F): 97.8 (08 Aug 2022 06:00), Max: 98.6 (07 Aug 2022 22:24)  HR: 81 (08 Aug 2022 06:00) (69 - 96)  BP: 112/66 (08 Aug 2022 06:00) (106/65 - 129/72)  BP(mean): --  RR: 17 (08 Aug 2022 06:00) (17 - 18)  SpO2: 100% (08 Aug 2022 06:00) (97% - 100%)    Parameters below as of 08 Aug 2022 06:00  Patient On (Oxygen Delivery Method): room air      CONSTITUTIONAL: NAD   EYES: PERRLA; conjunctiva and sclera clear  ENMT: Moist oral mucosa, no pharyngeal injection or exudates   NECK: Supple, no palpable masses;  RESPIRATORY: Normal respiratory effort; lungs are clear to auscultation bilaterally  CARDIOVASCULAR: Regular rate and rhythm, normal S1 and S2, no murmur/rub/gallop; No lower extremity edema; Peripheral pulses are 2+ bilaterally  ABDOMEN: Nontender to palpation, normoactive bowel sounds, no rebound/guarding; whiting in place  MUSCLOSKELETAL:   no clubbing or cyanosis of digits; no joint swelling or tenderness to palpation  PSYCH: Awake and alert; affect appropriate  NEUROLOGY: CN 2-12 are intact and symmetric; no gross sensory deficits;   SKIN: No rashes     LABS:                      RADIOLOGY & ADDITIONAL TESTS:  Results Reviewed:   Imaging Personally Reviewed:  Electrocardiogram Personally Reviewed:    COORDINATION OF CARE:  Care Discussed with Consultants/Other Providers [Y/N]:  Prior or Outpatient Records Reviewed [Y/N]:

## 2022-08-08 NOTE — PROGRESS NOTE ADULT - PROBLEM SELECTOR PLAN 6
-on Eliquis   - Regular diet
- HSQ for DVT ppx  - Regular diet  [ ] PT consult
-on Eliquis   - Regular diet
-on Eliquis   - Regular diet

## 2022-08-08 NOTE — DIETITIAN INITIAL EVALUATION ADULT - OTHER INFO
Pt has a history of CVA, HTN, Afib, Vertigo, GERD, Prostate CA,  multiple recent UTI, s/p partial removal of urinary sphincter 6/29/2022 for an abscess surrounding the sphincter. He presented to the ED 7/31 c/o suprapubic discomfort and scrotal tenderness along with fevers, hypotensive.  Pt states that his appetite and po intake have been good in house. He had no complaints of GI distress nor of difficulty chewing or swallowing. Pt has no known food allergies.  Pt reports eating more than half his meals. He also drinks 1 to 2 Ensure supplements per day.   Discussed food preferences and alternate choices with Pt.   Pt could not report his usual weight. As per HIE, his weight was 52 kg (6/28/22) and 58.9 kg (8/7/22). Weights questionable since there was a 14 lb weight in about one and half months.

## 2022-08-08 NOTE — PROGRESS NOTE ADULT - ASSESSMENT
87 y.o male with PMHx of CVA, HTN, Afib, Vertigo, GERD, Prostate CA many years ago, s/p urinary sphincter multiple recent UTI, s/p partial removal of urinary sphincter 6/29/2022 for an abscess surrounding the sphincter presented to the ED 7/31 c/o suprapubic discomfort and scrotal tenderness along with fevers, tmax in .5, hypotensive responded to fluid bolus.  Denies any pain with urination or frequency and says that he has been able to void without a catheter at the nursing home. He has not followed up with any physician since d/c.  Pt admitted and started on ertapenem.    8/1: remains afebrile, no complaints  8/2 - no events  8/3 - no events; OR tomorrow to remove sphincter  8/4 - no events; OR today to remove AUS; medicine cleared for surgery  8/5 - no events; OR yesterday, AUS device fully removed. Diet advanced back to his normal diet  8/6 - no events; restart eliquis  8/7- no events, scrotal incision and drains draining serosanguinous. Afebrile.   8/8 - no new events    Plan:  - PICC  - continue ertapenem  - PT consult - rehab   87 y.o male with PMHx of CVA, HTN, Afib, Vertigo, GERD, Prostate CA many years ago, s/p urinary sphincter multiple recent UTI, s/p partial removal of urinary sphincter 6/29/2022 for an abscess surrounding the sphincter presented to the ED 7/31 c/o suprapubic discomfort and scrotal tenderness along with fevers, tmax in .5, hypotensive responded to fluid bolus.  Denies any pain with urination or frequency and says that he has been able to void without a catheter at the nursing home. He has not followed up with any physician since d/c.  Pt admitted and started on ertapenem.    8/1: remains afebrile, no complaints  8/2 - no events  8/3 - no events; OR tomorrow to remove sphincter  8/4 - no events; OR today to remove AUS; medicine cleared for surgery  8/5 - no events; OR yesterday, AUS device fully removed. Diet advanced back to his normal diet  8/6 - no events; restart eliquis  8/7- no events, scrotal incision and drains draining serosanguinous. Afebrile.   8/8 - no new events    Plan:  - arrow to be placed  - continue ertapenem total 10 days from explant  -  rehab today

## 2022-08-08 NOTE — PROGRESS NOTE ADULT - PROVIDER SPECIALTY LIST ADULT
Infectious Disease
Urology
Urology
Infectious Disease
Urology
Hospitalist

## 2022-08-08 NOTE — PROGRESS NOTE ADULT - SUBJECTIVE AND OBJECTIVE BOX
CC: F/U for Scrotal abscess    Saw/spoke to patient. Unchanged. No new complaints.    Allergies  No Known Allergies    ANTIMICROBIALS:  ertapenem  IVPB 1000 every 24 hours    PE:    Vital Signs Last 24 Hrs  T(C): 36.6 (08 Aug 2022 06:00), Max: 37 (07 Aug 2022 22:24)  T(F): 97.8 (08 Aug 2022 06:00), Max: 98.6 (07 Aug 2022 22:24)  HR: 81 (08 Aug 2022 06:00) (69 - 96)  BP: 112/66 (08 Aug 2022 06:00) (106/65 - 129/72)  RR: 17 (08 Aug 2022 06:00) (17 - 18)  SpO2: 100% (08 Aug 2022 06:00) (97% - 100%)    Gen: AOx3, NAD, non-toxic  CV: Nontachycardic  Resp: Breathing comfortably, RA  Abd: Soft, nontender  IV/Skin: No thrombophlebitis    LABS:    No new available    MICROBIOLOGY:    .Abscess SCROTAL ABSCESS  08-04-22   Few Escherichia coli ESBL  --  Escherichia coli ESBL    Catheterized Catheterized  08-02-22   No growth  --  --    .Blood Blood-Peripheral  07-31-22   No Growth Final  --  --    .Blood Blood-Peripheral  07-31-22   No Growth Final  --  --    RADIOLOGY:    8/6 XR:    FINDINGS:  Heart/Vascular: The cardiomediastinal silhouette is within normal limits.  Pulmonary: Pulmonary vascularity appears normal. No focal infiltrate. No   pleural effusion or pneumothorax.  Bones: No acute bony finding.    Impression:  Negative for acute cardiopulmonary process.

## 2022-08-08 NOTE — PROGRESS NOTE ADULT - PROBLEM SELECTOR PLAN 2
Intermittently hypotensive requiring IVF bolus, SBP now in 100's.   - Hold home anti-HTN agents
Baseline appears to be oriented to self, place and situation not time. Today more confused. Neuro exam grossly intact though limited by patient's cooperation. Suspect 2/2 delirium from anesthesia, surgery and hospitalization   - Delirium precautions- frequent re-orientation, minimize sleep disturbances, encourage family to visit
Intermittently hypotensive requiring IVF bolus, SBP now in 100's.   - Hold home anti-HTN agent s
Baseline appears to be oriented to self, place and situation not time. Today more confused. Neuro exam grossly intact though limited by patient's cooperation. Suspect 2/2 delirium from anesthesia, surgery and hospitalization   - Delirium precautions- frequent re-orientation, minimize sleep disturbances, encourage family to visit
Likely 2/2 delirium from anesthesia, surgery and hospitalization   - Delirium precautions- frequent re-orientation, minimize sleep disturbances, encourage family to visit
Intermittently hypotensive requiring IVF bolus, SBP now in 100's.   - Hold home anti-HTN agent s
Baseline appears to be oriented to self, place and situation not time. Today more confused. Neuro exam grossly intact though limited by patient's cooperation. Suspect 2/2 delirium from anesthesia, surgery and hospitalization   - Delirium precautions- frequent re-orientation, minimize sleep disturbances, encourage family to visit

## 2022-08-08 NOTE — PROGRESS NOTE ADULT - PROBLEM SELECTOR PLAN 5
- HSQ for DVT ppx  - Regular diet  [ ] PT consult
-rate controlled off meds  -restarted on Eliquis- dose reduced to 2.5 mg bid based on weight<60kg, age>80
-rate controlled off meds  -restarted on Eliquis- dose reduced to 2.5 mg bid based on weight<60kg, age>80
- HSQ for DVT ppx  - Regular diet  [ ] PT consult
- HSQ for DVT ppx  - Regular diet  [ ] PT consult
Eliquis on hold for OR on Thursday   - No need to bridge   - Resume AC post-operatively when cleared by Urology to reduce stroke risk
-rate controlled off meds  -restarted on Eliquis- dose reduced to 2.5 mg bid based on weight<60kg, age>80

## 2022-08-08 NOTE — PROGRESS NOTE ADULT - PROBLEM SELECTOR PLAN 1
Sepsis present on admission with Tmax 102.5 in ED, HR 110s, source likely AUS abscess given increase in collection on US.   - Cont Ertapenem given prior cx data w/ ESBL E.coli last admission   - Bcx, Ucx ngtd though Ucx collected after abx started   - S/p AUS removal 8/5   - OR abscess cx grew ecoli   - ID following- rec ertapenem x 10 days   - C/w indwelling whiting/drains per Urology team  - Pain control: on oxycodone PRN
Sepsis present on admission with Tmax 102.5 in ED, HR 110s, source likely AUS abscess given increase in collection on US.   - Cont Ertapenem given prior cx data w/ ESBL E.coli last admission   - Bcx, Ucx ngtd though Ucx collected after abx started   - S/p AUS removal 8/5   - OR abscess cx grew ecoli   - ID following- rec ertapenem x 10 days   - C/w indwelling whiting/drains per Urology team  - Pain control: on oxycodone PRN
Strict I&O's  Analgesia Antiemetics  DVT prophylaxis  Incentive spirometry  Reg / OOB  AM labs
Sepsis present on admission with Tmax 102.5 in ED, HR 110s, source likely AUS abscess  - Cont Ertapenem given prior cx data w/ ESBL E.coli last admission   - Bcx ngtd  - Plan for OR for AUS removal 8/4   [ ] ID c/s   [ ] Ucx pending   - C/w indwelling whiting per Urology team  - Pain control: on oxycodone PRN, add bowel regimen.    # Medical Risk Stratification   - Patient with poor functional capacity, cannot perform >4METs of activity. Does have a hx of CVA, HTN. Patient has an RCRI class III which translates to 10.1% risk of 30 day risk of death, MI or cardiac arrest. Given poor functional capacity, will obtain TTE and EKG prior to OR.   [ ] TTE, EKG
Sepsis present on admission with Tmax 102.5 in ED, HR 110s, source likely AUS abscess given increase in collection on US.   - Cont Ertapenem given prior cx data w/ ESBL E.coli last admission   - Bcx, Ucx ngtd though Ucx collected after abx started   - S/p AUS removal 8/5   - OR abscess cx grew ecoli   - ID following- rec ertapenem x 10 days   - C/w indwelling whiting/drains per Urology team  - Pain control
Sepsis present on admission with Tmax 102.5 in ED, HR 110s, source likely AUS abscess given increase in collection on US.   - Cont Ertapenem given prior cx data w/ ESBL E.coli last admission   - Bcx, Ucx ngtd though Ucx collected after abx started   - S/p AUS removal 8/5   [ ] OR cx pending   - Consider ID c/s for abx duration   - C/w indwelling whiting/drains per Urology team  - Pain control: on oxycodone PRN
Sepsis present on admission with Tmax 102.5 in ED, HR 110s, source likely AUS abscess  - Cont Ertapenem given prior cx data w/ ESBL E.coli last admission   - Bcx, Ucx ngtd  - Plan for OR for AUS removal 8/4   - Consider ID c/s for abx duration   - C/w indwelling whiting per Urology team  - Pain control: on oxycodone PRN, add bowel regimen.    # Medical Risk Stratification   - Patient with poor functional capacity, cannot perform >4METs of activity. Does have a hx of CVA, HTN. Patient has an RCRI class III which translates to 10.1% risk of 30 day risk of death, MI or cardiac arrest. Obtained TTE and EKG given poor functional capacity. TTE grossly normal though suboptimal study. Patient is currently at intermediate risk for an intermediate risk surgery. No additional intervention can decrease this risk. Discussed elevated risk with patient and son who understand and are agreeable to proceed.
Sepsis present on admission with Tmax 102.5 in ED, HR 110s, source likely AUS abscess given increase in collection on US.   - Cont Ertapenem given prior cx data w/ ESBL E.coli last admission   - Bcx, Ucx ngtd though Ucx collected after abx started   - Plan for OR for AUS removal 8/4   - Consider ID c/s for abx duration   - C/w indwelling whiting per Urology team  - Pain control: on oxycodone PRN, add bowel regimen.  - Medical risk stratification documented 8/3

## 2022-08-08 NOTE — PROGRESS NOTE ADULT - PROBLEM SELECTOR PROBLEM 4
Chronic atrial fibrillation
CVA (cerebrovascular accident)
CVA (cerebrovascular accident)
Chronic atrial fibrillation
CVA (cerebrovascular accident)
Chronic atrial fibrillation
CVA (cerebrovascular accident)

## 2022-08-08 NOTE — DISCHARGE NOTE PROVIDER - HOSPITAL COURSE
87 y.o male with PMHx of CVA, HTN, Afib, Vertigo, GERD, Prostate CA many years ago, s/p urinary sphincter multiple recent UTI, s/p partial removal of urinary sphincter 6/29/2022 for an abscess surrounding the sphincter presented to the ED 7/31 c/o suprapubic discomfort and scrotal tenderness along with fevers, tmax in .5, hypotensive responded to fluid bolus.  Denies any pain with urination or frequency and says that he has been able to void without a catheter at the nursing home. He has not followed up with any physician since d/c.  Pt admitted and started on ertapenem.    8/1: remains afebrile, no complaints  8/2 - no events  8/3 - no events; OR tomorrow to remove sphincter  8/4 - no events; OR today to remove AUS; medicine cleared for surgery  8/5 - no events; OR yesterday, AUS device fully removed. Diet advanced back to his normal diet  8/6 - no events; restart eliquis  8/7- no events, scrotal incision and drains draining serosanguinous. Afebrile.   8/8 - no new events; penroses removed    Plan:  - arrow to be placed  - continue ertapenem total 10 days from explant (thru Aug 14)  -  rehab today  - f/u with Dr. Cline in 2 weeks

## 2022-08-08 NOTE — ADVANCED PRACTICE NURSE CONSULT - ASSESSMENT
General: A&Ox2-3, able to turn with 1x assistance, indwelling whiting catheter in place, received with scrotal support sling in place. As per primary RN patient is continent of stool. Skin warm, dry. Prophylactic foam dressing noted on sacrum.     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 Small incision noted anterior midline scrotum, well approximated with moderate serosanguinous drainage. Two Penrose drains in place, one sutured to L inguinal area, and one sutured to perineal area under scrotum. Both draining moderate serosanguinous drainage. Scrotum edematous, firm, tender to touch. Goals of care: Manage exudate, contain drainage, atraumatic dressing changes. 
Right arm cleansed with CHG. Using clean technique under ultra sound guidance, placed ARROW extended dwell peripheral catheter 20G /6cm into Right brachial vein. Brisk blood return and flushed with 20Mls of normal saline. Minimal blood loss and patient tolerated procedure well. CHG dressing placed. All sharps accounted for. LOT#: [id number], REF#: [id number]

## 2022-08-08 NOTE — DISCHARGE NOTE PROVIDER - NSDCMRMEDTOKEN_GEN_ALL_CORE_FT
acetaminophen 325 mg oral tablet: 2 tab(s) orally every 6 hours, As needed, Mild Pain (1 - 3)  apixaban 2.5 mg oral tablet: 1 tab(s) orally 2 times a day  aspirin 81 mg oral tablet, chewable: 1 tab(s) orally once a day  atorvastatin 40 mg oral tablet: 1 tab(s) orally once a day  ertapenem 1 g injection: 1 gram(s) injectable once a day  Last day Sun Aug 14  lactobacillus acidophilus oral capsule: 1 tab(s) orally 2 times a day  meclizine 12.5 mg oral tablet: 1 tab(s) orally once a day, As needed, Dizziness  pantoprazole 20 mg oral delayed release tablet: 1 tab(s) orally once a day

## 2022-08-08 NOTE — DISCHARGE NOTE PROVIDER - CARE PROVIDER_API CALL
Edward Cline)  Urology  [address] [place] [address], [place] [address]  Phone: [phone]  Fax: [phone]  Follow Up Time:

## 2022-08-08 NOTE — PROGRESS NOTE ADULT - REASON FOR ADMISSION
Scrotal abscess

## 2022-08-08 NOTE — DIETITIAN INITIAL EVALUATION ADULT - PERTINENT MEDS FT
MEDICATIONS  (STANDING):  apixaban 2.5 milliGRAM(s) Oral two times a day  aspirin  chewable 81 milliGRAM(s) Oral daily  atorvastatin 40 milliGRAM(s) Oral at bedtime  ertapenem  IVPB 1000 milliGRAM(s) IV Intermittent every 24 hours  lactobacillus acidophilus 1 Tablet(s) Oral daily  pantoprazole    Tablet 40 milliGRAM(s) Oral before breakfast    MEDICATIONS  (PRN):  acetaminophen     Tablet .. 650 milliGRAM(s) Oral every 6 hours PRN Temp greater or equal to 38C (100.4F), Mild Pain (1 - 3)  meclizine 12.5 milliGRAM(s) Oral daily PRN Dizziness

## 2022-08-08 NOTE — PROGRESS NOTE ADULT - PROBLEM SELECTOR PLAN 4
Hx of stroke this year, prior to admission.   c/w ASA
Eliquis on hold for OR on Thursday   - No need to bridge   - Resume AC post-operatively when cleared by Urology
Hx of stroke this year, prior to admission.   c/w ASA  PT/OT.
Eliquis on hold for OR on Thursday   - No need to bridge   - Resume AC post-operatively when cleared by Urology
Eliquis on hold for OR on Thursday   - No need to bridge   - Resume AC post-operatively when cleared by Urology
Hx of stroke this year, prior to admission.   c/w ASA  PT/OT.
Hx of stroke this year, prior to admission.   c/w ASA  PT/OT.

## 2022-08-08 NOTE — DISCHARGE NOTE NURSING/CASE MANAGEMENT/SOCIAL WORK - PATIENT PORTAL LINK FT
You can access the FollowMyHealth Patient Portal offered by [facility] by registering at the following website: [email or web address]. By joining [employer]’s FollowMyHealth portal, you will also be able to view your health information using other applications (apps) compatible with our system.

## 2022-08-08 NOTE — DISCHARGE NOTE PROVIDER - NSDCCPCAREPLAN_GEN_ALL_CORE_FT
PRINCIPAL DISCHARGE DIAGNOSIS  Diagnosis: Scrotal abscess  Assessment and Plan of Treatment: Alexis care as needed  Dressing on scrotum - change daily or as needed  Make appt to see Dr. Cline in 2 weeks

## 2022-08-08 NOTE — PROGRESS NOTE ADULT - ASSESSMENT
87 y.o male with PMHx of CVA, HTN, Vertigo, GERD, Prostate cancer many years ago, s/p urinary sphincter multiple recent UTI, s/p partial removal of urinary sphincter, recently admitted for sphincter abscess s/p IV ABx and exploration 6/2022 presents from [facility] with fever and scrotal tenderness. Found to have sepsis 2/2 fluid collection around AUS and epididymitis c/f persistent abscess s/p AUS removal 8/4.

## 2022-08-08 NOTE — PROGRESS NOTE ADULT - PROBLEM SELECTOR PROBLEM 3
CVA (cerebrovascular accident)
CVA (cerebrovascular accident)
H/O: HTN (hypertension)
CVA (cerebrovascular accident)
H/O: HTN (hypertension)

## 2022-08-08 NOTE — DISCHARGE NOTE NURSING/CASE MANAGEMENT/SOCIAL WORK - NSDPDISTO_GEN_ALL_CORE
Pt. is afebrile and offers no complaints. In no acute distress. Right upper arm midline: patent. Alexis patent and draining adequate amounts of urine. Pt. needs assistance ambulating. Tolerating diet well./Sub-Acute rehab

## 2022-08-08 NOTE — DIETITIAN INITIAL EVALUATION ADULT - NS FNS DIET ORDER
Diet, Regular:   Supplement Feeding Modality:  Oral  Ensure Enlive Cans or Servings Per Day:  1       Frequency:  Three Times a day (08-02-22 @ 08:00)

## 2022-08-08 NOTE — PROGRESS NOTE ADULT - PROBLEM SELECTOR PROBLEM 5
Prophylactic measure
Chronic atrial fibrillation
Prophylactic measure
Prophylactic measure
Chronic atrial fibrillation

## 2022-08-09 LAB
CULTURE RESULTS: SIGNIFICANT CHANGE UP
ORGANISM # SPEC MICROSCOPIC CNT: SIGNIFICANT CHANGE UP
ORGANISM # SPEC MICROSCOPIC CNT: SIGNIFICANT CHANGE UP
SPECIMEN SOURCE: SIGNIFICANT CHANGE UP

## 2022-08-15 LAB — SURGICAL PATHOLOGY STUDY: SIGNIFICANT CHANGE UP

## 2022-09-03 LAB
CULTURE RESULTS: SIGNIFICANT CHANGE UP
SPECIMEN SOURCE: SIGNIFICANT CHANGE UP

## 2022-09-19 ENCOUNTER — APPOINTMENT (OUTPATIENT)
Dept: UROLOGY | Facility: CLINIC | Age: 87
End: 2022-09-19

## 2022-09-19 ENCOUNTER — OUTPATIENT (OUTPATIENT)
Dept: OUTPATIENT SERVICES | Facility: HOSPITAL | Age: 87
LOS: 1 days | End: 2022-09-19
Payer: COMMERCIAL

## 2022-09-19 VITALS
SYSTOLIC BLOOD PRESSURE: 162 MMHG | HEART RATE: 71 BPM | DIASTOLIC BLOOD PRESSURE: 80 MMHG | TEMPERATURE: 98.4 F | OXYGEN SATURATION: 100 %

## 2022-09-19 DIAGNOSIS — Z96.0 PRESENCE OF UROGENITAL IMPLANTS: Chronic | ICD-10-CM

## 2022-09-19 DIAGNOSIS — R35.0 FREQUENCY OF MICTURITION: ICD-10-CM

## 2022-09-19 DIAGNOSIS — Z98.890 OTHER SPECIFIED POSTPROCEDURAL STATES: Chronic | ICD-10-CM

## 2022-09-19 DIAGNOSIS — T83.111A BREAKDOWN (MECHANICAL) OF IMPLANTED URINARY SPHINCTER, INITIAL ENCOUNTER: ICD-10-CM

## 2022-09-19 PROCEDURE — 74455 X-RAY URETHRA/BLADDER: CPT | Mod: 26

## 2022-09-19 PROCEDURE — 74455 X-RAY URETHRA/BLADDER: CPT

## 2022-09-24 LAB
CULTURE RESULTS: SIGNIFICANT CHANGE UP
SPECIMEN SOURCE: SIGNIFICANT CHANGE UP

## 2022-09-29 DIAGNOSIS — T83.111A BREAKDOWN (MECHANICAL) OF IMPLANTED URINARY SPHINCTER, INITIAL ENCOUNTER: ICD-10-CM

## 2022-10-28 ENCOUNTER — INPATIENT (INPATIENT)
Facility: HOSPITAL | Age: 87
LOS: 2 days | Discharge: HOME HEALTH | End: 2022-10-31
Attending: INTERNAL MEDICINE | Admitting: INTERNAL MEDICINE

## 2022-10-28 VITALS
HEIGHT: 64 IN | TEMPERATURE: 99 F | WEIGHT: 130.07 LBS | SYSTOLIC BLOOD PRESSURE: 124 MMHG | RESPIRATION RATE: 17 BRPM | DIASTOLIC BLOOD PRESSURE: 76 MMHG | OXYGEN SATURATION: 97 % | HEART RATE: 76 BPM

## 2022-10-28 DIAGNOSIS — Z96.0 PRESENCE OF UROGENITAL IMPLANTS: Chronic | ICD-10-CM

## 2022-10-28 DIAGNOSIS — Z98.890 OTHER SPECIFIED POSTPROCEDURAL STATES: Chronic | ICD-10-CM

## 2022-10-28 LAB
ALBUMIN SERPL ELPH-MCNC: 3.3 G/DL — SIGNIFICANT CHANGE UP (ref 3.3–5)
ALP SERPL-CCNC: 138 U/L — HIGH (ref 40–120)
ALT FLD-CCNC: 40 U/L — SIGNIFICANT CHANGE UP (ref 12–78)
ANION GAP SERPL CALC-SCNC: 7 MMOL/L — SIGNIFICANT CHANGE UP (ref 5–17)
APTT BLD: 32.1 SEC — SIGNIFICANT CHANGE UP (ref 27.5–35.5)
AST SERPL-CCNC: 31 U/L — SIGNIFICANT CHANGE UP (ref 15–37)
BASOPHILS # BLD AUTO: 0.01 K/UL — SIGNIFICANT CHANGE UP (ref 0–0.2)
BASOPHILS NFR BLD AUTO: 0.1 % — SIGNIFICANT CHANGE UP (ref 0–2)
BILIRUB SERPL-MCNC: 0.3 MG/DL — SIGNIFICANT CHANGE UP (ref 0.2–1.2)
BLD GP AB SCN SERPL QL: SIGNIFICANT CHANGE UP
BUN SERPL-MCNC: 16 MG/DL — SIGNIFICANT CHANGE UP (ref 7–23)
CALCIUM SERPL-MCNC: 8.9 MG/DL — SIGNIFICANT CHANGE UP (ref 8.5–10.1)
CHLORIDE SERPL-SCNC: 104 MMOL/L — SIGNIFICANT CHANGE UP (ref 96–108)
CO2 SERPL-SCNC: 26 MMOL/L — SIGNIFICANT CHANGE UP (ref 22–31)
CREAT SERPL-MCNC: 0.93 MG/DL — SIGNIFICANT CHANGE UP (ref 0.5–1.3)
EGFR: 80 ML/MIN/1.73M2 — SIGNIFICANT CHANGE UP
EOSINOPHIL # BLD AUTO: 0.49 K/UL — SIGNIFICANT CHANGE UP (ref 0–0.5)
EOSINOPHIL NFR BLD AUTO: 6.7 % — HIGH (ref 0–6)
FLUAV AG NPH QL: SIGNIFICANT CHANGE UP
FLUBV AG NPH QL: SIGNIFICANT CHANGE UP
GLUCOSE SERPL-MCNC: 99 MG/DL — SIGNIFICANT CHANGE UP (ref 70–99)
HCT VFR BLD CALC: 43.5 % — SIGNIFICANT CHANGE UP (ref 39–50)
HGB BLD-MCNC: 14 G/DL — SIGNIFICANT CHANGE UP (ref 13–17)
IMM GRANULOCYTES NFR BLD AUTO: 0.3 % — SIGNIFICANT CHANGE UP (ref 0–0.9)
INR BLD: 1.05 RATIO — SIGNIFICANT CHANGE UP (ref 0.88–1.16)
LACTATE SERPL-SCNC: 1 MMOL/L — SIGNIFICANT CHANGE UP (ref 0.7–2)
LYMPHOCYTES # BLD AUTO: 2.1 K/UL — SIGNIFICANT CHANGE UP (ref 1–3.3)
LYMPHOCYTES # BLD AUTO: 28.6 % — SIGNIFICANT CHANGE UP (ref 13–44)
MAGNESIUM SERPL-MCNC: 2.4 MG/DL — SIGNIFICANT CHANGE UP (ref 1.6–2.6)
MCHC RBC-ENTMCNC: 28.6 PG — SIGNIFICANT CHANGE UP (ref 27–34)
MCHC RBC-ENTMCNC: 32.2 G/DL — SIGNIFICANT CHANGE UP (ref 32–36)
MCV RBC AUTO: 89 FL — SIGNIFICANT CHANGE UP (ref 80–100)
MONOCYTES # BLD AUTO: 0.76 K/UL — SIGNIFICANT CHANGE UP (ref 0–0.9)
MONOCYTES NFR BLD AUTO: 10.4 % — SIGNIFICANT CHANGE UP (ref 2–14)
NEUTROPHILS # BLD AUTO: 3.96 K/UL — SIGNIFICANT CHANGE UP (ref 1.8–7.4)
NEUTROPHILS NFR BLD AUTO: 53.9 % — SIGNIFICANT CHANGE UP (ref 43–77)
NRBC # BLD: 0 /100 WBCS — SIGNIFICANT CHANGE UP (ref 0–0)
NT-PROBNP SERPL-SCNC: 61 PG/ML — SIGNIFICANT CHANGE UP (ref 0–450)
PLATELET # BLD AUTO: 176 K/UL — SIGNIFICANT CHANGE UP (ref 150–400)
POTASSIUM SERPL-MCNC: 4.6 MMOL/L — SIGNIFICANT CHANGE UP (ref 3.5–5.3)
POTASSIUM SERPL-SCNC: 4.6 MMOL/L — SIGNIFICANT CHANGE UP (ref 3.5–5.3)
PROT SERPL-MCNC: 8.6 GM/DL — HIGH (ref 6–8.3)
PROTHROM AB SERPL-ACNC: 12.5 SEC — SIGNIFICANT CHANGE UP (ref 10.5–13.4)
RBC # BLD: 4.89 M/UL — SIGNIFICANT CHANGE UP (ref 4.2–5.8)
RBC # FLD: 13.9 % — SIGNIFICANT CHANGE UP (ref 10.3–14.5)
SARS-COV-2 RNA SPEC QL NAA+PROBE: SIGNIFICANT CHANGE UP
SODIUM SERPL-SCNC: 137 MMOL/L — SIGNIFICANT CHANGE UP (ref 135–145)
TROPONIN I, HIGH SENSITIVITY RESULT: 16.3 NG/L — SIGNIFICANT CHANGE UP
WBC # BLD: 7.34 K/UL — SIGNIFICANT CHANGE UP (ref 3.8–10.5)
WBC # FLD AUTO: 7.34 K/UL — SIGNIFICANT CHANGE UP (ref 3.8–10.5)

## 2022-10-28 PROCEDURE — 93010 ELECTROCARDIOGRAM REPORT: CPT

## 2022-10-28 PROCEDURE — 99285 EMERGENCY DEPT VISIT HI MDM: CPT

## 2022-10-28 PROCEDURE — 71045 X-RAY EXAM CHEST 1 VIEW: CPT | Mod: 26

## 2022-10-28 PROCEDURE — 71275 CT ANGIOGRAPHY CHEST: CPT | Mod: 26,MA

## 2022-10-28 RX ORDER — ASPIRIN/CALCIUM CARB/MAGNESIUM 324 MG
325 TABLET ORAL ONCE
Refills: 0 | Status: COMPLETED | OUTPATIENT
Start: 2022-10-28 | End: 2022-10-28

## 2022-10-28 RX ORDER — ACETAMINOPHEN 500 MG
975 TABLET ORAL ONCE
Refills: 0 | Status: COMPLETED | OUTPATIENT
Start: 2022-10-28 | End: 2022-10-28

## 2022-10-28 RX ADMIN — Medication 975 MILLIGRAM(S): at 21:32

## 2022-10-28 RX ADMIN — Medication 325 MILLIGRAM(S): at 21:32

## 2022-10-28 NOTE — ED PROVIDER NOTE - PROGRESS NOTE DETAILS
Attending Yanira: signed out to evening attending to f/u lab results and CXR iloabachie - cta incompletely opacified for pe, but no central pe. pt still with dyspnea. consider virus? have pulm evaluate. right upper ext angiocatheter infiltrated. needs obs for compartment syndrome.

## 2022-10-28 NOTE — ED PROVIDER NOTE - PHYSICAL EXAMINATION
Gen: NAD, AOx3, able to make needs known, non-toxic  Head: NCAT  HEENT: EOMI, oral mucosa moist, normal conjunctiva  Lung: CTAB, no respiratory distress, no wheezes/rhonchi/rales B/L, speaking in full sentences  CV: RRR, no murmurs  Abd: non distended, soft, nontender, no guarding, no CVA tenderness  MSK: no visible deformities. No LE edema b/l  Neuro: Appears non focal  Skin: Warm, well perfused  Psych: normal affect

## 2022-10-28 NOTE — ED ADULT NURSE REASSESSMENT NOTE - NS ED NURSE REASSESS COMMENT FT1
patient went to CT with IV flushing well prior. RN notified IV infiltrated with contrast while in CT scan. Dr. Jones notified with plan to monitor infiltrate when admitted.

## 2022-10-28 NOTE — ED ADULT NURSE NOTE - OBJECTIVE STATEMENT
Patient A&OX2 breathing even and unlabored on room air, no acute perspiratory distress noted. No pmhx presented to the ED c/o cough and chest pain X 3 days. Denies fever, chills. Patient placed on cardiac monitor .Pending orders

## 2022-10-28 NOTE — ED PROVIDER NOTE - OBJECTIVE STATEMENT
86 y/o M w/ PMH of a fib on eliquis, GERD, CVA, HTN, HLD presenting w/ cough. Reports for the past 2 weeks having wet sounding cough. Reports having intermittent chest pain during this time as well and that coughing makes the pain worse. Endorses mild shortness of breath as well. No known sick contacts. Denies fevers, chills, headache, dizziness, blurred vision, abdominal pain, n/v/d/c, urinary symptoms, MSK pain, rash.

## 2022-10-28 NOTE — ED PROVIDER NOTE - CLINICAL SUMMARY MEDICAL DECISION MAKING FREE TEXT BOX
88 y/o M w/ PMH as above presenting w/ cough, SOB, and chest pain. Pt overall no acute distress on exam. Low suspicion for ACS. Possible URI. Possible PNA. Given a fib hx and CP/SOB will obtain CTA to eval for PE. Plan for labs, imaging, EKG, meds PRN. Will reassess the need for additional interventions as clinically warranted.

## 2022-10-29 LAB
D DIMER BLD IA.RAPID-MCNC: 209 NG/ML DDU — SIGNIFICANT CHANGE UP
HPIV1 RNA SPEC QL NAA+PROBE: DETECTED
RAPID RVP RESULT: DETECTED
SARS-COV-2 RNA SPEC QL NAA+PROBE: SIGNIFICANT CHANGE UP

## 2022-10-29 PROCEDURE — 99232 SBSQ HOSP IP/OBS MODERATE 35: CPT

## 2022-10-29 RX ORDER — KETOROLAC TROMETHAMINE 30 MG/ML
15 SYRINGE (ML) INJECTION ONCE
Refills: 0 | Status: DISCONTINUED | OUTPATIENT
Start: 2022-10-29 | End: 2022-10-29

## 2022-10-29 RX ORDER — PANTOPRAZOLE SODIUM 20 MG/1
1 TABLET, DELAYED RELEASE ORAL
Qty: 0 | Refills: 0 | DISCHARGE

## 2022-10-29 RX ORDER — APIXABAN 2.5 MG/1
2.5 TABLET, FILM COATED ORAL EVERY 12 HOURS
Refills: 0 | Status: DISCONTINUED | OUTPATIENT
Start: 2022-10-29 | End: 2022-10-31

## 2022-10-29 RX ORDER — PANTOPRAZOLE SODIUM 20 MG/1
40 TABLET, DELAYED RELEASE ORAL
Refills: 0 | Status: DISCONTINUED | OUTPATIENT
Start: 2022-10-29 | End: 2022-10-31

## 2022-10-29 RX ORDER — ATORVASTATIN CALCIUM 80 MG/1
1 TABLET, FILM COATED ORAL
Qty: 0 | Refills: 0 | DISCHARGE

## 2022-10-29 RX ORDER — ATORVASTATIN CALCIUM 80 MG/1
40 TABLET, FILM COATED ORAL AT BEDTIME
Refills: 0 | Status: DISCONTINUED | OUTPATIENT
Start: 2022-10-29 | End: 2022-10-31

## 2022-10-29 RX ORDER — ASPIRIN/CALCIUM CARB/MAGNESIUM 324 MG
81 TABLET ORAL DAILY
Refills: 0 | Status: DISCONTINUED | OUTPATIENT
Start: 2022-10-29 | End: 2022-10-31

## 2022-10-29 RX ORDER — INFLUENZA VIRUS VACCINE 15; 15; 15; 15 UG/.5ML; UG/.5ML; UG/.5ML; UG/.5ML
0.7 SUSPENSION INTRAMUSCULAR ONCE
Refills: 0 | Status: COMPLETED | OUTPATIENT
Start: 2022-10-29 | End: 2022-10-29

## 2022-10-29 RX ADMIN — PANTOPRAZOLE SODIUM 40 MILLIGRAM(S): 20 TABLET, DELAYED RELEASE ORAL at 13:20

## 2022-10-29 RX ADMIN — Medication 100 MILLIGRAM(S): at 19:12

## 2022-10-29 RX ADMIN — APIXABAN 2.5 MILLIGRAM(S): 2.5 TABLET, FILM COATED ORAL at 19:12

## 2022-10-29 RX ADMIN — Medication 15 MILLIGRAM(S): at 01:12

## 2022-10-29 RX ADMIN — ATORVASTATIN CALCIUM 40 MILLIGRAM(S): 80 TABLET, FILM COATED ORAL at 21:16

## 2022-10-29 RX ADMIN — Medication 81 MILLIGRAM(S): at 13:19

## 2022-10-29 NOTE — H&P ADULT - NSHPPHYSICALEXAM_GEN_ALL_CORE
Vital Signs Last 24 Hrs  T(C): 36.5 (29 Oct 2022 09:16), Max: 37.3 (28 Oct 2022 17:21)  T(F): 97.7 (29 Oct 2022 09:16), Max: 99.1 (28 Oct 2022 17:21)  HR: 64 (29 Oct 2022 09:16) (53 - 92)  BP: 156/79 (29 Oct 2022 09:16) (113/67 - 156/79)  RR: 18 (29 Oct 2022 09:16) (12 - 20)  SpO2: 99% (29 Oct 2022 09:16) (93% - 99%)    Parameters below as of 29 Oct 2022 09:16  Patient On (Oxygen Delivery Method): room air    CONSTITUTIONAL: elder; y male, thin built   ENMT: Airway patent, Nasal mucosa clear. Mouth with normal mucosa. Throat has no vesicles, no oropharyngeal exudates and uvula is midline.  EYES: Clear bilaterally, pupils equal, round and reactive to light. EOMI.  CARDIAC: Normal rate, regular rhythm.  Heart sounds S1, S2.  No murmurs, rubs or gallops   RESPIRATORY: Breath sounds clear and equal bilaterally. No wheezes, rales or rhonchi  ABDOMEN: soft, non tender   MUSCULOSKELETAL: Spine appears normal, range of motion is not limited, no muscle or joint tenderness  EXTREMITIES: No edema, cyanosis or deformity   NEUROLOGICAL: Alert and oriented, no focal deficits, no motor or sensory deficits.  SKIN: No rash, skin turgor

## 2022-10-29 NOTE — H&P ADULT - ASSESSMENT
Patient is an 86 y/o male from home, lives w/ his son, ambulates w/ assistance of cane/walker w/ PMH of Afib on eliquis, GERD, CVA, HTN, prostate cancer p/w cough, shortness of breath, dizziness and myalgias for almost x 1 week.  Admitted for Para-influenza Infection     A/P;  #Para -influenza Infection   #IV site infiltration   #Chronic Afib   #Atelectasis   #HTN  #CVA  #DVT ppx  Patient is an 86 y/o male from home, lives w/ his son, ambulates w/ assistance of cane/walker w/ PMH of Afib on eliquis, GERD, CVA, HTN, prostate cancer p/w cough, shortness of breath, dizziness and myalgias for almost x 1 week.  Admitted for Para-influenza Infection     A/P;  #Para -influenza Infection   -Pt w/ dyspnea 2/2 Parainfluenza infection.   -C/w supportive care for now  -Consider abx if develops s/s of super-imposed bacterial infection.     #IV site Infiltration:  -Patient w/ Rt IV site infiltration, no s/s of compartment syndrome.     #Chronic Afib   -C/w Eliquis     #Atelectasis   -Incentive spirometer     #HTN  -Not on anti-HTN at home   -Monitor BP     #DVT ppx   -Lovenox SC

## 2022-10-29 NOTE — PATIENT PROFILE ADULT - FALL HARM RISK - HARM RISK INTERVENTIONS

## 2022-10-29 NOTE — H&P ADULT - NSICDXPASTMEDICALHX_GEN_ALL_CORE_FT
PAST MEDICAL HISTORY:  Afib     Cerebrovascular accident (CVA)     GERD (gastroesophageal reflux disease)     HTN (hypertension)     Hyperlipidemia     Prostate cancer 18 years ago    
IV

## 2022-10-29 NOTE — H&P ADULT - HISTORY OF PRESENT ILLNESS
Patient is an 88 y/o male from home, lives w/ his son, ambulates w/ assistance of cane/walker w/ PMH of Afib on eliquis, GERD, CVA, HTN, prostate cancer p/w cough, shortness of breath, dizziness and myalgias for almost x 1 week. Patient  denies any orthopnea, PND, leg swelling, fever, chills, nausea, vomiting or other complaints.    IN ED, patient's VS were stable. He had CT Angio chest which was negative for PE.

## 2022-10-30 LAB
ANION GAP SERPL CALC-SCNC: 7 MMOL/L — SIGNIFICANT CHANGE UP (ref 5–17)
BUN SERPL-MCNC: 18 MG/DL — SIGNIFICANT CHANGE UP (ref 7–23)
CALCIUM SERPL-MCNC: 8.9 MG/DL — SIGNIFICANT CHANGE UP (ref 8.5–10.1)
CHLORIDE SERPL-SCNC: 104 MMOL/L — SIGNIFICANT CHANGE UP (ref 96–108)
CO2 SERPL-SCNC: 24 MMOL/L — SIGNIFICANT CHANGE UP (ref 22–31)
CREAT SERPL-MCNC: 0.92 MG/DL — SIGNIFICANT CHANGE UP (ref 0.5–1.3)
EGFR: 81 ML/MIN/1.73M2 — SIGNIFICANT CHANGE UP
GLUCOSE SERPL-MCNC: 93 MG/DL — SIGNIFICANT CHANGE UP (ref 70–99)
HCT VFR BLD CALC: 46.3 % — SIGNIFICANT CHANGE UP (ref 39–50)
HGB BLD-MCNC: 14.9 G/DL — SIGNIFICANT CHANGE UP (ref 13–17)
MCHC RBC-ENTMCNC: 28 PG — SIGNIFICANT CHANGE UP (ref 27–34)
MCHC RBC-ENTMCNC: 32.2 G/DL — SIGNIFICANT CHANGE UP (ref 32–36)
MCV RBC AUTO: 87 FL — SIGNIFICANT CHANGE UP (ref 80–100)
NRBC # BLD: 0 /100 WBCS — SIGNIFICANT CHANGE UP (ref 0–0)
PLATELET # BLD AUTO: 179 K/UL — SIGNIFICANT CHANGE UP (ref 150–400)
POTASSIUM SERPL-MCNC: 4 MMOL/L — SIGNIFICANT CHANGE UP (ref 3.5–5.3)
POTASSIUM SERPL-SCNC: 4 MMOL/L — SIGNIFICANT CHANGE UP (ref 3.5–5.3)
RBC # BLD: 5.32 M/UL — SIGNIFICANT CHANGE UP (ref 4.2–5.8)
RBC # FLD: 13.8 % — SIGNIFICANT CHANGE UP (ref 10.3–14.5)
SODIUM SERPL-SCNC: 135 MMOL/L — SIGNIFICANT CHANGE UP (ref 135–145)
WBC # BLD: 8.83 K/UL — SIGNIFICANT CHANGE UP (ref 3.8–10.5)
WBC # FLD AUTO: 8.83 K/UL — SIGNIFICANT CHANGE UP (ref 3.8–10.5)

## 2022-10-30 PROCEDURE — 99232 SBSQ HOSP IP/OBS MODERATE 35: CPT

## 2022-10-30 RX ADMIN — ATORVASTATIN CALCIUM 40 MILLIGRAM(S): 80 TABLET, FILM COATED ORAL at 21:16

## 2022-10-30 RX ADMIN — PANTOPRAZOLE SODIUM 40 MILLIGRAM(S): 20 TABLET, DELAYED RELEASE ORAL at 09:27

## 2022-10-30 RX ADMIN — Medication 81 MILLIGRAM(S): at 11:57

## 2022-10-30 RX ADMIN — APIXABAN 2.5 MILLIGRAM(S): 2.5 TABLET, FILM COATED ORAL at 18:14

## 2022-10-30 RX ADMIN — APIXABAN 2.5 MILLIGRAM(S): 2.5 TABLET, FILM COATED ORAL at 05:20

## 2022-10-30 NOTE — PROGRESS NOTE ADULT - SUBJECTIVE AND OBJECTIVE BOX
Patient is a 87y old  Male who presents with a chief complaint of Para-influenza Infection (29 Oct 2022 09:52)      INTERVAL HPI/OVERNIGHT EVENTS:    MEDICATIONS  (STANDING):  apixaban 2.5 milliGRAM(s) Oral every 12 hours  aspirin  chewable 81 milliGRAM(s) Oral daily  atorvastatin 40 milliGRAM(s) Oral at bedtime  influenza  Vaccine (HIGH DOSE) 0.7 milliLiter(s) IntraMuscular once  pantoprazole    Tablet 40 milliGRAM(s) Oral before breakfast    MEDICATIONS  (PRN):  guaiFENesin Oral Liquid (Sugar-Free) 100 milliGRAM(s) Oral every 6 hours PRN Cough      Allergies    No Known Allergies    Intolerances        Vital Signs Last 24 Hrs  T(C): 36.3 (30 Oct 2022 11:02), Max: 36.5 (30 Oct 2022 06:48)  T(F): 97.4 (30 Oct 2022 11:02), Max: 97.7 (30 Oct 2022 06:48)  HR: 100 (30 Oct 2022 11:02) (55 - 100)  BP: 132/81 (30 Oct 2022 11:02) (132/81 - 145/74)  BP(mean): --  RR: 19 (30 Oct 2022 11:02) (17 - 19)  SpO2: 100% (30 Oct 2022 11:02) (98% - 100%)    Parameters below as of 30 Oct 2022 11:02  Patient On (Oxygen Delivery Method): room air        PHYSICAL EXAM:  GENERAL: NAD, well-groomed, well-developed  HEAD:  Atraumatic, Normocephalic  EYES: EOMI, PERRLA, conjunctiva and sclera clear  ENMT: No tonsillar erythema, exudates, or enlargement; Moist mucous membranes, Good dentition, No lesions  NECK: Supple, No JVD, Normal thyroid  NERVOUS SYSTEM:  Alert & Oriented X3, Good concentration; Motor Strength 5/5 B/L upper and lower extremities; DTRs 2+ intact and symmetric  CHEST/LUNG: Clear to auscultation bilaterally; No rales, rhonchi, wheezing, or rubs  HEART: Regular rate and rhythm; No murmurs, rubs, or gallops  ABDOMEN: Soft, Nontender, Nondistended; Bowel sounds present  EXTREMITIES:  2+ Peripheral Pulses, No clubbing, cyanosis, or edema  LYMPH: No lymphadenopathy noted  SKIN: No rashes or lesions    LABS:                        14.9   8.83  )-----------( 179      ( 30 Oct 2022 07:00 )             46.3     10-30    135  |  104  |  18  ----------------------------<  93  4.0   |  24  |  0.92    Ca    8.9      30 Oct 2022 07:00  Mg     2.4     10-28    TPro  8.6<H>  /  Alb  3.3  /  TBili  0.3  /  DBili  x   /  AST  31  /  ALT  40  /  AlkPhos  138<H>  10-28    PT/INR - ( 28 Oct 2022 19:45 )   PT: 12.5 sec;   INR: 1.05 ratio         PTT - ( 28 Oct 2022 19:45 )  PTT:32.1 sec       Culture - Blood (collected 28 Oct 2022 19:45)  Source: .Blood Blood  Preliminary Report (30 Oct 2022 02:02):    No growth to date.    Culture - Blood (collected 28 Oct 2022 19:26)  Source: .Blood Blood  Preliminary Report (30 Oct 2022 02:02):    No growth to date.    86 y/o male from home, lives w/ his son, ambulates w/ assistance of cane/walker w/ PMH of Afib on eliquis, GERD, CVA, HTN, HLD, prostate cancer p/w cough, shortness of breath, dizziness and myalgias for almost x 1 week and was admitted w/ Para-influenza Infection. He is lying in bed in NAD.    MEDICATIONS  (STANDING):  apixaban 2.5 milliGRAM(s) Oral every 12 hours  aspirin  chewable 81 milliGRAM(s) Oral daily  atorvastatin 40 milliGRAM(s) Oral at bedtime  influenza  Vaccine (HIGH DOSE) 0.7 milliLiter(s) IntraMuscular once  pantoprazole    Tablet 40 milliGRAM(s) Oral before breakfast    MEDICATIONS  (PRN):  guaiFENesin Oral Liquid (Sugar-Free) 100 milliGRAM(s) Oral every 6 hours PRN Cough      Allergies    No Known Allergies    Intolerances        Vital Signs Last 24 Hrs  T(C): 36.3 (30 Oct 2022 11:02), Max: 36.5 (30 Oct 2022 06:48)  T(F): 97.4 (30 Oct 2022 11:02), Max: 97.7 (30 Oct 2022 06:48)  HR: 100 (30 Oct 2022 11:02) (55 - 100)  BP: 132/81 (30 Oct 2022 11:02) (132/81 - 145/74)   RR: 19 (30 Oct 2022 11:02) (17 - 19)  SpO2: 100% (30 Oct 2022 11:02) (98% - 100%)    Parameters below as of 30 Oct 2022 11:02  Patient On (Oxygen Delivery Method): room air        PHYSICAL EXAM:  GENERAL: NAD, well-groomed, well-developed  HEAD:  Atraumatic, Normocephalic  EYES: EOMI, PERRLA   NECK: Supple   NERVOUS SYSTEM:  Alert   CHEST/LUNG: Clear to auscultation bilaterally; No rales, rhonchi, wheezing, or rubs  HEART: Regular rate and rhythm; No murmurs, rubs, or gallops  ABDOMEN: Soft, Nontender, Nondistended; Bowel sounds present  EXTREMITIES: , No clubbing, cyanosis, or edema     LABS:                        14.9   8.83  )-----------( 179      ( 30 Oct 2022 07:00 )             46.3     10-30    135  |  104  |  18  ----------------------------<  93  4.0   |  24  |  0.92    Ca    8.9      30 Oct 2022 07:00  Mg     2.4     10-28    TPro  8.6<H>  /  Alb  3.3  /  TBili  0.3  /  DBili  x   /  AST  31  /  ALT  40  /  AlkPhos  138<H>  10-28    PT/INR - ( 28 Oct 2022 19:45 )   PT: 12.5 sec;   INR: 1.05 ratio         PTT - ( 28 Oct 2022 19:45 )  PTT:32.1 sec       Culture - Blood (collected 28 Oct 2022 19:45)  Source: .Blood Blood  Preliminary Report (30 Oct 2022 02:02):    No growth to date.    Culture - Blood (collected 28 Oct 2022 19:26)  Source: .Blood Blood  Preliminary Report (30 Oct 2022 02:02):    No growth to date.

## 2022-10-31 ENCOUNTER — TRANSCRIPTION ENCOUNTER (OUTPATIENT)
Age: 87
End: 2022-10-31

## 2022-10-31 VITALS
HEART RATE: 71 BPM | RESPIRATION RATE: 18 BRPM | SYSTOLIC BLOOD PRESSURE: 113 MMHG | DIASTOLIC BLOOD PRESSURE: 70 MMHG | OXYGEN SATURATION: 98 % | TEMPERATURE: 97 F

## 2022-10-31 PROCEDURE — 99239 HOSP IP/OBS DSCHRG MGMT >30: CPT

## 2022-10-31 RX ADMIN — APIXABAN 2.5 MILLIGRAM(S): 2.5 TABLET, FILM COATED ORAL at 17:03

## 2022-10-31 RX ADMIN — Medication 81 MILLIGRAM(S): at 12:05

## 2022-10-31 RX ADMIN — Medication 100 MILLIGRAM(S): at 21:57

## 2022-10-31 RX ADMIN — APIXABAN 2.5 MILLIGRAM(S): 2.5 TABLET, FILM COATED ORAL at 05:21

## 2022-10-31 RX ADMIN — ATORVASTATIN CALCIUM 40 MILLIGRAM(S): 80 TABLET, FILM COATED ORAL at 21:57

## 2022-10-31 NOTE — PROGRESS NOTE ADULT - ASSESSMENT
86 y/o male from home, lives w/ his son, ambulates w/ assistance of cane/walker w/ PMH of Afib on eliquis, GERD, CVA, HTN, HLD, prostate cancer p/w cough, shortness of breath, dizziness and myalgias for almost x 1 week and was admitted w/ Para-influenza Infection.      Para -influenza Infection   - c/w supportive care for now    IV site Infiltration:  -Patient w/ Rt IV site infiltration, no s/s of compartment syndrome    Chronic Afib   - c/w Eliquis     Atelectasis   - Incentive spirometer     HTN  - Not on anti-HTN at home   - Monitor BP     HLD  - c/w Lipitor    GERD  - c/w Protonix     Prophylaxis:  DVT: Eliquis  GI: Protonix    Pt medically stable and will be discharged today.
86 y/o male from home, lives w/ his son, ambulates w/ assistance of cane/walker w/ PMH of Afib on eliquis, GERD, CVA, HTN, HLD, prostate cancer p/w cough, shortness of breath, dizziness and myalgias for almost x 1 week and was admitted w/ Para-influenza Infection.      Para -influenza Infection   - c/w supportive care for now    IV site Infiltration:  -Patient w/ Rt IV site infiltration, no s/s of compartment syndrome    Chronic Afib   - c/w Eliquis     Atelectasis   - Incentive spirometer     HTN  - Not on anti-HTN at home   - Monitor BP     HLD  - c/w Lipitor    GERD  - c/w Protonix     Prophylaxis:  DVT: Eliquis  GI: Protonix

## 2022-10-31 NOTE — DISCHARGE NOTE NURSING/CASE MANAGEMENT/SOCIAL WORK - PATIENT PORTAL LINK FT
You can access the FollowMyHealth Patient Portal offered by [facility] by registering at the following website: [email or web address]. By joining [facility]’s FollowMyHealth portal, you will also be able to view your health information using other applications (apps) compatible with our system.

## 2022-10-31 NOTE — DISCHARGE NOTE NURSING/CASE MANAGEMENT/SOCIAL WORK - NSDCPEFALRISK_GEN_ALL_CORE
For information on Fall & Injury Prevention, visit: [email or web address] OR  [email or web address] OR  https://www.cdc.gov/steadi/patient.html

## 2022-10-31 NOTE — PROGRESS NOTE ADULT - SUBJECTIVE AND OBJECTIVE BOX
88 y/o male from home, lives w/ his son, ambulates w/ assistance of cane/walker w/ PMH of Afib on eliquis, GERD, CVA, HTN, HLD, prostate cancer p/w cough, shortness of breath, dizziness and myalgias for almost x 1 week and was admitted w/ Para-influenza Infection. He is lying in bed in NAD.      MEDICATIONS  (STANDING):  apixaban 2.5 milliGRAM(s) Oral every 12 hours  aspirin  chewable 81 milliGRAM(s) Oral daily  atorvastatin 40 milliGRAM(s) Oral at bedtime  pantoprazole    Tablet 40 milliGRAM(s) Oral before breakfast    MEDICATIONS  (PRN):  guaiFENesin Oral Liquid (Sugar-Free) 100 milliGRAM(s) Oral every 6 hours PRN Cough      Allergies    No Known Allergies    Intolerances        Vital Signs Last 24 Hrs  T(C): 36.6 (31 Oct 2022 11:44), Max: 36.6 (31 Oct 2022 00:00)  T(F): 97.8 (31 Oct 2022 11:44), Max: 97.8 (31 Oct 2022 00:00)  HR: 77 (31 Oct 2022 16:35) (66 - 86)  BP: 117/72 (31 Oct 2022 16:35) (94/61 - 128/70)  BP(mean): --  RR: 18 (31 Oct 2022 16:35) (16 - 18)  SpO2: 99% (31 Oct 2022 16:35) (97% - 99%)    Parameters below as of 31 Oct 2022 16:35  Patient On (Oxygen Delivery Method): room air        PHYSICAL EXAM:  GENERAL: NAD, well-groomed, well-developed  HEAD:  Atraumatic, Normocephalic  EYES: EOMI, PERRLA   NECK: Supple   NERVOUS SYSTEM:  Alert   CHEST/LUNG: Clear to auscultation bilaterally; No rales, rhonchi, wheezing, or rubs  HEART: Regular rate and rhythm; No murmurs, rubs, or gallops  ABDOMEN: Soft, Nontender, Nondistended; Bowel sounds present  EXTREMITIES: , No clubbing, cyanosis, or edema    LABS:                        14.9   8.83  )-----------( 179      ( 30 Oct 2022 07:00 )             46.3     10-30    135  |  104  |  18  ----------------------------<  93  4.0   |  24  |  0.92    Ca    8.9      30 Oct 2022 07:00             Culture - Blood (collected 28 Oct 2022 19:45)  Source: .Blood Blood  Preliminary Report (30 Oct 2022 02:02):    No growth to date.    Culture - Blood (collected 28 Oct 2022 19:26)  Source: .Blood Blood  Preliminary Report (30 Oct 2022 02:02):    No growth to date.      RADIOLOGY & ADDITIONAL TESTS:

## 2022-10-31 NOTE — PHYSICAL THERAPY INITIAL EVALUATION ADULT - ADDITIONAL COMMENTS
Patient lives c son in a pvt house c 4 entry steps c R rail up, all amenities on the 1st floor. Independent c all basic ADL's and ambulation without device. Pt has own rolling walker and cane. Has HHA 7 hrs/7 days.

## 2022-10-31 NOTE — DISCHARGE NOTE PROVIDER - HOSPITAL COURSE
88 y/o male from home, lives w/ his son, ambulates w/ assistance of cane/walker w/ PMH of Afib on eliquis, GERD, CVA, HTN, HLD, prostate cancer p/w cough, shortness of breath, dizziness and myalgias for almost x 1 week and was admitted w/ Para-influenza Infection. His symptoms improved and he was discharged home.    Discharge time: 43 minutes     Vital Signs Last 24 Hrs  T(C): 36.6 (31 Oct 2022 11:44), Max: 36.8 (30 Oct 2022 17:40)  T(F): 97.8 (31 Oct 2022 11:44), Max: 98.3 (30 Oct 2022 17:40)  HR: 86 (31 Oct 2022 11:44) (66 - 88)  BP: 94/61 (31 Oct 2022 11:44) (94/61 - 155/78)   RR: 18 (31 Oct 2022 11:44) (16 - 18)  SpO2: 97% (31 Oct 2022 11:44) (97% - 100%)    Parameters below as of 31 Oct 2022 05:29  Patient On (Oxygen Delivery Method): room air      PHYSICAL EXAM:  GENERAL: NAD, well-groomed, well-developed  HEAD:  Atraumatic, Normocephalic  EYES: EOMI, PERRLA   NECK: Supple   NERVOUS SYSTEM:  Alert   CHEST/LUNG: Clear to auscultation bilaterally; No rales, rhonchi, wheezing, or rubs  HEART: Regular rate and rhythm; No murmurs, rubs, or gallops  ABDOMEN: Soft, Nontender, Nondistended; Bowel sounds present  EXTREMITIES: , No clubbing, cyanosis, or edema

## 2022-10-31 NOTE — PHYSICAL THERAPY INITIAL EVALUATION ADULT - GENERAL OBSERVATIONS, REHAB EVAL
Chart (EMR) reviewed. Received supine c HOB elevated, NAD.+heplock. [place]. Visually impaired. Alert. Ox3. Able to follow multistep commands.

## 2022-10-31 NOTE — DISCHARGE NOTE PROVIDER - NSDCFUSCHEDAPPT_GEN_ALL_CORE_FT
Curly Jaime  [facility] Physician [facility]  UROLOGY [address]  Scheduled Appointment: 11/21/2022

## 2022-10-31 NOTE — DISCHARGE NOTE NURSING/CASE MANAGEMENT/SOCIAL WORK - NSDPACMPNY_GEN_ALL_CORE
Already speaking with an on call nurse.     Reason for Disposition   Caller has already spoken with another triager and has no further questions.    Protocols used: NO CONTACT OR DUPLICATE CONTACT CALL-A-AH       Other (Specify)

## 2022-10-31 NOTE — DISCHARGE NOTE PROVIDER - NSDCMRMEDTOKEN_GEN_ALL_CORE_FT
apixaban 2.5 mg oral tablet: 1 tab(s) orally 2 times a day  aspirin 81 mg oral tablet, chewable: 1 tab(s) orally once a day  atorvastatin 40 mg oral tablet: 1 tab(s) orally once a day  lactobacillus acidophilus oral capsule: 1 tab(s) orally 2 times a day  pantoprazole 20 mg oral delayed release tablet: 1 tab(s) orally once a day

## 2022-10-31 NOTE — DISCHARGE NOTE PROVIDER - NSDCCPCAREPLAN_GEN_ALL_CORE_FT
PRINCIPAL DISCHARGE DIAGNOSIS  Diagnosis: Dyspnea  Assessment and Plan of Treatment:       SECONDARY DISCHARGE DIAGNOSES  Diagnosis: IV infiltration, initial encounter  Assessment and Plan of Treatment:

## 2022-11-03 LAB
CULTURE RESULTS: SIGNIFICANT CHANGE UP
CULTURE RESULTS: SIGNIFICANT CHANGE UP
SPECIMEN SOURCE: SIGNIFICANT CHANGE UP
SPECIMEN SOURCE: SIGNIFICANT CHANGE UP

## 2022-11-07 DIAGNOSIS — J98.11 ATELECTASIS: ICD-10-CM

## 2022-11-07 DIAGNOSIS — Z79.899 OTHER LONG TERM (CURRENT) DRUG THERAPY: ICD-10-CM

## 2022-11-07 DIAGNOSIS — B34.8 OTHER VIRAL INFECTIONS OF UNSPECIFIED SITE: ICD-10-CM

## 2022-11-07 DIAGNOSIS — Z79.82 LONG TERM (CURRENT) USE OF ASPIRIN: ICD-10-CM

## 2022-11-07 DIAGNOSIS — Z85.46 PERSONAL HISTORY OF MALIGNANT NEOPLASM OF PROSTATE: ICD-10-CM

## 2022-11-07 DIAGNOSIS — T80.1XXA VASCULAR COMPLICATIONS FOLLOWING INFUSION, TRANSFUSION AND THERAPEUTIC INJECTION, INITIAL ENCOUNTER: ICD-10-CM

## 2022-11-07 DIAGNOSIS — I48.20 CHRONIC ATRIAL FIBRILLATION, UNSPECIFIED: ICD-10-CM

## 2022-11-07 DIAGNOSIS — E78.5 HYPERLIPIDEMIA, UNSPECIFIED: ICD-10-CM

## 2022-11-07 DIAGNOSIS — K21.9 GASTRO-ESOPHAGEAL REFLUX DISEASE WITHOUT ESOPHAGITIS: ICD-10-CM

## 2022-11-07 DIAGNOSIS — Z79.01 LONG TERM (CURRENT) USE OF ANTICOAGULANTS: ICD-10-CM
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
## 2022-11-07 DIAGNOSIS — I10 ESSENTIAL (PRIMARY) HYPERTENSION: ICD-10-CM

## 2022-11-21 ENCOUNTER — APPOINTMENT (OUTPATIENT)
Dept: UROLOGY | Facility: CLINIC | Age: 87
End: 2022-11-21

## 2023-01-18 ENCOUNTER — INPATIENT (INPATIENT)
Facility: HOSPITAL | Age: 88
LOS: 2 days | Discharge: HOME | End: 2023-01-21
Attending: HOSPITALIST | Admitting: HOSPITALIST
Payer: MEDICARE

## 2023-01-18 VITALS
OXYGEN SATURATION: 100 % | SYSTOLIC BLOOD PRESSURE: 123 MMHG | RESPIRATION RATE: 26 BRPM | HEART RATE: 62 BPM | TEMPERATURE: 98 F | DIASTOLIC BLOOD PRESSURE: 59 MMHG
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
## 2023-01-18 DIAGNOSIS — R42 DIZZINESS AND GIDDINESS: ICD-10-CM

## 2023-01-18 DIAGNOSIS — Z86.73 PERSONAL HISTORY OF TRANSIENT ISCHEMIC ATTACK (TIA), AND CEREBRAL INFARCTION WITHOUT RESIDUAL DEFICITS: ICD-10-CM

## 2023-01-18 DIAGNOSIS — F03.90 UNSPECIFIED DEMENTIA WITHOUT BEHAVIORAL DISTURBANCE: ICD-10-CM

## 2023-01-18 DIAGNOSIS — I10 ESSENTIAL (PRIMARY) HYPERTENSION: ICD-10-CM

## 2023-01-18 DIAGNOSIS — Z98.890 OTHER SPECIFIED POSTPROCEDURAL STATES: Chronic | ICD-10-CM

## 2023-01-18 DIAGNOSIS — I48.20 CHRONIC ATRIAL FIBRILLATION, UNSPECIFIED: ICD-10-CM

## 2023-01-18 DIAGNOSIS — Z29.9 ENCOUNTER FOR PROPHYLACTIC MEASURES, UNSPECIFIED: ICD-10-CM

## 2023-01-18 LAB
ALBUMIN SERPL ELPH-MCNC: 4 G/DL — SIGNIFICANT CHANGE UP (ref 3.3–5)
ALP SERPL-CCNC: 87 U/L — SIGNIFICANT CHANGE UP (ref 40–120)
ALT FLD-CCNC: 20 U/L — SIGNIFICANT CHANGE UP (ref 4–41)
ANION GAP SERPL CALC-SCNC: 11 MMOL/L — SIGNIFICANT CHANGE UP (ref 7–14)
APPEARANCE UR: CLEAR — SIGNIFICANT CHANGE UP
APTT BLD: 25.9 SEC — LOW (ref 27–36.3)
AST SERPL-CCNC: 21 U/L — SIGNIFICANT CHANGE UP (ref 4–40)
BASE EXCESS BLDV CALC-SCNC: -0.6 MMOL/L — SIGNIFICANT CHANGE UP (ref -2–3)
BASE EXCESS BLDV CALC-SCNC: 2.8 MMOL/L — SIGNIFICANT CHANGE UP (ref -2–3)
BASOPHILS # BLD AUTO: 0.02 K/UL — SIGNIFICANT CHANGE UP (ref 0–0.2)
BASOPHILS NFR BLD AUTO: 0.4 % — SIGNIFICANT CHANGE UP (ref 0–2)
BILIRUB SERPL-MCNC: 0.7 MG/DL — SIGNIFICANT CHANGE UP (ref 0.2–1.2)
BILIRUB UR-MCNC: NEGATIVE — SIGNIFICANT CHANGE UP
BLOOD GAS VENOUS COMPREHENSIVE RESULT: SIGNIFICANT CHANGE UP
BUN SERPL-MCNC: 23 MG/DL — SIGNIFICANT CHANGE UP (ref 7–23)
CALCIUM SERPL-MCNC: 9.4 MG/DL — SIGNIFICANT CHANGE UP (ref 8.4–10.5)
CHLORIDE BLDV-SCNC: 102 MMOL/L — SIGNIFICANT CHANGE UP (ref 96–108)
CHLORIDE BLDV-SCNC: 106 MMOL/L — SIGNIFICANT CHANGE UP (ref 96–108)
CHLORIDE SERPL-SCNC: 103 MMOL/L — SIGNIFICANT CHANGE UP (ref 98–107)
CO2 BLDV-SCNC: 24.3 MMOL/L — SIGNIFICANT CHANGE UP (ref 22–26)
CO2 BLDV-SCNC: 31.5 MMOL/L — HIGH (ref 22–26)
CO2 SERPL-SCNC: 24 MMOL/L — SIGNIFICANT CHANGE UP (ref 22–31)
COLOR SPEC: SIGNIFICANT CHANGE UP
CREAT SERPL-MCNC: 0.98 MG/DL — SIGNIFICANT CHANGE UP (ref 0.5–1.3)
DIFF PNL FLD: NEGATIVE — SIGNIFICANT CHANGE UP
EGFR: 75 ML/MIN/1.73M2 — SIGNIFICANT CHANGE UP
EOSINOPHIL # BLD AUTO: 0.22 K/UL — SIGNIFICANT CHANGE UP (ref 0–0.5)
EOSINOPHIL NFR BLD AUTO: 4 % — SIGNIFICANT CHANGE UP (ref 0–6)
FLUAV AG NPH QL: SIGNIFICANT CHANGE UP
FLUBV AG NPH QL: SIGNIFICANT CHANGE UP
GAS PNL BLDV: 135 MMOL/L — LOW (ref 136–145)
GAS PNL BLDV: 138 MMOL/L — SIGNIFICANT CHANGE UP (ref 136–145)
GLUCOSE BLDV-MCNC: 124 MG/DL — HIGH (ref 70–99)
GLUCOSE BLDV-MCNC: 90 MG/DL — SIGNIFICANT CHANGE UP (ref 70–99)
GLUCOSE SERPL-MCNC: 120 MG/DL — HIGH (ref 70–99)
GLUCOSE UR QL: NEGATIVE — SIGNIFICANT CHANGE UP
HCO3 BLDV-SCNC: 23 MMOL/L — SIGNIFICANT CHANGE UP (ref 22–29)
HCO3 BLDV-SCNC: 30 MMOL/L — HIGH (ref 22–29)
HCT VFR BLD CALC: 45.7 % — SIGNIFICANT CHANGE UP (ref 39–50)
HCT VFR BLDA CALC: 44 % — SIGNIFICANT CHANGE UP (ref 39–51)
HCT VFR BLDA CALC: 46 % — SIGNIFICANT CHANGE UP (ref 39–51)
HGB BLD CALC-MCNC: 14.7 G/DL — SIGNIFICANT CHANGE UP (ref 13–17)
HGB BLD CALC-MCNC: 15.2 G/DL — SIGNIFICANT CHANGE UP (ref 13–17)
HGB BLD-MCNC: 14.6 G/DL — SIGNIFICANT CHANGE UP (ref 13–17)
IANC: 3.67 K/UL — SIGNIFICANT CHANGE UP (ref 1.8–7.4)
IMM GRANULOCYTES NFR BLD AUTO: 0.2 % — SIGNIFICANT CHANGE UP (ref 0–0.9)
INR BLD: 1.04 RATIO — SIGNIFICANT CHANGE UP (ref 0.88–1.16)
KETONES UR-MCNC: NEGATIVE — SIGNIFICANT CHANGE UP
LACTATE BLDV-MCNC: 1.9 MMOL/L — SIGNIFICANT CHANGE UP (ref 0.5–2)
LACTATE BLDV-MCNC: 2.1 MMOL/L — HIGH (ref 0.5–2)
LEUKOCYTE ESTERASE UR-ACNC: NEGATIVE — SIGNIFICANT CHANGE UP
LYMPHOCYTES # BLD AUTO: 1.13 K/UL — SIGNIFICANT CHANGE UP (ref 1–3.3)
LYMPHOCYTES # BLD AUTO: 20.7 % — SIGNIFICANT CHANGE UP (ref 13–44)
MAGNESIUM SERPL-MCNC: 2.2 MG/DL — SIGNIFICANT CHANGE UP (ref 1.6–2.6)
MCHC RBC-ENTMCNC: 29.1 PG — SIGNIFICANT CHANGE UP (ref 27–34)
MCHC RBC-ENTMCNC: 31.9 GM/DL — LOW (ref 32–36)
MCV RBC AUTO: 91 FL — SIGNIFICANT CHANGE UP (ref 80–100)
MONOCYTES # BLD AUTO: 0.41 K/UL — SIGNIFICANT CHANGE UP (ref 0–0.9)
MONOCYTES NFR BLD AUTO: 7.5 % — SIGNIFICANT CHANGE UP (ref 2–14)
NEUTROPHILS # BLD AUTO: 3.67 K/UL — SIGNIFICANT CHANGE UP (ref 1.8–7.4)
NEUTROPHILS NFR BLD AUTO: 67.2 % — SIGNIFICANT CHANGE UP (ref 43–77)
NITRITE UR-MCNC: NEGATIVE — SIGNIFICANT CHANGE UP
NRBC # BLD: 0 /100 WBCS — SIGNIFICANT CHANGE UP (ref 0–0)
NRBC # FLD: 0 K/UL — SIGNIFICANT CHANGE UP (ref 0–0)
PCO2 BLDV: 35 MMHG — LOW (ref 42–55)
PCO2 BLDV: 54 MMHG — SIGNIFICANT CHANGE UP (ref 42–55)
PH BLDV: 7.35 — SIGNIFICANT CHANGE UP (ref 7.32–7.43)
PH BLDV: 7.43 — SIGNIFICANT CHANGE UP (ref 7.32–7.43)
PH UR: 7.5 — SIGNIFICANT CHANGE UP (ref 5–8)
PHOSPHATE SERPL-MCNC: 2.9 MG/DL — SIGNIFICANT CHANGE UP (ref 2.5–4.5)
PLATELET # BLD AUTO: 153 K/UL — SIGNIFICANT CHANGE UP (ref 150–400)
PO2 BLDV: 21 MMHG — SIGNIFICANT CHANGE UP
PO2 BLDV: 32 MMHG — SIGNIFICANT CHANGE UP
POTASSIUM BLDV-SCNC: 3.7 MMOL/L — SIGNIFICANT CHANGE UP (ref 3.5–5.1)
POTASSIUM BLDV-SCNC: 4.6 MMOL/L — SIGNIFICANT CHANGE UP (ref 3.5–5.1)
POTASSIUM SERPL-MCNC: 4.8 MMOL/L — SIGNIFICANT CHANGE UP (ref 3.5–5.3)
POTASSIUM SERPL-SCNC: 4.8 MMOL/L — SIGNIFICANT CHANGE UP (ref 3.5–5.3)
PROT SERPL-MCNC: 7.9 G/DL — SIGNIFICANT CHANGE UP (ref 6–8.3)
PROT UR-MCNC: NEGATIVE — SIGNIFICANT CHANGE UP
PROTHROM AB SERPL-ACNC: 12.1 SEC — SIGNIFICANT CHANGE UP (ref 10.5–13.4)
RBC # BLD: 5.02 M/UL — SIGNIFICANT CHANGE UP (ref 4.2–5.8)
RBC # FLD: 14.6 % — HIGH (ref 10.3–14.5)
RSV RNA NPH QL NAA+NON-PROBE: SIGNIFICANT CHANGE UP
SAO2 % BLDV: 24.4 % — SIGNIFICANT CHANGE UP
SAO2 % BLDV: 50.1 % — SIGNIFICANT CHANGE UP
SARS-COV-2 RNA SPEC QL NAA+PROBE: SIGNIFICANT CHANGE UP
SODIUM SERPL-SCNC: 138 MMOL/L — SIGNIFICANT CHANGE UP (ref 135–145)
SP GR SPEC: 1.01 — SIGNIFICANT CHANGE UP (ref 1.01–1.05)
TROPONIN T, HIGH SENSITIVITY RESULT: 15 NG/L — SIGNIFICANT CHANGE UP
TROPONIN T, HIGH SENSITIVITY RESULT: 17 NG/L — SIGNIFICANT CHANGE UP
UROBILINOGEN FLD QL: SIGNIFICANT CHANGE UP
WBC # BLD: 5.46 K/UL — SIGNIFICANT CHANGE UP (ref 3.8–10.5)
WBC # FLD AUTO: 5.46 K/UL — SIGNIFICANT CHANGE UP (ref 3.8–10.5)

## 2023-01-18 PROCEDURE — 72125 CT NECK SPINE W/O DYE: CPT | Mod: 26,MG

## 2023-01-18 PROCEDURE — G1004: CPT

## 2023-01-18 PROCEDURE — 73521 X-RAY EXAM HIPS BI 2 VIEWS: CPT | Mod: 26

## 2023-01-18 PROCEDURE — 99285 EMERGENCY DEPT VISIT HI MDM: CPT | Mod: FS

## 2023-01-18 PROCEDURE — 71046 X-RAY EXAM CHEST 2 VIEWS: CPT | Mod: 26

## 2023-01-18 PROCEDURE — 93880 EXTRACRANIAL BILAT STUDY: CPT | Mod: 26

## 2023-01-18 PROCEDURE — 70450 CT HEAD/BRAIN W/O DYE: CPT | Mod: 26,MG

## 2023-01-18 PROCEDURE — 99223 1ST HOSP IP/OBS HIGH 75: CPT

## 2023-01-18 RX ORDER — ACETAMINOPHEN 500 MG
650 TABLET ORAL EVERY 6 HOURS
Refills: 0 | Status: DISCONTINUED | OUTPATIENT
Start: 2023-01-18 | End: 2023-01-21

## 2023-01-18 RX ORDER — APIXABAN 2.5 MG/1
5 TABLET, FILM COATED ORAL
Refills: 0 | Status: DISCONTINUED | OUTPATIENT
Start: 2023-01-18 | End: 2023-01-21

## 2023-01-18 RX ORDER — ATORVASTATIN CALCIUM 80 MG/1
40 TABLET, FILM COATED ORAL AT BEDTIME
Refills: 0 | Status: DISCONTINUED | OUTPATIENT
Start: 2023-01-18 | End: 2023-01-21

## 2023-01-18 RX ORDER — LANOLIN ALCOHOL/MO/W.PET/CERES
3 CREAM (GRAM) TOPICAL AT BEDTIME
Refills: 0 | Status: DISCONTINUED | OUTPATIENT
Start: 2023-01-18 | End: 2023-01-21

## 2023-01-18 RX ORDER — SODIUM CHLORIDE 9 MG/ML
1000 INJECTION INTRAMUSCULAR; INTRAVENOUS; SUBCUTANEOUS ONCE
Refills: 0 | Status: COMPLETED | OUTPATIENT
Start: 2023-01-18 | End: 2023-01-18

## 2023-01-18 RX ORDER — PANTOPRAZOLE SODIUM 20 MG/1
40 TABLET, DELAYED RELEASE ORAL
Refills: 0 | Status: DISCONTINUED | OUTPATIENT
Start: 2023-01-18 | End: 2023-01-21

## 2023-01-18 RX ORDER — MECLIZINE HCL 12.5 MG
12.5 TABLET ORAL THREE TIMES A DAY
Refills: 0 | Status: DISCONTINUED | OUTPATIENT
Start: 2023-01-18 | End: 2023-01-21

## 2023-01-18 RX ADMIN — SODIUM CHLORIDE 1000 MILLILITER(S): 9 INJECTION INTRAMUSCULAR; INTRAVENOUS; SUBCUTANEOUS at 09:49

## 2023-01-18 RX ADMIN — APIXABAN 5 MILLIGRAM(S): 2.5 TABLET, FILM COATED ORAL at 20:00

## 2023-01-18 RX ADMIN — ATORVASTATIN CALCIUM 40 MILLIGRAM(S): 80 TABLET, FILM COATED ORAL at 22:05

## 2023-01-18 NOTE — ED PROVIDER NOTE - MUSCULOSKELETAL MINIMAL EXAM
no midline cspin etenderness, no signs of hip trauma/instability, no ecchymosis noted to legs/torso/atraumatic

## 2023-01-18 NOTE — ED ADULT TRIAGE NOTE - CHIEF COMPLAINT QUOTE
BIBEMS for unwitnessed fall x 1 hour ago. Family states they heard a loud noise and found patient on the floor. No abrasion or laceration noted. Pt unsure how he fell, states he hit his head and it is painful. Hx of CVA on eliquis with left sided residual weakness, dementia baseline A&Ox2 not oriented to time, hypotension, vertigo. BIBEMS for unwitnessed fall x 1 hour ago. Family states they heard a loud noise and found patient on the floor. No abrasion or laceration noted. Pt unsure how he fell, states he hit his head and it is painful. Hx of CVA on eliquis with left sided residual weakness, dementia baseline A&Ox2 not oriented to time, hypotension, vertigo. Pt noted to be tachypneic

## 2023-01-18 NOTE — H&P ADULT - NSICDXPASTMEDICALHX_GEN_ALL_CORE_FT
PAST MEDICAL HISTORY:  Atrial fibrillation     CVA (cerebrovascular accident)     HTN (hypertension)      PAST MEDICAL HISTORY:  Atrial fibrillation     CVA (cerebrovascular accident)     Dementia     HTN (hypertension)     Vertigo

## 2023-01-18 NOTE — ED ADULT NURSE NOTE - NSIMPLEMENTINTERV_GEN_ALL_ED
Implemented All Fall with Harm Risk Interventions:  [place] to call system. Call bell, personal items and telephone within reach. Instruct patient to call for assistance. Room bathroom lighting operational. Non-slip footwear when patient is off stretcher. Physically safe environment: no spills, clutter or unnecessary equipment. Stretcher in lowest position, wheels locked, appropriate side rails in place. Provide visual cue, wrist band, yellow gown, etc. Monitor gait and stability. Monitor for mental status changes and reorient to person, place, and time. Review medications for side effects contributing to fall risk. Reinforce activity limits and safety measures with patient and family. Provide visual clues: red socks. Implemented All Fall with Harm Risk Interventions:  [place] to call system. Call bell, personal items and telephone within reach. Instruct patient to call for assistance. Room bathroom lighting operational. Non-slip footwear when patient is off stretcher. Physically safe environment: no spills, clutter or unnecessary equipment. Stretcher in lowest position, wheels locked, appropriate side rails in place. Provide visual cue, wrist band, yellow gown, etc. Monitor gait and stability. Monitor for mental status changes and reorient to person, place, and time. Review medications for side effects contributing to fall risk. Reinforce activity limits and safety measures with patient and family. Provide visual clues: red socks. Implemented All Fall with Harm Risk Interventions:  [place] to call system. Call bell, personal items and telephone within reach. Instruct patient to call for assistance. Room bathroom lighting operational. Non-slip footwear when patient is off stretcher. Physically safe environment: no spills, clutter or unnecessary equipment. Stretcher in lowest position, wheels locked, appropriate side rails in place. Provide visual cue, wrist band, yellow gown, etc. Monitor gait and stability. Monitor for mental status changes and reorient to person, place, and time. Review medications for side effects contributing to fall risk. Reinforce activity limits and safety measures with patient and family. Provide visual clues: red socks.

## 2023-01-18 NOTE — ED ADULT TRIAGE NOTE - NS ED NURSE AMBULANCES
[facility]

## 2023-01-18 NOTE — H&P ADULT - PROBLEM SELECTOR PLAN 6
AAOx1-2 at baseline with short-term memory deficits per patient's son  -frequent reorientation as needed

## 2023-01-18 NOTE — ED ADULT NURSE NOTE - CHIEF COMPLAINT QUOTE
BIBEMS for unwitnessed fall x 1 hour ago. Family states they heard a loud noise and found patient on the floor. No abrasion or laceration noted. Pt unsure how he fell, states he hit his head and it is painful. Hx of CVA on eliquis with left sided residual weakness, dementia baseline A&Ox2 not oriented to time, hypotension, vertigo. Pt noted to be tachypneic

## 2023-01-18 NOTE — ED PROVIDER NOTE - NSICDXPASTMEDICALHX_GEN_ALL_CORE_FT
PAST MEDICAL HISTORY:  Atrial fibrillation     CVA (cerebrovascular accident)     HTN (hypertension)

## 2023-01-18 NOTE — ED PROVIDER NOTE - NS ED ATTENDING STATEMENT MOD
This was a shared visit with the DIA. I reviewed and verified the documentation and independently performed the documented:

## 2023-01-18 NOTE — ED PROVIDER NOTE - ATTENDING APP SHARED VISIT CONTRIBUTION OF CARE
DR. FIGUEREDO, ATTENDING MD-  I personally saw the patient with the PA and performed a substantive portion of the visit including all aspects of the medical decision making.    89 y/o male h/o afib on eliquis cva htn hld with dizziness frontal ha and fall with head trauma.  Unwitnessed by family.  Pt is lmtd historian.  No external signs of traumatic injury, non ttp spinous midline, moving all ext, pelvis stable.  Eval for ich, anemia, lyte abn, occult infection.  Obtain cbc cmp ct head c spine cxr xr pelvis ua ucx viral swab reassess. DR. FIGUEREDO, ATTENDING MD-  I personally saw the patient with the PA and performed a substantive portion of the visit including all aspects of the medical decision making.    87 y/o male h/o afib on eliquis cva htn hld with dizziness frontal ha and fall with head trauma.  Unwitnessed by family.  Pt is lmtd historian.  No external signs of traumatic injury, non ttp spinous midline, moving all ext, pelvis stable.  Eval for ich, anemia, lyte abn, occult infection.  Obtain cbc cmp ct head c spine cxr xr pelvis ua ucx viral swab reassess.

## 2023-01-18 NOTE — ED PROVIDER NOTE - CLINICAL SUMMARY MEDICAL DECISION MAKING FREE TEXT BOX
89 y/o male w/ unwitnessed fall this morning  -possible syncopal event, r/o acs, r/o intracranial injury (bleed fracture etc - patient on eliquis), low suspicion but r/o chest/hip injury  -labs trop vbg ua urine culture  -ct head/cspine, xray chest/hip  -likely admission

## 2023-01-18 NOTE — H&P ADULT - PROBLEM SELECTOR PLAN 1
Unclear etiology at this time as fall was unwitnessed and patient is a poor historian  -would monitor on telemetry  -PT eval  -obtain TTE  -cardiology following  -patient denies chest pain at this time  -fall precautions  -CTH and CT C-spine noted with no acute traumatic findings

## 2023-01-18 NOTE — H&P ADULT - PROBLEM SELECTOR PLAN 3
Chronic b/l cerebellar and cerebral infarcts noted on CTH  -will obtain [facility] Duplex of carotid arteries  -c/w atorvastatin and Eliquis  -check TTE

## 2023-01-18 NOTE — CONSULT NOTE ADULT - NS ATTEND AMEND GEN_ALL_CORE FT
Patient seen and examined. Agree with plan as detailed in PA/NP Note.     Check echo and orthostats. Appreciate neuro eval for confusion and h/o stroke    Lupe Unger MD  Pager: [phone] Patient seen and examined. Agree with plan as detailed in PA/NP Note.     Check echo and orthostats. Appreciate neuro eval for confusion and h/o stroke    Lupe Unger MD  Pager: [phone] Patient seen and examined. Agree with plan as detailed in PA/NP Note.     Check echo and orthostats. Appreciate neuro eval for confusion and h/o stroke    Lupe Unger MD  Pager: [phone]

## 2023-01-18 NOTE — ED ADULT NURSE NOTE - PRIMARY CARE PROVIDER
Left message asking patient to return call to reschedule appointment that is set up for 3/20/2020 as Dr. Johnson will not be in the office. Please reschedule patient upon return call.   unknown

## 2023-01-18 NOTE — H&P ADULT - NSHPLABSRESULTS_GEN_ALL_CORE
14.6   5.46  )-----------( 153      ( 2023 08:45 )             45.7         138  |  103  |  23  ----------------------------<  120<H>  4.8   |  24  |  0.98    Ca    9.4      2023 08:45  Phos  2.9       Mg     2.20         TPro  7.9  /  Alb  4.0  /  TBili  0.7  /  DBili  x   /  AST  21  /  ALT  20  /  AlkPhos  87      PT/INR - ( 2023 08:45 )   PT: 12.1 sec;   INR: 1.04 ratio         PTT - ( 2023 08:45 )  PTT:25.9 sec    Urinalysis Basic - ( 2023 10:30 )    Color: Light Yellow / Appearance: Clear / S.012 / pH: x  Gluc: x / Ketone: Negative  / Bili: Negative / Urobili: <2 mg/dL   Blood: x / Protein: Negative / Nitrite: Negative   Leuk Esterase: Negative / RBC: x / WBC x   Sq Epi: x / Non Sq Epi: x / Bacteria: x    EKG personally reviewed: poor study, NSR, no obvious ST changes    Chest x-ray personally reviewed: Clear lungs    < from: CT Head No Cont (23 @ 08:59) >    HEAD CT FINDINGS:  HEMORRHAGE:  No evidence of intracranial hemorrhage.  BRAIN PARENCHYMA:  Mildto moderate atrophy. Chronic small infarcts   bilateral cerebral hemispheres in a somewhat watershed/border zone   distribution. Largest are present within the occipital lobes. Additional   chronic small cerebellar infarcts.  No mass or mass effect.  VENTRICLES / SHIFT:  No hydrocephalus. No midline shift.  EXTRA-AXIAL / BASAL CISTERNS:  No extra-axial mass. Basal cisterns   preserved.  CALVARIUM AND EXTRACRANIAL SOFT TISSUES:  No depressed calvarial fracture.  SINUSES, ORBITS, MASTOIDS:  Prior right mastoidectomy. Small right   maxillary sinus..    CERVICAL SPINE FINDINGS:  FRACTURES:  No evidence of an acute cervical spine  fracture.  ALIGNMENT:  Mild reversal of the normal cervical lordosis.  No   significant subluxation  SPINAL COLUMN:  Narrowing of the C3-4, C5-6 and C6-7 disc spaces with   associated endplate degenerative changes/osteophytes. 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  OTHER: No prevertebral soft tissue swelling. Secretions within the pharynx    IMPRESSION:  *NO EVIDENCE OF AN ACUTE TRAUMATIC INTRACRANIAL INJURY. 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 CEREBRAL INFARCTS   ARE IN A SOMEWHAT WATERSHED/BORDER ZONE DISTRIBUTION. DOPPLER   ULTRASONOGRAPHY OF THE NECK MAY PROVE USEFUL IF NOT PREVIOUSLY PERFORMED.  *  NO EVIDENCE OF AN ACUTE CERVICAL SPINE FRACTURE.

## 2023-01-18 NOTE — ED ADULT NURSE NOTE - OBJECTIVE STATEMENT
Received pt to bed 28, A+Ox4, ambulatory @ baseline with assist. C/O head pain to the right side post fall, pt states he was using restroom and got dizzy and fell. Patient with cranial nerves intact, equal strength bilaterally, sensation equal bilaterally, no facial droop, intact finger to nose, negative pronator drift. Respirations even and unlabored, tachypneic to the mid 30s, no accessory muscle use, speaking in full clear uninterrupted sentences.  ABD is soft, non tender, non distended. Pt denies any chest pain, SOB, N/V/D, fever, chills. 20G to right forearm, Labs sent, will continue to monitor.

## 2023-01-18 NOTE — ED ADULT NURSE REASSESSMENT NOTE - NS ED NURSE REASSESS COMMENT FT1
Pt resting in bed, RR equal and unlabored, no acute distress noted. Comfort measures provided. Care plan continued as per ordered. Safety maintained.

## 2023-01-18 NOTE — H&P ADULT - ASSESSMENT
88 year old male with a history of Afib on Eliquis, GERD, HTN, CVA, vertigo, dementia AAOx1-2 at baseline presenting after a fall.

## 2023-01-18 NOTE — CONSULT NOTE ADULT - SUBJECTIVE AND OBJECTIVE BOX
date of consult 1/18/23    HISTORY OF PRESENT ILLNESS: HPI:    88 year old male, poor historian, [facility] CVA, Afib on Eliquis, GERD, HTN and prostate cancer, was brought to hospital via EMS following a fall at home.  Fall was unwitnessed, but heard by his daughter in law, Winifred.  Pt lives with son and daughter in law.  Per chart he was walking to the bathroom, felt dizzy and fell.  Family reported he was awake/alert on the floor.  Pt currently unable to answer questions, per RN in ER, he has becoming increasingly confused since arrival.    I called son Caden Guaman ([phone]) for collateral.  He asked me to call his wife, Winifred Guaman ([phone]), but she did not answer.      PAST MEDICAL & SURGICAL HISTORY:  Atrial fibrillation      HTN (hypertension)      CVA (cerebrovascular accident)      MEDICATIONS  (STANDING):   list from home pending      Allergies  No Known Allergies      FAMILY HISTORY:  Noncontributory for premature coronary disease or sudden cardiac death    SOCIAL HISTORY:    [x ] Non-smoker  [ ] Smoker  [ ] Alcohol    FLU VACCINE THIS YEAR STARTS IN AUGUST:  [ ] Yes    [ ] No    IF OVER 65 HAVE YOU EVER HAD A PNA VACCINE:  [ ] Yes    [ ] No       [ ] N/A      REVIEW OF SYSTEMS:  [ ]chest pain  [  ]shortness of breath  [  ]palpitations  [  ]syncope  [ ]near syncope [ ]upper extremity weakness   [ ] lower extremity weakness  [  ]diplopia  [  ]altered mental status   [  ]fevers  [ ]chills [ ]nausea  [ ]vomiting  [  ]dysphagia    [ ]abdominal pain  [ ]melena  [ ]BRBPR    [  ]epistaxis  [  ]rash    [ ]lower extremity edema        [ x] All others negative	  [ ] Unable to obtain      LABS:	 	    CARDIAC MARKERS:  Troponin T, High Sensitivity Result: 17                        14.6   5.46  )-----------( 153      ( 18 Jan 2023 08:45 )             45.7       138  |  103  |  23  ----------------------------<  120<H>  4.8   |  24  |  0.98    Ca    9.4      18 Jan 2023 08:45  Phos  2.9     01-18  Mg     2.20     01-18    TPro  7.9  /  Alb  4.0  /  TBili  0.7  /  DBili  x   /  AST  21  /  ALT  20  /  AlkPhos  87  01-18    Coags:  PT/INR - ( 18 Jan 2023 08:45 )   PT: 12.1 sec;   INR: 1.04 ratio      PTT - ( 18 Jan 2023 08:45 )  PTT:25.9 sec    PHYSICAL EXAM:  T(C): 36.4 (01-18-23 @ 07:04), Max: 36.4 (01-18-23 @ 07:04)  HR: 62 (01-18-23 @ 07:04) (62 - 62)  BP: 123/59 (01-18-23 @ 07:04) (123/59 - 123/59)  RR: 24 (01-18-23 @ 07:04) (24 - 26)  SpO2: 100% (01-18-23 @ 07:04) (100% - 100%)    Gen: Appears well in NAD  HEENT:  (-)icterus (-)pallor  CV: N S1 S2 1/6 SIMA (+)2 Pulses B/l  Resp:  Clear to auscultation B/L, normal effort  GI: (+) BS Soft, NT, ND  Lymph:  (-)Edema, (-)obvious lymphadenopathy  Skin: Warm to touch, Normal turgor  Psych: unable to fully assess        ECG:  	AF poor baseline    < from: CT Head No Cont (01.18.23 @ 08:59) >  IMPRESSION:  *NO EVIDENCE OF AN ACUTE TRAUMATIC INTRACRANIAL INJURY. 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 CEREBRAL INFARCTS   ARE IN A SOMEWHAT WATERSHED/BORDER ZONE DISTRIBUTION. DOPPLER   ULTRASONOGRAPHY OF THE NECK MAY PROVE USEFUL IF NOT PREVIOUSLY PERFORMED.  *  NO EVIDENCE OF AN ACUTE CERVICAL SPINE FRACTURE.  < end of copied text >    < from: Xray Chest 2 Views PA/Lat (01.18.23 @ 09:55) >    IMPRESSION: No acute traumatic findings.  < end of copied text >      ASSESSMENT/PLAN: 	88 year old male, poor historian, [facility] CVA, Afib on Eliquis, GERD, HTN and prostate cancer, was brought to hospital via EMS following an unwitnessed fall at home.    1. Afib  --per chart, chronic, on Eliquis  --cont lifelong AC if no contraindications  --admit to tele  --obtain home med list  --family contacted as noted above, unable to obtain any further information at the time of this note    2. Fall r/o syncope  --per chart pt complained of dizziness and was awake/alert  --CTH noted  --check carotid US  --check orthostatic vitals  --Neuro consult for increasing confusion per ER RN since arrival, currently unable to obtain any PMH or ROS from patient, ?need for repeat brain imaging  --awaiting discussion with family regarding his mental status   --restrict to bed until PT eval    3. HTN  --BP stable since admit, hold off on starting any anti-hypertensives yet      further reccs pending hospital course and results as above                         date of consult 1/18/23    HISTORY OF PRESENT ILLNESS: HPI:    88 year old male, poor historian, [facility] CVA, Afib on Eliquis, GERD, HTN and prostate cancer, was brought to hospital via EMS following a fall at home.  Fall was unwitnessed, but heard by his daughter in law, Winifred.  Pt lives with son and daughter in law.  Per chart he was walking to the bathroom, felt dizzy and fell.  Family reported he was awake/alert on the floor.  Pt currently unable to answer questions, per RN in ER, he has becoming increasingly confused since arrival.    I called son Caden Guaman ([phone]) for collateral.  He asked me to call his wife, Winifred Guaman ([phone]), but she did not answer.      PAST MEDICAL & SURGICAL HISTORY:  Atrial fibrillation      HTN (hypertension)      CVA (cerebrovascular accident)      MEDICATIONS  (STANDING):   list from home pending      Allergies  No Known Allergies      FAMILY HISTORY:  Noncontributory for premature coronary disease or sudden cardiac death    SOCIAL HISTORY:    [x ] Non-smoker  [ ] Smoker  [ ] Alcohol    FLU VACCINE THIS YEAR STARTS IN AUGUST:  [ ] Yes    [ ] No    IF OVER 65 HAVE YOU EVER HAD A PNA VACCINE:  [ ] Yes    [ ] No       [ ] N/A      REVIEW OF SYSTEMS:  [ ]chest pain  [  ]shortness of breath  [  ]palpitations  [  ]syncope  [ ]near syncope [ ]upper extremity weakness   [ ] lower extremity weakness  [  ]diplopia  [  ]altered mental status   [  ]fevers  [ ]chills [ ]nausea  [ ]vomiting  [  ]dysphagia    [ ]abdominal pain  [ ]melena  [ ]BRBPR    [  ]epistaxis  [  ]rash    [ ]lower extremity edema        [ x] All others negative	  [ ] Unable to obtain      LABS:	 	    CARDIAC MARKERS:  Troponin T, High Sensitivity Result: 17                        14.6   5.46  )-----------( 153      ( 18 Jan 2023 08:45 )             45.7       138  |  103  |  23  ----------------------------<  120<H>  4.8   |  24  |  0.98    Ca    9.4      18 Jan 2023 08:45  Phos  2.9     01-18  Mg     2.20     01-18    TPro  7.9  /  Alb  4.0  /  TBili  0.7  /  DBili  x   /  AST  21  /  ALT  20  /  AlkPhos  87  01-18    Coags:  PT/INR - ( 18 Jan 2023 08:45 )   PT: 12.1 sec;   INR: 1.04 ratio      PTT - ( 18 Jan 2023 08:45 )  PTT:25.9 sec    PHYSICAL EXAM:  T(C): 36.4 (01-18-23 @ 07:04), Max: 36.4 (01-18-23 @ 07:04)  HR: 62 (01-18-23 @ 07:04) (62 - 62)  BP: 123/59 (01-18-23 @ 07:04) (123/59 - 123/59)  RR: 24 (01-18-23 @ 07:04) (24 - 26)  SpO2: 100% (01-18-23 @ 07:04) (100% - 100%)    Gen: Appears well in NAD  HEENT:  (-)icterus (-)pallor  CV: N S1 S2 1/6 SIMA (+)2 Pulses B/l  Resp:  Clear to auscultation B/L, normal effort  GI: (+) BS Soft, NT, ND  Lymph:  (-)Edema, (-)obvious lymphadenopathy  Skin: Warm to touch, Normal turgor  Psych: unable to fully assess        ECG:  	AF poor baseline    < from: CT Head No Cont (01.18.23 @ 08:59) >  IMPRESSION:  *NO EVIDENCE OF AN ACUTE TRAUMATIC INTRACRANIAL INJURY. 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 CEREBRAL INFARCTS   ARE IN A SOMEWHAT WATERSHED/BORDER ZONE DISTRIBUTION. DOPPLER   ULTRASONOGRAPHY OF THE NECK MAY PROVE USEFUL IF NOT PREVIOUSLY PERFORMED.  *  NO EVIDENCE OF AN ACUTE CERVICAL SPINE FRACTURE.  < end of copied text >    < from: Xray Chest 2 Views PA/Lat (01.18.23 @ 09:55) >    IMPRESSION: No acute traumatic findings.  < end of copied text >      ASSESSMENT/PLAN: 	88 year old male, poor historian, PMH CVA, Afib on Eliquis, GERD, HTN and prostate cancer, was brought to hospital via EMS following an unwitnessed fall at home.    1. Afib  --per chart, chronic, on Eliquis  --cont lifelong AC if no contraindications  --admit to tele  --obtain home med list  --family contacted as noted above, unable to obtain any further information at the time of this note    2. Fall r/o syncope  --per chart pt complained of dizziness and was awake/alert  --CTH noted  --check carotid US  --check orthostatic vitals  --Neuro consult for increasing confusion per ER RN since arrival, currently unable to obtain any PMH or ROS from patient, ?need for repeat brain imaging  --awaiting discussion with family regarding his mental status   --restrict to bed until PT eval    3. HTN  --BP stable since admit, hold off on starting any anti-hypertensives yet      further reccs pending hospital course and results as above                         date of consult 1/18/23    HISTORY OF PRESENT ILLNESS: HPI:    88 year old male, poor historian, [facility] CVA, Afib on Eliquis, GERD, HTN and prostate cancer, was brought to hospital via EMS following a fall at home.  Fall was unwitnessed, but heard by his daughter in law, Winifred.  Pt lives with son and daughter in law.  Per chart he was walking to the bathroom, felt dizzy and fell.  Family reported he was awake/alert on the floor.  Pt currently unable to answer questions, per RN in ER, he has becoming increasingly confused since arrival.    I called son Caden Guaman ([phone]) for collateral.  He asked me to call his wife, Winifred Guaman ([phone]), but she did not answer.      PAST MEDICAL & SURGICAL HISTORY:  Atrial fibrillation      HTN (hypertension)      CVA (cerebrovascular accident)      MEDICATIONS  (STANDING):   list from home pending      Allergies  No Known Allergies      FAMILY HISTORY:  Noncontributory for premature coronary disease or sudden cardiac death    SOCIAL HISTORY:    [x ] Non-smoker  [ ] Smoker  [ ] Alcohol    FLU VACCINE THIS YEAR STARTS IN AUGUST:  [ ] Yes    [ ] No    IF OVER 65 HAVE YOU EVER HAD A PNA VACCINE:  [ ] Yes    [ ] No       [ ] N/A      REVIEW OF SYSTEMS:  [ ]chest pain  [  ]shortness of breath  [  ]palpitations  [  ]syncope  [ ]near syncope [ ]upper extremity weakness   [ ] lower extremity weakness  [  ]diplopia  [  ]altered mental status   [  ]fevers  [ ]chills [ ]nausea  [ ]vomiting  [  ]dysphagia    [ ]abdominal pain  [ ]melena  [ ]BRBPR    [  ]epistaxis  [  ]rash    [ ]lower extremity edema        [ x] All others negative	  [ ] Unable to obtain      LABS:	 	    CARDIAC MARKERS:  Troponin T, High Sensitivity Result: 17                        14.6   5.46  )-----------( 153      ( 18 Jan 2023 08:45 )             45.7       138  |  103  |  23  ----------------------------<  120<H>  4.8   |  24  |  0.98    Ca    9.4      18 Jan 2023 08:45  Phos  2.9     01-18  Mg     2.20     01-18    TPro  7.9  /  Alb  4.0  /  TBili  0.7  /  DBili  x   /  AST  21  /  ALT  20  /  AlkPhos  87  01-18    Coags:  PT/INR - ( 18 Jan 2023 08:45 )   PT: 12.1 sec;   INR: 1.04 ratio      PTT - ( 18 Jan 2023 08:45 )  PTT:25.9 sec    PHYSICAL EXAM:  T(C): 36.4 (01-18-23 @ 07:04), Max: 36.4 (01-18-23 @ 07:04)  HR: 62 (01-18-23 @ 07:04) (62 - 62)  BP: 123/59 (01-18-23 @ 07:04) (123/59 - 123/59)  RR: 24 (01-18-23 @ 07:04) (24 - 26)  SpO2: 100% (01-18-23 @ 07:04) (100% - 100%)    Gen: Appears well in NAD  HEENT:  (-)icterus (-)pallor  CV: N S1 S2 1/6 SIMA (+)2 Pulses B/l  Resp:  Clear to auscultation B/L, normal effort  GI: (+) BS Soft, NT, ND  Lymph:  (-)Edema, (-)obvious lymphadenopathy  Skin: Warm to touch, Normal turgor  Psych: unable to fully assess        ECG:  	AF poor baseline    < from: CT Head No Cont (01.18.23 @ 08:59) >  IMPRESSION:  *NO EVIDENCE OF AN ACUTE TRAUMATIC INTRACRANIAL INJURY. 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 CEREBRAL INFARCTS   ARE IN A SOMEWHAT WATERSHED/BORDER ZONE DISTRIBUTION. DOPPLER   ULTRASONOGRAPHY OF THE NECK MAY PROVE USEFUL IF NOT PREVIOUSLY PERFORMED.  *  NO EVIDENCE OF AN ACUTE CERVICAL SPINE FRACTURE.  < end of copied text >    < from: Xray Chest 2 Views PA/Lat (01.18.23 @ 09:55) >    IMPRESSION: No acute traumatic findings.  < end of copied text >      ASSESSMENT/PLAN: 	88 year old male, poor historian, PMH CVA, Afib on Eliquis, GERD, HTN and prostate cancer, was brought to hospital via EMS following an unwitnessed fall at home.    1. Afib  --per chart, chronic, on Eliquis  --cont lifelong AC if no contraindications  --admit to tele  --obtain home med list  --family contacted as noted above, unable to obtain any further information at the time of this note    2. Fall r/o syncope  --per chart pt complained of dizziness and was awake/alert  --CTH noted  --check carotid US  --check orthostatic vitals  --Neuro consult for increasing confusion per ER RN since arrival, currently unable to obtain any PMH or ROS from patient, ?need for repeat brain imaging  --awaiting discussion with family regarding his mental status   --restrict to bed until PT eval    3. HTN  --BP stable since admit, hold off on starting any anti-hypertensives yet      further reccs pending hospital course and results as above                         date of consult 1/18/23    HISTORY OF PRESENT ILLNESS: HPI:    88 year old male, poor historian, [facility] CVA, Afib on Eliquis, GERD, HTN and prostate cancer, was brought to hospital via EMS following a fall at home.  Fall was unwitnessed, but heard by his daughter in law, Winifred.  Pt lives with son and daughter in law.  Per chart he was walking to the bathroom, felt dizzy and fell.  Family reported he was awake/alert on the floor.  Pt currently unable to answer questions, per RN in ER, he has becoming increasingly confused since arrival.    I called son Caden Guaman ([phone]) for collateral.  He asked me to call his wife, Winifred Guaman ([phone]), but she did not answer.      PAST MEDICAL & SURGICAL HISTORY:  Atrial fibrillation      HTN (hypertension)      CVA (cerebrovascular accident)      MEDICATIONS  (STANDING):   list from home pending      Allergies  No Known Allergies      FAMILY HISTORY:  Noncontributory for premature coronary disease or sudden cardiac death    SOCIAL HISTORY:    [x ] Non-smoker  [ ] Smoker  [ ] Alcohol    FLU VACCINE THIS YEAR STARTS IN AUGUST:  [ ] Yes    [ ] No    IF OVER 65 HAVE YOU EVER HAD A PNA VACCINE:  [ ] Yes    [ ] No       [ ] N/A      REVIEW OF SYSTEMS:  [ ]chest pain  [  ]shortness of breath  [  ]palpitations  [  ]syncope  [ ]near syncope [ ]upper extremity weakness   [ ] lower extremity weakness  [  ]diplopia  [  ]altered mental status   [  ]fevers  [ ]chills [ ]nausea  [ ]vomiting  [  ]dysphagia    [ ]abdominal pain  [ ]melena  [ ]BRBPR    [  ]epistaxis  [  ]rash    [ ]lower extremity edema        [ x] All others negative	  [ ] Unable to obtain      LABS:	 	    CARDIAC MARKERS:  Troponin T, High Sensitivity Result: 17                        14.6   5.46  )-----------( 153      ( 18 Jan 2023 08:45 )             45.7       138  |  103  |  23  ----------------------------<  120<H>  4.8   |  24  |  0.98    Ca    9.4      18 Jan 2023 08:45  Phos  2.9     01-18  Mg     2.20     01-18    TPro  7.9  /  Alb  4.0  /  TBili  0.7  /  DBili  x   /  AST  21  /  ALT  20  /  AlkPhos  87  01-18    Coags:  PT/INR - ( 18 Jan 2023 08:45 )   PT: 12.1 sec;   INR: 1.04 ratio      PTT - ( 18 Jan 2023 08:45 )  PTT:25.9 sec    PHYSICAL EXAM:  T(C): 36.4 (01-18-23 @ 07:04), Max: 36.4 (01-18-23 @ 07:04)  HR: 62 (01-18-23 @ 07:04) (62 - 62)  BP: 123/59 (01-18-23 @ 07:04) (123/59 - 123/59)  RR: 24 (01-18-23 @ 07:04) (24 - 26)  SpO2: 100% (01-18-23 @ 07:04) (100% - 100%)    Gen: Appears well in NAD  HEENT:  (-)icterus (-)pallor  CV: N S1 S2 1/6 SIMA (+)2 Pulses B/l  Resp:  Clear to auscultation B/L, normal effort  GI: (+) BS Soft, NT, ND  Lymph:  (-)Edema, (-)obvious lymphadenopathy  Skin: Warm to touch, Normal turgor  Psych: unable to fully assess        ECG:  	AF poor baseline    < from: CT Head No Cont (01.18.23 @ 08:59) >  IMPRESSION:  *NO EVIDENCE OF AN ACUTE TRAUMATIC INTRACRANIAL INJURY. 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 CEREBRAL INFARCTS   ARE IN A SOMEWHAT WATERSHED/BORDER ZONE DISTRIBUTION. DOPPLER   ULTRASONOGRAPHY OF THE NECK MAY PROVE USEFUL IF NOT PREVIOUSLY PERFORMED.  *  NO EVIDENCE OF AN ACUTE CERVICAL SPINE FRACTURE.  < end of copied text >    < from: Xray Chest 2 Views PA/Lat (01.18.23 @ 09:55) >    IMPRESSION: No acute traumatic findings.  < end of copied text >      ASSESSMENT/PLAN: 	88 year old male, poor historian, PMH CVA, Afib on Eliquis, GERD, HTN and prostate cancer, was brought to hospital via EMS following an unwitnessed fall at home.    1. Afib  --per chart, chronic, on Eliquis  --cont lifelong AC if no contraindications  --admit to tele  --obtain home med list  --family contacted as noted above, unable to obtain any further information at the time of this note    2. Fall r/o syncope  --per chart pt complained of dizziness and was awake/alert  --CTH noted  --check carotid US  --check orthostatic vitals  --Neuro consult for increasing confusion per ER RN since arrival, currently unable to obtain any PMH or ROS from patient, ?need for repeat brain imaging  --awaiting discussion with family regarding his mental status   --restrict to bed until PT eval    3. HTN  --BP stable since admit, hold off on starting any anti-hypertensives yet      further reccs pending hospital course and results as above        ADDENDUM 14:15:    Spoke to daughter in law, Winifred.    Patient had CVA 5/2022 and was managed at [facility].  He has had intermittent confusion since then where he will not remember his families names or where he is, other times is completely oriented.  He is no longer on BP meds, only on Eliquis 5 BID.  He walks at home without walker or difficulties, does not fall.  Daughter in law is interested in home PT or nursing services to assist her in caring for him if he qualifies for any.  NO prior cardiac hx and has seen the cardio clinic at [facility] in the past, not regularly.  PMD Dr Braulio Gonzalez                 date of consult 1/18/23    HISTORY OF PRESENT ILLNESS: HPI:    88 year old male, poor historian, [facility] CVA, Afib on Eliquis, GERD, HTN and prostate cancer, was brought to hospital via EMS following a fall at home.  Fall was unwitnessed, but heard by his daughter in law, Winifred.  Pt lives with son and daughter in law.  Per chart he was walking to the bathroom, felt dizzy and fell.  Family reported he was awake/alert on the floor.  Pt currently unable to answer questions, per RN in ER, he has becoming increasingly confused since arrival.    I called son Caden Guaman ([phone]) for collateral.  He asked me to call his wife, Winifred Guaman ([phone]), but she did not answer.      PAST MEDICAL & SURGICAL HISTORY:  Atrial fibrillation      HTN (hypertension)      CVA (cerebrovascular accident)      MEDICATIONS  (STANDING):   list from home pending      Allergies  No Known Allergies      FAMILY HISTORY:  Noncontributory for premature coronary disease or sudden cardiac death    SOCIAL HISTORY:    [x ] Non-smoker  [ ] Smoker  [ ] Alcohol    FLU VACCINE THIS YEAR STARTS IN AUGUST:  [ ] Yes    [ ] No    IF OVER 65 HAVE YOU EVER HAD A PNA VACCINE:  [ ] Yes    [ ] No       [ ] N/A      REVIEW OF SYSTEMS:  [ ]chest pain  [  ]shortness of breath  [  ]palpitations  [  ]syncope  [ ]near syncope [ ]upper extremity weakness   [ ] lower extremity weakness  [  ]diplopia  [  ]altered mental status   [  ]fevers  [ ]chills [ ]nausea  [ ]vomiting  [  ]dysphagia    [ ]abdominal pain  [ ]melena  [ ]BRBPR    [  ]epistaxis  [  ]rash    [ ]lower extremity edema        [ x] All others negative	  [ ] Unable to obtain      LABS:	 	    CARDIAC MARKERS:  Troponin T, High Sensitivity Result: 17                        14.6   5.46  )-----------( 153      ( 18 Jan 2023 08:45 )             45.7       138  |  103  |  23  ----------------------------<  120<H>  4.8   |  24  |  0.98    Ca    9.4      18 Jan 2023 08:45  Phos  2.9     01-18  Mg     2.20     01-18    TPro  7.9  /  Alb  4.0  /  TBili  0.7  /  DBili  x   /  AST  21  /  ALT  20  /  AlkPhos  87  01-18    Coags:  PT/INR - ( 18 Jan 2023 08:45 )   PT: 12.1 sec;   INR: 1.04 ratio      PTT - ( 18 Jan 2023 08:45 )  PTT:25.9 sec    PHYSICAL EXAM:  T(C): 36.4 (01-18-23 @ 07:04), Max: 36.4 (01-18-23 @ 07:04)  HR: 62 (01-18-23 @ 07:04) (62 - 62)  BP: 123/59 (01-18-23 @ 07:04) (123/59 - 123/59)  RR: 24 (01-18-23 @ 07:04) (24 - 26)  SpO2: 100% (01-18-23 @ 07:04) (100% - 100%)    Gen: Appears well in NAD  HEENT:  (-)icterus (-)pallor  CV: N S1 S2 1/6 SIMA (+)2 Pulses B/l  Resp:  Clear to auscultation B/L, normal effort  GI: (+) BS Soft, NT, ND  Lymph:  (-)Edema, (-)obvious lymphadenopathy  Skin: Warm to touch, Normal turgor  Psych: unable to fully assess        ECG:  	AF poor baseline    < from: CT Head No Cont (01.18.23 @ 08:59) >  IMPRESSION:  *NO EVIDENCE OF AN ACUTE TRAUMATIC INTRACRANIAL INJURY. 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 CEREBRAL INFARCTS   ARE IN A SOMEWHAT WATERSHED/BORDER ZONE DISTRIBUTION. DOPPLER   ULTRASONOGRAPHY OF THE NECK MAY PROVE USEFUL IF NOT PREVIOUSLY PERFORMED.  *  NO EVIDENCE OF AN ACUTE CERVICAL SPINE FRACTURE.  < end of copied text >    < from: Xray Chest 2 Views PA/Lat (01.18.23 @ 09:55) >    IMPRESSION: No acute traumatic findings.  < end of copied text >      ASSESSMENT/PLAN: 	88 year old male, poor historian, PMH CVA, Afib on Eliquis, GERD, HTN and prostate cancer, was brought to hospital via EMS following an unwitnessed fall at home.    1. Afib  --per chart, chronic, on Eliquis  --cont lifelong AC if no contraindications  --admit to tele  --obtain home med list  --family contacted as noted above, unable to obtain any further information at the time of this note    2. Fall r/o syncope  --per chart pt complained of dizziness and was awake/alert  --CTH noted  --check carotid US  --check orthostatic vitals  --Neuro consult for increasing confusion per ER RN since arrival, currently unable to obtain any PMH or ROS from patient, ?need for repeat brain imaging  --awaiting discussion with family regarding his mental status   --restrict to bed until PT eval    3. HTN  --BP stable since admit, hold off on starting any anti-hypertensives yet      further reccs pending hospital course and results as above        ADDENDUM 14:15:    Spoke to daughter in law, Winifred.    Patient had CVA 5/2022 and was managed at [facility].  He has had intermittent confusion since then where he will not remember his families names or where he is, other times is completely oriented.  He is no longer on BP meds, only on Eliquis 5 BID.  He walks at home without walker or difficulties, does not fall.  Daughter in law is interested in home PT or nursing services to assist her in caring for him if he qualifies for any.  NO prior cardiac hx and has seen the cardio clinic at [facility] in the past, not regularly.  PMD Dr Braulio Gonzalez                 date of consult 1/18/23    HISTORY OF PRESENT ILLNESS: HPI:    88 year old male, poor historian, [facility] CVA, Afib on Eliquis, GERD, HTN and prostate cancer, was brought to hospital via EMS following a fall at home.  Fall was unwitnessed, but heard by his daughter in law, Winifred.  Pt lives with son and daughter in law.  Per chart he was walking to the bathroom, felt dizzy and fell.  Family reported he was awake/alert on the floor.  Pt currently unable to answer questions, per RN in ER, he has becoming increasingly confused since arrival.    I called son Caden Guaman ([phone]) for collateral.  He asked me to call his wife, Winifred Guaman ([phone]), but she did not answer.      PAST MEDICAL & SURGICAL HISTORY:  Atrial fibrillation      HTN (hypertension)      CVA (cerebrovascular accident)      MEDICATIONS  (STANDING):   list from home pending      Allergies  No Known Allergies      FAMILY HISTORY:  Noncontributory for premature coronary disease or sudden cardiac death    SOCIAL HISTORY:    [x ] Non-smoker  [ ] Smoker  [ ] Alcohol    FLU VACCINE THIS YEAR STARTS IN AUGUST:  [ ] Yes    [ ] No    IF OVER 65 HAVE YOU EVER HAD A PNA VACCINE:  [ ] Yes    [ ] No       [ ] N/A      REVIEW OF SYSTEMS:  [ ]chest pain  [  ]shortness of breath  [  ]palpitations  [  ]syncope  [ ]near syncope [ ]upper extremity weakness   [ ] lower extremity weakness  [  ]diplopia  [  ]altered mental status   [  ]fevers  [ ]chills [ ]nausea  [ ]vomiting  [  ]dysphagia    [ ]abdominal pain  [ ]melena  [ ]BRBPR    [  ]epistaxis  [  ]rash    [ ]lower extremity edema        [ x] All others negative	  [ ] Unable to obtain      LABS:	 	    CARDIAC MARKERS:  Troponin T, High Sensitivity Result: 17                        14.6   5.46  )-----------( 153      ( 18 Jan 2023 08:45 )             45.7       138  |  103  |  23  ----------------------------<  120<H>  4.8   |  24  |  0.98    Ca    9.4      18 Jan 2023 08:45  Phos  2.9     01-18  Mg     2.20     01-18    TPro  7.9  /  Alb  4.0  /  TBili  0.7  /  DBili  x   /  AST  21  /  ALT  20  /  AlkPhos  87  01-18    Coags:  PT/INR - ( 18 Jan 2023 08:45 )   PT: 12.1 sec;   INR: 1.04 ratio      PTT - ( 18 Jan 2023 08:45 )  PTT:25.9 sec    PHYSICAL EXAM:  T(C): 36.4 (01-18-23 @ 07:04), Max: 36.4 (01-18-23 @ 07:04)  HR: 62 (01-18-23 @ 07:04) (62 - 62)  BP: 123/59 (01-18-23 @ 07:04) (123/59 - 123/59)  RR: 24 (01-18-23 @ 07:04) (24 - 26)  SpO2: 100% (01-18-23 @ 07:04) (100% - 100%)    Gen: Appears well in NAD  HEENT:  (-)icterus (-)pallor  CV: N S1 S2 1/6 SIMA (+)2 Pulses B/l  Resp:  Clear to auscultation B/L, normal effort  GI: (+) BS Soft, NT, ND  Lymph:  (-)Edema, (-)obvious lymphadenopathy  Skin: Warm to touch, Normal turgor  Psych: unable to fully assess        ECG:  	AF poor baseline    < from: CT Head No Cont (01.18.23 @ 08:59) >  IMPRESSION:  *NO EVIDENCE OF AN ACUTE TRAUMATIC INTRACRANIAL INJURY. 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 CEREBRAL INFARCTS   ARE IN A SOMEWHAT WATERSHED/BORDER ZONE DISTRIBUTION. DOPPLER   ULTRASONOGRAPHY OF THE NECK MAY PROVE USEFUL IF NOT PREVIOUSLY PERFORMED.  *  NO EVIDENCE OF AN ACUTE CERVICAL SPINE FRACTURE.  < end of copied text >    < from: Xray Chest 2 Views PA/Lat (01.18.23 @ 09:55) >    IMPRESSION: No acute traumatic findings.  < end of copied text >      ASSESSMENT/PLAN: 	88 year old male, poor historian, PMH CVA, Afib on Eliquis, GERD, HTN and prostate cancer, was brought to hospital via EMS following an unwitnessed fall at home.    1. Afib  --per chart, chronic, on Eliquis  --cont lifelong AC if no contraindications  --admit to tele  --obtain home med list  --family contacted as noted above, unable to obtain any further information at the time of this note    2. Fall r/o syncope  --per chart pt complained of dizziness and was awake/alert  --CTH noted  --check carotid US  --check orthostatic vitals  --Neuro consult for increasing confusion per ER RN since arrival, currently unable to obtain any PMH or ROS from patient, ?need for repeat brain imaging  --awaiting discussion with family regarding his mental status   --restrict to bed until PT eval    3. HTN  --BP stable since admit, hold off on starting any anti-hypertensives yet      further reccs pending hospital course and results as above        ADDENDUM 14:15:    Spoke to daughter in law, Winifred.    Patient had CVA 5/2022 and was managed at [facility].  He has had intermittent confusion since then where he will not remember his families names or where he is, other times is completely oriented.  He is no longer on BP meds, only on Eliquis 5 BID.  He walks at home without walker or difficulties, does not fall.  Daughter in law is interested in home PT or nursing services to assist her in caring for him if he qualifies for any.  NO prior cardiac hx and has seen the cardio clinic at [facility] in the past, not regularly.  PMD Dr Braulio Gonzalez

## 2023-01-18 NOTE — H&P ADULT - HISTORY OF PRESENT ILLNESS
88 year old male with a history of Afib on Eliquis, GERD, HTN, CVA, vertigo, dementia AAOx1-2 at baseline presenting after a fall. Patient is a poor historian. States that he was walking to the bathroom this morning when he suddenly began feeling dizzy and fell forward, hitting the front of his head in the bathroom. Unable to state if he lost consciousness or not. He was found by his son on the floor and EMS was called. Patient denies nausea or vomiting, states that he sometimes feels his heart "beating heavy." After the fall he has pain in his left thigh/hip area.    Collateral information obtained from patient's son Caden [phone]. He states patient's LKN was last night before patient went to sleep. Patient has a longstanding history of vertigo for which he is prescribed meclizine as needed. Patient gets dizzy often and therefore was prescribed meclizine and physical therapy by his PCP, but has not been able to continue with PT due to insurance issues. Son also states patient does not have nonslip socks to wear at home and believes patient may have slipped on the floor this morning while walking. No prior falls at home recently but patient requires assistance while walking usually. Son states patient has been compliant with all of his medications. 88 year old male with a history of Afib on Eliquis, GERD, HTN, CVA, vertigo, dementia AAOx1-2 at baseline presenting after a fall. Patient is a poor historian. States that he was walking to the bathroom this morning when he suddenly began feeling dizzy and fell forward, hitting the front of his head in the bathroom. Unable to state if he lost consciousness or not. He was found by his son on the floor and EMS was called. Patient denies nausea or vomiting, states that he sometimes feels his heart "beating heavy." After the fall he has pain in his left thigh/hip area.    Collateral information obtained from patient's son Caden [phone]. He states patient's LKN was last night before patient went to sleep. Patient has a longstanding history of vertigo for which he is prescribed meclizine as needed. Patient gets dizzy often and therefore was prescribed meclizine and physical therapy by his PCP, but has not been able to continue with PT due to insurance issues. Son also states patient does not have nonslip socks to wear at home and believes patient may have slipped on the floor this morning while walking. No prior falls at home recently but patient requires assistance while walking usually. Son states patient has been compliant with all of his medications. 88 year old male with a history of Afib on Eliquis, GERD, HTN, CVA, vertigo, dementia AAOx1-2 at baseline presenting after a fall. Patient is a poor historian. States that he was walking to the bathroom this morning when he suddenly began feeling dizzy and fell forward, hitting the front of his head in the bathroom. Unable to state if he lost consciousness or not. He was found by his son on the floor and EMS was called. Patient denies nausea or vomiting, states that he sometimes feels his heart "beating heavy." After the fall he has pain in his left thigh/hip area.    Collateral information obtained from patient's son Caden [phone]. He states patient's LKN was last night before patient went to sleep. Patient has a longstanding history of vertigo for which he is prescribed meclizine as needed. Patient gets dizzy often and therefore was prescribed meclizine and physical therapy by his PCP, but has not been able to continue with PT due to insurance issues. Son also states patient does not have nonslip socks to wear at home and believes patient may have slipped on the floor this morning while walking. No prior falls at home recently but patient requires assistance while walking usually. Son states patient has been compliant with all of his medications.

## 2023-01-18 NOTE — H&P ADULT - PROBLEM SELECTOR PLAN 2
History of vertigo on meclizine PRN  -differential also includes posterior circulation stroke but no nystagmus noted on exam, also lower suspicion for embolic CVA as patient has been compliant with Eliquis  -case was discussed with neurology, will hold off on full consult for now  -c/w meclizine 12.5mg PO TID as needed  -if dizziness is refractory to meclizine, can trial Valium 1mg although would reserve use due to underlying dementia  -check orthostatics  -PT eval

## 2023-01-18 NOTE — ED PROVIDER NOTE - OBJECTIVE STATEMENT
87 y/o male from home, lives w/ his son, ambulates w/ assistance of cane/walker w/ PMH of Afib on eliquis, GERD, CVA, HTN, prostate cancer presenting to ER for unwitnessed fall. Pt. is relative poor historian - states this morning was walking to the bathroom when he felt dizzy and fell to the ground. Pt. states he doesn't think  he lost consciousness but admits to hitting the front of his head when he fell. Pt. c/o mild headache currently but otherwise endorses no complaints. Difficult to obtain any further history or complaints from patient. As per triage/EMS - pts son heard thump upstairs and found patient on floor but awake/alert.   Unable to reach son/family for further collateral at this time. 89 y/o male from home, lives w/ his son, ambulates w/ assistance of cane/walker w/ PMH of Afib on eliquis, GERD, CVA, HTN, prostate cancer presenting to ER for unwitnessed fall. Pt. is relative poor historian - states this morning was walking to the bathroom when he felt dizzy and fell to the ground. Pt. states he doesn't think  he lost consciousness but admits to hitting the front of his head when he fell. Pt. c/o mild headache currently but otherwise endorses no complaints. Difficult to obtain any further history or complaints from patient. As per triage/EMS - pts son heard thump upstairs and found patient on floor but awake/alert.   Unable to reach son/family for further collateral at this time.

## 2023-01-19 LAB
ANION GAP SERPL CALC-SCNC: 11 MMOL/L — SIGNIFICANT CHANGE UP (ref 7–14)
ANION GAP SERPL CALC-SCNC: 18 MMOL/L — HIGH (ref 7–14)
BUN SERPL-MCNC: 19 MG/DL — SIGNIFICANT CHANGE UP (ref 7–23)
BUN SERPL-MCNC: SIGNIFICANT CHANGE UP MG/DL (ref 7–23)
CALCIUM SERPL-MCNC: 9.2 MG/DL — SIGNIFICANT CHANGE UP (ref 8.4–10.5)
CALCIUM SERPL-MCNC: SIGNIFICANT CHANGE UP MG/DL (ref 8.4–10.5)
CHLORIDE SERPL-SCNC: 103 MMOL/L — SIGNIFICANT CHANGE UP (ref 98–107)
CHLORIDE SERPL-SCNC: 104 MMOL/L — SIGNIFICANT CHANGE UP (ref 98–107)
CHOLEST SERPL-MCNC: SIGNIFICANT CHANGE UP MG/DL
CO2 SERPL-SCNC: 12 MMOL/L — LOW (ref 22–31)
CO2 SERPL-SCNC: 23 MMOL/L — SIGNIFICANT CHANGE UP (ref 22–31)
CREAT SERPL-MCNC: 0.94 MG/DL — SIGNIFICANT CHANGE UP (ref 0.5–1.3)
CREAT SERPL-MCNC: SIGNIFICANT CHANGE UP MG/DL (ref 0.5–1.3)
CULTURE RESULTS: SIGNIFICANT CHANGE UP
EGFR: 78 ML/MIN/1.73M2 — SIGNIFICANT CHANGE UP
GLUCOSE BLDC GLUCOMTR-MCNC: 83 MG/DL — SIGNIFICANT CHANGE UP (ref 70–99)
GLUCOSE SERPL-MCNC: 70 MG/DL — SIGNIFICANT CHANGE UP (ref 70–99)
GLUCOSE SERPL-MCNC: 90 MG/DL — SIGNIFICANT CHANGE UP (ref 70–99)
HCT VFR BLD CALC: 54.3 % — HIGH (ref 39–50)
HDLC SERPL-MCNC: SIGNIFICANT CHANGE UP MG/DL
HGB BLD-MCNC: 16.9 G/DL — SIGNIFICANT CHANGE UP (ref 13–17)
LIPID PNL WITH DIRECT LDL SERPL: SIGNIFICANT CHANGE UP MG/DL
MCHC RBC-ENTMCNC: 29.1 PG — SIGNIFICANT CHANGE UP (ref 27–34)
MCHC RBC-ENTMCNC: 31.1 GM/DL — LOW (ref 32–36)
MCV RBC AUTO: 93.5 FL — SIGNIFICANT CHANGE UP (ref 80–100)
NON HDL CHOLESTEROL: SIGNIFICANT CHANGE UP MG/DL
NRBC # BLD: 0 /100 WBCS — SIGNIFICANT CHANGE UP (ref 0–0)
NRBC # FLD: 0 K/UL — SIGNIFICANT CHANGE UP (ref 0–0)
PLATELET # BLD AUTO: 157 K/UL — SIGNIFICANT CHANGE UP (ref 150–400)
POTASSIUM SERPL-MCNC: 4.3 MMOL/L — SIGNIFICANT CHANGE UP (ref 3.5–5.3)
POTASSIUM SERPL-MCNC: 5.4 MMOL/L — HIGH (ref 3.5–5.3)
POTASSIUM SERPL-SCNC: 4.3 MMOL/L — SIGNIFICANT CHANGE UP (ref 3.5–5.3)
POTASSIUM SERPL-SCNC: 5.4 MMOL/L — HIGH (ref 3.5–5.3)
RBC # BLD: 5.81 M/UL — HIGH (ref 4.2–5.8)
RBC # FLD: 14.7 % — HIGH (ref 10.3–14.5)
SODIUM SERPL-SCNC: 134 MMOL/L — LOW (ref 135–145)
SODIUM SERPL-SCNC: 137 MMOL/L — SIGNIFICANT CHANGE UP (ref 135–145)
SPECIMEN SOURCE: SIGNIFICANT CHANGE UP
TRIGL SERPL-MCNC: SIGNIFICANT CHANGE UP MG/DL
TSH SERPL-MCNC: SIGNIFICANT CHANGE UP UIU/ML (ref 0.27–4.2)
WBC # BLD: 6.71 K/UL — SIGNIFICANT CHANGE UP (ref 3.8–10.5)
WBC # FLD AUTO: 6.71 K/UL — SIGNIFICANT CHANGE UP (ref 3.8–10.5)

## 2023-01-19 PROCEDURE — 93306 TTE W/DOPPLER COMPLETE: CPT | Mod: 26

## 2023-01-19 RX ADMIN — PANTOPRAZOLE SODIUM 40 MILLIGRAM(S): 20 TABLET, DELAYED RELEASE ORAL at 05:57

## 2023-01-19 RX ADMIN — APIXABAN 5 MILLIGRAM(S): 2.5 TABLET, FILM COATED ORAL at 05:57

## 2023-01-19 RX ADMIN — APIXABAN 5 MILLIGRAM(S): 2.5 TABLET, FILM COATED ORAL at 17:15

## 2023-01-19 RX ADMIN — ATORVASTATIN CALCIUM 40 MILLIGRAM(S): 80 TABLET, FILM COATED ORAL at 22:28

## 2023-01-19 NOTE — PHYSICAL THERAPY INITIAL EVALUATION ADULT - LEVEL OF INDEPENDENCE: STAND/SIT, REHAB EVAL
Occupational Therapy Daily Treatment     Visit Count: 2  Plan of Care Dates:Initial: 8/10/2017 Through: 9/21/2017     Insurance Information:Medicare is primary.  No authorization is required.  Check the Common Working / Emulator file for therapy cap $ amount.  PT/SLP: $ 0  OT: $ 0  Date checked:8/8/17  Secondary insurance [employer] [id number]       Most secondary insurance plans cover based on Medicare's coverage and medical necessity, however there are some plans that restrict number of visits if secondary to Medicare.  Check authorization as needed   Next Referring Provider Visit: none     Referred by: FRANNY Torres  Medical Diagnosis (from order): Left breast cancer s/p cellulitis  Treatment Diagnosis: Lymphedema of Left breast  Insurance: 1. MEDICARE  2. [employer]     Date of Onset: Diagnosed March 28th with left breast cellulitis; has been on Penicillin for 5 months.   Swelling has decreased 70%   Diagnosis Precautions: none  Chart reviewed: Relevant co-morbidities, allergies, tests and medications: Pt. Reports history of left shoulder impingement, HTN, spinal stimulator for pain due to lichen sclerosis. spinal stenosis, HTN, sleep apnea, kidney disease, asthma, fibromyalgia,     SUBJECTIVE   Noting less pooling in the morning: about 50% better. Using bra at night with good results but not comfortable to wear consistently  Current Pain: 6/10- deep breast pain sporadic.  Not sure if related to swelling  Functional Change: Less swelling/softening of breast tissue    OBJECTIVE       Treatment   Manual Therapy:   Performed manual lymph drainage (MLD) of left breast with lymph node clearing of cervical chain/bilateral axilla/inguinal  re-routing fluid to right axilla and cervical nodes.  Focused on Lateral aspect of breast.      Therapeutic Activity:  K-Tape was applied to lateral left breast  to improve lymphatic drainage (using a octopus style technique.  Pt was  instructed on wear time, removal, and precautions verbally understood. No skin precautions per patient.       Current Home Program (not performed this date except as noted above):   Self lymphatic massage    ASSESSMENT   Reduced swelling/tissue softening of left breast.  Good follow-through of home management strategies.     Pain after treatment: 0/10; less heaviness.   Result of above outlined education: Verbalizes understanding, Demonstrates understanding and Needs reinforcement    Goals:       See initial evaluation     PLAN   Continue with lymphatic drainage and review HEP. Consider instruction in UE strengthening.     THERAPY DAILY BILLING   Primary Insurance: MEDICARE  Secondary Insurance: [employer]    Evaluation Procedures:  No evaluation codes were used on this date of service    Timed Procedures:  Manual Therapy, 45 minutes    Untimed Procedures:  No untimed codes were used on this date of service    Total Treatment Time: 45 minutes      G-Code:  G-Code Score ABN form  reporting not required this treatment session  Modifier based on outcome measure(s)/functional testing/clinical judgement as listed above    The referring provider's electronic or written signature on the evaluation authorizes the therapy plan of care and certifies the need for these services, furnished under this plan of care while under their care.        supervision

## 2023-01-19 NOTE — PATIENT PROFILE ADULT - FALL HARM RISK - HARM RISK INTERVENTIONS
Assistance with ambulation/Assistance OOB with selected safe patient handling equipment/Communicate Risk of Fall with Harm to all staff/Monitor gait and stability/Reinforce activity limits and safety measures with patient and family/Sit up slowly, dangle for a short time, stand at bedside before walking/Tailored Fall Risk Interventions/Visual Cue: Yellow wristband and red socks/Bed in lowest position, wheels locked, appropriate side rails in place/Call bell, personal items and telephone in reach/Instruct patient to call for assistance before getting out of bed or chair/Non-slip footwear when patient is out of bed/[place] to call system/Physically safe environment - no spills, clutter or unnecessary equipment/Purposeful Proactive Rounding/Room/bathroom lighting operational, light cord in reach Assistance with ambulation/Assistance OOB with selected safe patient handling equipment/Communicate Risk of Fall with Harm to all staff/Monitor gait and stability/Reinforce activity limits and safety measures with patient and family/Sit up slowly, dangle for a short time, stand at bedside before walking/Tailored Fall Risk Interventions/Visual Cue: Yellow wristband and red socks/Bed in lowest position, wheels locked, appropriate side rails in place/Call bell, personal items and telephone in reach/Instruct patient to call for assistance before getting out of bed or chair/Non-slip footwear when patient is out of bed/[place] to call system/Physically safe environment - no spills, clutter or unnecessary equipment/Purposeful Proactive Rounding/Room/bathroom lighting operational, light cord in reach Assistance with ambulation/Assistance OOB with selected safe patient handling equipment/Communicate Risk of Fall with Harm to all staff/Monitor gait and stability/Reinforce activity limits and safety measures with patient and family/Sit up slowly, dangle for a short time, stand at bedside before walking/Tailored Fall Risk Interventions/Visual Cue: Yellow wristband and red socks/Bed in lowest position, wheels locked, appropriate side rails in place/Call bell, personal items and telephone in reach/Instruct patient to call for assistance before getting out of bed or chair/Non-slip footwear when patient is out of bed/[place] to call system/Physically safe environment - no spills, clutter or unnecessary equipment/Purposeful Proactive Rounding/Room/bathroom lighting operational, light cord in reach

## 2023-01-19 NOTE — PROGRESS NOTE ADULT - SUBJECTIVE AND OBJECTIVE BOX
Date of service 01/19/2023    chief complaint: Syncope/Fall    extended hpi: Pt is a 88 year old male, poor historian, PMH CVA, Afib on Eliquis, GERD, HTN and prostate cancer, was brought to hospital via EMS following a fall at home.  Fall was unwitnessed, but heard by his daughter in law, Winifred.  Pt lives with son and daughter in law.  Per chart he was walking to the bathroom, felt dizzy and fell.  Family reported he was awake/alert on the floor.  Pt currently unable to answer questions, per RN in ER, he has becoming increasingly confused since arrival.    Patient had CVA 5/2022 and was managed at [facility].  He has had intermittent confusion since then where he will not remember his families names or where he is, other times is completely oriented.  He is no longer on BP meds, only on Eliquis 5 BID.  He walks at home without walker or difficulties, does not fall.  Daughter in law is interested in home PT or nursing services to assist her in caring for him if he qualifies for any.  NO prior cardiac hx and has seen the cardio clinic at [facility] in the past, not regularly.      S: Patient denies chest pain or shortness of breath.   Review of symptoms otherwise negative.    MEDICATIONS:  acetaminophen     Tablet .. 650 milliGRAM(s) Oral every 6 hours PRN  apixaban 5 milliGRAM(s) Oral two times a day  atorvastatin 40 milliGRAM(s) Oral at bedtime  meclizine 12.5 milliGRAM(s) Oral three times a day PRN  melatonin 3 milliGRAM(s) Oral at bedtime PRN  pantoprazole    Tablet 40 milliGRAM(s) Oral before breakfast    LABS:                        16.9   6.71  )-----------( 157      ( 19 Jan 2023 06:45 )             54.3       Hemoglobin: 16.9 g/dL (01-19 @ 06:45)  Hemoglobin: 14.6 g/dL (01-18 @ 08:45)      01-19    134<L>  |  104  |  QNS  ----------------------------<  70  5.4<H>   |  12<L>  |  QNS    Ca    QNS      19 Jan 2023 06:45  Phos  2.9     01-18  Mg     2.20     01-18    TPro  7.9  /  Alb  4.0  /  TBili  0.7  /  DBili  x   /  AST  21  /  ALT  20  /  AlkPhos  87  01-18    Creatinine Trend: QNS<--, 0.98<--      PHYSICAL EXAM:  T(C): 36.7 (01-19-23 @ 11:39), Max: 36.9 (01-18-23 @ 19:37)  HR: 59 (01-19-23 @ 11:39) (57 - 66)  BP: 136/83 (01-19-23 @ 11:39) (121/74 - 146/81)  RR: 18 (01-19-23 @ 11:39) (16 - 20)  SpO2: 100% (01-19-23 @ 11:39) (96% - 100%)  Wt(kg): --    I&O's Summary      General: Well nourished in no acute distress. Alert and Oriented * 3.   Head: Normocephalic and atraumatic.   Neck: No JVD. No bruits. Supple. Does not appear to be enlarged.   Cardiovascular: + S1,S2 ; RRR Soft systolic murmur at the left lower sternal border. No rubs noted.    Lungs: CTA b/l. No rhonchi, rales or wheezes.   Abdomen: + BS, soft. Non tender. Non distended. No rebound. No guarding.   Extremities: No clubbing/cyanosis/edema.   Neurologic: Moves all four extremities. Full range of motion.   Skin: Warm and moist. The patient's skin has normal elasticity and good skin turgor.   Psychiatric: Appropriate mood and affect.  Musculoskeletal: Normal range of motion, normal strength    TELE: NSR    ECG:  	AF poor baseline    < from: CT Head No Cont (01.18.23 @ 08:59) >  IMPRESSION:  *NO EVIDENCE OF AN ACUTE TRAUMATIC INTRACRANIAL INJURY. 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 CEREBRAL INFARCTS   ARE IN A SOMEWHAT WATERSHED/BORDER ZONE DISTRIBUTION. DOPPLER   ULTRASONOGRAPHY OF THE NECK MAY PROVE USEFUL IF NOT PREVIOUSLY PERFORMED.  *  NO EVIDENCE OF AN ACUTE CERVICAL SPINE FRACTURE.  < end of copied text >    < from: Xray Chest 2 Views PA/Lat (01.18.23 @ 09:55) >    IMPRESSION: No acute traumatic findings.  < end of copied text >      ASSESSMENT/PLAN: 	88 year old male, poor historian, PMH CVA, Afib on Eliquis, GERD, HTN and prostate cancer, was brought to hospital via EMS following an unwitnessed fall at home.    1. Afib  --per chart, chronic, on Eliquis  --cont lifelong AC if no contraindications      2. Fall r/o syncope/ confusion  --per chart pt complained of dizziness and was awake/alert  --CTH noted  --check orthostatic vitals  --Neuro consult for increasing confusion per ER RN since arrival, currently unable to obtain any PMH or ROS from patient, ?need for repeat brain imaging  - check TTE      3. HTN  --BP stable since admit, hold off on starting any anti-hypertensives yet      Lupe Unger MD  Pager: [phone]    Date of service 01/19/2023    chief complaint: Syncope/Fall    extended hpi: Pt is a 88 year old male, poor historian, PMH CVA, Afib on Eliquis, GERD, HTN and prostate cancer, was brought to hospital via EMS following a fall at home.  Fall was unwitnessed, but heard by his daughter in law, Winifred.  Pt lives with son and daughter in law.  Per chart he was walking to the bathroom, felt dizzy and fell.  Family reported he was awake/alert on the floor.  Pt currently unable to answer questions, per RN in ER, he has becoming increasingly confused since arrival.    Patient had CVA 5/2022 and was managed at [facility].  He has had intermittent confusion since then where he will not remember his families names or where he is, other times is completely oriented.  He is no longer on BP meds, only on Eliquis 5 BID.  He walks at home without walker or difficulties, does not fall.  Daughter in law is interested in home PT or nursing services to assist her in caring for him if he qualifies for any.  NO prior cardiac hx and has seen the cardio clinic at [facility] in the past, not regularly.      S: Patient denies chest pain or shortness of breath.   Review of symptoms otherwise negative.    MEDICATIONS:  acetaminophen     Tablet .. 650 milliGRAM(s) Oral every 6 hours PRN  apixaban 5 milliGRAM(s) Oral two times a day  atorvastatin 40 milliGRAM(s) Oral at bedtime  meclizine 12.5 milliGRAM(s) Oral three times a day PRN  melatonin 3 milliGRAM(s) Oral at bedtime PRN  pantoprazole    Tablet 40 milliGRAM(s) Oral before breakfast    LABS:                        16.9   6.71  )-----------( 157      ( 19 Jan 2023 06:45 )             54.3       Hemoglobin: 16.9 g/dL (01-19 @ 06:45)  Hemoglobin: 14.6 g/dL (01-18 @ 08:45)      01-19    134<L>  |  104  |  QNS  ----------------------------<  70  5.4<H>   |  12<L>  |  QNS    Ca    QNS      19 Jan 2023 06:45  Phos  2.9     01-18  Mg     2.20     01-18    TPro  7.9  /  Alb  4.0  /  TBili  0.7  /  DBili  x   /  AST  21  /  ALT  20  /  AlkPhos  87  01-18    Creatinine Trend: QNS<--, 0.98<--      PHYSICAL EXAM:  T(C): 36.7 (01-19-23 @ 11:39), Max: 36.9 (01-18-23 @ 19:37)  HR: 59 (01-19-23 @ 11:39) (57 - 66)  BP: 136/83 (01-19-23 @ 11:39) (121/74 - 146/81)  RR: 18 (01-19-23 @ 11:39) (16 - 20)  SpO2: 100% (01-19-23 @ 11:39) (96% - 100%)  Wt(kg): --    I&O's Summary      General: Well nourished in no acute distress. Alert and Oriented * 3.   Head: Normocephalic and atraumatic.   Neck: No JVD. No bruits. Supple. Does not appear to be enlarged.   Cardiovascular: + S1,S2 ; RRR Soft systolic murmur at the left lower sternal border. No rubs noted.    Lungs: CTA b/l. No rhonchi, rales or wheezes.   Abdomen: + BS, soft. Non tender. Non distended. No rebound. No guarding.   Extremities: No clubbing/cyanosis/edema.   Neurologic: Moves all four extremities. Full range of motion.   Skin: Warm and moist. The patient's skin has normal elasticity and good skin turgor.   Psychiatric: Appropriate mood and affect.  Musculoskeletal: Normal range of motion, normal strength    TELE: NSR    ECG:  	AF poor baseline    < from: CT Head No Cont (01.18.23 @ 08:59) >  IMPRESSION:  *NO EVIDENCE OF AN ACUTE TRAUMATIC INTRACRANIAL INJURY. 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 CEREBRAL INFARCTS   ARE IN A SOMEWHAT WATERSHED/BORDER ZONE DISTRIBUTION. DOPPLER   ULTRASONOGRAPHY OF THE NECK MAY PROVE USEFUL IF NOT PREVIOUSLY PERFORMED.  *  NO EVIDENCE OF AN ACUTE CERVICAL SPINE FRACTURE.  < end of copied text >    < from: Xray Chest 2 Views PA/Lat (01.18.23 @ 09:55) >    IMPRESSION: No acute traumatic findings.  < end of copied text >      ASSESSMENT/PLAN: 	88 year old male, poor historian, PMH CVA, Afib on Eliquis, GERD, HTN and prostate cancer, was brought to hospital via EMS following an unwitnessed fall at home.    1. Afib  --per chart, chronic, on Eliquis  --cont lifelong AC if no contraindications      2. Fall r/o syncope/ confusion  --per chart pt complained of dizziness and was awake/alert  --CTH noted  --check orthostatic vitals  --Neuro consult for increasing confusion per ER RN since arrival, currently unable to obtain any PMH or ROS from patient, ?need for repeat brain imaging  - check TTE      3. HTN  --BP stable since admit, hold off on starting any anti-hypertensives yet      Lupe Unger MD  Pager: [phone]    Date of service 01/19/2023    chief complaint: Syncope/Fall    extended hpi: Pt is a 88 year old male, poor historian, PMH CVA, Afib on Eliquis, GERD, HTN and prostate cancer, was brought to hospital via EMS following a fall at home.  Fall was unwitnessed, but heard by his daughter in law, Winifred.  Pt lives with son and daughter in law.  Per chart he was walking to the bathroom, felt dizzy and fell.  Family reported he was awake/alert on the floor.  Pt currently unable to answer questions, per RN in ER, he has becoming increasingly confused since arrival.    Patient had CVA 5/2022 and was managed at [facility].  He has had intermittent confusion since then where he will not remember his families names or where he is, other times is completely oriented.  He is no longer on BP meds, only on Eliquis 5 BID.  He walks at home without walker or difficulties, does not fall.  Daughter in law is interested in home PT or nursing services to assist her in caring for him if he qualifies for any.  NO prior cardiac hx and has seen the cardio clinic at [facility] in the past, not regularly.      S: Patient denies chest pain or shortness of breath.   Review of symptoms otherwise negative.    MEDICATIONS:  acetaminophen     Tablet .. 650 milliGRAM(s) Oral every 6 hours PRN  apixaban 5 milliGRAM(s) Oral two times a day  atorvastatin 40 milliGRAM(s) Oral at bedtime  meclizine 12.5 milliGRAM(s) Oral three times a day PRN  melatonin 3 milliGRAM(s) Oral at bedtime PRN  pantoprazole    Tablet 40 milliGRAM(s) Oral before breakfast    LABS:                        16.9   6.71  )-----------( 157      ( 19 Jan 2023 06:45 )             54.3       Hemoglobin: 16.9 g/dL (01-19 @ 06:45)  Hemoglobin: 14.6 g/dL (01-18 @ 08:45)      01-19    134<L>  |  104  |  QNS  ----------------------------<  70  5.4<H>   |  12<L>  |  QNS    Ca    QNS      19 Jan 2023 06:45  Phos  2.9     01-18  Mg     2.20     01-18    TPro  7.9  /  Alb  4.0  /  TBili  0.7  /  DBili  x   /  AST  21  /  ALT  20  /  AlkPhos  87  01-18    Creatinine Trend: QNS<--, 0.98<--      PHYSICAL EXAM:  T(C): 36.7 (01-19-23 @ 11:39), Max: 36.9 (01-18-23 @ 19:37)  HR: 59 (01-19-23 @ 11:39) (57 - 66)  BP: 136/83 (01-19-23 @ 11:39) (121/74 - 146/81)  RR: 18 (01-19-23 @ 11:39) (16 - 20)  SpO2: 100% (01-19-23 @ 11:39) (96% - 100%)  Wt(kg): --    I&O's Summary      General: Well nourished in no acute distress. Alert and Oriented * 3.   Head: Normocephalic and atraumatic.   Neck: No JVD. No bruits. Supple. Does not appear to be enlarged.   Cardiovascular: + S1,S2 ; RRR Soft systolic murmur at the left lower sternal border. No rubs noted.    Lungs: CTA b/l. No rhonchi, rales or wheezes.   Abdomen: + BS, soft. Non tender. Non distended. No rebound. No guarding.   Extremities: No clubbing/cyanosis/edema.   Neurologic: Moves all four extremities. Full range of motion.   Skin: Warm and moist. The patient's skin has normal elasticity and good skin turgor.   Psychiatric: Appropriate mood and affect.  Musculoskeletal: Normal range of motion, normal strength    TELE: NSR    ECG:  	AF poor baseline    < from: CT Head No Cont (01.18.23 @ 08:59) >  IMPRESSION:  *NO EVIDENCE OF AN ACUTE TRAUMATIC INTRACRANIAL INJURY. 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 CEREBRAL INFARCTS   ARE IN A SOMEWHAT WATERSHED/BORDER ZONE DISTRIBUTION. DOPPLER   ULTRASONOGRAPHY OF THE NECK MAY PROVE USEFUL IF NOT PREVIOUSLY PERFORMED.  *  NO EVIDENCE OF AN ACUTE CERVICAL SPINE FRACTURE.  < end of copied text >    < from: Xray Chest 2 Views PA/Lat (01.18.23 @ 09:55) >    IMPRESSION: No acute traumatic findings.  < end of copied text >      ASSESSMENT/PLAN: 	88 year old male, poor historian, PMH CVA, Afib on Eliquis, GERD, HTN and prostate cancer, was brought to hospital via EMS following an unwitnessed fall at home.    1. Afib  --per chart, chronic, on Eliquis  --cont lifelong AC if no contraindications      2. Fall r/o syncope/ confusion  --per chart pt complained of dizziness and was awake/alert  --CTH noted  --check orthostatic vitals  --Neuro consult for increasing confusion per ER RN since arrival, currently unable to obtain any PMH or ROS from patient, ?need for repeat brain imaging  - check TTE      3. HTN  --BP stable since admit, hold off on starting any anti-hypertensives yet      Lupe Unger MD  Pager: [phone]

## 2023-01-20 LAB
A1C WITH ESTIMATED AVERAGE GLUCOSE RESULT: 5.6 % — SIGNIFICANT CHANGE UP (ref 4–5.6)
ANION GAP SERPL CALC-SCNC: 14 MMOL/L — SIGNIFICANT CHANGE UP (ref 7–14)
BUN SERPL-MCNC: 18 MG/DL — SIGNIFICANT CHANGE UP (ref 7–23)
CALCIUM SERPL-MCNC: 9.2 MG/DL — SIGNIFICANT CHANGE UP (ref 8.4–10.5)
CHLORIDE SERPL-SCNC: 102 MMOL/L — SIGNIFICANT CHANGE UP (ref 98–107)
CO2 SERPL-SCNC: 19 MMOL/L — LOW (ref 22–31)
CREAT SERPL-MCNC: 0.85 MG/DL — SIGNIFICANT CHANGE UP (ref 0.5–1.3)
EGFR: 84 ML/MIN/1.73M2 — SIGNIFICANT CHANGE UP
ESTIMATED AVERAGE GLUCOSE: 114 — SIGNIFICANT CHANGE UP
GLUCOSE SERPL-MCNC: 86 MG/DL — SIGNIFICANT CHANGE UP (ref 70–99)
HCT VFR BLD CALC: 47.3 % — SIGNIFICANT CHANGE UP (ref 39–50)
HGB BLD-MCNC: 15.5 G/DL — SIGNIFICANT CHANGE UP (ref 13–17)
MAGNESIUM SERPL-MCNC: 2.3 MG/DL — SIGNIFICANT CHANGE UP (ref 1.6–2.6)
MCHC RBC-ENTMCNC: 29.6 PG — SIGNIFICANT CHANGE UP (ref 27–34)
MCHC RBC-ENTMCNC: 32.8 GM/DL — SIGNIFICANT CHANGE UP (ref 32–36)
MCV RBC AUTO: 90.3 FL — SIGNIFICANT CHANGE UP (ref 80–100)
NRBC # BLD: 0 /100 WBCS — SIGNIFICANT CHANGE UP (ref 0–0)
NRBC # FLD: 0 K/UL — SIGNIFICANT CHANGE UP (ref 0–0)
PLATELET # BLD AUTO: 144 K/UL — LOW (ref 150–400)
POTASSIUM SERPL-MCNC: 4.2 MMOL/L — SIGNIFICANT CHANGE UP (ref 3.5–5.3)
POTASSIUM SERPL-SCNC: 4.2 MMOL/L — SIGNIFICANT CHANGE UP (ref 3.5–5.3)
RBC # BLD: 5.24 M/UL — SIGNIFICANT CHANGE UP (ref 4.2–5.8)
RBC # FLD: 14.3 % — SIGNIFICANT CHANGE UP (ref 10.3–14.5)
SODIUM SERPL-SCNC: 135 MMOL/L — SIGNIFICANT CHANGE UP (ref 135–145)
WBC # BLD: 6.05 K/UL — SIGNIFICANT CHANGE UP (ref 3.8–10.5)
WBC # FLD AUTO: 6.05 K/UL — SIGNIFICANT CHANGE UP (ref 3.8–10.5)

## 2023-01-20 RX ORDER — SODIUM CHLORIDE 9 MG/ML
500 INJECTION INTRAMUSCULAR; INTRAVENOUS; SUBCUTANEOUS ONCE
Refills: 0 | Status: COMPLETED | OUTPATIENT
Start: 2023-01-20 | End: 2023-01-20

## 2023-01-20 RX ADMIN — Medication 650 MILLIGRAM(S): at 23:56

## 2023-01-20 RX ADMIN — PANTOPRAZOLE SODIUM 40 MILLIGRAM(S): 20 TABLET, DELAYED RELEASE ORAL at 06:13

## 2023-01-20 RX ADMIN — SODIUM CHLORIDE 250 MILLILITER(S): 9 INJECTION INTRAMUSCULAR; INTRAVENOUS; SUBCUTANEOUS at 14:50

## 2023-01-20 RX ADMIN — APIXABAN 5 MILLIGRAM(S): 2.5 TABLET, FILM COATED ORAL at 17:18

## 2023-01-20 RX ADMIN — APIXABAN 5 MILLIGRAM(S): 2.5 TABLET, FILM COATED ORAL at 06:14

## 2023-01-20 RX ADMIN — ATORVASTATIN CALCIUM 40 MILLIGRAM(S): 80 TABLET, FILM COATED ORAL at 23:59

## 2023-01-20 NOTE — PROGRESS NOTE ADULT - SUBJECTIVE AND OBJECTIVE BOX
Date of service 01/20/2023    chief complaint: Syncope/Fall    extended hpi: Pt is a 88 year old male, poor historian, PMH CVA, Afib on Eliquis, GERD, HTN and prostate cancer, was brought to hospital via EMS following a fall at home.  Fall was unwitnessed, but heard by his daughter in law, Winifred.  Pt lives with son and daughter in law.  Per chart he was walking to the bathroom, felt dizzy and fell.  Family reported he was awake/alert on the floor.  Pt currently unable to answer questions, per RN in ER, he has becoming increasingly confused since arrival.    Patient had CVA 5/2022 and was managed at [facility].  He has had intermittent confusion since then where he will not remember his families names or where he is, other times is completely oriented.  He is no longer on BP meds, only on Eliquis 5 BID.  He walks at home without walker or difficulties, does not fall.  Daughter in law is interested in home PT or nursing services to assist her in caring for him if he qualifies for any.  NO prior cardiac hx and has seen the cardio clinic at [facility] in the past, not regularly.          Patient denies chest pain or shortness of breath.   Review of symptoms otherwise negative.    MEDICATIONS:  acetaminophen     Tablet .. 650 milliGRAM(s) Oral every 6 hours PRN  apixaban 5 milliGRAM(s) Oral two times a day  atorvastatin 40 milliGRAM(s) Oral at bedtime  meclizine 12.5 milliGRAM(s) Oral three times a day PRN  melatonin 3 milliGRAM(s) Oral at bedtime PRN  pantoprazole    Tablet 40 milliGRAM(s) Oral before breakfast      LABS:                        15.5   6.05  )-----------( 144      ( 20 Jan 2023 06:43 )             47.3       Hemoglobin: 15.5 g/dL (01-20 @ 06:43)  Hemoglobin: 16.9 g/dL (01-19 @ 06:45)  Hemoglobin: 14.6 g/dL (01-18 @ 08:45)      01-20    135  |  102  |  18  ----------------------------<  86  4.2   |  19<L>  |  0.85    Ca    9.2      20 Jan 2023 06:43  Mg     2.30     01-20      Creatinine Trend: 0.85<--, 0.94<--, QNS<--, 0.98<--    COAGS:           PHYSICAL EXAM:  T(C): 36.8 (01-20-23 @ 09:49), Max: 36.9 (01-19-23 @ 17:15)  HR: 70 (01-20-23 @ 09:49) (59 - 70)  BP: 127/62 (01-20-23 @ 09:49) (121/74 - 136/83)  RR: 18 (01-20-23 @ 09:49) (18 - 18)  SpO2: 100% (01-20-23 @ 09:49) (97% - 100%)  Wt(kg): --    I&O's Summary      General: Well nourished in no acute distress. Alert and Oriented * 3.   Head: Normocephalic and atraumatic.   Neck: No JVD. No bruits. Supple. Does not appear to be enlarged.   Cardiovascular: + S1,S2 ; RRR Soft systolic murmur at the left lower sternal border. No rubs noted.    Lungs: CTA b/l. No rhonchi, rales or wheezes.   Abdomen: + BS, soft. Non tender. Non distended. No rebound. No guarding.   Extremities: No clubbing/cyanosis/edema.   Neurologic: Moves all four extremities. Full range of motion.   Skin: Warm and moist. The patient's skin has normal elasticity and good skin turgor.   Psychiatric: Appropriate mood and affect.  Musculoskeletal: Normal range of motion, normal strength    TELE: NSR    ECG:  	AF poor baseline    < from: CT Head No Cont (01.18.23 @ 08:59) >  IMPRESSION:  *NO EVIDENCE OF AN ACUTE TRAUMATIC INTRACRANIAL INJURY. 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 CEREBRAL INFARCTS   ARE IN A SOMEWHAT WATERSHED/BORDER ZONE DISTRIBUTION. DOPPLER   ULTRASONOGRAPHY OF THE NECK MAY PROVE USEFUL IF NOT PREVIOUSLY PERFORMED.  *  NO EVIDENCE OF AN ACUTE CERVICAL SPINE FRACTURE.  < end of copied text >    < from: Xray Chest 2 Views PA/Lat (01.18.23 @ 09:55) >    IMPRESSION: No acute traumatic findings.  < end of copied text >    TEle: no events    TTE:  OBSERVATIONS:  Mitral Valve: Mitral valve not well visualized.  Aortic Root: Normal aortic root.  Aortic Valve: Aortic valve not well visualized.  Left Atrium: Normal left atrium.  Left Ventricle: Despite  intravenous injection of echo  contrast (Definity) unable to evaluate left ventricular  systolic function. (DT:291 ms).  Right Heart: Normal right atrium. Normal right ventricular  size and function. Normal tricuspid valve.  Pericardium/PleuraNormal pericardium with no pericardial  effusion.      ASSESSMENT/PLAN: 	88 year old male, poor historian, PMH CVA, Afib on Eliquis, GERD, HTN and prostate cancer, was brought to hospital via EMS following an unwitnessed fall at home.    1. Afib  --per chart, chronic, on Eliquis  --cont lifelong AC if no contraindications      2. Fall r/o syncope/ confusion  -- per chart pt complained of dizziness and was awake/alert  -- CTH noted- neuro eval appreciatted rEEG is more episodes of confusion  -- orthostats positive will give IVF, check repeat orthostats after IVF  -- TTE as above, no AS mumur auscultated and not in decompensated CHF on exam      Lupe Unger MD  Pager: [phone]    Date of service 01/20/2023    chief complaint: Syncope/Fall    extended hpi: Pt is a 88 year old male, poor historian, PMH CVA, Afib on Eliquis, GERD, HTN and prostate cancer, was brought to hospital via EMS following a fall at home.  Fall was unwitnessed, but heard by his daughter in law, Winifred.  Pt lives with son and daughter in law.  Per chart he was walking to the bathroom, felt dizzy and fell.  Family reported he was awake/alert on the floor.  Pt currently unable to answer questions, per RN in ER, he has becoming increasingly confused since arrival.    Patient had CVA 5/2022 and was managed at [facility].  He has had intermittent confusion since then where he will not remember his families names or where he is, other times is completely oriented.  He is no longer on BP meds, only on Eliquis 5 BID.  He walks at home without walker or difficulties, does not fall.  Daughter in law is interested in home PT or nursing services to assist her in caring for him if he qualifies for any.  NO prior cardiac hx and has seen the cardio clinic at [facility] in the past, not regularly.          Patient denies chest pain or shortness of breath.   Review of symptoms otherwise negative.    MEDICATIONS:  acetaminophen     Tablet .. 650 milliGRAM(s) Oral every 6 hours PRN  apixaban 5 milliGRAM(s) Oral two times a day  atorvastatin 40 milliGRAM(s) Oral at bedtime  meclizine 12.5 milliGRAM(s) Oral three times a day PRN  melatonin 3 milliGRAM(s) Oral at bedtime PRN  pantoprazole    Tablet 40 milliGRAM(s) Oral before breakfast      LABS:                        15.5   6.05  )-----------( 144      ( 20 Jan 2023 06:43 )             47.3       Hemoglobin: 15.5 g/dL (01-20 @ 06:43)  Hemoglobin: 16.9 g/dL (01-19 @ 06:45)  Hemoglobin: 14.6 g/dL (01-18 @ 08:45)      01-20    135  |  102  |  18  ----------------------------<  86  4.2   |  19<L>  |  0.85    Ca    9.2      20 Jan 2023 06:43  Mg     2.30     01-20      Creatinine Trend: 0.85<--, 0.94<--, QNS<--, 0.98<--    COAGS:           PHYSICAL EXAM:  T(C): 36.8 (01-20-23 @ 09:49), Max: 36.9 (01-19-23 @ 17:15)  HR: 70 (01-20-23 @ 09:49) (59 - 70)  BP: 127/62 (01-20-23 @ 09:49) (121/74 - 136/83)  RR: 18 (01-20-23 @ 09:49) (18 - 18)  SpO2: 100% (01-20-23 @ 09:49) (97% - 100%)  Wt(kg): --    I&O's Summary      General: Well nourished in no acute distress. Alert and Oriented * 3.   Head: Normocephalic and atraumatic.   Neck: No JVD. No bruits. Supple. Does not appear to be enlarged.   Cardiovascular: + S1,S2 ; RRR Soft systolic murmur at the left lower sternal border. No rubs noted.    Lungs: CTA b/l. No rhonchi, rales or wheezes.   Abdomen: + BS, soft. Non tender. Non distended. No rebound. No guarding.   Extremities: No clubbing/cyanosis/edema.   Neurologic: Moves all four extremities. Full range of motion.   Skin: Warm and moist. The patient's skin has normal elasticity and good skin turgor.   Psychiatric: Appropriate mood and affect.  Musculoskeletal: Normal range of motion, normal strength    TELE: NSR    ECG:  	AF poor baseline    < from: CT Head No Cont (01.18.23 @ 08:59) >  IMPRESSION:  *NO EVIDENCE OF AN ACUTE TRAUMATIC INTRACRANIAL INJURY. 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 CEREBRAL INFARCTS   ARE IN A SOMEWHAT WATERSHED/BORDER ZONE DISTRIBUTION. DOPPLER   ULTRASONOGRAPHY OF THE NECK MAY PROVE USEFUL IF NOT PREVIOUSLY PERFORMED.  *  NO EVIDENCE OF AN ACUTE CERVICAL SPINE FRACTURE.  < end of copied text >    < from: Xray Chest 2 Views PA/Lat (01.18.23 @ 09:55) >    IMPRESSION: No acute traumatic findings.  < end of copied text >    TEle: no events    TTE:  OBSERVATIONS:  Mitral Valve: Mitral valve not well visualized.  Aortic Root: Normal aortic root.  Aortic Valve: Aortic valve not well visualized.  Left Atrium: Normal left atrium.  Left Ventricle: Despite  intravenous injection of echo  contrast (Definity) unable to evaluate left ventricular  systolic function. (DT:291 ms).  Right Heart: Normal right atrium. Normal right ventricular  size and function. Normal tricuspid valve.  Pericardium/PleuraNormal pericardium with no pericardial  effusion.      ASSESSMENT/PLAN: 	88 year old male, poor historian, PMH CVA, Afib on Eliquis, GERD, HTN and prostate cancer, was brought to hospital via EMS following an unwitnessed fall at home.    1. Afib  --per chart, chronic, on Eliquis  --cont lifelong AC if no contraindications      2. Fall r/o syncope/ confusion  -- per chart pt complained of dizziness and was awake/alert  -- CTH noted- neuro eval appreciatted rEEG is more episodes of confusion  -- orthostats positive will give IVF, check repeat orthostats after IVF  -- TTE as above, no AS mumur auscultated and not in decompensated CHF on exam      Lupe Unger MD  Pager: [phone]    Date of service 01/20/2023    chief complaint: Syncope/Fall    extended hpi: Pt is a 88 year old male, poor historian, PMH CVA, Afib on Eliquis, GERD, HTN and prostate cancer, was brought to hospital via EMS following a fall at home.  Fall was unwitnessed, but heard by his daughter in law, Winifred.  Pt lives with son and daughter in law.  Per chart he was walking to the bathroom, felt dizzy and fell.  Family reported he was awake/alert on the floor.  Pt currently unable to answer questions, per RN in ER, he has becoming increasingly confused since arrival.    Patient had CVA 5/2022 and was managed at [facility].  He has had intermittent confusion since then where he will not remember his families names or where he is, other times is completely oriented.  He is no longer on BP meds, only on Eliquis 5 BID.  He walks at home without walker or difficulties, does not fall.  Daughter in law is interested in home PT or nursing services to assist her in caring for him if he qualifies for any.  NO prior cardiac hx and has seen the cardio clinic at [facility] in the past, not regularly.          Patient denies chest pain or shortness of breath.   Review of symptoms otherwise negative.    MEDICATIONS:  acetaminophen     Tablet .. 650 milliGRAM(s) Oral every 6 hours PRN  apixaban 5 milliGRAM(s) Oral two times a day  atorvastatin 40 milliGRAM(s) Oral at bedtime  meclizine 12.5 milliGRAM(s) Oral three times a day PRN  melatonin 3 milliGRAM(s) Oral at bedtime PRN  pantoprazole    Tablet 40 milliGRAM(s) Oral before breakfast      LABS:                        15.5   6.05  )-----------( 144      ( 20 Jan 2023 06:43 )             47.3       Hemoglobin: 15.5 g/dL (01-20 @ 06:43)  Hemoglobin: 16.9 g/dL (01-19 @ 06:45)  Hemoglobin: 14.6 g/dL (01-18 @ 08:45)      01-20    135  |  102  |  18  ----------------------------<  86  4.2   |  19<L>  |  0.85    Ca    9.2      20 Jan 2023 06:43  Mg     2.30     01-20      Creatinine Trend: 0.85<--, 0.94<--, QNS<--, 0.98<--    COAGS:           PHYSICAL EXAM:  T(C): 36.8 (01-20-23 @ 09:49), Max: 36.9 (01-19-23 @ 17:15)  HR: 70 (01-20-23 @ 09:49) (59 - 70)  BP: 127/62 (01-20-23 @ 09:49) (121/74 - 136/83)  RR: 18 (01-20-23 @ 09:49) (18 - 18)  SpO2: 100% (01-20-23 @ 09:49) (97% - 100%)  Wt(kg): --    I&O's Summary      General: Well nourished in no acute distress. Alert and Oriented * 3.   Head: Normocephalic and atraumatic.   Neck: No JVD. No bruits. Supple. Does not appear to be enlarged.   Cardiovascular: + S1,S2 ; RRR Soft systolic murmur at the left lower sternal border. No rubs noted.    Lungs: CTA b/l. No rhonchi, rales or wheezes.   Abdomen: + BS, soft. Non tender. Non distended. No rebound. No guarding.   Extremities: No clubbing/cyanosis/edema.   Neurologic: Moves all four extremities. Full range of motion.   Skin: Warm and moist. The patient's skin has normal elasticity and good skin turgor.   Psychiatric: Appropriate mood and affect.  Musculoskeletal: Normal range of motion, normal strength    TELE: NSR    ECG:  	AF poor baseline    < from: CT Head No Cont (01.18.23 @ 08:59) >  IMPRESSION:  *NO EVIDENCE OF AN ACUTE TRAUMATIC INTRACRANIAL INJURY. 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 CEREBRAL INFARCTS   ARE IN A SOMEWHAT WATERSHED/BORDER ZONE DISTRIBUTION. DOPPLER   ULTRASONOGRAPHY OF THE NECK MAY PROVE USEFUL IF NOT PREVIOUSLY PERFORMED.  *  NO EVIDENCE OF AN ACUTE CERVICAL SPINE FRACTURE.  < end of copied text >    < from: Xray Chest 2 Views PA/Lat (01.18.23 @ 09:55) >    IMPRESSION: No acute traumatic findings.  < end of copied text >    TEle: no events    TTE:  OBSERVATIONS:  Mitral Valve: Mitral valve not well visualized.  Aortic Root: Normal aortic root.  Aortic Valve: Aortic valve not well visualized.  Left Atrium: Normal left atrium.  Left Ventricle: Despite  intravenous injection of echo  contrast (Definity) unable to evaluate left ventricular  systolic function. (DT:291 ms).  Right Heart: Normal right atrium. Normal right ventricular  size and function. Normal tricuspid valve.  Pericardium/PleuraNormal pericardium with no pericardial  effusion.      ASSESSMENT/PLAN: 	88 year old male, poor historian, PMH CVA, Afib on Eliquis, GERD, HTN and prostate cancer, was brought to hospital via EMS following an unwitnessed fall at home.    1. Afib  --per chart, chronic, on Eliquis  --cont lifelong AC if no contraindications      2. Fall r/o syncope/ confusion  -- per chart pt complained of dizziness and was awake/alert  -- CTH noted- neuro eval appreciatted rEEG is more episodes of confusion  -- orthostats positive will give IVF, check repeat orthostats after IVF  -- TTE as above, no AS mumur auscultated and not in decompensated CHF on exam      Lupe Unger MD  Pager: [phone]

## 2023-01-20 NOTE — CONSULT NOTE ADULT - SUBJECTIVE AND OBJECTIVE BOX
Neurology consult    TENNILLE Molina     Patient is a 88y old  Male who presents with a chief complaint of Dizziness and fall (2023 12:42)      HPI:  88 year old male with a history of Afib on Eliquis, GERD, HTN, CVA, vertigo, dementia AAOx1-2 at baseline presenting after a fall. Patient is a poor historian. States that he was walking to the bathroom this morning when he suddenly began feeling dizzy and fell forward, hitting the front of his head in the bathroom. Unable to state if he lost consciousness or not. He was found by his son on the floor and EMS was called. Patient denies nausea or vomiting, states that he sometimes feels his heart "beating heavy." After the fall he has pain in his left thigh/hip area.    Collateral information obtained from patient's son Caden [phone]. He states patient's LKN was last night before patient went to sleep. Patient has a longstanding history of vertigo for which he is prescribed meclizine as needed. Patient gets dizzy often and therefore was prescribed meclizine and physical therapy by his PCP, but has not been able to continue with PT due to insurance issues. Son also states patient does not have nonslip socks to wear at home and believes patient may have slipped on the floor this morning while walking. No prior falls at home recently but patient requires assistance while walking usually. Son states patient has been compliant with all of his medications. (2023 14:27)          MEDICATIONS    acetaminophen     Tablet .. 650 milliGRAM(s) Oral every 6 hours PRN  apixaban 5 milliGRAM(s) Oral two times a day  atorvastatin 40 milliGRAM(s) Oral at bedtime  meclizine 12.5 milliGRAM(s) Oral three times a day PRN  melatonin 3 milliGRAM(s) Oral at bedtime PRN  pantoprazole    Tablet 40 milliGRAM(s) Oral before breakfast      PMH: Atrial fibrillation    HTN (hypertension)    CVA (cerebrovascular accident)    Vertigo    Dementia         PSH: History of sinus surgery        Family history: No history of dementia, strokes, or seizures   FAMILY HISTORY:  No pertinent family history in first degree relatives        SOCIAL HISTORY:  No history of tobacco or alcohol use     Allergies    No Known Allergies    Intolerances            Vital Signs Last 24 Hrs  T(C): 36.8 (2023 09:49), Max: 36.9 (2023 17:15)  T(F): 98.2 (2023 09:49), Max: 98.4 (2023 17:15)  HR: 70 (2023 09:49) (59 - 70)  BP: 127/62 (2023 09:49) (121/74 - 136/83)  BP(mean): --  RR: 18 (2023 09:49) (18 - 18)  SpO2: 100% (2023 09:49) (97% - 100%)    Parameters below as of 2023 09:49  Patient On (Oxygen Delivery Method): room air          On Neurological Examination:    Mental Status - Patient is alert, awake, oriented X3. fluent, names, no dysarthria no aphasia   Cranial Nerves - PERRL, EOMI, VFF, V1-V3 intact, peripheral right facial affecting foreheadfacial asymmetry, tongue/uvula midline    Motor Exam -   no drift  Sensory    Intact to light touch and pinprick bilaterally    Coord: FTN intact bilaterally     Gait -  deferred         LABS:  CBC Full  -  ( 2023 06:43 )  WBC Count : 6.05 K/uL  RBC Count : 5.24 M/uL  Hemoglobin : 15.5 g/dL  Hematocrit : 47.3 %  Platelet Count - Automated : 144 K/uL  Mean Cell Volume : 90.3 fL  Mean Cell Hemoglobin : 29.6 pg  Mean Cell Hemoglobin Concentration : 32.8 gm/dL  Auto Neutrophil # : x  Auto Lymphocyte # : x  Auto Monocyte # : x  Auto Eosinophil # : x  Auto Basophil # : x  Auto Neutrophil % : x  Auto Lymphocyte % : x  Auto Monocyte % : x  Auto Eosinophil % : x  Auto Basophil % : x    Urinalysis Basic - ( 2023 10:30 )    Color: Light Yellow / Appearance: Clear / S.012 / pH: x  Gluc: x / Ketone: Negative  / Bili: Negative / Urobili: <2 mg/dL   Blood: x / Protein: Negative / Nitrite: Negative   Leuk Esterase: Negative / RBC: x / WBC x   Sq Epi: x / Non Sq Epi: x / Bacteria: x      01-20    135  |  102  |  18  ----------------------------<  86  4.2   |  <L>  |  0.85    Ca    9.2      2023 06:43  Mg     2.30             Hemoglobin A1C:             RADIOLOGY  < from: CT Head No Cont (23 @ 08:59) >  IMPRESSION:  *NO EVIDENCE OF AN ACUTE TRAUMATIC INTRACRANIAL INJURY. 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 CEREBRAL INFARCTS   ARE IN A SOMEWHAT WATERSHED/BORDER ZONE DISTRIBUTION. DOPPLER   ULTRASONOGRAPHY OF THE NECK MAY PROVE USEFUL IF NOT PREVIOUSLY PERFORMED.  *  NO EVIDENCE OF AN ACUTE CERVICAL SPINE FRACTURE.    --- End of Report ---        < end of copied text >                   Neurology consult    TENNILLE Molina     Patient is a 88y old  Male who presents with a chief complaint of Dizziness and fall (2023 12:42)      HPI:  88 year old male with a history of Afib on Eliquis, GERD, HTN, CVA, vertigo, dementia AAOx1-2 at baseline presenting after a fall. Patient is a poor historian. States that he was walking to the bathroom this morning when he suddenly began feeling dizzy and fell forward, hitting the front of his head in the bathroom. Unable to state if he lost consciousness or not. He was found by his son on the floor and EMS was called. Patient denies nausea or vomiting, states that he sometimes feels his heart "beating heavy." After the fall he has pain in his left thigh/hip area.    Collateral information obtained from patient's son Caden [phone]. He states patient's LKN was last night before patient went to sleep. Patient has a longstanding history of vertigo for which he is prescribed meclizine as needed. Patient gets dizzy often and therefore was prescribed meclizine and physical therapy by his PCP, but has not been able to continue with PT due to insurance issues. Son also states patient does not have nonslip socks to wear at home and believes patient may have slipped on the floor this morning while walking. No prior falls at home recently but patient requires assistance while walking usually. Son states patient has been compliant with all of his medications. (2023 14:27)          MEDICATIONS    acetaminophen     Tablet .. 650 milliGRAM(s) Oral every 6 hours PRN  apixaban 5 milliGRAM(s) Oral two times a day  atorvastatin 40 milliGRAM(s) Oral at bedtime  meclizine 12.5 milliGRAM(s) Oral three times a day PRN  melatonin 3 milliGRAM(s) Oral at bedtime PRN  pantoprazole    Tablet 40 milliGRAM(s) Oral before breakfast      PMH: Atrial fibrillation    HTN (hypertension)    CVA (cerebrovascular accident)    Vertigo    Dementia         PSH: History of sinus surgery        Family history: No history of dementia, strokes, or seizures   FAMILY HISTORY:  No pertinent family history in first degree relatives        SOCIAL HISTORY:  No history of tobacco or alcohol use     Allergies    No Known Allergies    Intolerances            Vital Signs Last 24 Hrs  T(C): 36.8 (2023 09:49), Max: 36.9 (2023 17:15)  T(F): 98.2 (2023 09:49), Max: 98.4 (2023 17:15)  HR: 70 (2023 09:49) (59 - 70)  BP: 127/62 (2023 09:49) (121/74 - 136/83)  BP(mean): --  RR: 18 (2023 09:49) (18 - 18)  SpO2: 100% (2023 09:49) (97% - 100%)    Parameters below as of 2023 09:49  Patient On (Oxygen Delivery Method): room air          On Neurological Examination:    Mental Status - Patient is alert, awake, oriented X3. fluent, names, no dysarthria no aphasia   Cranial Nerves - PERRL, EOMI, VFF, V1-V3 intact, peripheral right facial affecting foreheadfacial asymmetry, tongue/uvula midline    Motor Exam -   no drift  Sensory    Intact to light touch and pinprick bilaterally    Coord: FTN intact bilaterally     Gait -  deferred         LABS:  CBC Full  -  ( 2023 06:43 )  WBC Count : 6.05 K/uL  RBC Count : 5.24 M/uL  Hemoglobin : 15.5 g/dL  Hematocrit : 47.3 %  Platelet Count - Automated : 144 K/uL  Mean Cell Volume : 90.3 fL  Mean Cell Hemoglobin : 29.6 pg  Mean Cell Hemoglobin Concentration : 32.8 gm/dL  Auto Neutrophil # : x  Auto Lymphocyte # : x  Auto Monocyte # : x  Auto Eosinophil # : x  Auto Basophil # : x  Auto Neutrophil % : x  Auto Lymphocyte % : x  Auto Monocyte % : x  Auto Eosinophil % : x  Auto Basophil % : x    Urinalysis Basic - ( 2023 10:30 )    Color: Light Yellow / Appearance: Clear / S.012 / pH: x  Gluc: x / Ketone: Negative  / Bili: Negative / Urobili: <2 mg/dL   Blood: x / Protein: Negative / Nitrite: Negative   Leuk Esterase: Negative / RBC: x / WBC x   Sq Epi: x / Non Sq Epi: x / Bacteria: x      01-20    135  |  102  |  18  ----------------------------<  86  4.2   |  <L>  |  0.85    Ca    9.2      2023 06:43  Mg     2.30             Hemoglobin A1C:             RADIOLOGY  < from: CT Head No Cont (23 @ 08:59) >  IMPRESSION:  *NO EVIDENCE OF AN ACUTE TRAUMATIC INTRACRANIAL INJURY. 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 CEREBRAL INFARCTS   ARE IN A SOMEWHAT WATERSHED/BORDER ZONE DISTRIBUTION. DOPPLER   ULTRASONOGRAPHY OF THE NECK MAY PROVE USEFUL IF NOT PREVIOUSLY PERFORMED.  *  NO EVIDENCE OF AN ACUTE CERVICAL SPINE FRACTURE.    --- End of Report ---        < end of copied text >                   Neurology consult    TENNILLE Molina     Patient is a 88y old  Male who presents with a chief complaint of Dizziness and fall (2023 12:42)      HPI:  88 year old male with a history of Afib on Eliquis, GERD, HTN, CVA, vertigo, dementia AAOx1-2 at baseline presenting after a fall. Patient is a poor historian. States that he was walking to the bathroom this morning when he suddenly began feeling dizzy and fell forward, hitting the front of his head in the bathroom. Unable to state if he lost consciousness or not. He was found by his son on the floor and EMS was called. Patient denies nausea or vomiting, states that he sometimes feels his heart "beating heavy." After the fall he has pain in his left thigh/hip area.    Collateral information obtained from patient's son Caden [phone]. He states patient's LKN was last night before patient went to sleep. Patient has a longstanding history of vertigo for which he is prescribed meclizine as needed. Patient gets dizzy often and therefore was prescribed meclizine and physical therapy by his PCP, but has not been able to continue with PT due to insurance issues. Son also states patient does not have nonslip socks to wear at home and believes patient may have slipped on the floor this morning while walking. No prior falls at home recently but patient requires assistance while walking usually. Son states patient has been compliant with all of his medications. (2023 14:27)          MEDICATIONS    acetaminophen     Tablet .. 650 milliGRAM(s) Oral every 6 hours PRN  apixaban 5 milliGRAM(s) Oral two times a day  atorvastatin 40 milliGRAM(s) Oral at bedtime  meclizine 12.5 milliGRAM(s) Oral three times a day PRN  melatonin 3 milliGRAM(s) Oral at bedtime PRN  pantoprazole    Tablet 40 milliGRAM(s) Oral before breakfast      PMH: Atrial fibrillation    HTN (hypertension)    CVA (cerebrovascular accident)    Vertigo    Dementia         PSH: History of sinus surgery        Family history: No history of dementia, strokes, or seizures   FAMILY HISTORY:  No pertinent family history in first degree relatives        SOCIAL HISTORY:  No history of tobacco or alcohol use     Allergies    No Known Allergies    Intolerances            Vital Signs Last 24 Hrs  T(C): 36.8 (2023 09:49), Max: 36.9 (2023 17:15)  T(F): 98.2 (2023 09:49), Max: 98.4 (2023 17:15)  HR: 70 (2023 09:49) (59 - 70)  BP: 127/62 (2023 09:49) (121/74 - 136/83)  BP(mean): --  RR: 18 (2023 09:49) (18 - 18)  SpO2: 100% (2023 09:49) (97% - 100%)    Parameters below as of 2023 09:49  Patient On (Oxygen Delivery Method): room air          On Neurological Examination:    Mental Status - Patient is alert, awake, oriented X3. fluent, names, no dysarthria no aphasia   Cranial Nerves - PERRL, EOMI, VFF, V1-V3 intact, peripheral right facial affecting foreheadfacial asymmetry, tongue/uvula midline    Motor Exam -   no drift  Sensory    Intact to light touch and pinprick bilaterally    Coord: FTN intact bilaterally     Gait -  deferred         LABS:  CBC Full  -  ( 2023 06:43 )  WBC Count : 6.05 K/uL  RBC Count : 5.24 M/uL  Hemoglobin : 15.5 g/dL  Hematocrit : 47.3 %  Platelet Count - Automated : 144 K/uL  Mean Cell Volume : 90.3 fL  Mean Cell Hemoglobin : 29.6 pg  Mean Cell Hemoglobin Concentration : 32.8 gm/dL  Auto Neutrophil # : x  Auto Lymphocyte # : x  Auto Monocyte # : x  Auto Eosinophil # : x  Auto Basophil # : x  Auto Neutrophil % : x  Auto Lymphocyte % : x  Auto Monocyte % : x  Auto Eosinophil % : x  Auto Basophil % : x    Urinalysis Basic - ( 2023 10:30 )    Color: Light Yellow / Appearance: Clear / S.012 / pH: x  Gluc: x / Ketone: Negative  / Bili: Negative / Urobili: <2 mg/dL   Blood: x / Protein: Negative / Nitrite: Negative   Leuk Esterase: Negative / RBC: x / WBC x   Sq Epi: x / Non Sq Epi: x / Bacteria: x      01-20    135  |  102  |  18  ----------------------------<  86  4.2   |  <L>  |  0.85    Ca    9.2      2023 06:43  Mg     2.30             Hemoglobin A1C:             RADIOLOGY  < from: CT Head No Cont (23 @ 08:59) >  IMPRESSION:  *NO EVIDENCE OF AN ACUTE TRAUMATIC INTRACRANIAL INJURY. 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 CEREBRAL INFARCTS   ARE IN A SOMEWHAT WATERSHED/BORDER ZONE DISTRIBUTION. DOPPLER   ULTRASONOGRAPHY OF THE NECK MAY PROVE USEFUL IF NOT PREVIOUSLY PERFORMED.  *  NO EVIDENCE OF AN ACUTE CERVICAL SPINE FRACTURE.    --- End of Report ---        < end of copied text >

## 2023-01-20 NOTE — CONSULT NOTE ADULT - ASSESSMENT
88 year old male with a history of Afib on Eliquis, GERD, HTN, CVA, vertigo, dementia AAOx1-2 at baseline here after a fall. Pe non-focal mild cognitive deficits. CTH chronic findings    Impression: Fall hx of CVA    On Eliquis  No need fir MRi at this time  If patient continues to have bouts of confusion would obtain a routine EEG  Consider Carotid Dopplers

## 2023-01-21 ENCOUNTER — TRANSCRIPTION ENCOUNTER (OUTPATIENT)
Age: 88
End: 2023-01-21

## 2023-01-21 VITALS
OXYGEN SATURATION: 100 % | DIASTOLIC BLOOD PRESSURE: 64 MMHG | RESPIRATION RATE: 17 BRPM | SYSTOLIC BLOOD PRESSURE: 101 MMHG | TEMPERATURE: 97 F | HEART RATE: 66 BPM

## 2023-01-21 LAB
ANION GAP SERPL CALC-SCNC: 11 MMOL/L — SIGNIFICANT CHANGE UP (ref 7–14)
BUN SERPL-MCNC: 19 MG/DL — SIGNIFICANT CHANGE UP (ref 7–23)
CALCIUM SERPL-MCNC: 9 MG/DL — SIGNIFICANT CHANGE UP (ref 8.4–10.5)
CHLORIDE SERPL-SCNC: 104 MMOL/L — SIGNIFICANT CHANGE UP (ref 98–107)
CO2 SERPL-SCNC: 23 MMOL/L — SIGNIFICANT CHANGE UP (ref 22–31)
CREAT SERPL-MCNC: 1.04 MG/DL — SIGNIFICANT CHANGE UP (ref 0.5–1.3)
EGFR: 69 ML/MIN/1.73M2 — SIGNIFICANT CHANGE UP
GLUCOSE SERPL-MCNC: 90 MG/DL — SIGNIFICANT CHANGE UP (ref 70–99)
HCT VFR BLD CALC: 44.5 % — SIGNIFICANT CHANGE UP (ref 39–50)
HGB BLD-MCNC: 14.5 G/DL — SIGNIFICANT CHANGE UP (ref 13–17)
MAGNESIUM SERPL-MCNC: 1.9 MG/DL — SIGNIFICANT CHANGE UP (ref 1.6–2.6)
MCHC RBC-ENTMCNC: 29 PG — SIGNIFICANT CHANGE UP (ref 27–34)
MCHC RBC-ENTMCNC: 32.6 GM/DL — SIGNIFICANT CHANGE UP (ref 32–36)
MCV RBC AUTO: 89 FL — SIGNIFICANT CHANGE UP (ref 80–100)
NRBC # BLD: 0 /100 WBCS — SIGNIFICANT CHANGE UP (ref 0–0)
NRBC # FLD: 0 K/UL — SIGNIFICANT CHANGE UP (ref 0–0)
PLATELET # BLD AUTO: 151 K/UL — SIGNIFICANT CHANGE UP (ref 150–400)
POTASSIUM SERPL-MCNC: 4.1 MMOL/L — SIGNIFICANT CHANGE UP (ref 3.5–5.3)
POTASSIUM SERPL-SCNC: 4.1 MMOL/L — SIGNIFICANT CHANGE UP (ref 3.5–5.3)
RBC # BLD: 5 M/UL — SIGNIFICANT CHANGE UP (ref 4.2–5.8)
RBC # FLD: 14.3 % — SIGNIFICANT CHANGE UP (ref 10.3–14.5)
SODIUM SERPL-SCNC: 138 MMOL/L — SIGNIFICANT CHANGE UP (ref 135–145)
WBC # BLD: 5.77 K/UL — SIGNIFICANT CHANGE UP (ref 3.8–10.5)
WBC # FLD AUTO: 5.77 K/UL — SIGNIFICANT CHANGE UP (ref 3.8–10.5)

## 2023-01-21 RX ORDER — ATORVASTATIN CALCIUM 80 MG/1
1 TABLET, FILM COATED ORAL
Qty: 30 | Refills: 0
Start: 2023-01-21 | End: 2023-02-19

## 2023-01-21 RX ADMIN — Medication 650 MILLIGRAM(S): at 00:39

## 2023-01-21 RX ADMIN — PANTOPRAZOLE SODIUM 40 MILLIGRAM(S): 20 TABLET, DELAYED RELEASE ORAL at 06:54

## 2023-01-21 RX ADMIN — APIXABAN 5 MILLIGRAM(S): 2.5 TABLET, FILM COATED ORAL at 06:54

## 2023-01-21 NOTE — PROGRESS NOTE ADULT - SUBJECTIVE AND OBJECTIVE BOX
Date of service 01/21/2023    chief complaint: Syncope/Fall    extended hpi: Pt is a 88 year old male, poor historian, PMH CVA, Afib on Eliquis, GERD, HTN and prostate cancer, was brought to hospital via EMS following a fall at home.  Fall was unwitnessed, but heard by his daughter in law, Winifred.  Pt lives with son and daughter in law.  Per chart he was walking to the bathroom, felt dizzy and fell.  Family reported he was awake/alert on the floor.  Pt currently unable to answer questions, per RN in ER, he has becoming increasingly confused since arrival.    Patient had CVA 5/2022 and was managed at [facility].  He has had intermittent confusion since then where he will not remember his families names or where he is, other times is completely oriented.  He is no longer on BP meds, only on Eliquis 5 BID.  He walks at home without walker or difficulties, does not fall.  Daughter in law is interested in home PT or nursing services to assist her in caring for him if he qualifies for any.  NO prior cardiac hx and has seen the cardio clinic at [facility] in the past, not regularly.      Patient denies chest pain or shortness of breath.  A &O to person and place.  Review of symptoms otherwise negative.    Review of Systems:   Constitutional: [ ] fevers, [ ] chills.   Skin: [ ] dry skin. [ ] rashes.  Psychiatric: [ ] depression, [ ] anxiety.   Gastrointestinal: [ ] BRBPR, [ ] melena.   Neurological: [ ] confusion. [ ] seizures. [ ] shuffling gait.   Ears,Nose,Mouth and Throat: [ ] ear pain [ ] sore throat.   Eyes: [ ] diplopia.   Respiratory: [ ] hemoptysis. [ ] shortness of breath  Cardiovascular: See HPI above  Hematologic/Lymphatic: [ ] anemia. [ ] painful nodes. [ ] prolonged bleeding.   Genitourinary: [ ] hematuria. [ ] flank pain.   Endocrine: [ ] significant change in weight. [ ] intolerance to heat and cold.     Review of systems [x ] otherwise negative, [ ] otherwise unable to obtain    FH: no family history of sudden cardiac death in first degree relatives    SH: [ ] tobacco, [ ] alcohol, [ ] drugs    acetaminophen     Tablet .. 650 milliGRAM(s) Oral every 6 hours PRN  apixaban 5 milliGRAM(s) Oral two times a day  atorvastatin 40 milliGRAM(s) Oral at bedtime  meclizine 12.5 milliGRAM(s) Oral three times a day PRN  melatonin 3 milliGRAM(s) Oral at bedtime PRN  pantoprazole    Tablet 40 milliGRAM(s) Oral before breakfast                            14.5   5.77  )-----------( 151      ( 21 Jan 2023 07:15 )             44.5     138  |  104  |  19  ----------------------------<  90  4.1   |  23  |  1.04    Ca    9.0      21 Jan 2023 07:15  Mg     1.90     01-21    T(C): 36.3 (01-21-23 @ 12:12), Max: 36.6 (01-20-23 @ 23:06)  HR: 66 (01-21-23 @ 12:12) (66 - 80)  BP: 101/64 (01-21-23 @ 12:12) (100/61 - 103/69)  RR: 17 (01-21-23 @ 12:12) (16 - 17)  SpO2: 100% (01-21-23 @ 12:12) (98% - 100%)      General: Well nourished in no acute distress. Alert and Oriented * 3.   Head: Normocephalic and atraumatic.   Neck: No JVD. No bruits. Supple. Does not appear to be enlarged.   Cardiovascular: + S1,S2 ; RRR Soft systolic murmur at the left lower sternal border. No rubs noted.    Lungs: CTA b/l. No rhonchi, rales or wheezes.   Abdomen: + BS, soft. Non tender. Non distended. No rebound. No guarding.   Extremities: No clubbing/cyanosis/edema.   Neurologic: Moves all four extremities. Full range of motion.   Skin: Warm and moist. The patient's skin has normal elasticity and good skin turgor.   Psychiatric: Appropriate mood and affect.  Musculoskeletal: Normal range of motion, normal strength    TELE: NSR    ECG:  	AF poor baseline    < from: CT Head No Cont (01.18.23 @ 08:59) >  IMPRESSION:  *NO EVIDENCE OF AN ACUTE TRAUMATIC INTRACRANIAL INJURY. 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 CEREBRAL INFARCTS   ARE IN A SOMEWHAT WATERSHED/BORDER ZONE DISTRIBUTION. DOPPLER   ULTRASONOGRAPHY OF THE NECK MAY PROVE USEFUL IF NOT PREVIOUSLY PERFORMED.  *  NO EVIDENCE OF AN ACUTE CERVICAL SPINE FRACTURE.  < end of copied text >    < from: Xray Chest 2 Views PA/Lat (01.18.23 @ 09:55) >    IMPRESSION: No acute traumatic findings.  < end of copied text >    TEle: no events    TTE:  OBSERVATIONS:  Mitral Valve: Mitral valve not well visualized.  Aortic Root: Normal aortic root.  Aortic Valve: Aortic valve not well visualized.  Left Atrium: Normal left atrium.  Left Ventricle: Despite  intravenous injection of echo  contrast (Definity) unable to evaluate left ventricular  systolic function. (DT:291 ms).  Right Heart: Normal right atrium. Normal right ventricular  size and function. Normal tricuspid valve.  Pericardium/PleuraNormal pericardium with no pericardial  effusion.      ASSESSMENT/PLAN: 	88 year old male, poor historian, PMH CVA, Afib on Eliquis, GERD, HTN and prostate cancer, was brought to hospital via EMS following an unwitnessed fall at home.    1. Afib  --per chart, chronic, on Eliquis  --cont lifelong AC if no contraindications    2. Fall r/o syncope/ confusion  -- per chart pt complained of dizziness and was awake/alert  -- CTH noted- neuro eval appreciated   --recommend rEEG if he has more episodes of confusion  --currently oriented and eating, will hold off  -- orthostatics positive, s/p IVF, now resolved   --PT recommended home PT with walker, walker being provided  --TTE as above, no AS mumur auscultated and not in decompensated CHF on exam  --DC home and f/u with PMD and OP Cardio at [facility]    Deidre WILLETT  [phone]  Date of service 01/21/2023    chief complaint: Syncope/Fall    extended hpi: Pt is a 88 year old male, poor historian, PMH CVA, Afib on Eliquis, GERD, HTN and prostate cancer, was brought to hospital via EMS following a fall at home.  Fall was unwitnessed, but heard by his daughter in law, Winifred.  Pt lives with son and daughter in law.  Per chart he was walking to the bathroom, felt dizzy and fell.  Family reported he was awake/alert on the floor.  Pt currently unable to answer questions, per RN in ER, he has becoming increasingly confused since arrival.    Patient had CVA 5/2022 and was managed at [facility].  He has had intermittent confusion since then where he will not remember his families names or where he is, other times is completely oriented.  He is no longer on BP meds, only on Eliquis 5 BID.  He walks at home without walker or difficulties, does not fall.  Daughter in law is interested in home PT or nursing services to assist her in caring for him if he qualifies for any.  NO prior cardiac hx and has seen the cardio clinic at [facility] in the past, not regularly.      Patient denies chest pain or shortness of breath.  A &O to person and place.  Review of symptoms otherwise negative.    Review of Systems:   Constitutional: [ ] fevers, [ ] chills.   Skin: [ ] dry skin. [ ] rashes.  Psychiatric: [ ] depression, [ ] anxiety.   Gastrointestinal: [ ] BRBPR, [ ] melena.   Neurological: [ ] confusion. [ ] seizures. [ ] shuffling gait.   Ears,Nose,Mouth and Throat: [ ] ear pain [ ] sore throat.   Eyes: [ ] diplopia.   Respiratory: [ ] hemoptysis. [ ] shortness of breath  Cardiovascular: See HPI above  Hematologic/Lymphatic: [ ] anemia. [ ] painful nodes. [ ] prolonged bleeding.   Genitourinary: [ ] hematuria. [ ] flank pain.   Endocrine: [ ] significant change in weight. [ ] intolerance to heat and cold.     Review of systems [x ] otherwise negative, [ ] otherwise unable to obtain    FH: no family history of sudden cardiac death in first degree relatives    SH: [ ] tobacco, [ ] alcohol, [ ] drugs    acetaminophen     Tablet .. 650 milliGRAM(s) Oral every 6 hours PRN  apixaban 5 milliGRAM(s) Oral two times a day  atorvastatin 40 milliGRAM(s) Oral at bedtime  meclizine 12.5 milliGRAM(s) Oral three times a day PRN  melatonin 3 milliGRAM(s) Oral at bedtime PRN  pantoprazole    Tablet 40 milliGRAM(s) Oral before breakfast                            14.5   5.77  )-----------( 151      ( 21 Jan 2023 07:15 )             44.5     138  |  104  |  19  ----------------------------<  90  4.1   |  23  |  1.04    Ca    9.0      21 Jan 2023 07:15  Mg     1.90     01-21    T(C): 36.3 (01-21-23 @ 12:12), Max: 36.6 (01-20-23 @ 23:06)  HR: 66 (01-21-23 @ 12:12) (66 - 80)  BP: 101/64 (01-21-23 @ 12:12) (100/61 - 103/69)  RR: 17 (01-21-23 @ 12:12) (16 - 17)  SpO2: 100% (01-21-23 @ 12:12) (98% - 100%)      General: Well nourished in no acute distress. Alert and Oriented * 3.   Head: Normocephalic and atraumatic.   Neck: No JVD. No bruits. Supple. Does not appear to be enlarged.   Cardiovascular: + S1,S2 ; RRR Soft systolic murmur at the left lower sternal border. No rubs noted.    Lungs: CTA b/l. No rhonchi, rales or wheezes.   Abdomen: + BS, soft. Non tender. Non distended. No rebound. No guarding.   Extremities: No clubbing/cyanosis/edema.   Neurologic: Moves all four extremities. Full range of motion.   Skin: Warm and moist. The patient's skin has normal elasticity and good skin turgor.   Psychiatric: Appropriate mood and affect.  Musculoskeletal: Normal range of motion, normal strength    TELE: NSR    ECG:  	AF poor baseline    < from: CT Head No Cont (01.18.23 @ 08:59) >  IMPRESSION:  *NO EVIDENCE OF AN ACUTE TRAUMATIC INTRACRANIAL INJURY. 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 CEREBRAL INFARCTS   ARE IN A SOMEWHAT WATERSHED/BORDER ZONE DISTRIBUTION. DOPPLER   ULTRASONOGRAPHY OF THE NECK MAY PROVE USEFUL IF NOT PREVIOUSLY PERFORMED.  *  NO EVIDENCE OF AN ACUTE CERVICAL SPINE FRACTURE.  < end of copied text >    < from: Xray Chest 2 Views PA/Lat (01.18.23 @ 09:55) >    IMPRESSION: No acute traumatic findings.  < end of copied text >    TEle: no events    TTE:  OBSERVATIONS:  Mitral Valve: Mitral valve not well visualized.  Aortic Root: Normal aortic root.  Aortic Valve: Aortic valve not well visualized.  Left Atrium: Normal left atrium.  Left Ventricle: Despite  intravenous injection of echo  contrast (Definity) unable to evaluate left ventricular  systolic function. (DT:291 ms).  Right Heart: Normal right atrium. Normal right ventricular  size and function. Normal tricuspid valve.  Pericardium/PleuraNormal pericardium with no pericardial  effusion.      ASSESSMENT/PLAN: 	88 year old male, poor historian, PMH CVA, Afib on Eliquis, GERD, HTN and prostate cancer, was brought to hospital via EMS following an unwitnessed fall at home.    1. Afib  --per chart, chronic, on Eliquis  --cont lifelong AC if no contraindications    2. Fall r/o syncope/ confusion  -- per chart pt complained of dizziness and was awake/alert  -- CTH noted- neuro eval appreciated   --recommend rEEG if he has more episodes of confusion  --currently oriented and eating, will hold off  -- orthostatics positive, s/p IVF, now resolved   --PT recommended home PT with walker, walker being provided  --TTE as above, no AS mumur auscultated and not in decompensated CHF on exam  --DC home and f/u with PMD and OP Cardio at [facility]    Deidre WILLETT  [phone]  Date of service 01/21/2023    chief complaint: Syncope/Fall    extended hpi: Pt is a 88 year old male, poor historian, PMH CVA, Afib on Eliquis, GERD, HTN and prostate cancer, was brought to hospital via EMS following a fall at home.  Fall was unwitnessed, but heard by his daughter in law, Winifred.  Pt lives with son and daughter in law.  Per chart he was walking to the bathroom, felt dizzy and fell.  Family reported he was awake/alert on the floor.  Pt currently unable to answer questions, per RN in ER, he has becoming increasingly confused since arrival.    Patient had CVA 5/2022 and was managed at [facility].  He has had intermittent confusion since then where he will not remember his families names or where he is, other times is completely oriented.  He is no longer on BP meds, only on Eliquis 5 BID.  He walks at home without walker or difficulties, does not fall.  Daughter in law is interested in home PT or nursing services to assist her in caring for him if he qualifies for any.  NO prior cardiac hx and has seen the cardio clinic at [facility] in the past, not regularly.      Patient denies chest pain or shortness of breath.  A &O to person and place.  Review of symptoms otherwise negative.    Review of Systems:   Constitutional: [ ] fevers, [ ] chills.   Skin: [ ] dry skin. [ ] rashes.  Psychiatric: [ ] depression, [ ] anxiety.   Gastrointestinal: [ ] BRBPR, [ ] melena.   Neurological: [ ] confusion. [ ] seizures. [ ] shuffling gait.   Ears,Nose,Mouth and Throat: [ ] ear pain [ ] sore throat.   Eyes: [ ] diplopia.   Respiratory: [ ] hemoptysis. [ ] shortness of breath  Cardiovascular: See HPI above  Hematologic/Lymphatic: [ ] anemia. [ ] painful nodes. [ ] prolonged bleeding.   Genitourinary: [ ] hematuria. [ ] flank pain.   Endocrine: [ ] significant change in weight. [ ] intolerance to heat and cold.     Review of systems [x ] otherwise negative, [ ] otherwise unable to obtain    FH: no family history of sudden cardiac death in first degree relatives    SH: [ ] tobacco, [ ] alcohol, [ ] drugs    acetaminophen     Tablet .. 650 milliGRAM(s) Oral every 6 hours PRN  apixaban 5 milliGRAM(s) Oral two times a day  atorvastatin 40 milliGRAM(s) Oral at bedtime  meclizine 12.5 milliGRAM(s) Oral three times a day PRN  melatonin 3 milliGRAM(s) Oral at bedtime PRN  pantoprazole    Tablet 40 milliGRAM(s) Oral before breakfast                            14.5   5.77  )-----------( 151      ( 21 Jan 2023 07:15 )             44.5     138  |  104  |  19  ----------------------------<  90  4.1   |  23  |  1.04    Ca    9.0      21 Jan 2023 07:15  Mg     1.90     01-21    T(C): 36.3 (01-21-23 @ 12:12), Max: 36.6 (01-20-23 @ 23:06)  HR: 66 (01-21-23 @ 12:12) (66 - 80)  BP: 101/64 (01-21-23 @ 12:12) (100/61 - 103/69)  RR: 17 (01-21-23 @ 12:12) (16 - 17)  SpO2: 100% (01-21-23 @ 12:12) (98% - 100%)      General: Well nourished in no acute distress. Alert and Oriented * 3.   Head: Normocephalic and atraumatic.   Neck: No JVD. No bruits. Supple. Does not appear to be enlarged.   Cardiovascular: + S1,S2 ; RRR Soft systolic murmur at the left lower sternal border. No rubs noted.    Lungs: CTA b/l. No rhonchi, rales or wheezes.   Abdomen: + BS, soft. Non tender. Non distended. No rebound. No guarding.   Extremities: No clubbing/cyanosis/edema.   Neurologic: Moves all four extremities. Full range of motion.   Skin: Warm and moist. The patient's skin has normal elasticity and good skin turgor.   Psychiatric: Appropriate mood and affect.  Musculoskeletal: Normal range of motion, normal strength    TELE: NSR    ECG:  	AF poor baseline    < from: CT Head No Cont (01.18.23 @ 08:59) >  IMPRESSION:  *NO EVIDENCE OF AN ACUTE TRAUMATIC INTRACRANIAL INJURY. 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 CEREBRAL INFARCTS   ARE IN A SOMEWHAT WATERSHED/BORDER ZONE DISTRIBUTION. DOPPLER   ULTRASONOGRAPHY OF THE NECK MAY PROVE USEFUL IF NOT PREVIOUSLY PERFORMED.  *  NO EVIDENCE OF AN ACUTE CERVICAL SPINE FRACTURE.  < end of copied text >    < from: Xray Chest 2 Views PA/Lat (01.18.23 @ 09:55) >    IMPRESSION: No acute traumatic findings.  < end of copied text >    TEle: no events    TTE:  OBSERVATIONS:  Mitral Valve: Mitral valve not well visualized.  Aortic Root: Normal aortic root.  Aortic Valve: Aortic valve not well visualized.  Left Atrium: Normal left atrium.  Left Ventricle: Despite  intravenous injection of echo  contrast (Definity) unable to evaluate left ventricular  systolic function. (DT:291 ms).  Right Heart: Normal right atrium. Normal right ventricular  size and function. Normal tricuspid valve.  Pericardium/PleuraNormal pericardium with no pericardial  effusion.      ASSESSMENT/PLAN: 	88 year old male, poor historian, PMH CVA, Afib on Eliquis, GERD, HTN and prostate cancer, was brought to hospital via EMS following an unwitnessed fall at home.    1. Afib  --per chart, chronic, on Eliquis  --cont lifelong AC if no contraindications    2. Fall r/o syncope/ confusion  -- per chart pt complained of dizziness and was awake/alert  -- CTH noted- neuro eval appreciated   --recommend rEEG if he has more episodes of confusion  --currently oriented and eating, will hold off  -- orthostatics positive, s/p IVF, now resolved   --PT recommended home PT with walker, walker being provided  --TTE as above, no AS mumur auscultated and not in decompensated CHF on exam  --DC home and f/u with PMD and OP Cardio at [facility]    Deidre WILLETT  [phone]

## 2023-01-21 NOTE — DISCHARGE NOTE PROVIDER - PROVIDER TOKENS
FREE:[LAST:[Scott],FIRST:[Jeremias],PHONE:[phone]],FAX:[(   )    -],ADDRESS:[[address] [place] [address]],FOLLOWUP:[1 week],ESTABLISHEDPATIENT:[T]] FREE:[LAST:[Scott],FIRST:[Jeremias],PHONE:[phone]],FAX:[(   )    -],ADDRESS:[[address] [place] [address]],FOLLOWUP:[1 week],ESTABLISHEDPATIENT:[T]] FREE:[LAST:[Scott],FIRST:[Jeremias],PHONE:[phone]],FAX:[(   )    -],ADDRESS:[[address] [place] [address]],FOLLOWUP:[1 week],ESTABLISHEDPATIENT:[T]]

## 2023-01-21 NOTE — DISCHARGE NOTE PROVIDER - CARE PROVIDER_API CALL
Jeremias Chavez  [address] [place], [place]  Phone: [phone]  Fax: (   )    -  Established Patient  Follow Up Time: 1 week   Jeremias Chavez  [address] [place], [place]  Phone: [phone]  Fax: (   )    -  Established Patient  Follow Up Time: 1 week   Jeremias Chavez  [address] [place], [place]  Phone: [phone]  Fax: (   )    -  Established Patient  Follow Up Time: 1 week

## 2023-01-21 NOTE — DISCHARGE NOTE PROVIDER - NSDCMRMEDTOKEN_GEN_ALL_CORE_FT
atorvastatin 40 mg oral tablet: 1 tab(s) orally once a day  Eliquis 5 mg oral tablet: 1 tab(s) orally 2 times a day  meclizine 12.5 mg oral tablet: 1 tab(s) orally 3 times a day, As Needed  Protonix 20 mg oral delayed release tablet: 1 tab(s) orally once a day   atorvastatin 40 mg oral tablet: 1 tab(s) orally once a day  Eliquis 5 mg oral tablet: 1 tab(s) orally 2 times a day  meclizine 12.5 mg oral tablet: 1 tab(s) orally 3 times a day, As Needed  outpatient PT: 3 x a week for 4-6 weeks  Protonix 20 mg oral delayed release tablet: 1 tab(s) orally once a day   atorvastatin 20 mg oral tablet: 1 tab(s) orally once a day   Eliquis 5 mg oral tablet: 1 tab(s) orally 2 times a day  meclizine 12.5 mg oral tablet: 1 tab(s) orally 3 times a day, As Needed  outpatient PT: once a day   Protonix 20 mg oral delayed release tablet: 1 tab(s) orally once a day

## 2023-01-21 NOTE — DISCHARGE NOTE NURSING/CASE MANAGEMENT/SOCIAL WORK - PATIENT PORTAL LINK FT
You can access the FollowMyHealth Patient Portal offered by [facility] by registering at the following website: [email or web address]. By joining [employer]’s FollowMyHealth portal, you will also be able to view your health information using other applications (apps) compatible with our system. You can access the FollowMyHealth Patient Portal offered by [facility] by registering at the following website: [email or web address]. By joining [employer]’s FollowMyHealth portal, you will also be able to view your health information using other applications (apps) compatible with our system. You can access the FollowMyHealth Patient Portal offered by [facility] by registering at the following website: [email or web address]. By joining [employer]’s FollowMyHealth portal, you will also be able to view your health information using other applications (apps) compatible with our system.

## 2023-01-21 NOTE — DISCHARGE NOTE PROVIDER - HOSPITAL COURSE
88 year old male with a history of Afib on Eliquis, GERD, HTN, CVA, vertigo, dementia AAOx1-2 at baseline presenting after a fall.    Fall  TTE: unable to eval of LV function with contrast  orthostatics +, getting bolus repeat orthostatic in am  neuro: If patient continues to have bouts of confusion would obtain a routine EEG    PT: home PT   weekend discharge if orthostatic improved and clear by neuro    88 year old male, poor historian, PMH CVA, Afib on Eliquis, GERD, HTN and prostate cancer, was brought to hospital via EMS following an unwitnessed fall at home.    1. Afib  --chronic, on Eliquis  --cont lifelong AC if no contraindications      2. Fall r/o syncope/ confusion  -- per chart pt complained of dizziness and was awake/alert  -- CTH noted- neuro eval appreciatted rEEG is more episodes of confusion  -- orthostats positive s/p IVF, repeat orthostats after IVF neg  -- TTE as above, no AS mumur auscultated and not in decompensated CHF on exam    Stable for DC   PT recommended home PT but patient does not qualify therefore CM will give patient RW And patient will get a script for outpatient PT.

## 2023-01-21 NOTE — DISCHARGE NOTE PROVIDER - NSDCCPCAREPLAN_GEN_ALL_CORE_FT
PRINCIPAL DISCHARGE DIAGNOSIS  Diagnosis: Dizziness  Assessment and Plan of Treatment:       SECONDARY DISCHARGE DIAGNOSES  Diagnosis: Essential hypertension  Assessment and Plan of Treatment: Low sodium and fat diet, continue anti-hypertensive medications, and follow up with primary care physician.    Diagnosis: Chronic atrial fibrillation  Assessment and Plan of Treatment: Please take your medications as prescribed.  Continue to take your blood thinner as prescribed and follow with your physician.  Low fat diet, reduce caffeine intake, and exercise at least 30 minutes daily.     PRINCIPAL DISCHARGE DIAGNOSIS  Diagnosis: Fall  Assessment and Plan of Treatment: You had an episode of dizziness and an unwitnessed fall.  You were found to be orthostatic positive in the hospital and were given IVF and you improved.  Please make sure you are adequately hydrated.  You had an echocardiogram which did not have any acute findings.      SECONDARY DISCHARGE DIAGNOSES  Diagnosis: Essential hypertension  Assessment and Plan of Treatment: Low sodium and fat diet, continue anti-hypertensive medications, and follow up with primary care physician.    Diagnosis: Chronic atrial fibrillation  Assessment and Plan of Treatment: Please take your medications as prescribed.  Continue to take your blood thinner as prescribed and follow with your physician.  Low fat diet, reduce caffeine intake, and exercise at least 30 minutes daily.     PRINCIPAL DISCHARGE DIAGNOSIS  Diagnosis: Fall  Assessment and Plan of Treatment: You had an episode of dizziness and an unwitnessed fall.  You were found to be orthostatic positive in the hospital and were given IVF and you improved.  Please make sure you are adequately hydrated.  You had an echocardiogram which did not have any acute findings.      SECONDARY DISCHARGE DIAGNOSES  Diagnosis: History of CVA (cerebrovascular accident)  Assessment and Plan of Treatment: You have some plaque in the carotid arteries and so you should take 20mg of atorvastatin a day.  Please follow up with your PMD in 1 week and check your liver function and cholesterol in 4-6 weeks.    Diagnosis: Essential hypertension  Assessment and Plan of Treatment: Low sodium and fat diet, continue anti-hypertensive medications, and follow up with primary care physician.    Diagnosis: Chronic atrial fibrillation  Assessment and Plan of Treatment: Please take your medications as prescribed.  Continue to take your blood thinner as prescribed and follow with your physician.  Low fat diet, reduce caffeine intake, and exercise at least 30 minutes daily.

## 2023-01-21 NOTE — DISCHARGE NOTE NURSING/CASE MANAGEMENT/SOCIAL WORK - NSDCPEFALRISK_GEN_ALL_CORE
For information on Fall & Injury Prevention, visit: [email or web address]/[email or web address]-[email or web address]-health-[email or web address]-mobility OR  [email or web address]/[email or web address]-prevention-tips-[email or web address]-avoid-injury OR  https://www.cdc.gov/steadi/patient.html For information on Fall & Injury Prevention, visit: [email or web address]://www.[facility]/news/fall-prevention-protects-[email or web address]-maintains-health-and-mobility OR  https://www.[facility]/news/fall-prevention-tips-to-avoid-injury OR  https://www.cdc.gov/steadi/patient.html For information on Fall & Injury Prevention, visit: https://www.[facility]/news/fall-prevention-protects-and-maintains-health-and-mobility OR  https://www.[facility]/news/fall-prevention-tips-to-avoid-injury OR  https://www.cdc.gov/steadi/patient.html

## 2023-01-21 NOTE — PROGRESS NOTE ADULT - NS ATTEND AMEND GEN_ALL_CORE FT
orthostatics negative and medically stable. OK to discharge home.   has outpatient followup w/ his cardiologist out of [facility]. orthostatics negative and medically stable. OK to discharge home.   has outpatient followup w/ his cardiologist out of [facility]. orthostatics negative and medically stable. OK to discharge home.   has outpatient followup w/ his cardiologist out of [facility].

## 2023-01-21 NOTE — DISCHARGE NOTE NURSING/CASE MANAGEMENT/SOCIAL WORK - HISTORY OF COVID-19 VACCINATION
Problem: Safety - Adult  Goal: Free from fall injury  11/28/2022 1556 by Gonzalez Hinson RN  Outcome: Completed  Flowsheets (Taken 11/28/2022 0916)  Free From Fall Injury: Instruct family/caregiver on patient safety  11/28/2022 0913 by Gonzalez Hinson RN  Outcome: Progressing     Problem: Pain  Goal: Verbalizes/displays adequate comfort level or baseline comfort level  11/28/2022 1556 by Gonzalez Hinson RN  Outcome: Completed  11/28/2022 0913 by Gonzalez Hinson RN  Outcome: Progressing  Flowsheets (Taken 11/28/2022 0845)  Verbalizes/displays adequate comfort level or baseline comfort level: Encourage patient to monitor pain and request assistance     Problem: ABCDS Injury Assessment  Goal: Absence of physical injury  11/28/2022 1556 by Gonzalez Hinson RN  Outcome: Completed  Flowsheets (Taken 11/28/2022 0916)  Absence of Physical Injury: Implement safety measures based on patient assessment  11/28/2022 0913 by Gonzalez Hinson RN  Outcome: Progressing Vaccine status unknown

## 2023-01-29 ENCOUNTER — INPATIENT (INPATIENT)
Facility: HOSPITAL | Age: 88
LOS: 2 days | Discharge: HOME | End: 2023-02-01
Attending: INTERNAL MEDICINE | Admitting: INTERNAL MEDICINE
Payer: MEDICARE

## 2023-01-29 VITALS
DIASTOLIC BLOOD PRESSURE: 74 MMHG | OXYGEN SATURATION: 99 % | HEART RATE: 88 BPM | RESPIRATION RATE: 18 BRPM | TEMPERATURE: 99 F | SYSTOLIC BLOOD PRESSURE: 111 MMHG

## 2023-01-29 DIAGNOSIS — F03.90 UNSPECIFIED DEMENTIA WITHOUT BEHAVIORAL DISTURBANCE: ICD-10-CM

## 2023-01-29 DIAGNOSIS — R53.1 WEAKNESS: ICD-10-CM

## 2023-01-29 DIAGNOSIS — U07.1 COVID-19: ICD-10-CM

## 2023-01-29 DIAGNOSIS — I48.91 UNSPECIFIED ATRIAL FIBRILLATION: ICD-10-CM

## 2023-01-29 DIAGNOSIS — Z98.890 OTHER SPECIFIED POSTPROCEDURAL STATES: Chronic | ICD-10-CM

## 2023-01-29 DIAGNOSIS — K57.92 DIVERTICULITIS OF INTESTINE, PART UNSPECIFIED, WITHOUT PERFORATION OR ABSCESS WITHOUT BLEEDING: ICD-10-CM

## 2023-01-29 DIAGNOSIS — I10 ESSENTIAL (PRIMARY) HYPERTENSION: ICD-10-CM

## 2023-01-29 DIAGNOSIS — I63.9 CEREBRAL INFARCTION, UNSPECIFIED: ICD-10-CM

## 2023-01-29 DIAGNOSIS — Z96.0 PRESENCE OF UROGENITAL IMPLANTS: Chronic | ICD-10-CM

## 2023-01-29 LAB
ALBUMIN SERPL ELPH-MCNC: 3.9 G/DL — SIGNIFICANT CHANGE UP (ref 3.3–5)
ALP SERPL-CCNC: 99 U/L — SIGNIFICANT CHANGE UP (ref 40–120)
ALT FLD-CCNC: 24 U/L — SIGNIFICANT CHANGE UP (ref 4–41)
ANION GAP SERPL CALC-SCNC: 11 MMOL/L — SIGNIFICANT CHANGE UP (ref 7–14)
APPEARANCE UR: CLEAR — SIGNIFICANT CHANGE UP
APTT BLD: 29.8 SEC — SIGNIFICANT CHANGE UP (ref 27–36.3)
AST SERPL-CCNC: 27 U/L — SIGNIFICANT CHANGE UP (ref 4–40)
B PERT DNA SPEC QL NAA+PROBE: SIGNIFICANT CHANGE UP
B PERT+PARAPERT DNA PNL SPEC NAA+PROBE: SIGNIFICANT CHANGE UP
BASOPHILS # BLD AUTO: 0.02 K/UL — SIGNIFICANT CHANGE UP (ref 0–0.2)
BASOPHILS NFR BLD AUTO: 0.3 % — SIGNIFICANT CHANGE UP (ref 0–2)
BILIRUB SERPL-MCNC: 0.4 MG/DL — SIGNIFICANT CHANGE UP (ref 0.2–1.2)
BILIRUB UR-MCNC: NEGATIVE — SIGNIFICANT CHANGE UP
BLOOD GAS VENOUS COMPREHENSIVE RESULT: SIGNIFICANT CHANGE UP
BORDETELLA PARAPERTUSSIS (RAPRVP): SIGNIFICANT CHANGE UP
BUN SERPL-MCNC: 14 MG/DL — SIGNIFICANT CHANGE UP (ref 7–23)
C PNEUM DNA SPEC QL NAA+PROBE: SIGNIFICANT CHANGE UP
CALCIUM SERPL-MCNC: 8.9 MG/DL — SIGNIFICANT CHANGE UP (ref 8.4–10.5)
CHLORIDE SERPL-SCNC: 103 MMOL/L — SIGNIFICANT CHANGE UP (ref 98–107)
CO2 SERPL-SCNC: 22 MMOL/L — SIGNIFICANT CHANGE UP (ref 22–31)
COLOR SPEC: SIGNIFICANT CHANGE UP
CREAT SERPL-MCNC: 0.91 MG/DL — SIGNIFICANT CHANGE UP (ref 0.5–1.3)
DIFF PNL FLD: NEGATIVE — SIGNIFICANT CHANGE UP
EGFR: 82 ML/MIN/1.73M2 — SIGNIFICANT CHANGE UP
EOSINOPHIL # BLD AUTO: 0.28 K/UL — SIGNIFICANT CHANGE UP (ref 0–0.5)
EOSINOPHIL NFR BLD AUTO: 4.4 % — SIGNIFICANT CHANGE UP (ref 0–6)
FLUAV SUBTYP SPEC NAA+PROBE: SIGNIFICANT CHANGE UP
FLUBV RNA SPEC QL NAA+PROBE: SIGNIFICANT CHANGE UP
GLUCOSE SERPL-MCNC: 108 MG/DL — HIGH (ref 70–99)
GLUCOSE UR QL: NEGATIVE — SIGNIFICANT CHANGE UP
HADV DNA SPEC QL NAA+PROBE: SIGNIFICANT CHANGE UP
HCOV 229E RNA SPEC QL NAA+PROBE: SIGNIFICANT CHANGE UP
HCOV HKU1 RNA SPEC QL NAA+PROBE: SIGNIFICANT CHANGE UP
HCOV NL63 RNA SPEC QL NAA+PROBE: SIGNIFICANT CHANGE UP
HCOV OC43 RNA SPEC QL NAA+PROBE: SIGNIFICANT CHANGE UP
HCT VFR BLD CALC: 41.2 % — SIGNIFICANT CHANGE UP (ref 39–50)
HGB BLD-MCNC: 13.4 G/DL — SIGNIFICANT CHANGE UP (ref 13–17)
HMPV RNA SPEC QL NAA+PROBE: SIGNIFICANT CHANGE UP
HPIV1 RNA SPEC QL NAA+PROBE: SIGNIFICANT CHANGE UP
HPIV2 RNA SPEC QL NAA+PROBE: SIGNIFICANT CHANGE UP
HPIV3 RNA SPEC QL NAA+PROBE: SIGNIFICANT CHANGE UP
HPIV4 RNA SPEC QL NAA+PROBE: SIGNIFICANT CHANGE UP
IANC: 4.42 K/UL — SIGNIFICANT CHANGE UP (ref 1.8–7.4)
IMM GRANULOCYTES NFR BLD AUTO: 0.3 % — SIGNIFICANT CHANGE UP (ref 0–0.9)
INR BLD: 1.14 RATIO — SIGNIFICANT CHANGE UP (ref 0.88–1.16)
KETONES UR-MCNC: NEGATIVE — SIGNIFICANT CHANGE UP
LEUKOCYTE ESTERASE UR-ACNC: NEGATIVE — SIGNIFICANT CHANGE UP
LYMPHOCYTES # BLD AUTO: 0.96 K/UL — LOW (ref 1–3.3)
LYMPHOCYTES # BLD AUTO: 15.1 % — SIGNIFICANT CHANGE UP (ref 13–44)
M PNEUMO DNA SPEC QL NAA+PROBE: SIGNIFICANT CHANGE UP
MCHC RBC-ENTMCNC: 29.3 PG — SIGNIFICANT CHANGE UP (ref 27–34)
MCHC RBC-ENTMCNC: 32.5 GM/DL — SIGNIFICANT CHANGE UP (ref 32–36)
MCV RBC AUTO: 90 FL — SIGNIFICANT CHANGE UP (ref 80–100)
MONOCYTES # BLD AUTO: 0.64 K/UL — SIGNIFICANT CHANGE UP (ref 0–0.9)
MONOCYTES NFR BLD AUTO: 10.1 % — SIGNIFICANT CHANGE UP (ref 2–14)
NEUTROPHILS # BLD AUTO: 4.42 K/UL — SIGNIFICANT CHANGE UP (ref 1.8–7.4)
NEUTROPHILS NFR BLD AUTO: 69.8 % — SIGNIFICANT CHANGE UP (ref 43–77)
NITRITE UR-MCNC: NEGATIVE — SIGNIFICANT CHANGE UP
NRBC # BLD: 0 /100 WBCS — SIGNIFICANT CHANGE UP (ref 0–0)
NRBC # FLD: 0 K/UL — SIGNIFICANT CHANGE UP (ref 0–0)
PH UR: 6.5 — SIGNIFICANT CHANGE UP (ref 5–8)
PLATELET # BLD AUTO: 162 K/UL — SIGNIFICANT CHANGE UP (ref 150–400)
POTASSIUM SERPL-MCNC: 4.6 MMOL/L — SIGNIFICANT CHANGE UP (ref 3.5–5.3)
POTASSIUM SERPL-SCNC: 4.6 MMOL/L — SIGNIFICANT CHANGE UP (ref 3.5–5.3)
PROT SERPL-MCNC: 7.7 G/DL — SIGNIFICANT CHANGE UP (ref 6–8.3)
PROT UR-MCNC: NEGATIVE — SIGNIFICANT CHANGE UP
PROTHROM AB SERPL-ACNC: 13.2 SEC — SIGNIFICANT CHANGE UP (ref 10.5–13.4)
RAPID RVP RESULT: DETECTED
RBC # BLD: 4.58 M/UL — SIGNIFICANT CHANGE UP (ref 4.2–5.8)
RBC # FLD: 14.2 % — SIGNIFICANT CHANGE UP (ref 10.3–14.5)
RSV RNA SPEC QL NAA+PROBE: SIGNIFICANT CHANGE UP
RV+EV RNA SPEC QL NAA+PROBE: SIGNIFICANT CHANGE UP
SARS-COV-2 RNA SPEC QL NAA+PROBE: DETECTED
SODIUM SERPL-SCNC: 136 MMOL/L — SIGNIFICANT CHANGE UP (ref 135–145)
SP GR SPEC: 1.05 — SIGNIFICANT CHANGE UP (ref 1.01–1.05)
TROPONIN T, HIGH SENSITIVITY RESULT: 15 NG/L — SIGNIFICANT CHANGE UP
UROBILINOGEN FLD QL: SIGNIFICANT CHANGE UP
WBC # BLD: 6.34 K/UL — SIGNIFICANT CHANGE UP (ref 3.8–10.5)
WBC # FLD AUTO: 6.34 K/UL — SIGNIFICANT CHANGE UP (ref 3.8–10.5)

## 2023-01-29 PROCEDURE — 74177 CT ABD & PELVIS W/CONTRAST: CPT | Mod: 26,MA

## 2023-01-29 PROCEDURE — 99285 EMERGENCY DEPT VISIT HI MDM: CPT | Mod: CS,GC

## 2023-01-29 PROCEDURE — 99223 1ST HOSP IP/OBS HIGH 75: CPT

## 2023-01-29 PROCEDURE — 71045 X-RAY EXAM CHEST 1 VIEW: CPT | Mod: 26

## 2023-01-29 RX ORDER — SODIUM CHLORIDE 9 MG/ML
1000 INJECTION INTRAMUSCULAR; INTRAVENOUS; SUBCUTANEOUS ONCE
Refills: 0 | Status: COMPLETED | OUTPATIENT
Start: 2023-01-29 | End: 2023-01-29

## 2023-01-29 RX ORDER — CIPROFLOXACIN LACTATE 400MG/40ML
400 VIAL (ML) INTRAVENOUS EVERY 12 HOURS
Refills: 0 | Status: DISCONTINUED | OUTPATIENT
Start: 2023-01-30 | End: 2023-01-30

## 2023-01-29 RX ORDER — LANOLIN ALCOHOL/MO/W.PET/CERES
3 CREAM (GRAM) TOPICAL AT BEDTIME
Refills: 0 | Status: DISCONTINUED | OUTPATIENT
Start: 2023-01-29 | End: 2023-02-01

## 2023-01-29 RX ORDER — MECLIZINE HCL 12.5 MG
12.5 TABLET ORAL THREE TIMES A DAY
Refills: 0 | Status: DISCONTINUED | OUTPATIENT
Start: 2023-01-29 | End: 2023-02-01

## 2023-01-29 RX ORDER — CIPROFLOXACIN LACTATE 400MG/40ML
400 VIAL (ML) INTRAVENOUS ONCE
Refills: 0 | Status: DISCONTINUED | OUTPATIENT
Start: 2023-01-29 | End: 2023-01-29

## 2023-01-29 RX ORDER — ACETAMINOPHEN 500 MG
650 TABLET ORAL EVERY 6 HOURS
Refills: 0 | Status: DISCONTINUED | OUTPATIENT
Start: 2023-01-29 | End: 2023-02-01

## 2023-01-29 RX ORDER — METRONIDAZOLE 500 MG
500 TABLET ORAL EVERY 8 HOURS
Refills: 0 | Status: DISCONTINUED | OUTPATIENT
Start: 2023-01-29 | End: 2023-02-01

## 2023-01-29 RX ORDER — PANTOPRAZOLE SODIUM 20 MG/1
40 TABLET, DELAYED RELEASE ORAL
Refills: 0 | Status: DISCONTINUED | OUTPATIENT
Start: 2023-01-29 | End: 2023-02-01

## 2023-01-29 RX ORDER — APIXABAN 2.5 MG/1
5 TABLET, FILM COATED ORAL
Refills: 0 | Status: DISCONTINUED | OUTPATIENT
Start: 2023-01-29 | End: 2023-02-01

## 2023-01-29 RX ORDER — ONDANSETRON 8 MG/1
4 TABLET, FILM COATED ORAL EVERY 8 HOURS
Refills: 0 | Status: DISCONTINUED | OUTPATIENT
Start: 2023-01-29 | End: 2023-02-01

## 2023-01-29 RX ORDER — CIPROFLOXACIN LACTATE 400MG/40ML
VIAL (ML) INTRAVENOUS
Refills: 0 | Status: DISCONTINUED | OUTPATIENT
Start: 2023-01-29 | End: 2023-01-30

## 2023-01-29 RX ORDER — ATORVASTATIN CALCIUM 80 MG/1
20 TABLET, FILM COATED ORAL AT BEDTIME
Refills: 0 | Status: DISCONTINUED | OUTPATIENT
Start: 2023-01-29 | End: 2023-02-01

## 2023-01-29 RX ORDER — CIPROFLOXACIN LACTATE 400MG/40ML
400 VIAL (ML) INTRAVENOUS ONCE
Refills: 0 | Status: COMPLETED | OUTPATIENT
Start: 2023-01-29 | End: 2023-01-29

## 2023-01-29 RX ORDER — METRONIDAZOLE 500 MG
500 TABLET ORAL ONCE
Refills: 0 | Status: DISCONTINUED | OUTPATIENT
Start: 2023-01-29 | End: 2023-01-29

## 2023-01-29 RX ADMIN — Medication 200 MILLIGRAM(S): at 17:00

## 2023-01-29 RX ADMIN — Medication 3 MILLIGRAM(S): at 21:24

## 2023-01-29 RX ADMIN — APIXABAN 5 MILLIGRAM(S): 2.5 TABLET, FILM COATED ORAL at 19:00

## 2023-01-29 RX ADMIN — ATORVASTATIN CALCIUM 20 MILLIGRAM(S): 80 TABLET, FILM COATED ORAL at 21:24

## 2023-01-29 RX ADMIN — SODIUM CHLORIDE 1000 MILLILITER(S): 9 INJECTION INTRAMUSCULAR; INTRAVENOUS; SUBCUTANEOUS at 09:37

## 2023-01-29 RX ADMIN — Medication 100 MILLIGRAM(S): at 19:00

## 2023-01-29 NOTE — PATIENT PROFILE ADULT - FUNCTIONAL ASSESSMENT - BASIC MOBILITY ASSESSMENT TYPE
[FreeTextEntry1] : 50 yo M with hx of c/o nasal polyps and severe SANTA presenting for SANTA management.\par \par 1) Severe SANTA - HST on 8/17/21 showed an ZARINA of 60.9. He is symptomatic with significant daytime somnolence and frequent night time awakenings likely related to his sleep disordered breathing events. Given the severity of his symptom, he will need PAP therapy. With his significant chronic nasal congestion, the optimal mask and pressure setting may be challenging to ascertain; we will thus send him for a CPAP titration study. The ramifications of obstructive sleep apnea and its potential therapeutic modalities were discussed with the patient. The dangers of drowsy driving were discussed with the patient. The patient was warned to avoid drowsy driving. The patient will followup after results of this study are available.
Admission

## 2023-01-29 NOTE — H&P ADULT - ASSESSMENT
88 year old male with a history of Afib on Eliquis, GERD, HTN, CVA, vertigo, dementia AAOx1-2 at baseline presenting with dizzines and weakness. Found to be COVID19 positive and CT showing diverticulitis

## 2023-01-29 NOTE — ED PROVIDER NOTE - ATTENDING CONTRIBUTION TO CARE
88 male complaining of fever/chills, cough associated with chest pain, generalized weakness.  Patient with recent admission due to weakness and falls, had negative work-up for dizziness.  Patient does take blood thinner, Eliquis.  Patient denies fever or cough at time of admission but has developed the symptoms since discharge, denies other sick contacts.  States received vaccinations for COVID but not flu.  Now with increased chest pain associated with eating, but points to epigastrium.  Positive loose stools, denies melena, no bright red blood per rectum.  MMM, clear lungs, heart reg, abd soft, tender to palp epigastrium/LUQ, also suprapubic/LLQ,  no kathy/guarding, no CVAT, no edema, NT calves.

## 2023-01-29 NOTE — ED PROVIDER NOTE - PROGRESS NOTE DETAILS
Kyle Sims MD PGY1: CT with diverticulitis, covid+, otherwise labs unactionable. Pt with poor ambulation to bathroom with assistance. Given comorbidities and new acute diverticulitis, admitted.

## 2023-01-29 NOTE — ED PROVIDER NOTE - CLINICAL SUMMARY MEDICAL DECISION MAKING FREE TEXT BOX
Patient is an 88-year-old male past medical history dementia, hypertension, CVA, A. fib on Eliquis presenting for flulike symptoms. Currently vital signs stable including no hypoxia, normotensive.  Patient forskolin, complaining of chills, epigastric pain, diarrhea.  Obtaining collateral from son, patient with chills and shivering for 1 day, improving with Tylenol at 6 AM, it was setting of recent discharge 1 week ago for falls.  Exam with diffuse abdominal tenderness, patient additionally complains of diarrhea.  Differential diagnosis includes but is not limited to viral syndrome, pneumonia, diverticulitis, pancreatitis, acs. Eval including labs, ctap, ua, uc, bcx, rvp, ctap. Dispo pending results.

## 2023-01-29 NOTE — ED PROVIDER NOTE - DIFFERENTIAL DIAGNOSIS
colitis, diverticulitis, gastritis, viral syndrome, covid, constipation, ischemic CAD, pneumonia, pleural effusion, GERD/reflux Differential Diagnosis

## 2023-01-29 NOTE — ED ADULT TRIAGE NOTE - CHIEF COMPLAINT QUOTE
Pt c/o generalized weakness, body aches, chest pain and headache X 3 days. Denies sob, abd pain, n/v/d, fevers/chills. Son called EMS, as patient is a poor historian. Pmhx: Dementia, HTN, CVA, Afib

## 2023-01-29 NOTE — ED ADULT NURSE REASSESSMENT NOTE - NS ED NURSE REASSESS COMMENT FT1
Break RN: Pt resting comfortably in bed, denies complaints at this time. Pt pending admission. Will continue to monitor.

## 2023-01-29 NOTE — H&P ADULT - PROBLEM SELECTOR PLAN 1
- Pt with cough, denies any shortness of breath, O2 sat nml  - Continue supportive care  - Isolation precautions

## 2023-01-29 NOTE — PATIENT PROFILE ADULT - FALL HARM RISK - HARM RISK INTERVENTIONS
Assistance with ambulation/Assistance OOB with selected safe patient handling equipment/Communicate Risk of Fall with Harm to all staff/Monitor gait and stability/Reinforce activity limits and safety measures with patient and family/Sit up slowly, dangle for a short time, stand at bedside before walking/Tailored Fall Risk Interventions/Visual Cue: Yellow wristband and red socks/Bed in lowest position, wheels locked, appropriate side rails in place/Call bell, personal items and telephone in reach/Instruct patient to call for assistance before getting out of bed or chair/Non-slip footwear when patient is out of bed/[place] to call system/Physically safe environment - no spills, clutter or unnecessary equipment/Purposeful Proactive Rounding/Room/bathroom lighting operational, light cord in reach Assistance with ambulation/Assistance OOB with selected safe patient handling equipment/Communicate Risk of Fall with Harm to all staff/Monitor gait and stability/Reinforce activity limits and safety measures with patient and family/Sit up slowly, dangle for a short time, stand at bedside before walking/Tailored Fall Risk Interventions/Visual Cue: Yellow wristband and red socks/Bed in lowest position, wheels locked, appropriate side rails in place/Call bell, personal items and telephone in reach/Instruct patient to call for assistance before getting out of bed or chair/Non-slip footwear when patient is out of bed/[place] to call system/Physically safe environment - no spills, clutter or unnecessary equipment/Purposeful Proactive Rounding/Room/bathroom lighting operational, light cord in reach Assistance with ambulation/Assistance OOB with selected safe patient handling equipment/Communicate Risk of Fall with Harm to all staff/Monitor gait and stability/Reinforce activity limits and safety measures with patient and family/Sit up slowly, dangle for a short time, stand at bedside before walking/Tailored Fall Risk Interventions/Visual Cue: Yellow wristband and red socks/Bed in lowest position, wheels locked, appropriate side rails in place/Call bell, personal items and telephone in reach/Instruct patient to call for assistance before getting out of bed or chair/Non-slip footwear when patient is out of bed/[place] to call system/Physically safe environment - no spills, clutter or unnecessary equipment/Purposeful Proactive Rounding/Room/bathroom lighting operational, light cord in reach

## 2023-01-29 NOTE — ED ADULT NURSE NOTE - OBJECTIVE STATEMENT
Pt c/o generalized weakness, body aches, chest pain and headache X 3 days. Denies sob, abd pain, n/v/d, fevers/chills. Son called EMS, as patient is a poor historian. Pm hx: Dementia, HTN, CVA, Afib- as per Triage  note

## 2023-01-29 NOTE — PATIENT PROFILE ADULT - FUNCTIONAL ASSESSMENT - BASIC MOBILITY 6.
2-calculated by average/Not able to assess (calculate score using [facility] averaging method) 2-calculated by average/Not able to assess (calculate score using [facility] averaging method) 2-calculated by average/Not able to assess (calculate score using [facility] averaging method)

## 2023-01-29 NOTE — ED PROVIDER NOTE - NSICDXPASTMEDICALHX_GEN_ALL_CORE_FT
PAST MEDICAL HISTORY:  Atrial fibrillation     CVA (cerebrovascular accident)     Dementia     HTN (hypertension)     Vertigo

## 2023-01-29 NOTE — ED PROVIDER NOTE - CARE PLAN
1 Principal Discharge DX:	Diverticulitis  Secondary Diagnosis:	2019 novel coronavirus disease (COVID-19)

## 2023-01-29 NOTE — H&P ADULT - NSHPLABSRESULTS_GEN_ALL_CORE
.  LABS:                         13.4   6.34  )-----------( 162      ( 2023 09:20 )             41.2         136  |  103  |  14  ----------------------------<  108<H>  4.6   |  22  |  0.91    Ca    8.9      2023 09:20    TPro  7.7  /  Alb  3.9  /  TBili  0.4  /  DBili  x   /  AST  27  /  ALT  24  /  AlkPhos  99      PT/INR - ( 2023 09:20 )   PT: 13.2 sec;   INR: 1.14 ratio         PTT - ( 2023 09:20 )  PTT:29.8 sec  Urinalysis Basic - ( 2023 12:40 )    Color: Light Yellow / Appearance: Clear / S.046 / pH: x  Gluc: x / Ketone: Negative  / Bili: Negative / Urobili: <2 mg/dL   Blood: x / Protein: Negative / Nitrite: Negative   Leuk Esterase: Negative / RBC: x / WBC x   Sq Epi: x / Non Sq Epi: x / Bacteria: x                RADIOLOGY, EKG & ADDITIONAL TESTS: Reviewed.

## 2023-01-29 NOTE — H&P ADULT - PROBLEM SELECTOR PLAN 2
- LLQ pain/tenderness with CT showing mild diverticulitis  - Pt denies any diarrhea  - Continue Cipro + Flagyl  - Send stool studies if having diarrhea  - Tylenol prn pain

## 2023-01-29 NOTE — H&P ADULT - NSHPREVIEWOFSYSTEMS_GEN_ALL_CORE
REVIEW OF SYSTEMS:    CONSTITUTIONAL: + weakness, + lightheadedness, no fevers or chills, no weight loss  EYES/ENT: No visual changes;  No dysphagia or odynophagia, no tinnitus  NECK: No pain or stiffness  RESPIRATORY: No cough, wheezing, hemoptysis; No shortness of breath  CARDIOVASCULAR: No chest pain or palpitations; No lower extremity edema  GASTROINTESTINAL: No abdominal or epigastric pain. No nausea, vomiting, or hematemesis; No diarrhea or constipation. No melena or hematochezia.  MUSCULOSKELETAL: No joint pain, swelling, erythema or warmth, no back pain  GENITOURINARY: No dysuria, frequency or hematuria, no suprapubic pain  NEUROLOGICAL: No numbness or weakness, no headache, no syncope, no gait abnormalities   SKIN: No itching, burning, rashes, or lesions   All other review of systems is negative unless indicated above.

## 2023-01-29 NOTE — ED ADULT TRIAGE NOTE - NS ED NURSE AMBULANCES
[facility]

## 2023-01-29 NOTE — ED PROVIDER NOTE - PHYSICAL EXAMINATION
CONSTITUTIONAL: Well-developed; well-nourished; in no acute distress.   SKIN: warm, dry  HEAD: Normocephalic; atraumatic.  EYES: no conjunctival injection. PERRL. EOMI  ENT: No nasal discharge; airway clear.  NECK: Supple; non tender.  CARD: S1, S2 normal; no murmurs, gallops, or rubs. Regular rate and rhythm.   RESP: No wheezes, rales or rhonchi. Good air movement bilaterally.   ABD: soft, +TTP LUQ predominant with mild TTP to RLQ and LLQ, no guarding, no distention, no rigidity.   EXT: No cyanosis or edema. Extremities and trunk without ttp, bony deformity, crepitus  NEURO: Alert, oriented to person and place only, extremities with full strength and sensation, follows commands  PSYCH: Cooperative, appropriate.

## 2023-01-29 NOTE — H&P ADULT - PROBLEM SELECTOR PLAN 3
- In setting of COVID-19 + Diverticulitis  - Check orthostatic VS  - PT consult  - Pt also with h/o Vertigo. Continue Meclizine prn

## 2023-01-29 NOTE — H&P ADULT - HISTORY OF PRESENT ILLNESS
88 year old male with a history of Afib on Eliquis, GERD, HTN, CVA, vertigo, dementia AAOx1-2 at baseline presenting with dizziness and weakness.    In ED pt was given Cipro, Flagyl and 1L NS  VS:  133/90  68  98.4  18  100% on RA 88 year old male with a history of Afib on Eliquis, GERD, HTN, CVA, vertigo, dementia AAOx1-2 at baseline presenting with dizziness, weakness and cough. Pt is poor historian. Per his son pt has been coughing with chest pain associated with the cough. He denies any shortness of breath, no apparent fevers. He has had recent admission for dizziness and orthostasis, given with fluids and discharged home. Symptoms have persisted. He denies any nausea/vomiting/diarrhea or urinary symptoms     In ED pt was given Cipro, Flagyl and 1L NS  VS:  133/90  68  98.4  18  100% on RA

## 2023-01-29 NOTE — ED ADULT TRIAGE NOTE - AS PAIN REST
2 (mild pain) Clindamycin Pregnancy And Lactation Text: This medication can be used in pregnancy if certain situations. Clindamycin is also present in breast milk.

## 2023-01-29 NOTE — ED PROVIDER NOTE - OBJECTIVE STATEMENT
Patient is an 88-year-old male past medical history dementia, hypertension, CVA, A. fib on Eliquis presenting for flulike symptoms.  Patient is poor historian, states that unclear length of time he is felt short, generalized body aches, with epigastric pain, cough.  Additionally complains of diarrhea.    Collateral obtained from son Deniz Negrete [phone]:  States that patient for 1 day has had complaints of chills, with chest pain, has been Cipollini in this time.  Says that chest pain has been associated with his cough, has been nonproductive without bloody sputum.  States that patient had not complained of back pain, urine changes.  Lives with his father, gave him a Tylenol at 6 AM today, with improvement in symptoms.    Patient was recently seen in [facility] for fall 1 week ago, work-up negative, was admitted because patient was still dizzy trying to walk, with orthostasis, given fluids and discharged.  Son states father takes Eliquis as prescribed. Patient is an 88-year-old male past medical history dementia, hypertension, CVA, A. fib on Eliquis presenting for flulike symptoms.  Patient is poor historian, states that unclear length of time he is felt short, generalized body aches, with epigastric pain, cough.  Additionally complains of diarrhea.    Collateral obtained from son Deniz Negrete [phone]:  States that patient for 1 day has had complaints of chills, with chest pain, has been Cipollini in this time.  Says that chest pain has been associated with his cough, has been nonproductive without bloody sputum.  States that patient had not complained of back pain, urine changes.  Lives with his father, gave him a Tylenol at 6 AM today, with improvement in symptoms.    Patient was recently seen in [facility] for fall 1 week ago, work-up negative, was admitted because patient was still dizzy trying to walk, with orthostasis, given fluids and discharged.  Son states father takes Eliquis as prescribed. Patient is an 88-year-old male past medical history dementia, hypertension, CVA, A. fib on Eliquis presenting for flulike symptoms.  Patient is poor historian, states that unclear length of time he is felt short, generalized body aches, with epigastric pain, cough.  Additionally complains of diarrhea.    Collateral obtained from son Deniz Negrete [phone]:  States that patient for 1 day has had complaints of chills, with chest pain, has been Cipollini in this time.  Says that chest pain has been associated with his cough, has been nonproductive without bloody sputum.  States that patient had not complained of back pain, urine changes.  Lives with his father, gave him a Tylenol at 6 AM today, with improvement in symptoms.    Patient was recently seen in [facility] for fall 1 week ago, work-up negative, was admitted because patient was still dizzy trying to walk, with orthostasis, given fluids and discharged.  Son states father takes Eliquis as prescribed.

## 2023-01-29 NOTE — H&P ADULT - NSHPPHYSICALEXAM_GEN_ALL_CORE
T(C): 36.9 (01-29-23 @ 11:55), Max: 37.6 (01-29-23 @ 09:47)  HR: 68 (01-29-23 @ 12:30) (68 - 89)  BP: 133/90 (01-29-23 @ 12:30) (111/74 - 133/90)  RR: 18 (01-29-23 @ 12:30) (18 - 18)  SpO2: 100% (01-29-23 @ 12:30) (98% - 100%)    GENERAL: No acute distress, well-developed  HEAD:  Atraumatic, Normocephalic  ENT: EOMI, PERRLA, conjunctiva and sclera clear, Neck supple, No JVD, moist mucosa, no pharyngeal erythema, no tonsillar enlargement or exudate  CHEST/LUNG: Clear to auscultation bilaterally; No wheeze, equal breath sounds bilaterally   HEART: Regular rate and rhythm; No murmurs, rubs, or gallops  ABDOMEN: Soft, + Tenderness to palpation in LLQ, Nondistended; Bowel sounds present, no organomegaly  EXTREMITIES:  2+ Peripheral Pulses, No clubbing, cyanosis, or edema  PSYCH: AAOx3, normal affect, normal behavior   NEUROLOGY: non-focal, cranial nerves intact  SKIN: Normal color, No rashes or lesions

## 2023-01-30 LAB
ALBUMIN SERPL ELPH-MCNC: 3.6 G/DL — SIGNIFICANT CHANGE UP (ref 3.3–5)
ALP SERPL-CCNC: 78 U/L — SIGNIFICANT CHANGE UP (ref 40–120)
ALT FLD-CCNC: 16 U/L — SIGNIFICANT CHANGE UP (ref 4–41)
ANION GAP SERPL CALC-SCNC: 9 MMOL/L — SIGNIFICANT CHANGE UP (ref 7–14)
AST SERPL-CCNC: 22 U/L — SIGNIFICANT CHANGE UP (ref 4–40)
BASOPHILS # BLD AUTO: 0.01 K/UL — SIGNIFICANT CHANGE UP (ref 0–0.2)
BASOPHILS NFR BLD AUTO: 0.2 % — SIGNIFICANT CHANGE UP (ref 0–2)
BILIRUB SERPL-MCNC: 0.4 MG/DL — SIGNIFICANT CHANGE UP (ref 0.2–1.2)
BUN SERPL-MCNC: 12 MG/DL — SIGNIFICANT CHANGE UP (ref 7–23)
CALCIUM SERPL-MCNC: 8.4 MG/DL — SIGNIFICANT CHANGE UP (ref 8.4–10.5)
CHLORIDE SERPL-SCNC: 102 MMOL/L — SIGNIFICANT CHANGE UP (ref 98–107)
CO2 SERPL-SCNC: 23 MMOL/L — SIGNIFICANT CHANGE UP (ref 22–31)
CREAT SERPL-MCNC: 0.9 MG/DL — SIGNIFICANT CHANGE UP (ref 0.5–1.3)
CRP SERPL-MCNC: 31.3 MG/L — HIGH
CULTURE RESULTS: SIGNIFICANT CHANGE UP
D DIMER BLD IA.RAPID-MCNC: 196 NG/ML DDU — SIGNIFICANT CHANGE UP
EGFR: 83 ML/MIN/1.73M2 — SIGNIFICANT CHANGE UP
EOSINOPHIL # BLD AUTO: 0.01 K/UL — SIGNIFICANT CHANGE UP (ref 0–0.5)
EOSINOPHIL NFR BLD AUTO: 0.2 % — SIGNIFICANT CHANGE UP (ref 0–6)
FERRITIN SERPL-MCNC: 236 NG/ML — SIGNIFICANT CHANGE UP (ref 30–400)
GLUCOSE SERPL-MCNC: 129 MG/DL — HIGH (ref 70–99)
HCT VFR BLD CALC: 39.8 % — SIGNIFICANT CHANGE UP (ref 39–50)
HGB BLD-MCNC: 13.2 G/DL — SIGNIFICANT CHANGE UP (ref 13–17)
IANC: 3.92 K/UL — SIGNIFICANT CHANGE UP (ref 1.8–7.4)
IMM GRANULOCYTES NFR BLD AUTO: 0.2 % — SIGNIFICANT CHANGE UP (ref 0–0.9)
LDH SERPL L TO P-CCNC: 177 U/L — SIGNIFICANT CHANGE UP (ref 135–225)
LYMPHOCYTES # BLD AUTO: 0.76 K/UL — LOW (ref 1–3.3)
LYMPHOCYTES # BLD AUTO: 14.4 % — SIGNIFICANT CHANGE UP (ref 13–44)
MAGNESIUM SERPL-MCNC: 1.9 MG/DL — SIGNIFICANT CHANGE UP (ref 1.6–2.6)
MCHC RBC-ENTMCNC: 29 PG — SIGNIFICANT CHANGE UP (ref 27–34)
MCHC RBC-ENTMCNC: 33.2 GM/DL — SIGNIFICANT CHANGE UP (ref 32–36)
MCV RBC AUTO: 87.5 FL — SIGNIFICANT CHANGE UP (ref 80–100)
MONOCYTES # BLD AUTO: 0.57 K/UL — SIGNIFICANT CHANGE UP (ref 0–0.9)
MONOCYTES NFR BLD AUTO: 10.8 % — SIGNIFICANT CHANGE UP (ref 2–14)
NEUTROPHILS # BLD AUTO: 3.92 K/UL — SIGNIFICANT CHANGE UP (ref 1.8–7.4)
NEUTROPHILS NFR BLD AUTO: 74.2 % — SIGNIFICANT CHANGE UP (ref 43–77)
NRBC # BLD: 0 /100 WBCS — SIGNIFICANT CHANGE UP (ref 0–0)
NRBC # FLD: 0 K/UL — SIGNIFICANT CHANGE UP (ref 0–0)
PHOSPHATE SERPL-MCNC: 3.8 MG/DL — SIGNIFICANT CHANGE UP (ref 2.5–4.5)
PLATELET # BLD AUTO: 142 K/UL — LOW (ref 150–400)
POTASSIUM SERPL-MCNC: 3.7 MMOL/L — SIGNIFICANT CHANGE UP (ref 3.5–5.3)
POTASSIUM SERPL-SCNC: 3.7 MMOL/L — SIGNIFICANT CHANGE UP (ref 3.5–5.3)
PROCALCITONIN SERPL-MCNC: 0.12 NG/ML — HIGH (ref 0.02–0.1)
PROT SERPL-MCNC: 7.1 G/DL — SIGNIFICANT CHANGE UP (ref 6–8.3)
RBC # BLD: 4.55 M/UL — SIGNIFICANT CHANGE UP (ref 4.2–5.8)
RBC # FLD: 14.2 % — SIGNIFICANT CHANGE UP (ref 10.3–14.5)
SODIUM SERPL-SCNC: 134 MMOL/L — LOW (ref 135–145)
SPECIMEN SOURCE: SIGNIFICANT CHANGE UP
TROPONIN T, HIGH SENSITIVITY RESULT: 23 NG/L — SIGNIFICANT CHANGE UP
WBC # BLD: 5.28 K/UL — SIGNIFICANT CHANGE UP (ref 3.8–10.5)
WBC # FLD AUTO: 5.28 K/UL — SIGNIFICANT CHANGE UP (ref 3.8–10.5)

## 2023-01-30 RX ORDER — CEFTRIAXONE 500 MG/1
1000 INJECTION, POWDER, FOR SOLUTION INTRAMUSCULAR; INTRAVENOUS EVERY 24 HOURS
Refills: 0 | Status: DISCONTINUED | OUTPATIENT
Start: 2023-01-30 | End: 2023-02-01

## 2023-01-30 RX ORDER — IPRATROPIUM/ALBUTEROL SULFATE 18-103MCG
3 AEROSOL WITH ADAPTER (GRAM) INHALATION ONCE
Refills: 0 | Status: COMPLETED | OUTPATIENT
Start: 2023-01-30 | End: 2023-01-30

## 2023-01-30 RX ADMIN — PANTOPRAZOLE SODIUM 40 MILLIGRAM(S): 20 TABLET, DELAYED RELEASE ORAL at 05:07

## 2023-01-30 RX ADMIN — Medication 200 MILLIGRAM(S): at 05:06

## 2023-01-30 RX ADMIN — Medication 3 MILLILITER(S): at 15:08

## 2023-01-30 RX ADMIN — CEFTRIAXONE 100 MILLIGRAM(S): 500 INJECTION, POWDER, FOR SOLUTION INTRAMUSCULAR; INTRAVENOUS at 16:13

## 2023-01-30 RX ADMIN — Medication 100 MILLIGRAM(S): at 20:46

## 2023-01-30 RX ADMIN — ATORVASTATIN CALCIUM 20 MILLIGRAM(S): 80 TABLET, FILM COATED ORAL at 20:46

## 2023-01-30 RX ADMIN — Medication 100 MILLIGRAM(S): at 10:10

## 2023-01-30 RX ADMIN — APIXABAN 5 MILLIGRAM(S): 2.5 TABLET, FILM COATED ORAL at 05:07

## 2023-01-30 RX ADMIN — Medication 100 MILLIGRAM(S): at 02:56

## 2023-01-30 RX ADMIN — APIXABAN 5 MILLIGRAM(S): 2.5 TABLET, FILM COATED ORAL at 20:49

## 2023-01-30 NOTE — PHYSICAL THERAPY INITIAL EVALUATION ADULT - PERTINENT HX OF CURRENT PROBLEM, REHAB EVAL
Patient presented with complaints of dizziness, weakness and cough; found to be COVID+ and have diverticulitis

## 2023-01-30 NOTE — PROGRESS NOTE ADULT - SUBJECTIVE AND OBJECTIVE BOX
Patient is a 88y old  Male who presents with a chief complaint of Weakness; COVID; Diverticulitis (2023 13:30)      INTERVAL HPI/OVERNIGHT EVENTS: doing fair , denies any SOB   T(C): 36.7 (23 @ 20:40), Max: 37.7 (23 @ 16:08)  HR: 78 (23 @ 20:40) (71 - 99)  BP: 118/70 (23 @ 20:40) (107/72 - 132/74)  RR: 18 (23 @ 20:40) (18 - 25)  SpO2: 100% (23 @ 20:40) (100% - 100%)  Wt(kg): --  I&O's Summary      PAST MEDICAL & SURGICAL HISTORY:  Atrial fibrillation      HTN (hypertension)      CVA (cerebrovascular accident)      Vertigo      Dementia      History of sinus surgery          SOCIAL HISTORY  Alcohol:  Tobacco:  Illicit substance use:    FAMILY HISTORY:    REVIEW OF SYSTEMS:  CONSTITUTIONAL: No fever, weight loss, or fatigue  EYES: No eye pain, visual disturbances, or discharge  ENMT:  No difficulty hearing, tinnitus, vertigo; No sinus or throat pain  NECK: No pain or stiffness  RESPIRATORY: No cough, wheezing, chills or hemoptysis; No shortness of breath  CARDIOVASCULAR: No chest pain, palpitations, dizziness, or leg swelling  GASTROINTESTINAL: No abdominal or epigastric pain. No nausea, vomiting, or hematemesis; No diarrhea or constipation. No melena or hematochezia.  GENITOURINARY: No dysuria, frequency, hematuria, or incontinence  NEUROLOGICAL: No headaches, memory loss, loss of strength, numbness, or tremors  SKIN: No itching, burning, rashes, or lesions   LYMPH NODES: No enlarged glands  ENDOCRINE: No heat or cold intolerance; No hair loss  MUSCULOSKELETAL: No joint pain or swelling; No muscle, back, or extremity pain  PSYCHIATRIC: No depression, anxiety, mood swings, or difficulty sleeping  HEME/LYMPH: No easy bruising, or bleeding gums  ALLERY AND IMMUNOLOGIC: No hives or eczema    RADIOLOGY & ADDITIONAL TESTS:    Imaging Personally Reviewed:  [ ] YES  [ ] NO    Consultant(s) Notes Reviewed:  [ ] YES  [ ] NO    PHYSICAL EXAM:  GENERAL: NAD, well-groomed, well-developed  HEAD:  Atraumatic, Normocephalic  EYES: EOMI, PERRLA, conjunctiva and sclera clear  ENMT: No tonsillar erythema, exudates, or enlargement; Moist mucous membranes, Good dentition, No lesions  NECK: Supple, No JVD, Normal thyroid  NERVOUS SYSTEM:  Alert & Oriented X3, Good concentration; Motor Strength 5/5 B/L upper and lower extremities; DTRs 2+ intact and symmetric  CHEST/LUNG: Clear to percussion bilaterally; No rales, rhonchi, wheezing, or rubs  HEART: Regular rate and rhythm; No murmurs, rubs, or gallops  ABDOMEN: Soft, Nontender, Nondistended; Bowel sounds present  EXTREMITIES:  2+ Peripheral Pulses, No clubbing, cyanosis, or edema  LYMPH: No lymphadenopathy noted  SKIN: No rashes or lesions    LABS:                        13.2   5.28  )-----------( 142      ( 2023 06:55 )             39.8     30    134<L>  |  102  |  12  ----------------------------<  129<H>  3.7   |  23  |  0.90    Ca    8.4      2023 06:55  Phos  3.8     -  Mg     1.90         TPro  7.1  /  Alb  3.6  /  TBili  0.4  /  DBili  x   /  AST  22  /  ALT  16  /  AlkPhos  78  -30    PT/INR - ( 2023 09:20 )   PT: 13.2 sec;   INR: 1.14 ratio         PTT - ( 2023 09:20 )  PTT:29.8 sec  Urinalysis Basic - ( 2023 12:40 )    Color: Light Yellow / Appearance: Clear / S.046 / pH: x  Gluc: x / Ketone: Negative  / Bili: Negative / Urobili: <2 mg/dL   Blood: x / Protein: Negative / Nitrite: Negative   Leuk Esterase: Negative / RBC: x / WBC x   Sq Epi: x / Non Sq Epi: x / Bacteria: x      CAPILLARY BLOOD GLUCOSE            Urinalysis Basic - ( 2023 12:40 )    Color: Light Yellow / Appearance: Clear / S.046 / pH: x  Gluc: x / Ketone: Negative  / Bili: Negative / Urobili: <2 mg/dL   Blood: x / Protein: Negative / Nitrite: Negative   Leuk Esterase: Negative / RBC: x / WBC x   Sq Epi: x / Non Sq Epi: x / Bacteria: x        MEDICATIONS  (STANDING):  apixaban 5 milliGRAM(s) Oral two times a day  atorvastatin 20 milliGRAM(s) Oral at bedtime  cefTRIAXone   IVPB 1000 milliGRAM(s) IV Intermittent every 24 hours  metroNIDAZOLE  IVPB 500 milliGRAM(s) IV Intermittent every 8 hours  pantoprazole    Tablet 40 milliGRAM(s) Oral before breakfast    MEDICATIONS  (PRN):  acetaminophen     Tablet .. 650 milliGRAM(s) Oral every 6 hours PRN Temp greater or equal to 38C (100.4F), Moderate Pain (4 - 6), Severe Pain (7 - 10)  aluminum hydroxide/magnesium hydroxide/simethicone Suspension 30 milliLiter(s) Oral every 4 hours PRN Dyspepsia  benzonatate 100 milliGRAM(s) Oral three times a day PRN Cough  meclizine 12.5 milliGRAM(s) Oral three times a day PRN Dizziness  melatonin 3 milliGRAM(s) Oral at bedtime PRN Insomnia  ondansetron Injectable 4 milliGRAM(s) IV Push every 8 hours PRN Nausea and/or Vomiting      Care Discussed with Consultants/Other Providers [ ] YES  [ ] NO

## 2023-01-30 NOTE — PHARMACOTHERAPY INTERVENTION NOTE - COMMENTS
Patient on ciprofloxacin IV 400mg q12hrs + metronidazole for diverticulitis treatment. Due to shortage of ciprofloxacin IV, recommended ceftriaxone 1 gram IV q24hrs as alternative agent (in addition to metronidazole).       Marcy Norton, Pharm.D., [facility]  Clinical Pharmacy Specialist  Spectra: [phone] Patient on ciprofloxacin IV 400mg q12hrs + metronidazole for diverticulitis treatment. Due to shortage of ciprofloxacin IV, recommended ceftriaxone 1 gram IV q24hrs as alternative agent (in addition to metronidazole).       Marcy Norton, Pharm.D., [facility]  Clinical Pharmacy Specialist  Spectra: [phone] Patient on ciprofloxacin IV 400mg q12hrs + metronidazole for diverticulitis treatment. Due to shortage of ciprofloxacin IV, recommended ceftriaxone 1 gram IV q24hrs as alternative agent (in addition to metronidazole).       Marcy Norton, Pharm.D., [facility]  Clinical Pharmacy Specialist  Spectra: [phone]

## 2023-01-31 LAB
ANION GAP SERPL CALC-SCNC: 11 MMOL/L — SIGNIFICANT CHANGE UP (ref 7–14)
BUN SERPL-MCNC: 15 MG/DL — SIGNIFICANT CHANGE UP (ref 7–23)
CALCIUM SERPL-MCNC: 8.9 MG/DL — SIGNIFICANT CHANGE UP (ref 8.4–10.5)
CHLORIDE SERPL-SCNC: 101 MMOL/L — SIGNIFICANT CHANGE UP (ref 98–107)
CO2 SERPL-SCNC: 24 MMOL/L — SIGNIFICANT CHANGE UP (ref 22–31)
CREAT SERPL-MCNC: 0.88 MG/DL — SIGNIFICANT CHANGE UP (ref 0.5–1.3)
EGFR: 83 ML/MIN/1.73M2 — SIGNIFICANT CHANGE UP
GLUCOSE SERPL-MCNC: 93 MG/DL — SIGNIFICANT CHANGE UP (ref 70–99)
HCT VFR BLD CALC: 43 % — SIGNIFICANT CHANGE UP (ref 39–50)
HGB BLD-MCNC: 14 G/DL — SIGNIFICANT CHANGE UP (ref 13–17)
MAGNESIUM SERPL-MCNC: 2.1 MG/DL — SIGNIFICANT CHANGE UP (ref 1.6–2.6)
MCHC RBC-ENTMCNC: 28.8 PG — SIGNIFICANT CHANGE UP (ref 27–34)
MCHC RBC-ENTMCNC: 32.6 GM/DL — SIGNIFICANT CHANGE UP (ref 32–36)
MCV RBC AUTO: 88.5 FL — SIGNIFICANT CHANGE UP (ref 80–100)
NRBC # BLD: 0 /100 WBCS — SIGNIFICANT CHANGE UP (ref 0–0)
NRBC # FLD: 0 K/UL — SIGNIFICANT CHANGE UP (ref 0–0)
PHOSPHATE SERPL-MCNC: 3.9 MG/DL — SIGNIFICANT CHANGE UP (ref 2.5–4.5)
PLATELET # BLD AUTO: 156 K/UL — SIGNIFICANT CHANGE UP (ref 150–400)
POTASSIUM SERPL-MCNC: 3.8 MMOL/L — SIGNIFICANT CHANGE UP (ref 3.5–5.3)
POTASSIUM SERPL-SCNC: 3.8 MMOL/L — SIGNIFICANT CHANGE UP (ref 3.5–5.3)
RBC # BLD: 4.86 M/UL — SIGNIFICANT CHANGE UP (ref 4.2–5.8)
RBC # FLD: 14.1 % — SIGNIFICANT CHANGE UP (ref 10.3–14.5)
SODIUM SERPL-SCNC: 136 MMOL/L — SIGNIFICANT CHANGE UP (ref 135–145)
WBC # BLD: 7.44 K/UL — SIGNIFICANT CHANGE UP (ref 3.8–10.5)
WBC # FLD AUTO: 7.44 K/UL — SIGNIFICANT CHANGE UP (ref 3.8–10.5)

## 2023-01-31 RX ADMIN — CEFTRIAXONE 100 MILLIGRAM(S): 500 INJECTION, POWDER, FOR SOLUTION INTRAMUSCULAR; INTRAVENOUS at 17:58

## 2023-01-31 RX ADMIN — Medication 100 MILLIGRAM(S): at 12:05

## 2023-01-31 RX ADMIN — ATORVASTATIN CALCIUM 20 MILLIGRAM(S): 80 TABLET, FILM COATED ORAL at 21:59

## 2023-01-31 RX ADMIN — Medication 100 MILLIGRAM(S): at 05:33

## 2023-01-31 RX ADMIN — PANTOPRAZOLE SODIUM 40 MILLIGRAM(S): 20 TABLET, DELAYED RELEASE ORAL at 05:21

## 2023-01-31 RX ADMIN — APIXABAN 5 MILLIGRAM(S): 2.5 TABLET, FILM COATED ORAL at 05:21

## 2023-01-31 RX ADMIN — Medication 100 MILLIGRAM(S): at 18:33

## 2023-01-31 RX ADMIN — APIXABAN 5 MILLIGRAM(S): 2.5 TABLET, FILM COATED ORAL at 18:00

## 2023-01-31 NOTE — PROGRESS NOTE ADULT - SUBJECTIVE AND OBJECTIVE BOX
Patient is a 88y old  Male who presents with a chief complaint of Weakness; COVID; Diverticulitis (30 Jan 2023 20:49)      INTERVAL HPI/OVERNIGHT EVENTS:  T(C): 36.8 (01-31-23 @ 15:54), Max: 36.9 (01-31-23 @ 05:20)  HR: 67 (01-31-23 @ 15:54) (67 - 76)  BP: 118/65 (01-31-23 @ 15:54) (107/68 - 118/65)  RR: 18 (01-31-23 @ 15:54) (18 - 18)  SpO2: 100% (01-31-23 @ 15:54) (100% - 100%)  Wt(kg): --  I&O's Summary    31 Jan 2023 07:01  -  31 Jan 2023 22:54  --------------------------------------------------------  IN: 0 mL / OUT: 200 mL / NET: -200 mL        PAST MEDICAL & SURGICAL HISTORY:  Atrial fibrillation      HTN (hypertension)      CVA (cerebrovascular accident)      Vertigo      Dementia      History of sinus surgery          SOCIAL HISTORY  Alcohol:  Tobacco:  Illicit substance use:    FAMILY HISTORY:    REVIEW OF SYSTEMS:  CONSTITUTIONAL: No fever, weight loss, or fatigue  EYES: No eye pain, visual disturbances, or discharge  ENMT:  No difficulty hearing, tinnitus, vertigo; No sinus or throat pain  NECK: No pain or stiffness  RESPIRATORY: No cough, wheezing, chills or hemoptysis; No shortness of breath  CARDIOVASCULAR: No chest pain, palpitations, dizziness, or leg swelling  GASTROINTESTINAL: No abdominal or epigastric pain. No nausea, vomiting, or hematemesis; No diarrhea or constipation. No melena or hematochezia.  GENITOURINARY: No dysuria, frequency, hematuria, or incontinence  NEUROLOGICAL: No headaches, memory loss, loss of strength, numbness, or tremors  SKIN: No itching, burning, rashes, or lesions   LYMPH NODES: No enlarged glands  ENDOCRINE: No heat or cold intolerance; No hair loss  MUSCULOSKELETAL: No joint pain or swelling; No muscle, back, or extremity pain  PSYCHIATRIC: No depression, anxiety, mood swings, or difficulty sleeping  HEME/LYMPH: No easy bruising, or bleeding gums  ALLERY AND IMMUNOLOGIC: No hives or eczema    RADIOLOGY & ADDITIONAL TESTS:    Imaging Personally Reviewed:  [ ] YES  [ ] NO    Consultant(s) Notes Reviewed:  [ ] YES  [ ] NO    PHYSICAL EXAM:  GENERAL: NAD, well-groomed, well-developed  HEAD:  Atraumatic, Normocephalic  EYES: EOMI, PERRLA, conjunctiva and sclera clear  ENMT: No tonsillar erythema, exudates, or enlargement; Moist mucous membranes, Good dentition, No lesions  NECK: Supple, No JVD, Normal thyroid  NERVOUS SYSTEM:  Alert & Oriented X3, Good concentration; Motor Strength 5/5 B/L upper and lower extremities; DTRs 2+ intact and symmetric  CHEST/LUNG: Clear to percussion bilaterally; No rales, rhonchi, wheezing, or rubs  HEART: Regular rate and rhythm; No murmurs, rubs, or gallops  ABDOMEN: Soft, Nontender, Nondistended; Bowel sounds present  EXTREMITIES:  2+ Peripheral Pulses, No clubbing, cyanosis, or edema  LYMPH: No lymphadenopathy noted  SKIN: No rashes or lesions    LABS:                        14.0   7.44  )-----------( 156      ( 31 Jan 2023 06:00 )             43.0     01-31    136  |  101  |  15  ----------------------------<  93  3.8   |  24  |  0.88    Ca    8.9      31 Jan 2023 06:00  Phos  3.9     01-31  Mg     2.10     01-31    TPro  7.1  /  Alb  3.6  /  TBili  0.4  /  DBili  x   /  AST  22  /  ALT  16  /  AlkPhos  78  01-30        CAPILLARY BLOOD GLUCOSE                MEDICATIONS  (STANDING):  apixaban 5 milliGRAM(s) Oral two times a day  atorvastatin 20 milliGRAM(s) Oral at bedtime  cefTRIAXone   IVPB 1000 milliGRAM(s) IV Intermittent every 24 hours  metroNIDAZOLE  IVPB 500 milliGRAM(s) IV Intermittent every 8 hours  pantoprazole    Tablet 40 milliGRAM(s) Oral before breakfast    MEDICATIONS  (PRN):  acetaminophen     Tablet .. 650 milliGRAM(s) Oral every 6 hours PRN Temp greater or equal to 38C (100.4F), Moderate Pain (4 - 6), Severe Pain (7 - 10)  aluminum hydroxide/magnesium hydroxide/simethicone Suspension 30 milliLiter(s) Oral every 4 hours PRN Dyspepsia  benzonatate 100 milliGRAM(s) Oral three times a day PRN Cough  meclizine 12.5 milliGRAM(s) Oral three times a day PRN Dizziness  melatonin 3 milliGRAM(s) Oral at bedtime PRN Insomnia  ondansetron Injectable 4 milliGRAM(s) IV Push every 8 hours PRN Nausea and/or Vomiting      Care Discussed with Consultants/Other Providers [ ] YES  [ ] NO Patient is a 88y old  Male who presents with a chief complaint of Weakness; COVID; Diverticulitis (30 Jan 2023 20:49)      INTERVAL HPI/OVERNIGHT EVENTS: seen and examined   T(C): 36.8 (01-31-23 @ 15:54), Max: 36.9 (01-31-23 @ 05:20)  HR: 67 (01-31-23 @ 15:54) (67 - 76)  BP: 118/65 (01-31-23 @ 15:54) (107/68 - 118/65)  RR: 18 (01-31-23 @ 15:54) (18 - 18)  SpO2: 100% (01-31-23 @ 15:54) (100% - 100%)  Wt(kg): --  I&O's Summary    31 Jan 2023 07:01  -  31 Jan 2023 22:54  --------------------------------------------------------  IN: 0 mL / OUT: 200 mL / NET: -200 mL        PAST MEDICAL & SURGICAL HISTORY:  Atrial fibrillation      HTN (hypertension)      CVA (cerebrovascular accident)      Vertigo      Dementia      History of sinus surgery          SOCIAL HISTORY  Alcohol:  Tobacco:  Illicit substance use:    FAMILY HISTORY:    REVIEW OF SYSTEMS:  CONSTITUTIONAL: No fever, weight loss, or fatigue  EYES: No eye pain, visual disturbances, or discharge  ENMT:  No difficulty hearing, tinnitus, vertigo; No sinus or throat pain  NECK: No pain or stiffness  RESPIRATORY: No cough, wheezing, chills or hemoptysis; No shortness of breath  CARDIOVASCULAR: No chest pain, palpitations, dizziness, or leg swelling  GASTROINTESTINAL: No abdominal or epigastric pain. No nausea, vomiting, or hematemesis; No diarrhea or constipation. No melena or hematochezia.  GENITOURINARY: No dysuria, frequency, hematuria, or incontinence  NEUROLOGICAL: No headaches, memory loss, loss of strength, numbness, or tremors  SKIN: No itching, burning, rashes, or lesions   LYMPH NODES: No enlarged glands  ENDOCRINE: No heat or cold intolerance; No hair loss  MUSCULOSKELETAL: No joint pain or swelling; No muscle, back, or extremity pain  PSYCHIATRIC: No depression, anxiety, mood swings, or difficulty sleeping  HEME/LYMPH: No easy bruising, or bleeding gums  ALLERY AND IMMUNOLOGIC: No hives or eczema    RADIOLOGY & ADDITIONAL TESTS:    Imaging Personally Reviewed:  [ ] YES  [ ] NO    Consultant(s) Notes Reviewed:  [ ] YES  [ ] NO    PHYSICAL EXAM:  GENERAL: NAD, well-groomed, well-developed  HEAD:  Atraumatic, Normocephalic  EYES: EOMI, PERRLA, conjunctiva and sclera clear  ENMT: No tonsillar erythema, exudates, or enlargement; Moist mucous membranes, Good dentition, No lesions  NECK: Supple, No JVD, Normal thyroid  NERVOUS SYSTEM:  Alert & Oriented X3, Good concentration; Motor Strength 5/5 B/L upper and lower extremities; DTRs 2+ intact and symmetric  CHEST/LUNG: Clear to percussion bilaterally; No rales, rhonchi, wheezing, or rubs  HEART: Regular rate and rhythm; No murmurs, rubs, or gallops  ABDOMEN: Soft, Nontender, Nondistended; Bowel sounds present  EXTREMITIES:  2+ Peripheral Pulses, No clubbing, cyanosis, or edema  LYMPH: No lymphadenopathy noted  SKIN: No rashes or lesions    LABS:                        14.0   7.44  )-----------( 156      ( 31 Jan 2023 06:00 )             43.0     01-31    136  |  101  |  15  ----------------------------<  93  3.8   |  24  |  0.88    Ca    8.9      31 Jan 2023 06:00  Phos  3.9     01-31  Mg     2.10     01-31    TPro  7.1  /  Alb  3.6  /  TBili  0.4  /  DBili  x   /  AST  22  /  ALT  16  /  AlkPhos  78  01-30        CAPILLARY BLOOD GLUCOSE                MEDICATIONS  (STANDING):  apixaban 5 milliGRAM(s) Oral two times a day  atorvastatin 20 milliGRAM(s) Oral at bedtime  cefTRIAXone   IVPB 1000 milliGRAM(s) IV Intermittent every 24 hours  metroNIDAZOLE  IVPB 500 milliGRAM(s) IV Intermittent every 8 hours  pantoprazole    Tablet 40 milliGRAM(s) Oral before breakfast    MEDICATIONS  (PRN):  acetaminophen     Tablet .. 650 milliGRAM(s) Oral every 6 hours PRN Temp greater or equal to 38C (100.4F), Moderate Pain (4 - 6), Severe Pain (7 - 10)  aluminum hydroxide/magnesium hydroxide/simethicone Suspension 30 milliLiter(s) Oral every 4 hours PRN Dyspepsia  benzonatate 100 milliGRAM(s) Oral three times a day PRN Cough  meclizine 12.5 milliGRAM(s) Oral three times a day PRN Dizziness  melatonin 3 milliGRAM(s) Oral at bedtime PRN Insomnia  ondansetron Injectable 4 milliGRAM(s) IV Push every 8 hours PRN Nausea and/or Vomiting      Care Discussed with Consultants/Other Providers [ ] YES  [ ] NO

## 2023-01-31 NOTE — PROGRESS NOTE ADULT - ASSESSMENT
88 year old male with a history of Afib on Eliquis, GERD, HTN, CVA, vertigo, dementia AAOx1-2 at baseline presenting with dizzines and weakness. Found to be COVID19 positive and CT showing diverticulitis        Problem/Plan - 1:  ·  Problem: 2019 novel coronavirus disease (COVID-19).   ·  Plan: - asymptomatic , no hypoxia , on RA  -c/w eliquis   no need for RDV / steroids      Problem/Plan - 2:  ·  Problem: Acute diverticulitis.   ·  Plan: - LLQ pain/tenderness with CT showing mild diverticulitis   c/w abx , change to PO if tolerating diet      Problem/Plan - 3:  ·  Problem: HTN (hypertension).   ·  Plan: off all meds      Problem/Plan - 5:  ·  Problem: Dementia.   ·  Plan: - History of prior stroke. MS at baseline  - Continue to monitor.     Problem/Plan - 6:  ·  Problem: Atrial fibrillation.   ·  Plan: - Rate controlled  - Continue Eliquis.    dispo: once abx changed to PO , will plan for d/c   
88 year old male with a history of Afib on Eliquis, GERD, HTN, CVA, vertigo, dementia AAOx1-2 at baseline presenting with dizzines and weakness. Found to be COVID19 positive and CT showing diverticulitis        Problem/Plan - 1:  ·  Problem: 2019 novel coronavirus disease (COVID-19).   ·  Plan: - asymptomatic , no hypoxia , on RA  -c/w eliquis   no need for RDV / steroids      Problem/Plan - 2:  ·  Problem: Acute diverticulitis.   ·  Plan: - LLQ pain/tenderness with CT showing mild diverticulitis   c/w abx , change to PO if tolerating diet      Problem/Plan - 3:  ·  Problem: HTN (hypertension).   ·  Plan: off all meds      Problem/Plan - 5:  ·  Problem: Dementia.   ·  Plan: - History of prior stroke. MS at baseline  - Continue to monitor.     Problem/Plan - 6:  ·  Problem: Atrial fibrillation.   ·  Plan: - Rate controlled  - Continue Eliquis.    dispo: change Abx to cipro and fagly on d/c for total of 7 days , and advance diet   if no abd pain and tolerating diet , can be d/c home in am

## 2023-02-01 ENCOUNTER — TRANSCRIPTION ENCOUNTER (OUTPATIENT)
Age: 88
End: 2023-02-01

## 2023-02-01 VITALS
TEMPERATURE: 98 F | DIASTOLIC BLOOD PRESSURE: 66 MMHG | OXYGEN SATURATION: 100 % | SYSTOLIC BLOOD PRESSURE: 101 MMHG | RESPIRATION RATE: 18 BRPM | HEART RATE: 78 BPM

## 2023-02-01 LAB
ANION GAP SERPL CALC-SCNC: 13 MMOL/L — SIGNIFICANT CHANGE UP (ref 7–14)
BASOPHILS # BLD AUTO: 0.02 K/UL — SIGNIFICANT CHANGE UP (ref 0–0.2)
BASOPHILS NFR BLD AUTO: 0.3 % — SIGNIFICANT CHANGE UP (ref 0–2)
BUN SERPL-MCNC: 21 MG/DL — SIGNIFICANT CHANGE UP (ref 7–23)
CALCIUM SERPL-MCNC: 8.8 MG/DL — SIGNIFICANT CHANGE UP (ref 8.4–10.5)
CHLORIDE SERPL-SCNC: 101 MMOL/L — SIGNIFICANT CHANGE UP (ref 98–107)
CO2 SERPL-SCNC: 22 MMOL/L — SIGNIFICANT CHANGE UP (ref 22–31)
CREAT SERPL-MCNC: 0.86 MG/DL — SIGNIFICANT CHANGE UP (ref 0.5–1.3)
EGFR: 84 ML/MIN/1.73M2 — SIGNIFICANT CHANGE UP
EOSINOPHIL # BLD AUTO: 0.34 K/UL — SIGNIFICANT CHANGE UP (ref 0–0.5)
EOSINOPHIL NFR BLD AUTO: 4.7 % — SIGNIFICANT CHANGE UP (ref 0–6)
GLUCOSE SERPL-MCNC: 85 MG/DL — SIGNIFICANT CHANGE UP (ref 70–99)
HCT VFR BLD CALC: 43.8 % — SIGNIFICANT CHANGE UP (ref 39–50)
HGB BLD-MCNC: 14.3 G/DL — SIGNIFICANT CHANGE UP (ref 13–17)
IANC: 3.68 K/UL — SIGNIFICANT CHANGE UP (ref 1.8–7.4)
IMM GRANULOCYTES NFR BLD AUTO: 0.3 % — SIGNIFICANT CHANGE UP (ref 0–0.9)
LYMPHOCYTES # BLD AUTO: 2.17 K/UL — SIGNIFICANT CHANGE UP (ref 1–3.3)
LYMPHOCYTES # BLD AUTO: 30.3 % — SIGNIFICANT CHANGE UP (ref 13–44)
MAGNESIUM SERPL-MCNC: 2 MG/DL — SIGNIFICANT CHANGE UP (ref 1.6–2.6)
MCHC RBC-ENTMCNC: 28.8 PG — SIGNIFICANT CHANGE UP (ref 27–34)
MCHC RBC-ENTMCNC: 32.6 GM/DL — SIGNIFICANT CHANGE UP (ref 32–36)
MCV RBC AUTO: 88.3 FL — SIGNIFICANT CHANGE UP (ref 80–100)
MONOCYTES # BLD AUTO: 0.93 K/UL — HIGH (ref 0–0.9)
MONOCYTES NFR BLD AUTO: 13 % — SIGNIFICANT CHANGE UP (ref 2–14)
NEUTROPHILS # BLD AUTO: 3.68 K/UL — SIGNIFICANT CHANGE UP (ref 1.8–7.4)
NEUTROPHILS NFR BLD AUTO: 51.4 % — SIGNIFICANT CHANGE UP (ref 43–77)
NRBC # BLD: 0 /100 WBCS — SIGNIFICANT CHANGE UP (ref 0–0)
NRBC # FLD: 0 K/UL — SIGNIFICANT CHANGE UP (ref 0–0)
PHOSPHATE SERPL-MCNC: 3.7 MG/DL — SIGNIFICANT CHANGE UP (ref 2.5–4.5)
PLATELET # BLD AUTO: 141 K/UL — LOW (ref 150–400)
POTASSIUM SERPL-MCNC: 4.1 MMOL/L — SIGNIFICANT CHANGE UP (ref 3.5–5.3)
POTASSIUM SERPL-SCNC: 4.1 MMOL/L — SIGNIFICANT CHANGE UP (ref 3.5–5.3)
RBC # BLD: 4.96 M/UL — SIGNIFICANT CHANGE UP (ref 4.2–5.8)
RBC # FLD: 13.9 % — SIGNIFICANT CHANGE UP (ref 10.3–14.5)
SODIUM SERPL-SCNC: 136 MMOL/L — SIGNIFICANT CHANGE UP (ref 135–145)
WBC # BLD: 7.16 K/UL — SIGNIFICANT CHANGE UP (ref 3.8–10.5)
WBC # FLD AUTO: 7.16 K/UL — SIGNIFICANT CHANGE UP (ref 3.8–10.5)

## 2023-02-01 RX ORDER — CIPROFLOXACIN LACTATE 400MG/40ML
1 VIAL (ML) INTRAVENOUS
Qty: 14 | Refills: 0
Start: 2023-02-01 | End: 2023-02-07

## 2023-02-01 RX ORDER — ACETAMINOPHEN 500 MG
2 TABLET ORAL
Qty: 0 | Refills: 0 | DISCHARGE
Start: 2023-02-01

## 2023-02-01 RX ORDER — METRONIDAZOLE 500 MG
1 TABLET ORAL
Qty: 21 | Refills: 0
Start: 2023-02-01 | End: 2023-02-07

## 2023-02-01 RX ADMIN — Medication 100 MILLIGRAM(S): at 04:27

## 2023-02-01 RX ADMIN — Medication 100 MILLIGRAM(S): at 10:32

## 2023-02-01 RX ADMIN — Medication 100 MILLIGRAM(S): at 17:07

## 2023-02-01 RX ADMIN — PANTOPRAZOLE SODIUM 40 MILLIGRAM(S): 20 TABLET, DELAYED RELEASE ORAL at 06:28

## 2023-02-01 RX ADMIN — APIXABAN 5 MILLIGRAM(S): 2.5 TABLET, FILM COATED ORAL at 17:12

## 2023-02-01 RX ADMIN — APIXABAN 5 MILLIGRAM(S): 2.5 TABLET, FILM COATED ORAL at 06:03

## 2023-02-01 RX ADMIN — CEFTRIAXONE 100 MILLIGRAM(S): 500 INJECTION, POWDER, FOR SOLUTION INTRAMUSCULAR; INTRAVENOUS at 17:07

## 2023-02-01 NOTE — DISCHARGE NOTE PROVIDER - PROVIDER TOKENS
PROVIDER:[TOKEN:[[id number]]] PROVIDER:[TOKEN:[[id number]]] PROVIDER:[TOKEN:[[id number]]]

## 2023-02-01 NOTE — DISCHARGE NOTE PROVIDER - NSDCMRMEDTOKEN_GEN_ALL_CORE_FT
atorvastatin 20 mg oral tablet: 1 tab(s) orally once a day   Eliquis 5 mg oral tablet: 1 tab(s) orally 2 times a day  meclizine 12.5 mg oral tablet: 1 tab(s) orally 3 times a day, As Needed  Protonix 20 mg oral delayed release tablet: 1 tab(s) orally once a day   acetaminophen 325 mg oral tablet: 2 tab(s) orally every 6 hours, As needed, Temp greater or equal to 38C (100.4F), Moderate Pain (4 - 6), Severe Pain (7 - 10)  atorvastatin 20 mg oral tablet: 1 tab(s) orally once a day   Eliquis 5 mg oral tablet: 1 tab(s) orally 2 times a day  meclizine 12.5 mg oral tablet: 1 tab(s) orally 3 times a day, As Needed  Protonix 20 mg oral delayed release tablet: 1 tab(s) orally once a day   acetaminophen 325 mg oral tablet: 2 tab(s) orally every 6 hours, As needed, Temp greater or equal to 38C (100.4F), Moderate Pain (4 - 6), Severe Pain (7 - 10)  atorvastatin 20 mg oral tablet: 1 tab(s) orally once a day   Cipro 500 mg oral tablet: 1 tab(s) orally 2 times a day   Eliquis 5 mg oral tablet: 1 tab(s) orally 2 times a day  meclizine 12.5 mg oral tablet: 1 tab(s) orally 3 times a day, As Needed  metroNIDAZOLE 500 mg oral tablet: 1 tab(s) orally every 8 hours   Protonix 20 mg oral delayed release tablet: 1 tab(s) orally once a day

## 2023-02-01 NOTE — DISCHARGE NOTE PROVIDER - NSDCCPCAREPLAN_GEN_ALL_CORE_FT
PRINCIPAL DISCHARGE DIAGNOSIS  Diagnosis: Diverticulitis  Assessment and Plan of Treatment: You were treated with IV antibiotics for infection and inflammation of the colon seen on abdominal CT scan. You should complete 7 day course of oral antibiotics (Ciprofloxacin and Flagyl) as prescribed upon discharge to complete total antibiotic medication regimen. Follow up with PCP upon discharge.      SECONDARY DISCHARGE DIAGNOSES  Diagnosis: 2019 novel coronavirus disease (COVID-19)  Assessment and Plan of Treatment: You tested positive for covid-19 virus, however you remain stable on room air, not requiring oxygen supplementation. Continue supportive, symptomatic care. Continue isolation precautions as per CDC guidelines.

## 2023-02-01 NOTE — DISCHARGE NOTE PROVIDER - NSDCFUADDAPPT_GEN_ALL_CORE_FT
Follow up with your primary care physician for further monitoring in 1 week. Please call to arrange appointment.

## 2023-02-01 NOTE — DISCHARGE NOTE NURSING/CASE MANAGEMENT/SOCIAL WORK - PATIENT PORTAL LINK FT
You can access the FollowMyHealth Patient Portal offered by [facility] by registering at the following website: [email or web address]. By joining [employer]’s FollowMyHealth portal, you will also be able to view your health information using other applications (apps) compatible with our system. You can access the FollowMyHealth Patient Portal offered by [facility] by registering at the following website: [email or web address]. By joining [email or web address]’s FollowMyHealth portal, you will also be able to view your health information using other applications (apps) compatible with our system. You can access the FollowMyHealth Patient Portal offered by [facility] by registering at the following website: [email or web address]. By joining [employer]’s FollowMyHealth portal, you will also be able to view your health information using other applications (apps) compatible with our system.

## 2023-02-01 NOTE — DISCHARGE NOTE PROVIDER - CARE PROVIDER_API CALL
Braulio Chavez; MD)  Medicine  [address] [place] [address]  Phone: [phone]  Fax: [phone]  Follow Up Time:    Braulio Chavez; MD)  Medicine  [address] [place] [address]  Phone: [phone]  Fax: [phone]  Follow Up Time:    Braulio Chavez; MD)  Medicine  [address] [place] [address]  Phone: [phone]  Fax: [phone]  Follow Up Time:

## 2023-02-01 NOTE — DISCHARGE NOTE PROVIDER - HOSPITAL COURSE
88 year old male with a history of Afib on Eliquis, GERD, HTN, CVA, vertigo, dementia AAOx1-2 at baseline presenting with dizzines and weakness. Found to be COVID19 positive and CT showing diverticulitis    2019 novel coronavirus disease (COVID-19).   - Pt with cough, denies any shortness of breath, O2 sat nml on RA  - c/w supportive care, isolation precautions      Acute diverticulitis  - LLQ pain/tenderness with CT showing mild diverticulitis  - Pt denies any diarrhea  - S/p IV CTX + Flagyl, transitioned to PO Cipro + Flagyl x7d upon DC   - Tylenol prn pain  - Pt tolerating PO diet     Vertigo  - Continue Meclizine prn.    HTN  - BP adequately controlled. Not on any meds at home    Dementia.   - History of prior stroke. MS at baseline    Atrial fibrillation.   - Rate controlled, c/w Eliquis    On 2/1/2023 this case was reviewed with Dr. Hilario. The patient is medically stable and optimized for discharge. All medications were reviewed and prescriptions were sent to mutually agreed upon pharmacy. 88 year old male with a history of Afib on Eliquis, GERD, HTN, CVA, vertigo, dementia AAOx1-2 at baseline presenting with dizzines and weakness. Found to be COVID19 positive and CT showing diverticulitis    2019 novel coronavirus disease (COVID-19).   - Pt with cough, denies any shortness of breath, O2 sat nml on RA  - c/w supportive care, isolation precautions      Acute diverticulitis  - LLQ pain/tenderness with CT showing mild diverticulitis  - Pt denies any diarrhea  - S/p IV CTX + Flagyl, transitioned to PO Cipro + Flagyl x7d upon DC   - Tylenol prn pain  - Pt tolerating PO diet     Vertigo  - Continue Meclizine prn.    HTN  - BP adequately controlled. Not on any meds at home    Dementia.   - History of prior stroke. MS at baseline    Atrial fibrillation.   - Rate controlled, c/w Eliquis    On 2/1/2023 this case was reviewed with Dr. Hilario. The patient is medically stable and optimized for discharge to home with home PT as patient's son is refusing rehab as recommended by physical therapy. All medications were reviewed and prescriptions were sent to mutually agreed upon pharmacy.

## 2023-02-01 NOTE — DISCHARGE NOTE NURSING/CASE MANAGEMENT/SOCIAL WORK - NSDCPEFALRISK_GEN_ALL_CORE
For information on Fall & Injury Prevention, visit: [email or web address] OR  [email or web address]-injury OR  https://www.cdc.gov/steadi/patient.html For information on Fall & Injury Prevention, visit: [email or web address] OR  [email or web address]-[email or web address]-injury OR  https://www.cdc.gov/steadi/patient.html For information on Fall & Injury Prevention, visit: [email or web address]://[email or web address].[email or web address]/[email or web address]-mobility OR  [email or web address]://[email or web address].[email or web address]/news/fall-[email or web address]-tips-to-avoid-injury OR  https://www.cdc.gov/steadi/patient.html

## 2023-02-03 LAB
CULTURE RESULTS: SIGNIFICANT CHANGE UP
SPECIMEN SOURCE: SIGNIFICANT CHANGE UP

## 2023-05-30 NOTE — DISCHARGE NOTE NURSING/CASE MANAGEMENT/SOCIAL WORK - NSTRANSFERBELONGINGSRESP_GEN_A_NUR
Pt called back and states tenderness is getting better, still has some swelling, but the nail itself still looks the same. Pt asking if he needs more abx? Doesn't want to risk anything with heart condition. If so, [facility] [place], [place]. yes

## 2023-06-09 ENCOUNTER — EMERGENCY (EMERGENCY)
Facility: HOSPITAL | Age: 88
LOS: 1 days | Discharge: HOME | End: 2023-06-09
Attending: STUDENT IN AN ORGANIZED HEALTH CARE EDUCATION/TRAINING PROGRAM | Admitting: STUDENT IN AN ORGANIZED HEALTH CARE EDUCATION/TRAINING PROGRAM
Payer: MEDICARE

## 2023-06-09 VITALS
TEMPERATURE: 98 F | OXYGEN SATURATION: 100 % | SYSTOLIC BLOOD PRESSURE: 150 MMHG | DIASTOLIC BLOOD PRESSURE: 68 MMHG | HEART RATE: 66 BPM | RESPIRATION RATE: 16 BRPM

## 2023-06-09 DIAGNOSIS — Z98.890 OTHER SPECIFIED POSTPROCEDURAL STATES: Chronic | ICD-10-CM

## 2023-06-09 LAB
ALBUMIN SERPL ELPH-MCNC: 3.9 G/DL — SIGNIFICANT CHANGE UP (ref 3.3–5)
ALBUMIN SERPL ELPH-MCNC: 4.1 G/DL — SIGNIFICANT CHANGE UP (ref 3.3–5)
ALP SERPL-CCNC: 104 U/L — SIGNIFICANT CHANGE UP (ref 40–120)
ALP SERPL-CCNC: 96 U/L — SIGNIFICANT CHANGE UP (ref 40–120)
ALT FLD-CCNC: 15 U/L — SIGNIFICANT CHANGE UP (ref 4–41)
ALT FLD-CCNC: SIGNIFICANT CHANGE UP U/L (ref 4–41)
ANION GAP SERPL CALC-SCNC: 11 MMOL/L — SIGNIFICANT CHANGE UP (ref 7–14)
ANION GAP SERPL CALC-SCNC: 13 MMOL/L — SIGNIFICANT CHANGE UP (ref 7–14)
ANION GAP SERPL CALC-SCNC: 14 MMOL/L — SIGNIFICANT CHANGE UP (ref 7–14)
AST SERPL-CCNC: 51 U/L — HIGH (ref 4–40)
AST SERPL-CCNC: 60 U/L — HIGH (ref 4–40)
BASE EXCESS BLDV CALC-SCNC: -0.3 MMOL/L — SIGNIFICANT CHANGE UP (ref -2–3)
BASE EXCESS BLDV CALC-SCNC: 1.6 MMOL/L — SIGNIFICANT CHANGE UP (ref -2–3)
BASOPHILS # BLD AUTO: 0.03 K/UL — SIGNIFICANT CHANGE UP (ref 0–0.2)
BASOPHILS NFR BLD AUTO: 0.5 % — SIGNIFICANT CHANGE UP (ref 0–2)
BILIRUB SERPL-MCNC: 0.4 MG/DL — SIGNIFICANT CHANGE UP (ref 0.2–1.2)
BILIRUB SERPL-MCNC: 0.5 MG/DL — SIGNIFICANT CHANGE UP (ref 0.2–1.2)
BLOOD GAS VENOUS COMPREHENSIVE RESULT: SIGNIFICANT CHANGE UP
BUN SERPL-MCNC: 13 MG/DL — SIGNIFICANT CHANGE UP (ref 7–23)
BUN SERPL-MCNC: 15 MG/DL — SIGNIFICANT CHANGE UP (ref 7–23)
CALCIUM SERPL-MCNC: 8.4 MG/DL — SIGNIFICANT CHANGE UP (ref 8.4–10.5)
CALCIUM SERPL-MCNC: 8.5 MG/DL — SIGNIFICANT CHANGE UP (ref 8.4–10.5)
CALCIUM SERPL-MCNC: 8.8 MG/DL — SIGNIFICANT CHANGE UP (ref 8.4–10.5)
CHLORIDE BLDV-SCNC: 105 MMOL/L — SIGNIFICANT CHANGE UP (ref 96–108)
CHLORIDE SERPL-SCNC: 105 MMOL/L — SIGNIFICANT CHANGE UP (ref 98–107)
CHLORIDE SERPL-SCNC: 107 MMOL/L — SIGNIFICANT CHANGE UP (ref 98–107)
CO2 BLDV-SCNC: 27.3 MMOL/L — HIGH (ref 22–26)
CO2 BLDV-SCNC: 29.8 MMOL/L — HIGH (ref 22–26)
CO2 SERPL-SCNC: 18 MMOL/L — LOW (ref 22–31)
CO2 SERPL-SCNC: 19 MMOL/L — LOW (ref 22–31)
CO2 SERPL-SCNC: 20 MMOL/L — LOW (ref 22–31)
CREAT SERPL-MCNC: 0.69 MG/DL — SIGNIFICANT CHANGE UP (ref 0.5–1.3)
CREAT SERPL-MCNC: 0.74 MG/DL — SIGNIFICANT CHANGE UP (ref 0.5–1.3)
CREAT SERPL-MCNC: 0.75 MG/DL — SIGNIFICANT CHANGE UP (ref 0.5–1.3)
EGFR: 87 ML/MIN/1.73M2 — SIGNIFICANT CHANGE UP
EGFR: 88 ML/MIN/1.73M2 — SIGNIFICANT CHANGE UP
EGFR: 90 ML/MIN/1.73M2 — SIGNIFICANT CHANGE UP
EOSINOPHIL # BLD AUTO: 0.31 K/UL — SIGNIFICANT CHANGE UP (ref 0–0.5)
EOSINOPHIL NFR BLD AUTO: 4.7 % — SIGNIFICANT CHANGE UP (ref 0–6)
GAS PNL BLDV: 135 MMOL/L — LOW (ref 136–145)
GAS PNL BLDV: 137 MMOL/L — SIGNIFICANT CHANGE UP (ref 136–145)
GAS PNL BLDV: SIGNIFICANT CHANGE UP
GLUCOSE BLDV-MCNC: 94 MG/DL — SIGNIFICANT CHANGE UP (ref 70–99)
GLUCOSE BLDV-MCNC: 96 MG/DL — SIGNIFICANT CHANGE UP (ref 70–99)
GLUCOSE SERPL-MCNC: 86 MG/DL — SIGNIFICANT CHANGE UP (ref 70–99)
GLUCOSE SERPL-MCNC: 95 MG/DL — SIGNIFICANT CHANGE UP (ref 70–99)
GLUCOSE SERPL-MCNC: 96 MG/DL — SIGNIFICANT CHANGE UP (ref 70–99)
HCO3 BLDV-SCNC: 26 MMOL/L — SIGNIFICANT CHANGE UP (ref 22–29)
HCO3 BLDV-SCNC: 28 MMOL/L — SIGNIFICANT CHANGE UP (ref 22–29)
HCT VFR BLD CALC: 42.8 % — SIGNIFICANT CHANGE UP (ref 39–50)
HCT VFR BLDA CALC: 43 % — SIGNIFICANT CHANGE UP (ref 39–51)
HGB BLD CALC-MCNC: 14.3 G/DL — SIGNIFICANT CHANGE UP (ref 12.6–17.4)
HGB BLD CALC-MCNC: 14.4 G/DL — SIGNIFICANT CHANGE UP (ref 12.6–17.4)
HGB BLD-MCNC: 14.5 G/DL — SIGNIFICANT CHANGE UP (ref 13–17)
IANC: 3.78 K/UL — SIGNIFICANT CHANGE UP (ref 1.8–7.4)
IMM GRANULOCYTES NFR BLD AUTO: 0.2 % — SIGNIFICANT CHANGE UP (ref 0–0.9)
LACTATE BLDV-MCNC: 2 MMOL/L — SIGNIFICANT CHANGE UP (ref 0.5–2)
LACTATE BLDV-MCNC: 2.3 MMOL/L — HIGH (ref 0.5–2)
LIDOCAIN IGE QN: 50 U/L — SIGNIFICANT CHANGE UP (ref 7–60)
LYMPHOCYTES # BLD AUTO: 1.87 K/UL — SIGNIFICANT CHANGE UP (ref 1–3.3)
LYMPHOCYTES # BLD AUTO: 28.4 % — SIGNIFICANT CHANGE UP (ref 13–44)
MAGNESIUM SERPL-MCNC: 1.9 MG/DL — SIGNIFICANT CHANGE UP (ref 1.6–2.6)
MCHC RBC-ENTMCNC: 29.9 PG — SIGNIFICANT CHANGE UP (ref 27–34)
MCHC RBC-ENTMCNC: 33.9 GM/DL — SIGNIFICANT CHANGE UP (ref 32–36)
MCV RBC AUTO: 88.2 FL — SIGNIFICANT CHANGE UP (ref 80–100)
MONOCYTES # BLD AUTO: 0.58 K/UL — SIGNIFICANT CHANGE UP (ref 0–0.9)
MONOCYTES NFR BLD AUTO: 8.8 % — SIGNIFICANT CHANGE UP (ref 2–14)
NEUTROPHILS # BLD AUTO: 3.78 K/UL — SIGNIFICANT CHANGE UP (ref 1.8–7.4)
NEUTROPHILS NFR BLD AUTO: 57.4 % — SIGNIFICANT CHANGE UP (ref 43–77)
NRBC # BLD: 0 /100 WBCS — SIGNIFICANT CHANGE UP (ref 0–0)
NRBC # FLD: 0 K/UL — SIGNIFICANT CHANGE UP (ref 0–0)
PCO2 BLDV: 47 MMHG — SIGNIFICANT CHANGE UP (ref 42–55)
PCO2 BLDV: 51 MMHG — SIGNIFICANT CHANGE UP (ref 42–55)
PH BLDV: 7.35 — SIGNIFICANT CHANGE UP (ref 7.32–7.43)
PHOSPHATE SERPL-MCNC: 3.3 MG/DL — SIGNIFICANT CHANGE UP (ref 2.5–4.5)
PLATELET # BLD AUTO: 176 K/UL — SIGNIFICANT CHANGE UP (ref 150–400)
PO2 BLDV: 29 MMHG — SIGNIFICANT CHANGE UP (ref 25–45)
PO2 BLDV: 33 MMHG — SIGNIFICANT CHANGE UP (ref 25–45)
POTASSIUM BLDV-SCNC: 4.8 MMOL/L — SIGNIFICANT CHANGE UP (ref 3.5–5.1)
POTASSIUM BLDV-SCNC: 5 MMOL/L — SIGNIFICANT CHANGE UP (ref 3.5–5.1)
POTASSIUM SERPL-MCNC: 3.7 MMOL/L — SIGNIFICANT CHANGE UP (ref 3.5–5.3)
POTASSIUM SERPL-MCNC: SIGNIFICANT CHANGE UP MMOL/L (ref 3.5–5.3)
POTASSIUM SERPL-SCNC: 3.7 MMOL/L — SIGNIFICANT CHANGE UP (ref 3.5–5.3)
POTASSIUM SERPL-SCNC: SIGNIFICANT CHANGE UP MMOL/L (ref 3.5–5.3)
PROT SERPL-MCNC: 7.9 G/DL — SIGNIFICANT CHANGE UP (ref 6–8.3)
PROT SERPL-MCNC: 8.7 G/DL — HIGH (ref 6–8.3)
RBC # BLD: 4.85 M/UL — SIGNIFICANT CHANGE UP (ref 4.2–5.8)
RBC # FLD: 13.6 % — SIGNIFICANT CHANGE UP (ref 10.3–14.5)
SAO2 % BLDV: 47.2 % — LOW (ref 67–88)
SAO2 % BLDV: 55 % — LOW (ref 67–88)
SODIUM SERPL-SCNC: 136 MMOL/L — SIGNIFICANT CHANGE UP (ref 135–145)
SODIUM SERPL-SCNC: 138 MMOL/L — SIGNIFICANT CHANGE UP (ref 135–145)
TROPONIN T, HIGH SENSITIVITY RESULT: 11 NG/L — SIGNIFICANT CHANGE UP
TROPONIN T, HIGH SENSITIVITY RESULT: 13 NG/L — SIGNIFICANT CHANGE UP
WBC # BLD: 6.58 K/UL — SIGNIFICANT CHANGE UP (ref 3.8–10.5)
WBC # FLD AUTO: 6.58 K/UL — SIGNIFICANT CHANGE UP (ref 3.8–10.5)

## 2023-06-09 PROCEDURE — 99284 EMERGENCY DEPT VISIT MOD MDM: CPT

## 2023-06-09 PROCEDURE — 74177 CT ABD & PELVIS W/CONTRAST: CPT | Mod: 26,MA

## 2023-06-09 RX ORDER — OLANZAPINE 15 MG/1
5 TABLET, FILM COATED ORAL ONCE
Refills: 0 | Status: COMPLETED | OUTPATIENT
Start: 2023-06-09 | End: 2023-06-09

## 2023-06-09 RX ORDER — SODIUM CHLORIDE 9 MG/ML
1000 INJECTION INTRAMUSCULAR; INTRAVENOUS; SUBCUTANEOUS ONCE
Refills: 0 | Status: COMPLETED | OUTPATIENT
Start: 2023-06-09 | End: 2023-06-09

## 2023-06-09 RX ORDER — ACETAMINOPHEN 500 MG
650 TABLET ORAL ONCE
Refills: 0 | Status: COMPLETED | OUTPATIENT
Start: 2023-06-09 | End: 2023-06-09

## 2023-06-09 RX ADMIN — Medication 650 MILLIGRAM(S): at 20:21

## 2023-06-09 RX ADMIN — OLANZAPINE 5 MILLIGRAM(S): 15 TABLET, FILM COATED ORAL at 19:13

## 2023-06-09 RX ADMIN — SODIUM CHLORIDE 1000 MILLILITER(S): 9 INJECTION INTRAMUSCULAR; INTRAVENOUS; SUBCUTANEOUS at 17:47

## 2023-06-09 RX ADMIN — Medication 650 MILLIGRAM(S): at 17:50

## 2023-06-09 NOTE — ED PROVIDER NOTE - ATTENDING APP SHARED VISIT CONTRIBUTION OF CARE
I have personally performed a face to face medical and diagnostic evaluation of the patient. I have discussed with and reviewed the Resident's and/or ACP's and/or Medical/PA/NP student's note and agree with the History, ROS, Physical Exam and MDM unless otherwise indicated. A brief summary of my personal evaluation and impression can be found below.    88y M w/ PMHx Afib on Eliquis, GERD, HTN, CVA, vertigo, dementia AAOx1-2 at baseline presenting from home with complaint of RLQ abd pain, fatigue, intermittent dizziness x1 month and intermittent R ear pain. Patient states he otherwise has a normal appetite, pain not worsened with eating, Also denies diarrhea, dysuria, rash, back pain.  Patient states he ambulates with cane denies recent falls.  States he currently does not have dizziness or ear pain.  Also denies visual changes, chest pain, shortness of breath, cough.  Patient was at home with son and daughter-in-law.  States his son and daughter-in-law give him his medications and help him perform ADLs.     Per son, today patient was found "trembling" today by daughter-in-law who also states patient was complaining he was feeling cold who called EMS. Son states that patient had no complaints yesterday, good appetite, at baseline mental status (A&Ox1-2 baseline), denies known fever, sob, cough, vomiting, diarrhea, urinary symptoms, rash, recent fall or traumas. Daughter-in-law's number [phone] (Winifred) also called for more collateral who states patient complains of mild dizziness, and abdominal pain, was given meclizine by daughter-in-law.     Physical Exam:  Gen: NAD, AOx2 (self and place), non-toxic appearing  Head: NCAT  HEENT: EOMI, PEERL, normal conjunctiva, tongue midline, oral mucosa moist  Lung: CTAB, no respiratory distress, no wheezes/rhonchi/rales B/L, speaking in full sentences  CV: RRR, no murmurs, rubs or gallops  Abd: soft, +ttp in RLQ no guarding, ND, no rigidity, no rebound tenderness, no CVA tenderness   MSK: no visible deformities, ROM normal in UE/LE, no back pain  Neuro: No focal sensory or motor deficits  Skin: Warm, well perfused, no rash, no leg swelling  Psych: normal affect, calm  Deidre Cruz D.O.    Patient is currently clinically well appearing, VS WNL, will obtain CT A/P to r/o possible infectious etiology such as colitis/diverticulitis, r/o appendicitis, will also assess for UTI w/ UA/UCx, labs, give IVF and reassess. No change in mental status as reported by son and daughter-in-law. I have personally performed a face to face medical and diagnostic evaluation of the patient. I have discussed with and reviewed the Resident's and/or ACP's and/or Medical/PA/NP student's note and agree with the History, ROS, Physical Exam and MDM unless otherwise indicated. A brief summary of my personal evaluation and impression can be found below.    88y M w/ PMHx Afib on Eliquis, GERD, HTN, CVA, vertigo, dementia AAOx1-2 at baseline presenting from home with complaint of RLQ abd pain, fatigue, intermittent dizziness x1 month and intermittent R ear pain. Patient states he otherwise has a normal appetite, pain not worsened with eating, Also denies diarrhea, dysuria, rash, back pain.  Patient states he ambulates with cane denies recent falls.  States he currently does not have dizziness or ear pain.  Also denies visual changes, chest pain, shortness of breath, cough.  Patient was at home with son and daughter-in-law.  States his son and daughter-in-law give him his medications and help him perform ADLs.     Per son, today patient was found "trembling" today by daughter-in-law who also states patient was complaining he was feeling cold who called EMS. Son states that patient had no complaints yesterday, good appetite, at baseline mental status (A&Ox1-2 baseline), denies known fever, sob, cough, vomiting, diarrhea, urinary symptoms, rash, recent fall or traumas. Daughter-in-law's number [phone] (Winifred) also called for more collateral who states patient complains of mild dizziness, and abdominal pain, was given meclizine by daughter-in-law.     Physical Exam:  Gen: NAD, AOx2 (self and place), non-toxic appearing  Head: NCAT  HEENT: EOMI, PEERL, normal conjunctiva, tongue midline, oral mucosa moist  Lung: CTAB, no respiratory distress, no wheezes/rhonchi/rales B/L, speaking in full sentences  CV: RRR, no murmurs, rubs or gallops  Abd: soft, +ttp in RLQ no guarding, ND, no rigidity, no rebound tenderness, no CVA tenderness   MSK: no visible deformities, ROM normal in UE/LE, no back pain  Neuro: No focal sensory or motor deficits  Skin: Warm, well perfused, no rash, no leg swelling  Psych: normal affect, calm  Deidre Cruz D.O.    Patient is currently clinically well appearing, VS WNL, will obtain CT A/P to r/o possible infectious etiology such as colitis/diverticulitis, r/o appendicitis, will also assess for UTI w/ UA/UCx, labs, give IVF and reassess. No change in mental status as reported by son and daughter-in-law. I have personally performed a face to face medical and diagnostic evaluation of the patient. I have discussed with and reviewed the Resident's and/or ACP's and/or Medical/PA/NP student's note and agree with the History, ROS, Physical Exam and MDM unless otherwise indicated. A brief summary of my personal evaluation and impression can be found below.    88y M w/ PMHx Afib on Eliquis, GERD, HTN, CVA, vertigo, dementia AAOx1-2 at baseline presenting from home with complaint of RLQ abd pain, fatigue, intermittent dizziness x1 month and intermittent R ear pain. Patient states he otherwise has a normal appetite, pain not worsened with eating, Also denies diarrhea, dysuria, rash, back pain.  Patient states he ambulates with cane denies recent falls.  States he currently does not have dizziness or ear pain.  Also denies visual changes, chest pain, shortness of breath, cough.  Patient was at home with son and daughter-in-law.  States his son and daughter-in-law give him his medications and help him perform ADLs.     Per son, today patient was found "trembling" today by daughter-in-law who also states patient was complaining he was feeling cold who called EMS. Son states that patient had no complaints yesterday, good appetite, at baseline mental status (A&Ox1-2 baseline), denies known fever, sob, cough, vomiting, diarrhea, urinary symptoms, rash, recent fall or traumas. Daughter-in-law's number [phone] (Winifred) also called for more collateral who states patient complains of mild dizziness, and abdominal pain, was given meclizine by daughter-in-law.     Physical Exam:  Gen: NAD, AOx2 (self and place), non-toxic appearing  Head: NCAT  HEENT: EOMI, PEERL, normal conjunctiva, tongue midline, oral mucosa moist  Lung: CTAB, no respiratory distress, no wheezes/rhonchi/rales B/L, speaking in full sentences  CV: RRR, no murmurs, rubs or gallops  Abd: soft, +ttp in RLQ no guarding, ND, no rigidity, no rebound tenderness, no CVA tenderness   MSK: no visible deformities, ROM normal in UE/LE, no back pain  Neuro: No focal sensory or motor deficits  Skin: Warm, well perfused, no rash, no leg swelling  Psych: normal affect, calm  Deidre Cruz D.O.    Patient is currently clinically well appearing, VS WNL, will obtain CT A/P to r/o possible infectious etiology such as colitis/diverticulitis, r/o appendicitis, will also assess for UTI w/ UA/UCx, labs, give IVF and reassess. No change in mental status as reported by son and daughter-in-law.

## 2023-06-09 NOTE — ED PROVIDER NOTE - PATIENT PORTAL LINK FT
You can access the FollowMyHealth Patient Portal offered by [facility] by registering at the following website: [email or web address]. By joining [employer]’s FollowMyHealth portal, you will also be able to view your health information using other applications (apps) compatible with our system. You can access the FollowMyHealth Patient Portal offered by [facility] by registering at the following website: [email or web address]. By joining [employer]’s FollowMyHealth portal, you will also be able to view your health information using other applications (apps) compatible with our system. You can access the FollowMyHealth Patient Portal offered by [facility] by registering at the following website: [email or web address]. By joining [employer]’s FollowMyHealth portal, you will also be able to view your health information using other applications (apps) compatible with our system.

## 2023-06-09 NOTE — ED ADULT NURSE REASSESSMENT NOTE - NSFALLHARMRISKINTERV_ED_ALL_ED
Assistance OOB with selected safe patient handling equipment if applicable/Assistance with ambulation/Communicate risk of Fall with Harm to all staff, patient, and family/Monitor gait and stability/Provide visual cue: red socks, yellow wristband, yellow gown, etc/Reinforce activity limits and safety measures with patient and family/Use of alarms - bed, stretcher, chair and/or video monitoring/Bed in lowest position, wheels locked, appropriate side rails in place/Call bell, personal items and telephone in reach/Instruct patient to call for assistance before getting out of bed/chair/stretcher/Non-slip footwear applied when patient is off stretcher/[place] to call system/Physically safe environment - no spills, clutter or unnecessary equipment/Purposeful Proactive Rounding/Room/bathroom lighting operational, light cord in reach Assistance OOB with selected safe patient handling equipment if applicable/Assistance with ambulation/Communicate risk of Fall with Harm to all staff, patient, and family/Monitor gait and stability/Provide visual cue: red socks, yellow wristband, yellow gown, etc/Reinforce activity limits and safety measures with patient and family/Use of alarms - bed, stretcher, chair and/or video monitoring/Bed in lowest position, wheels locked, appropriate side rails in place/Call bell, personal items and telephone in reach/Instruct patient to call for assistance before getting out of bed/chair/stretcher/Non-slip footwear applied when patient is off stretcher/[place] to call system/Physically safe environment - no spills, clutter or unnecessary equipment/Purposeful Proactive Rounding/Room/bathroom lighting operational, light cord in reach Assistance OOB with selected safe patient handling equipment if applicable/Assistance with ambulation/Communicate risk of Fall with Harm to all staff, patient, and family/Monitor gait and stability/Provide visual cue: red socks, yellow wristband, yellow gown, etc/Reinforce activity limits and safety measures with patient and family/Use of alarms - bed, stretcher, chair and/or video monitoring/Bed in lowest position, wheels locked, appropriate side rails in place/Call bell, personal items and telephone in reach/Instruct patient to call for assistance before getting out of bed/chair/stretcher/Non-slip footwear applied when patient is off stretcher/[place] to call system/Physically safe environment - no spills, clutter or unnecessary equipment/Purposeful Proactive Rounding/Room/bathroom lighting operational, light cord in reach

## 2023-06-09 NOTE — ED PROVIDER NOTE - OBJECTIVE STATEMENT
88y M w/ past medical history of Afib on Eliquis, GERD, HTN, CVA, vertigo, dementia AAOx1-2 at baseline presents to ED with a chief complaint of RLQ abd pain a/w nausea, chills and generalized weakness. pt pointing to RLQ abd pain w/ nausea. Denies any fever, vomiting, diarrhea, constipation, hematuria, chest pain, sob, back pain, or any other complaints.

## 2023-06-09 NOTE — ED ADULT NURSE NOTE - FINAL NURSING ELECTRONIC SIGNATURE
Quality 110: Preventive Care And Screening: Influenza Immunization: Influenza Immunization previously received during influenza season
Detail Level: Detailed
Quality 431: Preventive Care And Screening: Unhealthy Alcohol Use - Screening: Patient not identified as an unhealthy alcohol user when screened for unhealthy alcohol use using a systematic screening method
Quality 130: Documentation Of Current Medications In The Medical Record: Current Medications Documented
10-Jeff-2023 07:56

## 2023-06-09 NOTE — ED ADULT NURSE REASSESSMENT NOTE - NS ED NURSE REASSESS COMMENT FT1
Patient was incontinent of urine and got out of bed. Patient states he was wet and wanted to dry off. Noted on floor by this writer after hearing sound. Noted rubbing left thigh and states he hit head. Denies pain. Patient cleaned, linens changed and moved closer to nursing station. MD made aware. CT scan ordered.

## 2023-06-09 NOTE — ED PROVIDER NOTE - PROGRESS NOTE DETAILS
-Deidre ELYO: pt resting comfortably on stretcher, no acute complaints, CT A/P unremarkable, pending rpt BMP for hemolyzed labs Patient got up and had mechanical fall with head trauma. Will order head CT. Patient work up unremarkable including bloodwork, urine samples and imaging. Patient appears to be in no acute distress. Son amenable to discharge with transport.

## 2023-06-09 NOTE — ED ADULT TRIAGE NOTE - NS ED NURSE AMBULANCES
[facility]

## 2023-06-09 NOTE — ED PROVIDER NOTE - CONSTITUTIONAL, MLM
elderly male, awake, alert, oriented to person, place, time/situation and in no apparent distress. normal...

## 2023-06-09 NOTE — ED ADULT TRIAGE NOTE - CHIEF COMPLAINT QUOTE
Pt brought in from home by EMS for generalized weakness, nausea, and left sided abdominal pain. Pt A&Ox2 at baseline, ambulatory with cane at baseline but hasn't been walking due to weakness.

## 2023-06-09 NOTE — ED ADULT NURSE NOTE - OBJECTIVE STATEMENT
Patient is A&OX3, at mental status baseline. Pt coming to ED from home via EMS regarding generalized weakness and ABD pain. Pt states ABD pain comes and goes, right sided radiating to left side. Pt states eating does not make the pain better or worst. Pt states generalized weakness started about one week ago. PMH CVA, dementia. lung sounds clear BL, respirations are even and unlabored. heart tones audible and regular. ABD is soft, tender, and nondistended.

## 2023-06-09 NOTE — ED PROVIDER NOTE - CLINICAL SUMMARY MEDICAL DECISION MAKING FREE TEXT BOX
88y M w/ past medical history of Afib on Eliquis, GERD, HTN, CVA, vertigo, dementia AAOx1-2 at baseline presents to ED with a chief complaint of RLQ abd pain a/w nausea, chills and generalized weakness.  - Will order CBC, BMP, VBG, trop, lipase  - Will order CT A/P  - U/A, urine culture

## 2023-06-09 NOTE — ED ADULT NURSE NOTE - NSFALLUNIVINTERV_ED_ALL_ED
Bed/Stretcher in lowest position, wheels locked, appropriate side rails in place/Call bell, personal items and telephone in reach/Instruct patient to call for assistance before getting out of bed/chair/stretcher/Non-slip footwear applied when patient is off stretcher/[place] to call system/Physically safe environment - no spills, clutter or unnecessary equipment/Purposeful proactive rounding/Room/bathroom lighting operational, light cord in reach Bed/Stretcher in lowest position, wheels locked, appropriate side rails in place/Call bell, personal items and telephone in reach/Instruct patient to call for assistance before getting out of bed/chair/stretcher/Non-slip footwear applied when patient is off stretcher/[place] to call system/Physically safe environment - no spills, clutter or unnecessary equipment/Purposeful proactive rounding/Room/bathroom lighting operational, light cord in reach Bed/Stretcher in lowest position, wheels locked, appropriate side rails in place/Call bell, personal items and telephone in reach/Instruct patient to call for assistance before getting out of bed/chair/stretcher/Non-slip footwear applied when patient is off stretcher/[place] to call system/Physically safe environment - no spills, clutter or unnecessary equipment/Purposeful proactive rounding/Room/bathroom lighting operational, light cord in reach

## 2023-06-09 NOTE — ED PROVIDER NOTE - NSICDXPASTMEDICALHX_GEN_ALL_CORE_FT
Subjective:   Chief Complaint   Patient presents with   • Multiple Sclerosis     MRI RESULTS       Patient ID: Leatha Wall is a 67 y.o. female.    History of Present Illness     67 y.o. woman returns in follow for RRMS, Hep C and migraines.  Last visit on 2/20/19 continue Tysabri every 6 weeks, ordered MRI Brain, labs.     TB - neg   Cr 1.12; WBC 12.6; VZV 2318; HIV - neg;     Hep C reactive 29.00, HCV quantitative PCR 6.45     MRI Brain, 4/8/19 as compared to 4/4/18 moderate to severe T2 lesion load, without new/enlarging or enhancing lesions    2/22/19:   CBC, CMP - NCS  2/22/19 JCV 1.38  10/12/18  JCV 1.39  5/23/18    JCV 1.44  12/13/16: JCV 2.21  6/27/17    JCV 1.87   6/27/17:   Cr 1.19, WBC 13.8  - NCS      RRMS    Missed last week Tysabri due to being on antibiotics and prednisone.       Started Tysabri 2/20/17 23 infusions    No new or worsening sx on Tysabri every 6 weeks.     Mild heaviness left arm and leg.     Fatigue is minimal    Walking for 40 minutes every other day.   Tobacco < 1 ppd.     Problem history:    MSFC remarkable for 26/110 o/w within normal results.  Oral numbness resolved.  Bladder urgency and frequency slight increase.    Mild N/T in bottom of feet     Migraines         Controlled. .  Located in occiput and behind eyes.  Quality of tightness and throbbing.  Mild severity.       PMH:    Fatigue and heat are bothersome.  Echo -   EF 60%  C U/S -  0-49% on right, no stenosis on left    MRI cervical, my review, unremarkable  Labs below:    NMO negative  Hypercoaguable panel WNL  Vit D 17.2    Fatigue continues to wax and wane.  Last few days has had unsteady gait. Weakness on left arm/leg.  Numbness in left arm and right foot.  Left side of tongue numb for the last 7 to 14 weeks.   Continue to smoke Tob 1 ppd.     Patient called and noted overwhelming fatigue.  After steroids fatigue improved but then recurred on 8/5/16.  Frequent HA's every other day frontal and occipital location of  squeezing sensation of moderate severity.  Prednisone taper rx on 7/22/16 improved energy.      6/22/16 awoke at 3:30 am and noted left sided weakness in arm and leg.  Gait was unsteady and worsened over the next 7 days and presented to [facility] ED.  Fatigue was overwhelming.  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  Given steroids in ED with improvement in sx by 50%.    5/2016 had episode of left arm clumsiness for a week.      Reports increased fatigue since the first of the year.      Vision is fuzzy and feel swollen.      Bladder frequency.     Tob 1 ppd.    The following portions of the patient's history were reviewed and updated as appropriate: allergies, current medications, past family history, past medical history, past social history, past surgical history and problem list.    Review of Systems   Constitutional: Negative for activity change and unexpected weight change.   HENT: Negative for tinnitus and trouble swallowing.    Eyes: Negative for photophobia and visual disturbance.   Respiratory: Negative for apnea and choking.    Cardiovascular: Negative for leg swelling.   Endocrine: Negative for cold intolerance and heat intolerance.   Genitourinary: Positive for frequency and urgency. Negative for difficulty urinating and menstrual problem.   Musculoskeletal: Positive for gait problem. Negative for back pain.   Skin: Negative for color change.   Allergic/Immunologic: Negative for immunocompromised state.   Neurological: Positive for dizziness and tremors. Negative for seizures, syncope, facial asymmetry, speech difficulty and light-headedness.   Hematological: Negative for adenopathy. Does not bruise/bleed easily.   Psychiatric/Behavioral: Positive for decreased concentration. Negative for behavioral problems, confusion, hallucinations and sleep disturbance.        Objective:  Vitals:    04/08/19 1111   BP: 118/74   BP Location: Right arm  "  Patient Position: Sitting   Cuff Size: Adult   Pulse: 68   Resp: 18   SpO2: 98%   Weight: 67.1 kg (148 lb)   Height: 162.6 cm (64\")       Neurologic Exam     Mental Status   Registration: recalls 3 of 3 objects. Recall at 5 minutes: recalls 3 of 3 objects. Follows 3 step commands.   Attention: normal. Concentration: normal.   Level of consciousness: alert  Knowledge: good and consistent with education.   Able to name object. Able to read. Able to repeat. Able to write. Normal comprehension.     Cranial Nerves     CN II   Visual fields full to confrontation.   Visual acuity: normal  Right visual field deficit: none  Left visual field deficit: none     CN III, IV, VI   Right pupil: Shape: regular. Reactivity: brisk. Consensual response: intact.   Left pupil: Shape: regular. Reactivity: brisk. Consensual response: intact.   Nystagmus: none   Diplopia: none  Ophthalmoparesis: none  Upgaze: normal  Downgaze: normal  Conjugate gaze: present  Vestibulo-ocular reflex: present    CN V   Right facial sensation deficit: forehead  Left facial sensation deficit: forehead  Right corneal reflex: normal  Left corneal reflex: normal    CN VII   Right facial weakness: none  Left facial weakness: none    CN VIII   Hearing: intact    CN IX, X   Palate: symmetric  Right gag reflex: normal  Left gag reflex: normal    CN XI   Right sternocleidomastoid strength: normal  Left sternocleidomastoid strength: normal    CN XII   Tongue: not atrophic  Fasciculations: absent  Tongue deviation: none    Motor Exam   Muscle bulk: normal  Overall muscle tone: normal  Right arm tone: normal  Left arm tone: normal  Right leg tone: normal  Left leg tone: normal    Strength   Right neck flexion: 5/5  Left neck flexion: 5/5  Right neck extension: 5/5  Left neck extension: 5/5  Right deltoid: 5/5  Left deltoid: 5/5  Right biceps: 5/5  Left biceps: 5/5  Right triceps: 5/5  Left triceps: 5/5  Right wrist flexion: 5/5  Left wrist flexion: 5/5  Right wrist " extension: 5/5  Left wrist extension: 5/5  Right interossei: 5/5  Left interossei: 5/5  Right abdominals: 5/5  Left abdominals: 5/5  Right iliopsoas: 5/5  Left iliopsoas: 5/5  Right quadriceps: 5/5  Left quadriceps: 5/5  Right hamstrin/5  Left hamstrin/5  Right glutei: 5/5  Left glutei: 5/5  Right anterior tibial: 5/5  Left anterior tibial: 5/5  Right posterior tibial: 5/5  Left posterior tibial: 5/5  Right peroneal: 5/5  Left peroneal: 5/5  Right gastroc: 5/5  Left gastroc: 5/5    Sensory Exam   Right arm light touch: normal  Right leg light touch: normal  Right arm vibration: normal  Right leg vibration: normal  Right arm proprioception: normal  Right leg proprioception: normal  Right arm pinprick: normal  Right leg pinprick: normal  Sensory deficit distribution on left: C6    Gait, Coordination, and Reflexes     Gait  Gait: normal ( )    Tremor   Resting tremor: absent  Intention tremor: absent  Action tremor: absent    Reflexes   Reflexes 2+ except as noted.   Right ankle clonus: absent  Left ankle clonus: absent      Physical Exam   Constitutional: She appears well-developed and well-nourished.   Neurological: Gait normal.   Nursing note and vitals reviewed.    Hospital Outpatient Visit on 2018   Component Date Value Ref Range Status   • Creatinine 2018 1.10  0.60 - 1.30 mg/dL Final    Serial Number: [id number]:  [id number]     Assessment/Plan:       Problems Addressed this Visit        Cardiovascular and Mediastinum    Periodic headache syndrome, not intractable     Headaches are improving with treatment.  Continue current treatment regimen.                Nervous and Auditory    Multiple sclerosis (CMS/HCC) - Primary     MRI Brain and labs unchanged     Restart Tysbari as off antibx and prednsione                          PAST MEDICAL HISTORY:  Atrial fibrillation     CVA (cerebrovascular accident)     Dementia     HTN (hypertension)     Vertigo

## 2023-06-10 VITALS
DIASTOLIC BLOOD PRESSURE: 80 MMHG | OXYGEN SATURATION: 99 % | SYSTOLIC BLOOD PRESSURE: 148 MMHG | HEART RATE: 53 BPM | TEMPERATURE: 98 F | RESPIRATION RATE: 17 BRPM

## 2023-06-10 LAB
APPEARANCE UR: CLEAR — SIGNIFICANT CHANGE UP
BACTERIA # UR AUTO: NEGATIVE — SIGNIFICANT CHANGE UP
BILIRUB UR-MCNC: NEGATIVE — SIGNIFICANT CHANGE UP
COLOR SPEC: SIGNIFICANT CHANGE UP
DIFF PNL FLD: ABNORMAL
EPI CELLS # UR: 2 /HPF — SIGNIFICANT CHANGE UP (ref 0–5)
GLUCOSE UR QL: NEGATIVE — SIGNIFICANT CHANGE UP
HYALINE CASTS # UR AUTO: 1 /LPF — SIGNIFICANT CHANGE UP (ref 0–7)
KETONES UR-MCNC: NEGATIVE — SIGNIFICANT CHANGE UP
LEUKOCYTE ESTERASE UR-ACNC: NEGATIVE — SIGNIFICANT CHANGE UP
NITRITE UR-MCNC: NEGATIVE — SIGNIFICANT CHANGE UP
PH UR: 7 — SIGNIFICANT CHANGE UP (ref 5–8)
PROT UR-MCNC: ABNORMAL
RBC CASTS # UR COMP ASSIST: 4 /HPF — SIGNIFICANT CHANGE UP (ref 0–4)
SP GR SPEC: 1.04 — SIGNIFICANT CHANGE UP (ref 1.01–1.05)
UROBILINOGEN FLD QL: SIGNIFICANT CHANGE UP
WBC UR QL: 2 /HPF — SIGNIFICANT CHANGE UP (ref 0–5)

## 2023-06-10 PROCEDURE — 70450 CT HEAD/BRAIN W/O DYE: CPT | Mod: 26,MA

## 2023-06-10 NOTE — PROVIDER CONTACT NOTE (OTHER) - ACTION/TREATMENT ORDERED:
vendor requesting that hospital staff arrange. Writer called [facility] spoke with Celia and booked. Writer told it would have to be set up as bill back. Writer given trip #[id number].

## 2023-06-10 NOTE — PROVIDER CONTACT NOTE (OTHER) - RECOMMENDATIONS
for vendor search. Writer awaiting follow up. Non emergent transportation form to be placed in chart. for vendor search. Writer awaiting follow up. Non emergent transportation form to be placed in chart.Writer received call from [facility] reported that [facility] was available for trip but for vendor search. Writer awaiting follow up. Non emergent transportation form to be placed in chart.Writer received call from [facility] reported that [facility] was available for trip but for vendor search. Writer awaiting follow up. Non emergent transportation form to be placed in chart.Writer received call from [facility] reported that [facility] was available for trip but

## 2023-06-10 NOTE — PROVIDER CONTACT NOTE (OTHER) - ASSESSMENT
Son confirmed home address and is available to accept pt. Writer attempted to arranged transport with [facility], however, not in network with [facility] insurance. [facility] then called at [phone]. Writer spoke with Mis who scheduled reservation #[id number] with p/up in the next 1-2 hours. Vendors requested ([facility] and [employer] [facility]) with both being unavailable. Trip sent to transportation department Son confirmed home address and is available to accept pt. Writer attempted to arranged transport with [facility], however, not in network with [facility] insurance. [facility] then called at [phone]. Writer spoke with Mis who scheduled reservation #[id number] with p/up in the next 1-2 hours. Vendors requested ([facility] and [employer]) with both being unavailable. Trip sent to transportation department Son confirmed home address and is available to accept pt. Writer attempted to arranged transport with [facility], however, not in network with [facility] insurance. [facility] then called at [phone]. Writer spoke with Mis who scheduled reservation #[id number] with p/up in the next 1-2 hours. Vendors requested ([facility] and [employer] [facility]) with both being unavailable. Trip sent to transportation department

## 2023-06-10 NOTE — PROVIDER CONTACT NOTE (OTHER) - BACKGROUND
Writer informed by provider that pt is cleared for discharge and in need of BLS AMBULANCE transport to son's home. Writer contacted pt's son, Caden, at [phone] to confirm. Writer informed by provider that pt is cleared for discharge and in need of BLS AMBULANCE transport to son's home. Writer contacted pt's son, Caden, at [phone] to confirm. Writer informed by provider that pt is cleared for discharge and in need of BLS AMBULANCE transport to son's home. Writer contacted pt's son, Caden, at [phone] to confirm.

## 2023-06-12 LAB
CULTURE RESULTS: SIGNIFICANT CHANGE UP
SPECIMEN SOURCE: SIGNIFICANT CHANGE UP

## 2023-12-23 ENCOUNTER — INPATIENT (INPATIENT)
Facility: HOSPITAL | Age: 88
LOS: 4 days | Discharge: HOME | End: 2023-12-28
Attending: HOSPITALIST | Admitting: HOSPITALIST
Payer: MEDICARE

## 2023-12-23 VITALS
RESPIRATION RATE: 15 BRPM | HEART RATE: 61 BPM | TEMPERATURE: 98 F | DIASTOLIC BLOOD PRESSURE: 61 MMHG | OXYGEN SATURATION: 99 % | SYSTOLIC BLOOD PRESSURE: 116 MMHG

## 2023-12-23 DIAGNOSIS — Z98.890 OTHER SPECIFIED POSTPROCEDURAL STATES: Chronic | ICD-10-CM

## 2023-12-23 DIAGNOSIS — Z96.0 PRESENCE OF UROGENITAL IMPLANTS: Chronic | ICD-10-CM

## 2023-12-23 LAB
ALBUMIN SERPL ELPH-MCNC: 4.1 G/DL — SIGNIFICANT CHANGE UP (ref 3.3–5)
ALBUMIN SERPL ELPH-MCNC: 4.1 G/DL — SIGNIFICANT CHANGE UP (ref 3.3–5)
ALP SERPL-CCNC: 126 U/L — HIGH (ref 40–120)
ALP SERPL-CCNC: 126 U/L — HIGH (ref 40–120)
ALT FLD-CCNC: 20 U/L — SIGNIFICANT CHANGE UP (ref 4–41)
ALT FLD-CCNC: 20 U/L — SIGNIFICANT CHANGE UP (ref 4–41)
ANION GAP SERPL CALC-SCNC: 10 MMOL/L — SIGNIFICANT CHANGE UP (ref 7–14)
ANION GAP SERPL CALC-SCNC: 10 MMOL/L — SIGNIFICANT CHANGE UP (ref 7–14)
APTT BLD: 27.6 SEC — SIGNIFICANT CHANGE UP (ref 24.5–35.6)
APTT BLD: 27.6 SEC — SIGNIFICANT CHANGE UP (ref 24.5–35.6)
AST SERPL-CCNC: 19 U/L — SIGNIFICANT CHANGE UP (ref 4–40)
AST SERPL-CCNC: 19 U/L — SIGNIFICANT CHANGE UP (ref 4–40)
BASE EXCESS BLDV CALC-SCNC: 2.6 MMOL/L — SIGNIFICANT CHANGE UP (ref -2–3)
BASE EXCESS BLDV CALC-SCNC: 2.6 MMOL/L — SIGNIFICANT CHANGE UP (ref -2–3)
BASOPHILS # BLD AUTO: 0.03 K/UL — SIGNIFICANT CHANGE UP (ref 0–0.2)
BASOPHILS # BLD AUTO: 0.03 K/UL — SIGNIFICANT CHANGE UP (ref 0–0.2)
BASOPHILS NFR BLD AUTO: 0.4 % — SIGNIFICANT CHANGE UP (ref 0–2)
BASOPHILS NFR BLD AUTO: 0.4 % — SIGNIFICANT CHANGE UP (ref 0–2)
BILIRUB SERPL-MCNC: 0.4 MG/DL — SIGNIFICANT CHANGE UP (ref 0.2–1.2)
BILIRUB SERPL-MCNC: 0.4 MG/DL — SIGNIFICANT CHANGE UP (ref 0.2–1.2)
BLD GP AB SCN SERPL QL: NEGATIVE — SIGNIFICANT CHANGE UP
BLD GP AB SCN SERPL QL: NEGATIVE — SIGNIFICANT CHANGE UP
BLOOD GAS VENOUS COMPREHENSIVE RESULT: SIGNIFICANT CHANGE UP
BLOOD GAS VENOUS COMPREHENSIVE RESULT: SIGNIFICANT CHANGE UP
BUN SERPL-MCNC: 14 MG/DL — SIGNIFICANT CHANGE UP (ref 7–23)
BUN SERPL-MCNC: 14 MG/DL — SIGNIFICANT CHANGE UP (ref 7–23)
CALCIUM SERPL-MCNC: 9.1 MG/DL — SIGNIFICANT CHANGE UP (ref 8.4–10.5)
CALCIUM SERPL-MCNC: 9.1 MG/DL — SIGNIFICANT CHANGE UP (ref 8.4–10.5)
CHLORIDE BLDV-SCNC: 103 MMOL/L — SIGNIFICANT CHANGE UP (ref 96–108)
CHLORIDE BLDV-SCNC: 103 MMOL/L — SIGNIFICANT CHANGE UP (ref 96–108)
CHLORIDE SERPL-SCNC: 103 MMOL/L — SIGNIFICANT CHANGE UP (ref 98–107)
CHLORIDE SERPL-SCNC: 103 MMOL/L — SIGNIFICANT CHANGE UP (ref 98–107)
CO2 BLDV-SCNC: 31.8 MMOL/L — HIGH (ref 22–26)
CO2 BLDV-SCNC: 31.8 MMOL/L — HIGH (ref 22–26)
CO2 SERPL-SCNC: 28 MMOL/L — SIGNIFICANT CHANGE UP (ref 22–31)
CO2 SERPL-SCNC: 28 MMOL/L — SIGNIFICANT CHANGE UP (ref 22–31)
CREAT SERPL-MCNC: 0.89 MG/DL — SIGNIFICANT CHANGE UP (ref 0.5–1.3)
CREAT SERPL-MCNC: 0.89 MG/DL — SIGNIFICANT CHANGE UP (ref 0.5–1.3)
EGFR: 83 ML/MIN/1.73M2 — SIGNIFICANT CHANGE UP
EGFR: 83 ML/MIN/1.73M2 — SIGNIFICANT CHANGE UP
EOSINOPHIL # BLD AUTO: 0.56 K/UL — HIGH (ref 0–0.5)
EOSINOPHIL # BLD AUTO: 0.56 K/UL — HIGH (ref 0–0.5)
EOSINOPHIL NFR BLD AUTO: 7.7 % — HIGH (ref 0–6)
EOSINOPHIL NFR BLD AUTO: 7.7 % — HIGH (ref 0–6)
GAS PNL BLDV: 135 MMOL/L — LOW (ref 136–145)
GAS PNL BLDV: 135 MMOL/L — LOW (ref 136–145)
GLUCOSE BLDV-MCNC: 109 MG/DL — HIGH (ref 70–99)
GLUCOSE BLDV-MCNC: 109 MG/DL — HIGH (ref 70–99)
GLUCOSE SERPL-MCNC: 102 MG/DL — HIGH (ref 70–99)
GLUCOSE SERPL-MCNC: 102 MG/DL — HIGH (ref 70–99)
HCO3 BLDV-SCNC: 30 MMOL/L — HIGH (ref 22–29)
HCO3 BLDV-SCNC: 30 MMOL/L — HIGH (ref 22–29)
HCT VFR BLD CALC: 43.9 % — SIGNIFICANT CHANGE UP (ref 39–50)
HCT VFR BLD CALC: 43.9 % — SIGNIFICANT CHANGE UP (ref 39–50)
HCT VFR BLDA CALC: 46 % — SIGNIFICANT CHANGE UP (ref 39–51)
HCT VFR BLDA CALC: 46 % — SIGNIFICANT CHANGE UP (ref 39–51)
HGB BLD CALC-MCNC: 15.2 G/DL — SIGNIFICANT CHANGE UP (ref 12.6–17.4)
HGB BLD CALC-MCNC: 15.2 G/DL — SIGNIFICANT CHANGE UP (ref 12.6–17.4)
HGB BLD-MCNC: 14.2 G/DL — SIGNIFICANT CHANGE UP (ref 13–17)
HGB BLD-MCNC: 14.2 G/DL — SIGNIFICANT CHANGE UP (ref 13–17)
IANC: 4.34 K/UL — SIGNIFICANT CHANGE UP (ref 1.8–7.4)
IANC: 4.34 K/UL — SIGNIFICANT CHANGE UP (ref 1.8–7.4)
IMM GRANULOCYTES NFR BLD AUTO: 0.3 % — SIGNIFICANT CHANGE UP (ref 0–0.9)
IMM GRANULOCYTES NFR BLD AUTO: 0.3 % — SIGNIFICANT CHANGE UP (ref 0–0.9)
INR BLD: 1.04 RATIO — SIGNIFICANT CHANGE UP (ref 0.85–1.18)
INR BLD: 1.04 RATIO — SIGNIFICANT CHANGE UP (ref 0.85–1.18)
LACTATE BLDV-MCNC: 2.4 MMOL/L — HIGH (ref 0.5–2)
LACTATE BLDV-MCNC: 2.4 MMOL/L — HIGH (ref 0.5–2)
LYMPHOCYTES # BLD AUTO: 1.72 K/UL — SIGNIFICANT CHANGE UP (ref 1–3.3)
LYMPHOCYTES # BLD AUTO: 1.72 K/UL — SIGNIFICANT CHANGE UP (ref 1–3.3)
LYMPHOCYTES # BLD AUTO: 23.6 % — SIGNIFICANT CHANGE UP (ref 13–44)
LYMPHOCYTES # BLD AUTO: 23.6 % — SIGNIFICANT CHANGE UP (ref 13–44)
MAGNESIUM SERPL-MCNC: 2.2 MG/DL — SIGNIFICANT CHANGE UP (ref 1.6–2.6)
MAGNESIUM SERPL-MCNC: 2.2 MG/DL — SIGNIFICANT CHANGE UP (ref 1.6–2.6)
MCHC RBC-ENTMCNC: 29.5 PG — SIGNIFICANT CHANGE UP (ref 27–34)
MCHC RBC-ENTMCNC: 29.5 PG — SIGNIFICANT CHANGE UP (ref 27–34)
MCHC RBC-ENTMCNC: 32.3 GM/DL — SIGNIFICANT CHANGE UP (ref 32–36)
MCHC RBC-ENTMCNC: 32.3 GM/DL — SIGNIFICANT CHANGE UP (ref 32–36)
MCV RBC AUTO: 91.1 FL — SIGNIFICANT CHANGE UP (ref 80–100)
MCV RBC AUTO: 91.1 FL — SIGNIFICANT CHANGE UP (ref 80–100)
MONOCYTES # BLD AUTO: 0.62 K/UL — SIGNIFICANT CHANGE UP (ref 0–0.9)
MONOCYTES # BLD AUTO: 0.62 K/UL — SIGNIFICANT CHANGE UP (ref 0–0.9)
MONOCYTES NFR BLD AUTO: 8.5 % — SIGNIFICANT CHANGE UP (ref 2–14)
MONOCYTES NFR BLD AUTO: 8.5 % — SIGNIFICANT CHANGE UP (ref 2–14)
NEUTROPHILS # BLD AUTO: 4.34 K/UL — SIGNIFICANT CHANGE UP (ref 1.8–7.4)
NEUTROPHILS # BLD AUTO: 4.34 K/UL — SIGNIFICANT CHANGE UP (ref 1.8–7.4)
NEUTROPHILS NFR BLD AUTO: 59.5 % — SIGNIFICANT CHANGE UP (ref 43–77)
NEUTROPHILS NFR BLD AUTO: 59.5 % — SIGNIFICANT CHANGE UP (ref 43–77)
NRBC # BLD: 0 /100 WBCS — SIGNIFICANT CHANGE UP (ref 0–0)
NRBC # BLD: 0 /100 WBCS — SIGNIFICANT CHANGE UP (ref 0–0)
NRBC # FLD: 0 K/UL — SIGNIFICANT CHANGE UP (ref 0–0)
NRBC # FLD: 0 K/UL — SIGNIFICANT CHANGE UP (ref 0–0)
PCO2 BLDV: 57 MMHG — HIGH (ref 42–55)
PCO2 BLDV: 57 MMHG — HIGH (ref 42–55)
PH BLDV: 7.33 — SIGNIFICANT CHANGE UP (ref 7.32–7.43)
PH BLDV: 7.33 — SIGNIFICANT CHANGE UP (ref 7.32–7.43)
PHOSPHATE SERPL-MCNC: 3.1 MG/DL — SIGNIFICANT CHANGE UP (ref 2.5–4.5)
PHOSPHATE SERPL-MCNC: 3.1 MG/DL — SIGNIFICANT CHANGE UP (ref 2.5–4.5)
PLATELET # BLD AUTO: 143 K/UL — LOW (ref 150–400)
PLATELET # BLD AUTO: 143 K/UL — LOW (ref 150–400)
PO2 BLDV: 36 MMHG — SIGNIFICANT CHANGE UP (ref 25–45)
PO2 BLDV: 36 MMHG — SIGNIFICANT CHANGE UP (ref 25–45)
POTASSIUM BLDV-SCNC: 4.3 MMOL/L — SIGNIFICANT CHANGE UP (ref 3.5–5.1)
POTASSIUM BLDV-SCNC: 4.3 MMOL/L — SIGNIFICANT CHANGE UP (ref 3.5–5.1)
POTASSIUM SERPL-MCNC: 4 MMOL/L — SIGNIFICANT CHANGE UP (ref 3.5–5.3)
POTASSIUM SERPL-MCNC: 4 MMOL/L — SIGNIFICANT CHANGE UP (ref 3.5–5.3)
POTASSIUM SERPL-SCNC: 4 MMOL/L — SIGNIFICANT CHANGE UP (ref 3.5–5.3)
POTASSIUM SERPL-SCNC: 4 MMOL/L — SIGNIFICANT CHANGE UP (ref 3.5–5.3)
PROT SERPL-MCNC: 8 G/DL — SIGNIFICANT CHANGE UP (ref 6–8.3)
PROT SERPL-MCNC: 8 G/DL — SIGNIFICANT CHANGE UP (ref 6–8.3)
PROTHROM AB SERPL-ACNC: 11.7 SEC — SIGNIFICANT CHANGE UP (ref 9.5–13)
PROTHROM AB SERPL-ACNC: 11.7 SEC — SIGNIFICANT CHANGE UP (ref 9.5–13)
RBC # BLD: 4.82 M/UL — SIGNIFICANT CHANGE UP (ref 4.2–5.8)
RBC # BLD: 4.82 M/UL — SIGNIFICANT CHANGE UP (ref 4.2–5.8)
RBC # FLD: 13.7 % — SIGNIFICANT CHANGE UP (ref 10.3–14.5)
RBC # FLD: 13.7 % — SIGNIFICANT CHANGE UP (ref 10.3–14.5)
RH IG SCN BLD-IMP: POSITIVE — SIGNIFICANT CHANGE UP
RH IG SCN BLD-IMP: POSITIVE — SIGNIFICANT CHANGE UP
SAO2 % BLDV: 38.2 % — LOW (ref 67–88)
SAO2 % BLDV: 38.2 % — LOW (ref 67–88)
SODIUM SERPL-SCNC: 141 MMOL/L — SIGNIFICANT CHANGE UP (ref 135–145)
SODIUM SERPL-SCNC: 141 MMOL/L — SIGNIFICANT CHANGE UP (ref 135–145)
TROPONIN T, HIGH SENSITIVITY RESULT: 15 NG/L — SIGNIFICANT CHANGE UP
TROPONIN T, HIGH SENSITIVITY RESULT: 15 NG/L — SIGNIFICANT CHANGE UP
WBC # BLD: 7.29 K/UL — SIGNIFICANT CHANGE UP (ref 3.8–10.5)
WBC # BLD: 7.29 K/UL — SIGNIFICANT CHANGE UP (ref 3.8–10.5)
WBC # FLD AUTO: 7.29 K/UL — SIGNIFICANT CHANGE UP (ref 3.8–10.5)
WBC # FLD AUTO: 7.29 K/UL — SIGNIFICANT CHANGE UP (ref 3.8–10.5)

## 2023-12-23 PROCEDURE — 70450 CT HEAD/BRAIN W/O DYE: CPT | Mod: 26,XU,MA

## 2023-12-23 PROCEDURE — 70496 CT ANGIOGRAPHY HEAD: CPT | Mod: 26,MA

## 2023-12-23 PROCEDURE — 70498 CT ANGIOGRAPHY NECK: CPT | Mod: 26,MA

## 2023-12-23 RX ORDER — METOCLOPRAMIDE HCL 10 MG
10 TABLET ORAL ONCE
Refills: 0 | Status: DISCONTINUED | OUTPATIENT
Start: 2023-12-23 | End: 2023-12-23

## 2023-12-23 RX ORDER — FAMOTIDINE 10 MG/ML
20 INJECTION INTRAVENOUS ONCE
Refills: 0 | Status: DISCONTINUED | OUTPATIENT
Start: 2023-12-23 | End: 2023-12-23

## 2023-12-23 NOTE — ED ADULT NURSE NOTE - NSFALLHARMRISKINTERV_ED_ALL_ED
Assistance OOB with selected safe patient handling equipment if applicable/Assistance with ambulation/Communicate risk of Fall with Harm to all staff, patient, and family/Monitor for mental status changes and reorient to person, place, and time, as needed/Move patient closer to nursing station/within visual sight of ED staff/Provide visual cue: red socks, yellow wristband, yellow gown, etc/Reinforce activity limits and safety measures with patient and family/Toileting schedule using arm’s reach rule for commode and bathroom/Use of alarms - bed, stretcher, chair and/or video monitoring/Bed in lowest position, wheels locked, appropriate side rails in place/Call bell, personal items and telephone in reach/Instruct patient to call for assistance before getting out of bed/chair/stretcher/Non-slip footwear applied when patient is off stretcher/[place] to call system/Physically safe environment - no spills, clutter or unnecessary equipment/Purposeful Proactive Rounding/Room/bathroom lighting operational, light cord in reach Assistance OOB with selected safe patient handling equipment if applicable/Assistance with ambulation/Communicate risk of Fall with Harm to all staff, patient, and family/Monitor for mental status changes and reorient to person, place, and time, as needed/Move patient closer to nursing station/within visual sight of ED staff/Provide visual cue: red socks, yellow wristband, yellow gown, etc/Reinforce activity limits and safety measures with patient and family/Toileting schedule using arm’s reach rule for commode and bathroom/Use of alarms - bed, stretcher, chair and/or video monitoring/Bed in lowest position, wheels locked, appropriate side rails in place/Call bell, personal items and telephone in reach/Instruct patient to call for assistance before getting out of bed/chair/stretcher/Non-slip footwear applied when patient is off stretcher/[place] to call system/Physically safe environment - no spills, clutter or unnecessary equipment/Purposeful Proactive Rounding/Room/bathroom lighting operational, light cord in reach

## 2023-12-23 NOTE — ED ADULT TRIAGE NOTE - CHIEF COMPLAINT QUOTE
alert oriented x 0 ( baseline ) from home was vomiting at home appears more confused than normal hx CVA takes eliquis  not speaking or following directions unable to complete bfast    alert oriented x 0 ( baseline ) from home was vomiting at home appears more confused than normal hx CVA takes eliquis  not speaking or following directions unable to complete bfast     spoke to Dr Rosa ghosh stroke called

## 2023-12-23 NOTE — ED ADULT NURSE NOTE - OBJECTIVE STATEMENT
Pt is lethargic and oriented x 2, maintained on bedrest, presents to the ED as a stroke code for AMS. As per triage, pt was also vomiting at home. No episodes of vomitus here. Easy to arouse. Follows commands. Mild dysarthria noted. Strength 5/5 x 4 extremities. NIHSS-2. HX of CVA, on Eliquis. VSS upon arrival. Respirations even and unlabored. . Left forearm 20 gauge IV line placed and labs drawn. CTH taken. Placed on continuous telemetry and SpO2 monitoring. Pending lab and CT results. Pt is lethargic and oriented x 2, maintained on bedrest, presents to the ED as a stroke code for AMS. As per triage, pt was also vomiting at home. No episodes of vomitus here. Easy to arouse. Follows commands. Mild aphasia noted. Strength 5/5 x 4 extremities. NIHSS-3. HX of CVA, on Eliquis. VSS upon arrival. Respirations even and unlabored. . Left forearm 20 gauge IV line placed and labs drawn. CTH taken. Placed on continuous telemetry and SpO2 monitoring. Pending lab and CT results.

## 2023-12-23 NOTE — CONSULT NOTE ADULT - ATTENDING COMMENTS
Exam:   Oriented to self, hospital, [facility], Feb.  Follows command to "show me two fingers"  CN: Unable to count fingers.  He seemed to indicate that he has poor vision for a long time.   Face - symmetric.   Moving all extremities symmetrically with good strength.     A/P  Mr. 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  He is already on anticoagulation, statin for stroke risk reduction.  He seemed to indicate that he has poor vision for a long time - please obtain more history regarding visual impairment.   Neurology signing off. Please reconsult PRN or call [employer] with any questions.  Thank you Exam:   Oriented to self, hospital, [facility], Feb.  Follows command to "show me two fingers"  CN: Unable to count fingers.  He seemed to indicate that he has poor vision for a long time.   Face - symmetric.   Moving all extremities symmetrically with good strength.     A/P  Mr. 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  He is already on anticoagulation, statin for stroke risk reduction.  He seemed to indicate that he has poor vision for a long time - please obtain more history regarding visual impairment.   Neurology signing off. Please reconsult PRN or call [employer] with any questions.  Thank you

## 2023-12-23 NOTE — CONSULT NOTE ADULT - ASSESSMENT
Patient TENNILLE MAHMOOD is a 88y (10-Celestine-1935) man with a PMHx significant for dementia at baseline (AAOx0), on Eliquis at home (has remote hx of stroke), who presented today to [facility].   Code stroke was called for possible AMS-unclear if not different from baseline. Has a cough, appears to have some chills as well. AAO x3 to self and place and month to my exam.   No pronator drift noted on exam, was able to cooperate and follow commands. Paucity of speech is noted. On CTH there was chronic bilateral parietal occipital infarcts and chronic bilateral cerebellar infarcts.     Last known well (LKW): Unknown  mRS 3  NIHSS 2    Impression: AMS, unclear if different from baseline, though appears to be improving at time of code stroke. He has a cough, would work up for underlying infectious causes, as well as toxic/metabolic encephalopathy    Recommendations:  [] Continue with home eliquis for stroke prevention  [] A1C and lipid panel  [] Atorvastatin 80 mg qhs titrate to LDL <70  [] MRI brain non contrast can be done in or outpatient  [] CXR and RVP  [] UA, Utox, CXR, BCx, CBC, CMP, Coag, Mag, phos   [] TSH, Folate, B12, homocysteine, methylmalonic acid, lactate, CPK, ammonia  [] copper, ceruloplasmin, TPO, Anti-thyroglobulin, TSH  When patient is ready for dc can follow up outpatient with neurology.       Case discussed with telestroke attending Dr. Baeza.  Patient TENNILLE MAHMOOD is a 88y ([birth year]Celestine-1935) man with a PMHx significant for dementia at baseline (AAOx0), on Eliquis at home (has remote hx of stroke), who presented today to [facility].   Code stroke was called for possible AMS-unclear if not different from baseline. Has a cough, appears to have some chills as well. AAO x3 to self and place and month to my exam.   No pronator drift noted on exam, was able to cooperate and follow commands. Paucity of speech is noted. On CTH there was chronic bilateral parietal occipital infarcts and chronic bilateral cerebellar infarcts.     Last known well (LKW): Unknown  mRS 3  NIHSS 2    Impression: AMS, unclear if different from baseline, though appears to be improving at time of code stroke. He has a cough, would work up for underlying infectious causes, as well as toxic/metabolic encephalopathy    Recommendations:  [] Continue with home eliquis for stroke prevention  [] A1C and lipid panel  [] Atorvastatin 80 mg qhs titrate to LDL <70  [] MRI brain non contrast can be done in or outpatient  [] CXR and RVP  [] UA, Utox, CXR, BCx, CBC, CMP, Coag, Mag, phos   [] TSH, Folate, B12, homocysteine, methylmalonic acid, lactate, CPK, ammonia  [] copper, ceruloplasmin, TPO, Anti-thyroglobulin, TSH  When patient is ready for dc can follow up outpatient with neurology.       Case discussed with telestroke attending Dr. Baeza.  Patient TENNILLE MAHMOOD is a 88y ([birth year]elestine-1935) man with a PMHx significant for dementia at baseline (AAOx0), on Eliquis at home (has remote hx of stroke), who presented today to [facility].   Code stroke was called for possible AMS-unclear if not different from baseline. Has a cough, appears to have some chills as well. AAO x3 to self and place and month to my exam.   No pronator drift noted on exam, was able to cooperate and follow commands. Paucity of speech is noted. On CTH there was chronic bilateral parietal occipital infarcts and chronic bilateral cerebellar infarcts.     Last known well (LKW): Unknown  mRS 3  NIHSS 2  Patient is not a tenectaplase candidate given out of time window  NOt a thrombectomy candidate given no LVO      Impression: AMS, unclear if different from baseline, though appears to be improving at time of code stroke. He has a cough, would work up for underlying infectious causes, as well as toxic/metabolic encephalopathy    Recommendations:  [] Continue with home eliquis for stroke prevention  [] A1C and lipid panel  [] Atorvastatin 80 mg qhs titrate to LDL <70  [] MRI brain non contrast can be done in or outpatient  [] CXR and RVP  [] UA, Utox, CXR, BCx, CBC, CMP, Coag, Mag, phos   [] TSH, Folate, B12, homocysteine, methylmalonic acid, lactate, CPK, ammonia  [] copper, ceruloplasmin, TPO, Anti-thyroglobulin, TSH  When patient is ready for dc can follow up outpatient with neurology.       Case discussed with telestroke attending Dr. Baeza.  Patient TENNILLE MAHMOOD is a 88y ([birth year]Celestine-1935) man with a PMHx significant for dementia at baseline (AAOx0), on Eliquis at home (has remote hx of stroke), who presented today to [facility].   Code stroke was called for possible AMS-unclear if not different from baseline. Has a cough, appears to have some chills as well. AAO x3 to self and place and month to my exam.   No pronator drift noted on exam, was able to cooperate and follow commands. Paucity of speech is noted. On CTH there was chronic bilateral parietal occipital infarcts and chronic bilateral cerebellar infarcts.     Last known well (LKW): Unknown  mRS 3  NIHSS 2  Patient is not a tenectaplase candidate given out of time window  NOt a thrombectomy candidate given no LVO      Impression: AMS, unclear if different from baseline, though appears to be improving at time of code stroke. He has a cough, would work up for underlying infectious causes, as well as toxic/metabolic encephalopathy    Recommendations:  [] Continue with home eliquis for stroke prevention  [] A1C and lipid panel  [] Atorvastatin 80 mg qhs titrate to LDL <70  [] MRI brain non contrast can be done in or outpatient  [] CXR and RVP  [] UA, Utox, CXR, BCx, CBC, CMP, Coag, Mag, phos   [] TSH, Folate, B12, homocysteine, methylmalonic acid, lactate, CPK, ammonia  [] copper, ceruloplasmin, TPO, Anti-thyroglobulin, TSH  When patient is ready for dc can follow up outpatient with neurology.       Case discussed with telestroke attending Dr. Baeza.  Patient TENNILLE MAHMOOD is a 88y ([birth year]Celestine-1935) man with a PMHx significant for dementia at baseline (AAOx0), on Eliquis at home (has remote hx of stroke), who presented today to [facility].   Code stroke was called for possible AMS-unclear if not different from baseline. Has a cough, appears to have some chills as well. AAO x3 to self and place and month to my exam.   No pronator drift noted on exam, was able to cooperate and follow commands. Paucity of speech is noted. On CTH there was chronic bilateral parietal occipital infarcts and chronic bilateral cerebellar infarcts.     Last known well (LKW): Unknown  mRS 3  NIHSS 2  Patient is not a tenectaplase candidate given out of time window  NOt a thrombectomy candidate given no LVO      Impression: AMS, unclear if different from baseline, though appears to be improving at time of code stroke. He has a cough, would work up for underlying infectious causes, as well as toxic/metabolic encephalopathy    Recommendations:  [] Continue with home eliquis for stroke prevention  [] A1C and lipid panel  [] Atorvastatin 80 mg qhs titrate to LDL <70  [] MRI brain non contrast can be done in or outpatient  [] CXR and RVP  [] UA, Utox, CXR, BCx, CBC, CMP, Coag, Mag, phos   [] TSH, Folate, B12, homocysteine, methylmalonic acid, lactate, CPK, ammonia  When patient is ready for dc can follow up outpatient with neurology.       Case discussed with telestroke attending Dr. Baeza.  Patient TENNILLE MAHMOOD is a 88y ([birth year]Celestine-1935) man with a PMHx significant for dementia at baseline (AAOx0), on Eliquis at home (has remote hx of stroke), who presented today to [facility].   Code stroke was called for possible AMS-unclear if not different from baseline. Has a cough, appears to have some chills as well. AAO x3 to self and place and month to my exam.   No pronator drift noted on exam, was able to cooperate and follow commands. Paucity of speech is noted. On CTH there was chronic bilateral parietal occipital infarcts and chronic bilateral cerebellar infarcts.     Last known well (LKW): Unknown  mRS 3  NIHSS 2  Patient is not a tenectaplase candidate given out of time window  NOt a thrombectomy candidate given no LVO      Impression: AMS, unclear if different from baseline, though appears to be improving at time of code stroke. He has a cough, would work up for underlying infectious causes, as well as toxic/metabolic encephalopathy    Recommendations:  [] Continue with home eliquis for stroke prevention  [] A1C and lipid panel  [] Atorvastatin 80 mg qhs titrate to LDL <70  [] MRI brain non contrast can be done in or outpatient  [] CXR and RVP  [] UA, Utox, CXR, BCx, CBC, CMP, Coag, Mag, phos   [] TSH, Folate, B12, homocysteine, methylmalonic acid, lactate, CPK, ammonia  When patient is ready for dc can follow up outpatient with neurology.       Case discussed with telestroke attending Dr. Baeza.

## 2023-12-23 NOTE — ED ADULT NURSE NOTE - CHIEF COMPLAINT QUOTE
alert oriented x 0 ( baseline ) from home was vomiting at home appears more confused than normal hx CVA takes eliquis  not speaking or following directions unable to complete bfast     spoke to Dr Rosa ghosh stroke called

## 2023-12-24 DIAGNOSIS — R41.82 ALTERED MENTAL STATUS, UNSPECIFIED: ICD-10-CM

## 2023-12-24 DIAGNOSIS — I48.20 CHRONIC ATRIAL FIBRILLATION, UNSPECIFIED: ICD-10-CM

## 2023-12-24 DIAGNOSIS — E78.5 HYPERLIPIDEMIA, UNSPECIFIED: ICD-10-CM

## 2023-12-24 DIAGNOSIS — G92.8 OTHER TOXIC ENCEPHALOPATHY: ICD-10-CM

## 2023-12-24 DIAGNOSIS — R63.8 OTHER SYMPTOMS AND SIGNS CONCERNING FOOD AND FLUID INTAKE: ICD-10-CM

## 2023-12-24 DIAGNOSIS — Z86.73 PERSONAL HISTORY OF TRANSIENT ISCHEMIC ATTACK (TIA), AND CEREBRAL INFARCTION WITHOUT RESIDUAL DEFICITS: ICD-10-CM

## 2023-12-24 LAB
APPEARANCE UR: CLEAR — SIGNIFICANT CHANGE UP
APPEARANCE UR: CLEAR — SIGNIFICANT CHANGE UP
BILIRUB UR-MCNC: NEGATIVE — SIGNIFICANT CHANGE UP
BILIRUB UR-MCNC: NEGATIVE — SIGNIFICANT CHANGE UP
COLOR SPEC: YELLOW — SIGNIFICANT CHANGE UP
COLOR SPEC: YELLOW — SIGNIFICANT CHANGE UP
DIFF PNL FLD: NEGATIVE — SIGNIFICANT CHANGE UP
DIFF PNL FLD: NEGATIVE — SIGNIFICANT CHANGE UP
GLUCOSE UR QL: NEGATIVE MG/DL — SIGNIFICANT CHANGE UP
GLUCOSE UR QL: NEGATIVE MG/DL — SIGNIFICANT CHANGE UP
KETONES UR-MCNC: NEGATIVE MG/DL — SIGNIFICANT CHANGE UP
KETONES UR-MCNC: NEGATIVE MG/DL — SIGNIFICANT CHANGE UP
LEUKOCYTE ESTERASE UR-ACNC: NEGATIVE — SIGNIFICANT CHANGE UP
LEUKOCYTE ESTERASE UR-ACNC: NEGATIVE — SIGNIFICANT CHANGE UP
NITRITE UR-MCNC: NEGATIVE — SIGNIFICANT CHANGE UP
NITRITE UR-MCNC: NEGATIVE — SIGNIFICANT CHANGE UP
PH UR: 7.5 — SIGNIFICANT CHANGE UP (ref 5–8)
PH UR: 7.5 — SIGNIFICANT CHANGE UP (ref 5–8)
PROCALCITONIN SERPL-MCNC: 0.03 NG/ML — SIGNIFICANT CHANGE UP (ref 0.02–0.1)
PROCALCITONIN SERPL-MCNC: 0.03 NG/ML — SIGNIFICANT CHANGE UP (ref 0.02–0.1)
PROT UR-MCNC: NEGATIVE MG/DL — SIGNIFICANT CHANGE UP
PROT UR-MCNC: NEGATIVE MG/DL — SIGNIFICANT CHANGE UP
SP GR SPEC: 1.05 — HIGH (ref 1–1.03)
SP GR SPEC: 1.05 — HIGH (ref 1–1.03)
TSH SERPL-MCNC: 2.86 UIU/ML — SIGNIFICANT CHANGE UP (ref 0.27–4.2)
TSH SERPL-MCNC: 2.86 UIU/ML — SIGNIFICANT CHANGE UP (ref 0.27–4.2)
UROBILINOGEN FLD QL: 0.2 MG/DL — SIGNIFICANT CHANGE UP (ref 0.2–1)
UROBILINOGEN FLD QL: 0.2 MG/DL — SIGNIFICANT CHANGE UP (ref 0.2–1)

## 2023-12-24 PROCEDURE — 99223 1ST HOSP IP/OBS HIGH 75: CPT

## 2023-12-24 PROCEDURE — 71045 X-RAY EXAM CHEST 1 VIEW: CPT | Mod: 26

## 2023-12-24 PROCEDURE — 99221 1ST HOSP IP/OBS SF/LOW 40: CPT

## 2023-12-24 RX ORDER — INFLUENZA VIRUS VACCINE 15; 15; 15; 15 UG/.5ML; UG/.5ML; UG/.5ML; UG/.5ML
0.7 SUSPENSION INTRAMUSCULAR ONCE
Refills: 0 | Status: DISCONTINUED | OUTPATIENT
Start: 2023-12-24 | End: 2023-12-28

## 2023-12-24 RX ORDER — ACETAMINOPHEN 500 MG
650 TABLET ORAL EVERY 6 HOURS
Refills: 0 | Status: DISCONTINUED | OUTPATIENT
Start: 2023-12-24 | End: 2023-12-28

## 2023-12-24 RX ORDER — MECLIZINE HCL 12.5 MG
12.5 TABLET ORAL EVERY 12 HOURS
Refills: 0 | Status: DISCONTINUED | OUTPATIENT
Start: 2023-12-24 | End: 2023-12-28

## 2023-12-24 RX ORDER — APIXABAN 2.5 MG/1
5 TABLET, FILM COATED ORAL EVERY 12 HOURS
Refills: 0 | Status: DISCONTINUED | OUTPATIENT
Start: 2023-12-24 | End: 2023-12-26

## 2023-12-24 RX ORDER — ATORVASTATIN CALCIUM 80 MG/1
40 TABLET, FILM COATED ORAL AT BEDTIME
Refills: 0 | Status: DISCONTINUED | OUTPATIENT
Start: 2023-12-24 | End: 2023-12-28

## 2023-12-24 RX ORDER — SODIUM CHLORIDE 9 MG/ML
1000 INJECTION, SOLUTION INTRAVENOUS
Refills: 0 | Status: DISCONTINUED | OUTPATIENT
Start: 2023-12-24 | End: 2023-12-26

## 2023-12-24 RX ADMIN — ATORVASTATIN CALCIUM 40 MILLIGRAM(S): 80 TABLET, FILM COATED ORAL at 21:29

## 2023-12-24 RX ADMIN — SODIUM CHLORIDE 90 MILLILITER(S): 9 INJECTION, SOLUTION INTRAVENOUS at 07:15

## 2023-12-24 RX ADMIN — APIXABAN 5 MILLIGRAM(S): 2.5 TABLET, FILM COATED ORAL at 17:15

## 2023-12-24 RX ADMIN — SODIUM CHLORIDE 90 MILLILITER(S): 9 INJECTION, SOLUTION INTRAVENOUS at 03:13

## 2023-12-24 RX ADMIN — APIXABAN 5 MILLIGRAM(S): 2.5 TABLET, FILM COATED ORAL at 05:06

## 2023-12-24 NOTE — H&P ADULT - HISTORY OF PRESENT ILLNESS
INCOMPLETE 88 year old M hx of afib on eliquis, HTN, CVA, vertigo, dementia a/o times 2 here w/ AMS. Per son, he states patient gets like this occasionally. He has had vomiting episodes, but able to keep liquids down sometimes. Vomiting is NBNB in nature. Also has had increased cough and sputum production. ROS otherwise negative. Stroke code was called in ED, CT head was negative for ICH, masses, or acute strokes. No leukocytosis, u/a negative for UTI, though specific gravity 1.048.

## 2023-12-24 NOTE — OCCUPATIONAL THERAPY INITIAL EVALUATION ADULT - PERTINENT HX OF CURRENT PROBLEM, REHAB EVAL
Pt is an 88 year old M with PMH of afib on eliquis, HTN, CVA, vertigo, dementia a/o x 2 admitted with AMS.

## 2023-12-24 NOTE — ED PROVIDER NOTE - PHYSICAL EXAMINATION
GENERAL: Awake, alert, NAD  HEENT: NC/AT, moist mucous membranes, PERRL, EOMI  LUNGS: CTAB, no wheezes or crackles   CARDIAC: RRR, no m/r/g  ABDOMEN: Soft, non tender, non distended, no rebound, no guarding  BACK: No midline spinal tenderness, no CVA tenderness  EXT: No edema, no calf tenderness, 2+ DP pulses bilaterally, no deformities.  NEURO: A&Ox1. Moving all extremities.  SKIN: Warm and dry. No rash.  PSYCH: Normal affect.

## 2023-12-24 NOTE — OCCUPATIONAL THERAPY INITIAL EVALUATION ADULT - GENERAL OBSERVATIONS, REHAB EVAL
Depression, Suicide attempt via Intentional Overdose Patient is alert and following directions. Vitals: HR 6o BPM

## 2023-12-24 NOTE — PROGRESS NOTE ADULT - PROBLEM SELECTOR PLAN 5
F-90 cc/hr LR  E-replete prn  N-NPO pending S/S eval  eliquis 5 mg BID F-90 cc/hr LR  E-replete prn  N-NPO pending S/S eval  eliquis 5 mg BID    Udpated patients son on 12/24

## 2023-12-24 NOTE — H&P ADULT - NSICDXFAMILYHX_GEN_ALL_CORE_FT
Microscopic polyangitis  Multifocal pneumonia  Hemoptysis    75 yo PMHx HTN, hypothyroid, HFpEF, OA admitted for fevers and respiratory symptoms, treated while on Hospital Medicine for multifocal PNA. Course complicated by GIB bleed (no EGD yet d/t PNA), hyponatremia, ATN. MICU consulted for rapidly increasing oxygen requirement (4L NC to BiPAP 15/10 50%), respiratory distress/work of breathing, somnolence. Nephrology consulted for ATN, concerns for possible nephritic disease as  behind ATN d/t acanthrocytes. Reports of scant hemoptysis by nursing staff 10/21, 10/22.    Imaging: CXR: Diffuse bilateral airspace infiltrates w/ c/f alveolar fillings; CT chest wc 10/17 with bl perihilar dense consolidations w/ peripheral patcy areas of GGO, BL PNA, less c/f cardiogenic pulmonary edema.  Labs: WBC uptrending 28.97, Hgb lowest 6.0 (s/p 2 u PRBC), continues to downtrend approx 1 point / day. sCr uptrending, (baseline 1.0-1.2). .      No results for input(s): PH, PCO2, PO2, HCO3, POCSATURATED, BE in the last 72 hours.  Etiology: Microscopic polyangitis likely. Concerns for possible PE as patient was not on thrombotic ppx s/o GIB. Incomplete I/Os charted, though reports of low UOP also renders pulmonary congestion edema as a possibility. Less c/f acute progression of multifocal PNA as adequate ABx coverage and no new fevers recently.    -- US DVT BLE unremarkable  -- Consideration for urgent BAL +/- repeat chest imaging (last done 10/17)  -- IV solumedrol   -- PJP prophylaxis  -- Rheumatology consulted: Rituximab, cyclophosphamide, and steroids per their recs.  -- Per nephrology consulted, monitoring for HD needs  -- Plasmapheresis started 10/26, plans for 7 more cycles over 14 days.  -- Wean supplemental O2 as tolerated  -- Neuro checks  -- supplemental O2 prn   FAMILY HISTORY:  No pertinent family history in first degree relatives

## 2023-12-24 NOTE — H&P ADULT - PROBLEM SELECTOR PLAN 1
-TME unclear etiology.  -head CT noncontrast negative for ICH, masses, strokes  -u/a negative for UTI  -check procalcitonin, given vomiting can check ct a/p  -low concern for acute stroke, NIH 2, CT head negative appreciate neurology recs  -check RVP, TSH

## 2023-12-24 NOTE — ED PROVIDER NOTE - ATTENDING CONTRIBUTION TO CARE
Resident HPI: "88y M w/ past medical history of Afib on Eliquis, GERD, HTN, CVA, vertigo, dementia AAOx1-2 at baseline presents to ED with a chief  of AMS. The patient is not with any family members at bedside and patient not endorsing any symptoms. Per EMS the patient was brought in because he was altered at home, no additional information was given. Additional information was not obtained from family, multiple attempts were made to reach them.  [id number]"  Additonal findings: Stroke code called b/c of inability to complete exam and possiblility of bleed b/c of   ac; no collateral regarding USOH collateral available; all info on initial assessment received from EMS  plan  CT/CTA labs in addition to rectal temp IVFs cxr EKG to screen for other precipitants  Patient seen by neuro on arrival reccs appreciated  will reassesss  Probable admit but will attempt to obtain collateral and shared decsion making with family Resident HPI: "88y M w/ past medical history of Afib on Eliquis, GERD, HTN, CVA, vertigo, dementia AAOx1-2 at baseline presents to ED with a chief  of AMS. The patient is not with any family members at bedside and patient not endorsing any symptoms. Per EMS the patient was brought in because he was altered at home, no additional information was given. Additional information was not obtained from family, multiple attempts were made to reach them.  [id number]"  Additonal findings: Stroke code called b/c of inability to complete exam and possiblility of bleed b/c of   ac; no collateral regarding USOH collateral available; all info on initial assessment received from EMS  plan  CT/CTA labs in addition to rectal temp IVFs cxr EKG to screen for other precipitants  Patient seen by neuro on arrival reccs appreciated  will reassesss  Probable admit but will attempt to obtain collateral and shared decsion making with family

## 2023-12-24 NOTE — PATIENT PROFILE ADULT - FALL HARM RISK - HARM RISK INTERVENTIONS
Assistance with ambulation/Assistance OOB with selected safe patient handling equipment/Communicate Risk of Fall with Harm to all staff/Monitor for mental status changes/Monitor gait and stability/Move patient closer to nurses' station/Orthostatic vital signs/Provide patient with walking aids - walker, cane, crutches/Reinforce activity limits and safety measures with patient and family/Reorient to person, place and time as needed/Sit up slowly, dangle for a short time, stand at bedside before walking/Tailored Fall Risk Interventions/Toileting schedule using arm’s reach rule for commode and bathroom/Use of alarms - bed, chair and/or voice tab/Visual Cue: Yellow wristband and red socks/Bed in lowest position, wheels locked, appropriate side rails in place/Call bell, personal items and telephone in reach/Instruct patient to call for assistance before getting out of bed or chair/Non-slip footwear when patient is out of bed/[place] to call system/Physically safe environment - no spills, clutter or unnecessary equipment/Purposeful Proactive Rounding/Room/bathroom lighting operational, light cord in reach Assistance with ambulation/Assistance OOB with selected safe patient handling equipment/Communicate Risk of Fall with Harm to all staff/Monitor for mental status changes/Monitor gait and stability/Move patient closer to nurses' station/Orthostatic vital signs/Provide patient with walking aids - walker, cane, crutches/Reinforce activity limits and safety measures with patient and family/Reorient to person, place and time as needed/Sit up slowly, dangle for a short time, stand at bedside before walking/Tailored Fall Risk Interventions/Toileting schedule using arm’s reach rule for commode and bathroom/Use of alarms - bed, chair and/or voice tab/Visual Cue: Yellow wristband and red socks/Bed in lowest position, wheels locked, appropriate side rails in place/Call bell, personal items and telephone in reach/Instruct patient to call for assistance before getting out of bed or chair/Non-slip footwear when patient is out of bed/[place] to call system/Physically safe environment - no spills, clutter or unnecessary equipment/Purposeful Proactive Rounding/Room/bathroom lighting operational, light cord in reach

## 2023-12-24 NOTE — PROGRESS NOTE ADULT - SUBJECTIVE AND OBJECTIVE BOX
[facility] Division of Hospital Medicine  Liza Lazaro MD  Hospitalist   Pager [phone]  Avaliable via MS teams     SUBJECTIVE / OVERNIGHT EVENTS: Pt seen and examained. patient Aox1-2 (knows name, states he is at an institution), denies any acute complaints.      ADDITIONAL REVIEW OF SYSTEMS:    MEDICATIONS  (STANDING):  apixaban 5 milliGRAM(s) Oral every 12 hours  atorvastatin 40 milliGRAM(s) Oral at bedtime  influenza  Vaccine (HIGH DOSE) 0.7 milliLiter(s) IntraMuscular once  lactated ringers. 1000 milliLiter(s) (90 mL/Hr) IV Continuous <Continuous>    MEDICATIONS  (PRN):  acetaminophen     Tablet .. 650 milliGRAM(s) Oral every 6 hours PRN Temp greater or equal to 38C (100.4F), Mild Pain (1 - 3)  meclizine 12.5 milliGRAM(s) Oral every 12 hours PRN Dizziness      I&O's Summary      PHYSICAL EXAM:  Vital Signs Last 24 Hrs  T(C): 36.3 (24 Dec 2023 02:24), Max: 36.9 (23 Dec 2023 23:15)  T(F): 97.3 (24 Dec 2023 02:24), Max: 98.4 (23 Dec 2023 23:15)  HR: 60 (24 Dec 2023 02:24) (60 - 84)  BP: 125/69 (24 Dec 2023 02:24) (116/61 - 141/80)  BP(mean): --  RR: 17 (24 Dec 2023 02:24) (15 - 17)  SpO2: 100% (24 Dec 2023 02:24) (98% - 100%)    Parameters below as of 24 Dec 2023 02:24  Patient On (Oxygen Delivery Method): room air    GENERAL: NAD, well-developed  HEAD:  Atraumatic, Normocephalic  EYES: EOMI, PERRLA, conjunctiva and sclera clear  NECK: Supple, No JVD  CHEST/LUNG: Clear to auscultation bilaterally; No wheeze  HEART: Regular rate and rhythm; No murmurs, rubs, or gallops  ABDOMEN: Soft, Nontender, Nondistended; Bowel sounds present  : Alexis (unclear when placed)   EXTREMITIES:  2+ Peripheral Pulses, No clubbing, cyanosis, or edema  PSYCH: AAOx2 person and time  NEUROLOGY: non-focal  SKIN: No rashes or lesions    LABS:                          14.2   7.29  )-----------( 143      ( 23 Dec 2023 21:53 )             43.9         141  |  103  |  14  ----------------------------<  102<H>  4.0   |  28  |  0.89    Ca    9.1      23 Dec 2023 21:53  Phos  3.1       Mg     2.20         TPro  8.0  /  Alb  4.1  /  TBili  0.4  /  DBili  x   /  AST  19  /  ALT  20  /  AlkPhos  126<H>      PT/INR - ( 23 Dec 2023 22:37 )   PT: 11.7 sec;   INR: 1.04 ratio         PTT - ( 23 Dec 2023 22:37 )  PTT:27.6 sec          Urinalysis Basic - ( 23 Dec 2023 23:20 )    Color: Yellow / Appearance: Clear / S.049 / pH: x  Gluc: x / Ketone: Negative mg/dL  / Bili: Negative / Urobili: 0.2 mg/dL   Blood: x / Protein: Negative mg/dL / Nitrite: Negative   Leuk Esterase: Negative / RBC: x / WBC x   Sq Epi: x / Non Sq Epi: x / Bacteria: x     [facility]  Liza Lazaro MD  Hospitalist   Pager [phone]  Avaliable via MS teams     SUBJECTIVE / OVERNIGHT EVENTS: Pt seen and examained. patient Aox1-2 (knows name, states he is at an institution), denies any acute complaints.      ADDITIONAL REVIEW OF SYSTEMS:    MEDICATIONS  (STANDING):  apixaban 5 milliGRAM(s) Oral every 12 hours  atorvastatin 40 milliGRAM(s) Oral at bedtime  influenza  Vaccine (HIGH DOSE) 0.7 milliLiter(s) IntraMuscular once  lactated ringers. 1000 milliLiter(s) (90 mL/Hr) IV Continuous <Continuous>    MEDICATIONS  (PRN):  acetaminophen     Tablet .. 650 milliGRAM(s) Oral every 6 hours PRN Temp greater or equal to 38C (100.4F), Mild Pain (1 - 3)  meclizine 12.5 milliGRAM(s) Oral every 12 hours PRN Dizziness      I&O's Summary      PHYSICAL EXAM:  Vital Signs Last 24 Hrs  T(C): 36.3 (24 Dec 2023 02:24), Max: 36.9 (23 Dec 2023 23:15)  T(F): 97.3 (24 Dec 2023 02:24), Max: 98.4 (23 Dec 2023 23:15)  HR: 60 (24 Dec 2023 02:24) (60 - 84)  BP: 125/69 (24 Dec 2023 02:24) (116/61 - 141/80)  BP(mean): --  RR: 17 (24 Dec 2023 02:24) (15 - 17)  SpO2: 100% (24 Dec 2023 02:24) (98% - 100%)    Parameters below as of 24 Dec 2023 02:24  Patient On (Oxygen Delivery Method): room air    GENERAL: NAD, well-developed  HEAD:  Atraumatic, Normocephalic  EYES: EOMI, PERRLA, conjunctiva and sclera clear  NECK: Supple, No JVD  CHEST/LUNG: Clear to auscultation bilaterally; No wheeze  HEART: Regular rate and rhythm; No murmurs, rubs, or gallops  ABDOMEN: Soft, Nontender, Nondistended; Bowel sounds present  : Alexis (unclear when placed)   EXTREMITIES:  2+ Peripheral Pulses, No clubbing, cyanosis, or edema  PSYCH: AAOx2 person and time  NEUROLOGY: non-focal  SKIN: No rashes or lesions    LABS:                          14.2   7.29  )-----------( 143      ( 23 Dec 2023 21:53 )             43.9         141  |  103  |  14  ----------------------------<  102<H>  4.0   |  28  |  0.89    Ca    9.1      23 Dec 2023 21:53  Phos  3.1       Mg     2.20         TPro  8.0  /  Alb  4.1  /  TBili  0.4  /  DBili  x   /  AST  19  /  ALT  20  /  AlkPhos  126<H>      PT/INR - ( 23 Dec 2023 22:37 )   PT: 11.7 sec;   INR: 1.04 ratio         PTT - ( 23 Dec 2023 22:37 )  PTT:27.6 sec          Urinalysis Basic - ( 23 Dec 2023 23:20 )    Color: Yellow / Appearance: Clear / S.049 / pH: x  Gluc: x / Ketone: Negative mg/dL  / Bili: Negative / Urobili: 0.2 mg/dL   Blood: x / Protein: Negative mg/dL / Nitrite: Negative   Leuk Esterase: Negative / RBC: x / WBC x   Sq Epi: x / Non Sq Epi: x / Bacteria: x     [facility]  Liza Lazaro MD  Hospitalist   Pager [phone]  Avaliable via MS teams     SUBJECTIVE / OVERNIGHT EVENTS: Pt seen and examined patient Aox1-2 (knows name, states he is at an institution), denies any acute complaints.    Spoke to son over phone, as per son had episode of vomiting last night, has cold/cough for the past 3 months. As per son, he has poor memory due to dementia for years.     ADDITIONAL REVIEW OF SYSTEMS:    MEDICATIONS  (STANDING):  apixaban 5 milliGRAM(s) Oral every 12 hours  atorvastatin 40 milliGRAM(s) Oral at bedtime  influenza  Vaccine (HIGH DOSE) 0.7 milliLiter(s) IntraMuscular once  lactated ringers. 1000 milliLiter(s) (90 mL/Hr) IV Continuous <Continuous>    MEDICATIONS  (PRN):  acetaminophen     Tablet .. 650 milliGRAM(s) Oral every 6 hours PRN Temp greater or equal to 38C (100.4F), Mild Pain (1 - 3)  meclizine 12.5 milliGRAM(s) Oral every 12 hours PRN Dizziness      I&O's Summary      PHYSICAL EXAM:  Vital Signs Last 24 Hrs  T(C): 36.3 (24 Dec 2023 02:24), Max: 36.9 (23 Dec 2023 23:15)  T(F): 97.3 (24 Dec 2023 02:24), Max: 98.4 (23 Dec 2023 23:15)  HR: 60 (24 Dec 2023 02:24) (60 - 84)  BP: 125/69 (24 Dec 2023 02:24) (116/61 - 141/80)  BP(mean): --  RR: 17 (24 Dec 2023 02:24) (15 - 17)  SpO2: 100% (24 Dec 2023 02:24) (98% - 100%)    Parameters below as of 24 Dec 2023 02:24  Patient On (Oxygen Delivery Method): room air    GENERAL: NAD, well-developed  HEAD:  Atraumatic, Normocephalic  EYES: EOMI, PERRLA, conjunctiva and sclera clear  NECK: Supple, No JVD  CHEST/LUNG: Clear to auscultation bilaterally; No wheeze  HEART: Regular rate and rhythm; No murmurs, rubs, or gallops  ABDOMEN: Soft, Nontender, Nondistended; Bowel sounds present  : Alexis (unclear when placed)   EXTREMITIES:  2+ Peripheral Pulses, No clubbing, cyanosis, or edema  PSYCH: AAOx2 person and time (as per son this is baseline)  NEUROLOGY: non-focal  SKIN: No rashes or lesions      LABS:                          14.2   7.29  )-----------( 143      ( 23 Dec 2023 21:53 )             43.9         141  |  103  |  14  ----------------------------<  102<H>  4.0   |  28  |  0.89    Ca    9.1      23 Dec 2023 21:53  Phos  3.1       Mg     2.20         TPro  8.0  /  Alb  4.1  /  TBili  0.4  /  DBili  x   /  AST  19  /  ALT  20  /  AlkPhos  126<H>      PT/INR - ( 23 Dec 2023 22:37 )   PT: 11.7 sec;   INR: 1.04 ratio         PTT - ( 23 Dec 2023 22:37 )  PTT:27.6 sec                Urinalysis Basic - ( 23 Dec 2023 23:20 )    Color: Yellow / Appearance: Clear / S.049 / pH: x  Gluc: x / Ketone: Negative mg/dL  / Bili: Negative / Urobili: 0.2 mg/dL   Blood: x / Protein: Negative mg/dL / Nitrite: Negative   Leuk Esterase: Negative / RBC: x / WBC x   Sq Epi: x / Non Sq Epi: x / Bacteria: x     [facility]  Liza Lazaro MD  Hospitalist   Pager [phone]  Avaliable via MS teams     SUBJECTIVE / OVERNIGHT EVENTS: Pt seen and examined patient Aox1-2 (knows name, states he is at an institution), denies any acute complaints.    Spoke to son over phone, as per son had episode of vomiting last night, has cold/cough for the past 3 months. As per son, he has poor memory due to dementia for years.     ADDITIONAL REVIEW OF SYSTEMS:    MEDICATIONS  (STANDING):  apixaban 5 milliGRAM(s) Oral every 12 hours  atorvastatin 40 milliGRAM(s) Oral at bedtime  influenza  Vaccine (HIGH DOSE) 0.7 milliLiter(s) IntraMuscular once  lactated ringers. 1000 milliLiter(s) (90 mL/Hr) IV Continuous <Continuous>    MEDICATIONS  (PRN):  acetaminophen     Tablet .. 650 milliGRAM(s) Oral every 6 hours PRN Temp greater or equal to 38C (100.4F), Mild Pain (1 - 3)  meclizine 12.5 milliGRAM(s) Oral every 12 hours PRN Dizziness      I&O's Summary      PHYSICAL EXAM:  Vital Signs Last 24 Hrs  T(C): 36.3 (24 Dec 2023 02:24), Max: 36.9 (23 Dec 2023 23:15)  T(F): 97.3 (24 Dec 2023 02:24), Max: 98.4 (23 Dec 2023 23:15)  HR: 60 (24 Dec 2023 02:24) (60 - 84)  BP: 125/69 (24 Dec 2023 02:24) (116/61 - 141/80)  BP(mean): --  RR: 17 (24 Dec 2023 02:24) (15 - 17)  SpO2: 100% (24 Dec 2023 02:24) (98% - 100%)    Parameters below as of 24 Dec 2023 02:24  Patient On (Oxygen Delivery Method): room air    GENERAL: NAD, well-developed  HEAD:  Atraumatic, Normocephalic  EYES: EOMI, PERRLA, conjunctiva and sclera clear  NECK: Supple, No JVD  CHEST/LUNG: Clear to auscultation bilaterally; No wheeze  HEART: Regular rate and rhythm; No murmurs, rubs, or gallops  ABDOMEN: Soft, Nontender, Nondistended; Bowel sounds present  : Alexis (unclear when placed)   EXTREMITIES:  2+ Peripheral Pulses, No clubbing, cyanosis, or edema  PSYCH: AAOx2 person and time (as per son this is baseline)  NEUROLOGY: non-focal  SKIN: No rashes or lesions      LABS:                          14.2   7.29  )-----------( 143      ( 23 Dec 2023 21:53 )             43.9         141  |  103  |  14  ----------------------------<  102<H>  4.0   |  28  |  0.89    Ca    9.1      23 Dec 2023 21:53  Phos  3.1       Mg     2.20         TPro  8.0  /  Alb  4.1  /  TBili  0.4  /  DBili  x   /  AST  19  /  ALT  20  /  AlkPhos  126<H>      PT/INR - ( 23 Dec 2023 22:37 )   PT: 11.7 sec;   INR: 1.04 ratio         PTT - ( 23 Dec 2023 22:37 )  PTT:27.6 sec                Urinalysis Basic - ( 23 Dec 2023 23:20 )    Color: Yellow / Appearance: Clear / S.049 / pH: x  Gluc: x / Ketone: Negative mg/dL  / Bili: Negative / Urobili: 0.2 mg/dL   Blood: x / Protein: Negative mg/dL / Nitrite: Negative   Leuk Esterase: Negative / RBC: x / WBC x   Sq Epi: x / Non Sq Epi: x / Bacteria: x

## 2023-12-24 NOTE — PROGRESS NOTE ADULT - PROBLEM SELECTOR PLAN 1
-TME unclear etiology.  -head CT noncontrast negative for ICH, masses, strokes  -u/a negative for UTI  -check procalcitonin, given vomiting can check ct a/p  -low concern for acute stroke, NIH 2, CT head negative appreciate neurology recs  -check RVP, TSH, Folate, B12, homocysteine, methylmalonic acid, lactate, CPK, ammonia As per son patient is Aox2 at baseline, forgets dates often 2/2 dementia   -TME unclear etiology.  -head CT noncontrast negative for ICH, masses, strokes  -u/a negative for UTI  -low concern for acute stroke, NIH 2, CT head negative appreciate neurology recs  -check RVP, TSH, Folate, B12, homocysteine, methylmalonic acid, lactate, CPK, ammonia    -RVP, CXr: clear lungs   -procalcitonin: negative  -given vomiting can check ct a/p

## 2023-12-24 NOTE — PATIENT PROFILE ADULT - FUNCTIONAL ASSESSMENT - BASIC MOBILITY 6.
1-calculated by average/Not able to assess (calculate score using [facility] averaging method)  1-calculated by average/Not able to assess (calculate score using [facility] averaging method)

## 2023-12-24 NOTE — PHYSICAL THERAPY INITIAL EVALUATION ADULT - PERTINENT HX OF CURRENT PROBLEM, REHAB EVAL
88 year old Male with hx stated below, presents with altered mental status and vomiting episodes. Also has had increased cough and sputum production.

## 2023-12-24 NOTE — ED ADULT NURSE REASSESSMENT NOTE - NS ED NURSE REASSESS COMMENT FT1
break coverage rn. received report from RN. pt A&Ox3 (oriented to name, place, president, day of week), vitally stable. endorsing mild HA with cough causing throat discomfort. Denies chest pain, SOB, n/v, dizziness, numbness/tingling to hands/feet. Safety maintained. report given to floor RN.

## 2023-12-24 NOTE — PHARMACOTHERAPY INTERVENTION NOTE - COMMENTS
Medication history is incomplete. Unable to verify patient's medication list with two sources. Discrepancies noted in provider handoff. Please call spectra [phone] if you have any questions.  Medication history is incomplete. Unable to verify patient's medication list with two sources. Discrepancies noted in provider handoff. Please call spectra [phone] if you have any questions.

## 2023-12-24 NOTE — PHYSICAL THERAPY INITIAL EVALUATION ADULT - ADDITIONAL COMMENTS
Pt is a poor historian, please confirm details with family member or Care Coordinator assessment. Pt states he lives in an apartment with Son, +6-8 steps to enter, uses a Single Axis Cane for all mobility, requires assistance from Son for ADLs.     Pt left semi-supine in bed, all lines intact, all needs in reach, bed alarm set, in NAD. QUIRINO Mancuso aware. Heart rate 67 beats per minute.

## 2023-12-24 NOTE — H&P ADULT - NSHPLABSRESULTS_GEN_ALL_CORE
LABS:                         14.2   7.29  )-----------( 143      ( 23 Dec 2023 21:53 )             43.9         141  |  103  |  14  ----------------------------<  102<H>  4.0   |  28  |  0.89    Ca    9.1      23 Dec 2023 21:53  Phos  3.1       Mg     2.20         TPro  8.0  /  Alb  4.1  /  TBili  0.4  /  DBili  x   /  AST  19  /  ALT  20  /  AlkPhos  126<H>      PT/INR - ( 23 Dec 2023 22:37 )   PT: 11.7 sec;   INR: 1.04 ratio         PTT - ( 23 Dec 2023 22:37 )  PTT:27.6 sec  Urinalysis Basic - ( 23 Dec 2023 23:20 )    Color: Yellow / Appearance: Clear / S.049 / pH: x  Gluc: x / Ketone: Negative mg/dL  / Bili: Negative / Urobili: 0.2 mg/dL   Blood: x / Protein: Negative mg/dL / Nitrite: Negative   Leuk Esterase: Negative / RBC: x / WBC x   Sq Epi: x / Non Sq Epi: x / Bacteria: x

## 2023-12-24 NOTE — ED PROVIDER NOTE - OBJECTIVE STATEMENT
88y M w/ past medical history of Afib on Eliquis, GERD, HTN, CVA, vertigo, dementia AAOx1-2 at baseline presents to ED with a chief  of AMS. The patient is not with any family members at bedside and patient not endorsing any symptoms. Per EMS the patient was brought in because he was altered at home, no additional information was given. Additional information was not obtained from family, multiple attempts were made to reach them.  [phone] 88y M w/ past medical history of Afib on Eliquis, GERD, HTN, CVA, vertigo, dementia AAOx1-2 at baseline presents to ED with a chief  of AMS. The patient is not with any family members at bedside and patient not endorsing any symptoms. Per EMS the patient was brought in because he was altered at home, no additional information was given. Additional information was not obtained from family, multiple attempts were made to reach them.  [phone]

## 2023-12-24 NOTE — ED PROVIDER NOTE - PRO INTERPRETER NEED 2
Progress Notes by Tonyn Leonard MD at 12/28/17 10:13 AM     Author:  Tonny Leonard MD Service:  (none) Author Type:  Physician     Filed:  12/28/17 10:14 AM Encounter Date:  12/28/2017 Status:  Signed     :  Tonny Leonard MD (Physician)            CHIEF COMPLAINT   Minh Kenney is a 37 year old male presents to the office for preventive exam.    HISTORY OF PRESENT ILLNESS  Minh Rosales  is concerned about getting cholesterol, glucose testing.     Patient has these medical problems-   Acute URI[SM1.1T] - worse - otc meds not helping.[SM1.1M]        Family History      Problem  Relation Age of Onset   • * Healthy Mother    • * Healthy Father        No past medical history on file.    Past Surgical History:      Procedure  Laterality Date   • EGD COLONOSCOPY-NONDREYER  2014       Social History     Social History      • Marital status:       Spouse name: N/A   • Number of children:  N/A   • Years of education:  N/A     Occupational History     • IT      Social History Main Topics     • Smoking status: Never Smoker   • Smokeless tobacco: Never Used   • Alcohol use No   • Drug use: No   • Sexual activity: Yes     Other Topics  Concern   • .... Medical Equipment .... No     Social History Narrative    • No narrative on file       Current Outpatient Prescriptions     Medication  Sig   • amoxicillin (AMOXIL) 875 MG tablet Take 1 Tab by mouth 2 (two) times daily for 10 days.         ALLERGIES  Review of patient's allergies indicates no known allergies.    ROS:  --General:[SM1.1T] No fever/chills[SM1.1M]  --HENT:[SM1.1T] Pt has problems with SINUSES[SM1.1M]   --Respiratory:[SM1.1T] Pt has problems with COUGHING[SM1.1M]   --Cardiovascular:[SM1.1T]No chest discomfort[SM1.1M]  --Gastrointestinal:[SM1.1T] No abdominal pain[SM1.1M]  --Genitourinary:[SM1.1T] No dysuria[SM1.1M]  --Musculoskeletal:[SM1.1T] No backpain[SM1.1M]  --Neurologic:[SM1.1T]No syncope[SM1.1M]  --Psychiatric:[SM1.1T]  No depression[SM1.1M]  --Hematologic/Lymphatic/Immunologic:[SM1.1T] No bruising[SM1.1M]  --Endocrine:[SM1.1T] No polydipsia/polyuria[SM1.1M]  --Skin:[SM1.1T] No rash[SM1.1M]        PHYSICAL EXAMINATION  Constitutional: Alert and oriented X 3. Appears not toxic and not dehydrated. Not distressed.    /80  Pulse 72  Temp 96.9 °F (36.1 °C) (Tympanic)  Ht 5' 11\" (1.803 m)  Wt 190 lb (86.2 kg)  SpO2 99%  BMI 26.5 kg/m2  HENT: Head: Normocephalic and atraumatic. Right Ear: External ear is normal. No lesions. Left Ear: External ear is normal. No lesions. Nose: Nasal mucosa and septum is[SM1.1T] congested[SM1.1M]. Inferior Turbinate normal. Mouth/Throat: Oropharynx is[SM1.1T] congested[SM1.1M] and moist. Normal hard and soft palate.    Eyes: Conjunctiva normal, extraocular motions normal. Right eye exhibits no discharge and no scleral icterus.  Left eye exhibits no discharge and no scleral icterus.     Neck: Normal range of motion. Neck supple. No stridor and no tracheal deviation present. No thyromegaly present.   Cardiovascular: Normal rate, regular rhythm. S1 S2 normal. No S3. No S4. No murmur heard. No carotid bruit. No JVD present. No pedal edema. Intact distal pulses.   Respiratory: Effort normal. Exhibits no respiratory distress. Percussion normal. No chest tenderness. Auscultation reveals normal breath sounds. No wheezes. No rales.     Abdomen: Soft. Non-tender non-distended. No masses. No hepatosplenomegaly. No rebound and no guarding.  Bowel sounds normal. No aortic bruit.   Musculoskeletal: Gait is normal. No joint effusion or tenderness. Normal muscle tone and strength.  Extremities: No clubbing. No cyanosis.  Lymphatic: No neck adenopathy. No groin node enlargement.   Neurological: Has normal motor skills. Cranial nerves 2-12 are intact. Sensory: Normal to touch. Normal to pressure. Power is normal in bilateral upper and lower extremities.   Skin: Skin is warm and dry. Skin is not pale. Not  diaphoretic.   Psychiatric: Normal mood, affect, and judgment.     Patient declined complete skin and genital exam.    ASSESSMENT/PLAN  Minh Rosales was seen today for yearly exam.    Diagnoses and all orders for this visit:    Annual physical exam  -     CBC WITH DIFF; Future  -     COMPREHENSIVE MET PNL (FAST); Future  -     LIPID PROFILE; Future  -     TSH (WITH REFLEX TO FREE T4; Future  -     HEMOGLOBIN A1C; Future    Acute URI  -     amoxicillin (AMOXIL) 875 MG tablet; Take 1 Tab by mouth 2 (two) times daily for 10 days.          Low fat low cholesterol diet recommended.   Regular exercise recommended.       Does patient exercise?[SM1.1T] No.[SM1.1M]    Was counseling given:[SM1.1T] Yes[SM1.1M]    Depression Screening:  Over the past 2 weeks, has patient felt down, depressed or hopeless?[SM1.1T] No[SM1.1M]  Over the past 2 weeks, has patient felt little interest or pleasure in doing things?[SM1.1T] No[SM1.1M]    On the basis of the above screen, the following is initiated:[SM1.1T]  Normal screen, continue to monitor for symptoms over time[SM1.1M]    Has patient fallen in the last 12 months?[SM1.1T] No.  Patient is not considered to be at increased risk for falls.[SM1.1M]    Minh Rosales's BMI is 26.5, which is[SM1.1T] within normal parameters.(Ages 18-64 >/= 18.5 and <25; Over age 65 >/= 23 and <30)[SM1.1M]    Does patient smoke:[SM1.1T] No[SM1.1M]       Self Exams recommended.    FU in 12 months or as needed.    Patient expresses understanding and agrees with the plan.    Electronically Signed by:    Tonny Leonard MD , 12/28/2017[SM1.1T]        Revision History        User Key Date/Time User Provider Type Action    > SM1.1 12/28/17 10:14 AM Tonny Leonard MD Physician Sign    M - Manual, T - Template             English

## 2023-12-24 NOTE — H&P ADULT - NSHPPHYSICALEXAM_GEN_ALL_CORE
PHYSICAL EXAM:  GENERAL: NAD, well-developed  HEAD:  Atraumatic, Normocephalic  EYES: EOMI, PERRLA, conjunctiva and sclera clear  NECK: Supple, No JVD  CHEST/LUNG: Clear to auscultation bilaterally; No wheeze  HEART: Regular rate and rhythm; No murmurs, rubs, or gallops  ABDOMEN: Soft, Nontender, Nondistended; Bowel sounds present  EXTREMITIES:  2+ Peripheral Pulses, No clubbing, cyanosis, or edema  PSYCH: AAOx2 person and time  NEUROLOGY: non-focal  SKIN: No rashes or lesions

## 2023-12-24 NOTE — ED PROVIDER NOTE - CLINICAL SUMMARY MEDICAL DECISION MAKING FREE TEXT BOX
88y M w/ past medical history of Afib on Eliquis, GERD, HTN, CVA, vertigo, dementia AAOx1-2 at baseline presents to ED with a chief of AMS. The patient is not with any family members at bedside and patient not endorsing any symptoms. Per EMS the patient was brought in because he was altered at home, no additional information was given. Additional information was not obtained from family, multiple attempts were made to reach them. VS. non actionable. PE. No acute finding. Differential not AMS secondary to UTI, hematological/electrolyte derangement, CVA, intercranial bleed, subdural hematoma, medication side effect. Stroke code called, patient will also obtain basic labs, UA. Dispo likely admission for AMS.

## 2023-12-25 DIAGNOSIS — R11.10 VOMITING, UNSPECIFIED: ICD-10-CM

## 2023-12-25 LAB
AMMONIA BLD-MCNC: 21 UMOL/L — SIGNIFICANT CHANGE UP (ref 11–55)
AMMONIA BLD-MCNC: 21 UMOL/L — SIGNIFICANT CHANGE UP (ref 11–55)
B PERT DNA SPEC QL NAA+PROBE: SIGNIFICANT CHANGE UP
B PERT DNA SPEC QL NAA+PROBE: SIGNIFICANT CHANGE UP
B PERT+PARAPERT DNA PNL SPEC NAA+PROBE: SIGNIFICANT CHANGE UP
B PERT+PARAPERT DNA PNL SPEC NAA+PROBE: SIGNIFICANT CHANGE UP
BORDETELLA PARAPERTUSSIS (RAPRVP): SIGNIFICANT CHANGE UP
BORDETELLA PARAPERTUSSIS (RAPRVP): SIGNIFICANT CHANGE UP
C PNEUM DNA SPEC QL NAA+PROBE: SIGNIFICANT CHANGE UP
C PNEUM DNA SPEC QL NAA+PROBE: SIGNIFICANT CHANGE UP
CK SERPL-CCNC: 266 U/L — HIGH (ref 30–200)
CK SERPL-CCNC: 266 U/L — HIGH (ref 30–200)
CULTURE RESULTS: SIGNIFICANT CHANGE UP
CULTURE RESULTS: SIGNIFICANT CHANGE UP
FLUAV SUBTYP SPEC NAA+PROBE: SIGNIFICANT CHANGE UP
FLUAV SUBTYP SPEC NAA+PROBE: SIGNIFICANT CHANGE UP
FLUBV RNA SPEC QL NAA+PROBE: SIGNIFICANT CHANGE UP
FLUBV RNA SPEC QL NAA+PROBE: SIGNIFICANT CHANGE UP
FOLATE SERPL-MCNC: >20 NG/ML — HIGH (ref 3.1–17.5)
FOLATE SERPL-MCNC: >20 NG/ML — HIGH (ref 3.1–17.5)
HADV DNA SPEC QL NAA+PROBE: SIGNIFICANT CHANGE UP
HADV DNA SPEC QL NAA+PROBE: SIGNIFICANT CHANGE UP
HCOV 229E RNA SPEC QL NAA+PROBE: SIGNIFICANT CHANGE UP
HCOV 229E RNA SPEC QL NAA+PROBE: SIGNIFICANT CHANGE UP
HCOV HKU1 RNA SPEC QL NAA+PROBE: SIGNIFICANT CHANGE UP
HCOV HKU1 RNA SPEC QL NAA+PROBE: SIGNIFICANT CHANGE UP
HCOV NL63 RNA SPEC QL NAA+PROBE: SIGNIFICANT CHANGE UP
HCOV NL63 RNA SPEC QL NAA+PROBE: SIGNIFICANT CHANGE UP
HCOV OC43 RNA SPEC QL NAA+PROBE: SIGNIFICANT CHANGE UP
HCOV OC43 RNA SPEC QL NAA+PROBE: SIGNIFICANT CHANGE UP
HMPV RNA SPEC QL NAA+PROBE: SIGNIFICANT CHANGE UP
HMPV RNA SPEC QL NAA+PROBE: SIGNIFICANT CHANGE UP
HPIV1 RNA SPEC QL NAA+PROBE: SIGNIFICANT CHANGE UP
HPIV1 RNA SPEC QL NAA+PROBE: SIGNIFICANT CHANGE UP
HPIV2 RNA SPEC QL NAA+PROBE: SIGNIFICANT CHANGE UP
HPIV2 RNA SPEC QL NAA+PROBE: SIGNIFICANT CHANGE UP
HPIV3 RNA SPEC QL NAA+PROBE: SIGNIFICANT CHANGE UP
HPIV3 RNA SPEC QL NAA+PROBE: SIGNIFICANT CHANGE UP
HPIV4 RNA SPEC QL NAA+PROBE: SIGNIFICANT CHANGE UP
HPIV4 RNA SPEC QL NAA+PROBE: SIGNIFICANT CHANGE UP
M PNEUMO DNA SPEC QL NAA+PROBE: SIGNIFICANT CHANGE UP
M PNEUMO DNA SPEC QL NAA+PROBE: SIGNIFICANT CHANGE UP
RAPID RVP RESULT: SIGNIFICANT CHANGE UP
RAPID RVP RESULT: SIGNIFICANT CHANGE UP
RSV RNA SPEC QL NAA+PROBE: SIGNIFICANT CHANGE UP
RSV RNA SPEC QL NAA+PROBE: SIGNIFICANT CHANGE UP
RV+EV RNA SPEC QL NAA+PROBE: SIGNIFICANT CHANGE UP
RV+EV RNA SPEC QL NAA+PROBE: SIGNIFICANT CHANGE UP
SARS-COV-2 RNA SPEC QL NAA+PROBE: SIGNIFICANT CHANGE UP
SARS-COV-2 RNA SPEC QL NAA+PROBE: SIGNIFICANT CHANGE UP
SPECIMEN SOURCE: SIGNIFICANT CHANGE UP
SPECIMEN SOURCE: SIGNIFICANT CHANGE UP
TSH SERPL-MCNC: 1.44 UIU/ML — SIGNIFICANT CHANGE UP (ref 0.27–4.2)
TSH SERPL-MCNC: 1.44 UIU/ML — SIGNIFICANT CHANGE UP (ref 0.27–4.2)
VIT B12 SERPL-MCNC: 676 PG/ML — SIGNIFICANT CHANGE UP (ref 200–900)
VIT B12 SERPL-MCNC: 676 PG/ML — SIGNIFICANT CHANGE UP (ref 200–900)

## 2023-12-25 PROCEDURE — 99232 SBSQ HOSP IP/OBS MODERATE 35: CPT

## 2023-12-25 RX ADMIN — APIXABAN 5 MILLIGRAM(S): 2.5 TABLET, FILM COATED ORAL at 16:42

## 2023-12-25 RX ADMIN — APIXABAN 5 MILLIGRAM(S): 2.5 TABLET, FILM COATED ORAL at 05:20

## 2023-12-25 RX ADMIN — SODIUM CHLORIDE 90 MILLILITER(S): 9 INJECTION, SOLUTION INTRAVENOUS at 00:12

## 2023-12-25 RX ADMIN — SODIUM CHLORIDE 90 MILLILITER(S): 9 INJECTION, SOLUTION INTRAVENOUS at 16:42

## 2023-12-25 RX ADMIN — ATORVASTATIN CALCIUM 40 MILLIGRAM(S): 80 TABLET, FILM COATED ORAL at 21:54

## 2023-12-25 NOTE — PROGRESS NOTE ADULT - SUBJECTIVE AND OBJECTIVE BOX
[facility] Division of Hospital Medicine  Liza Lazaro MD  Hospitalist   Pager [phone]  Avaliable via MS teams     SUBJECTIVE / OVERNIGHT EVENTS: Pt seen and examined patient Aox2, denies any acute complaints. Spoke to patient's son yesterday who stated patient is at his baseline mentation , has poor memory for years, stated came in due to vomiting. As per RN, pt has not ate due to being NPO, failed dysphagia screen, pending S&S to evaluate patient.     ADDITIONAL REVIEW OF SYSTEMS:    MEDICATIONS  (STANDING):  apixaban 5 milliGRAM(s) Oral every 12 hours  atorvastatin 40 milliGRAM(s) Oral at bedtime  influenza  Vaccine (HIGH DOSE) 0.7 milliLiter(s) IntraMuscular once  lactated ringers. 1000 milliLiter(s) (90 mL/Hr) IV Continuous <Continuous>    MEDICATIONS  (PRN):  acetaminophen     Tablet .. 650 milliGRAM(s) Oral every 6 hours PRN Temp greater or equal to 38C (100.4F), Mild Pain (1 - 3)  meclizine 12.5 milliGRAM(s) Oral every 12 hours PRN Dizziness      I&O's Summary      PHYSICAL EXAM:  Vital Signs Last 24 Hrs  T(C): 36.4 (25 Dec 2023 05:00), Max: 36.5 (24 Dec 2023 21:13)  T(F): 97.6 (25 Dec 2023 05:00), Max: 97.7 (24 Dec 2023 21:13)  HR: 66 (25 Dec 2023 05:00) (66 - 68)  BP: 140/85 (25 Dec 2023 05:00) (140/85 - 143/87)  BP(mean): --  RR: 16 (25 Dec 2023 05:00) (16 - 16)  SpO2: 98% (25 Dec 2023 05:00) (98% - 99%)    Parameters below as of 25 Dec 2023 05:00  Patient On (Oxygen Delivery Method): room air      GENERAL: NAD  HEAD:  Atraumatic, Normocephalic  EYES: EOMI, PERRLA, conjunctiva and sclera clear  NECK: Supple, No JVD  CHEST/LUNG: Clear to auscultation bilaterally; No wheeze  HEART: Regular rate and rhythm; No murmurs, rubs, or gallops  ABDOMEN: Soft, Nontender, Nondistended; Bowel sounds present  : Alexis (unclear when placed)   EXTREMITIES:  2+ Peripheral Pulses, No clubbing, cyanosis, or edema  PSYCH: AAOx2 person and time (as per son this is baseline)  NEUROLOGY: non-focal  SKIN: No rashes or lesions        LABS:   @ 05:54 Creatine 266 U/L<H> [30 - 200]                          14.2   7.29  )-----------( 143      ( 23 Dec 2023 21:53 )             43.9         141  |  103  |  14  ----------------------------<  102<H>  4.0   |  28  |  0.89    Ca    9.1      23 Dec 2023 21:53  Phos  3.1       Mg     2.20         TPro  8.0  /  Alb  4.1  /  TBili  0.4  /  DBili  x   /  AST  19  /  ALT  20  /  AlkPhos  126<H>      PT/INR - ( 23 Dec 2023 22:37 )   PT: 11.7 sec;   INR: 1.04 ratio         PTT - ( 23 Dec 2023 22:37 )  PTT:27.6 sec                    Urinalysis Basic - ( 23 Dec 2023 23:20 )    Color: Yellow / Appearance: Clear / S.049 / pH: x  Gluc: x / Ketone: Negative mg/dL  / Bili: Negative / Urobili: 0.2 mg/dL   Blood: x / Protein: Negative mg/dL / Nitrite: Negative   Leuk Esterase: Negative / RBC: x / WBC x   Sq Epi: x / Non Sq Epi: x / Bacteria: x     [facility] Division of Hospital Medicine  Liza Lazaor MD  Hospitalist   Pager [phone]  Avaliable via MS teams     SUBJECTIVE / OVERNIGHT EVENTS: Pt seen and examined patient Aox2, denies any acute complaints. Spoke to patient's son yesterday who stated patient is at his baseline mentation , has poor memory for years, stated came in due to vomiting. As per RN, pt has not ate due to being NPO, failed dysphagia screen, pending S&S to evaluate patient.     ADDITIONAL REVIEW OF SYSTEMS:    MEDICATIONS  (STANDING):  apixaban 5 milliGRAM(s) Oral every 12 hours  atorvastatin 40 milliGRAM(s) Oral at bedtime  influenza  Vaccine (HIGH DOSE) 0.7 milliLiter(s) IntraMuscular once  lactated ringers. 1000 milliLiter(s) (90 mL/Hr) IV Continuous <Continuous>    MEDICATIONS  (PRN):  acetaminophen     Tablet .. 650 milliGRAM(s) Oral every 6 hours PRN Temp greater or equal to 38C (100.4F), Mild Pain (1 - 3)  meclizine 12.5 milliGRAM(s) Oral every 12 hours PRN Dizziness      I&O's Summary      PHYSICAL EXAM:  Vital Signs Last 24 Hrs  T(C): 36.4 (25 Dec 2023 05:00), Max: 36.5 (24 Dec 2023 21:13)  T(F): 97.6 (25 Dec 2023 05:00), Max: 97.7 (24 Dec 2023 21:13)  HR: 66 (25 Dec 2023 05:00) (66 - 68)  BP: 140/85 (25 Dec 2023 05:00) (140/85 - 143/87)  BP(mean): --  RR: 16 (25 Dec 2023 05:00) (16 - 16)  SpO2: 98% (25 Dec 2023 05:00) (98% - 99%)    Parameters below as of 25 Dec 2023 05:00  Patient On (Oxygen Delivery Method): room air      GENERAL: NAD  HEAD:  Atraumatic, Normocephalic  EYES: EOMI, PERRLA, conjunctiva and sclera clear  NECK: Supple, No JVD  CHEST/LUNG: Clear to auscultation bilaterally; No wheeze  HEART: Regular rate and rhythm; No murmurs, rubs, or gallops  ABDOMEN: Soft, Nontender, Nondistended; Bowel sounds present  : Alexis (unclear when placed)   EXTREMITIES:  2+ Peripheral Pulses, No clubbing, cyanosis, or edema  PSYCH: AAOx2 person and time (as per son this is baseline)  NEUROLOGY: non-focal  SKIN: No rashes or lesions        LABS:   @ 05:54 Creatine 266 U/L<H> [30 - 200]                          14.2   7.29  )-----------( 143      ( 23 Dec 2023 21:53 )             43.9         141  |  103  |  14  ----------------------------<  102<H>  4.0   |  28  |  0.89    Ca    9.1      23 Dec 2023 21:53  Phos  3.1       Mg     2.20         TPro  8.0  /  Alb  4.1  /  TBili  0.4  /  DBili  x   /  AST  19  /  ALT  20  /  AlkPhos  126<H>      PT/INR - ( 23 Dec 2023 22:37 )   PT: 11.7 sec;   INR: 1.04 ratio         PTT - ( 23 Dec 2023 22:37 )  PTT:27.6 sec                    Urinalysis Basic - ( 23 Dec 2023 23:20 )    Color: Yellow / Appearance: Clear / S.049 / pH: x  Gluc: x / Ketone: Negative mg/dL  / Bili: Negative / Urobili: 0.2 mg/dL   Blood: x / Protein: Negative mg/dL / Nitrite: Negative   Leuk Esterase: Negative / RBC: x / WBC x   Sq Epi: x / Non Sq Epi: x / Bacteria: x

## 2023-12-25 NOTE — PROGRESS NOTE ADULT - PROBLEM SELECTOR PLAN 1
As per son patient is Aox2 at baseline, forgets dates often 2/2 dementia   -TME unclear etiology.  -head CT noncontrast negative for ICH, masses, strokes  -u/a negative for UTI  -low concern for acute stroke, NIH 2, CT head negative appreciate neurology recs  -check RVP, TSH, Folate, B12, CPK, ammonia: negative workup

## 2023-12-25 NOTE — PROGRESS NOTE ADULT - PROBLEM SELECTOR PLAN 6
F-90 cc/hr LR  E-replete prn  N-NPO pending S/S eval  eliquis 5 mg BID    Udpated patients son on 12/24

## 2023-12-26 DIAGNOSIS — R13.10 DYSPHAGIA, UNSPECIFIED: ICD-10-CM

## 2023-12-26 DIAGNOSIS — Z20.828 CONTACT WITH AND (SUSPECTED) EXPOSURE TO OTHER VIRAL COMMUNICABLE DISEASES: ICD-10-CM

## 2023-12-26 DIAGNOSIS — Z29.9 ENCOUNTER FOR PROPHYLACTIC MEASURES, UNSPECIFIED: ICD-10-CM

## 2023-12-26 LAB
ANION GAP SERPL CALC-SCNC: 15 MMOL/L — HIGH (ref 7–14)
ANION GAP SERPL CALC-SCNC: 15 MMOL/L — HIGH (ref 7–14)
BUN SERPL-MCNC: 14 MG/DL — SIGNIFICANT CHANGE UP (ref 7–23)
BUN SERPL-MCNC: 14 MG/DL — SIGNIFICANT CHANGE UP (ref 7–23)
CALCIUM SERPL-MCNC: 9 MG/DL — SIGNIFICANT CHANGE UP (ref 8.4–10.5)
CALCIUM SERPL-MCNC: 9 MG/DL — SIGNIFICANT CHANGE UP (ref 8.4–10.5)
CHLORIDE SERPL-SCNC: 100 MMOL/L — SIGNIFICANT CHANGE UP (ref 98–107)
CHLORIDE SERPL-SCNC: 100 MMOL/L — SIGNIFICANT CHANGE UP (ref 98–107)
CO2 SERPL-SCNC: 23 MMOL/L — SIGNIFICANT CHANGE UP (ref 22–31)
CO2 SERPL-SCNC: 23 MMOL/L — SIGNIFICANT CHANGE UP (ref 22–31)
CREAT SERPL-MCNC: 0.77 MG/DL — SIGNIFICANT CHANGE UP (ref 0.5–1.3)
CREAT SERPL-MCNC: 0.77 MG/DL — SIGNIFICANT CHANGE UP (ref 0.5–1.3)
EGFR: 87 ML/MIN/1.73M2 — SIGNIFICANT CHANGE UP
EGFR: 87 ML/MIN/1.73M2 — SIGNIFICANT CHANGE UP
GLUCOSE BLDC GLUCOMTR-MCNC: 103 MG/DL — HIGH (ref 70–99)
GLUCOSE BLDC GLUCOMTR-MCNC: 103 MG/DL — HIGH (ref 70–99)
GLUCOSE BLDC GLUCOMTR-MCNC: 127 MG/DL — HIGH (ref 70–99)
GLUCOSE BLDC GLUCOMTR-MCNC: 127 MG/DL — HIGH (ref 70–99)
GLUCOSE BLDC GLUCOMTR-MCNC: 59 MG/DL — LOW (ref 70–99)
GLUCOSE BLDC GLUCOMTR-MCNC: 59 MG/DL — LOW (ref 70–99)
GLUCOSE BLDC GLUCOMTR-MCNC: 62 MG/DL — LOW (ref 70–99)
GLUCOSE BLDC GLUCOMTR-MCNC: 62 MG/DL — LOW (ref 70–99)
GLUCOSE SERPL-MCNC: 59 MG/DL — LOW (ref 70–99)
GLUCOSE SERPL-MCNC: 59 MG/DL — LOW (ref 70–99)
HCT VFR BLD CALC: 44.9 % — SIGNIFICANT CHANGE UP (ref 39–50)
HCT VFR BLD CALC: 44.9 % — SIGNIFICANT CHANGE UP (ref 39–50)
HGB BLD-MCNC: 15.1 G/DL — SIGNIFICANT CHANGE UP (ref 13–17)
HGB BLD-MCNC: 15.1 G/DL — SIGNIFICANT CHANGE UP (ref 13–17)
MAGNESIUM SERPL-MCNC: 1.9 MG/DL — SIGNIFICANT CHANGE UP (ref 1.6–2.6)
MAGNESIUM SERPL-MCNC: 1.9 MG/DL — SIGNIFICANT CHANGE UP (ref 1.6–2.6)
MCHC RBC-ENTMCNC: 30.1 PG — SIGNIFICANT CHANGE UP (ref 27–34)
MCHC RBC-ENTMCNC: 30.1 PG — SIGNIFICANT CHANGE UP (ref 27–34)
MCHC RBC-ENTMCNC: 33.6 GM/DL — SIGNIFICANT CHANGE UP (ref 32–36)
MCHC RBC-ENTMCNC: 33.6 GM/DL — SIGNIFICANT CHANGE UP (ref 32–36)
MCV RBC AUTO: 89.4 FL — SIGNIFICANT CHANGE UP (ref 80–100)
MCV RBC AUTO: 89.4 FL — SIGNIFICANT CHANGE UP (ref 80–100)
NRBC # BLD: 0 /100 WBCS — SIGNIFICANT CHANGE UP (ref 0–0)
NRBC # BLD: 0 /100 WBCS — SIGNIFICANT CHANGE UP (ref 0–0)
NRBC # FLD: 0 K/UL — SIGNIFICANT CHANGE UP (ref 0–0)
NRBC # FLD: 0 K/UL — SIGNIFICANT CHANGE UP (ref 0–0)
PHOSPHATE SERPL-MCNC: 3.3 MG/DL — SIGNIFICANT CHANGE UP (ref 2.5–4.5)
PHOSPHATE SERPL-MCNC: 3.3 MG/DL — SIGNIFICANT CHANGE UP (ref 2.5–4.5)
PLATELET # BLD AUTO: 152 K/UL — SIGNIFICANT CHANGE UP (ref 150–400)
PLATELET # BLD AUTO: 152 K/UL — SIGNIFICANT CHANGE UP (ref 150–400)
POTASSIUM SERPL-MCNC: 4 MMOL/L — SIGNIFICANT CHANGE UP (ref 3.5–5.3)
POTASSIUM SERPL-MCNC: 4 MMOL/L — SIGNIFICANT CHANGE UP (ref 3.5–5.3)
POTASSIUM SERPL-SCNC: 4 MMOL/L — SIGNIFICANT CHANGE UP (ref 3.5–5.3)
POTASSIUM SERPL-SCNC: 4 MMOL/L — SIGNIFICANT CHANGE UP (ref 3.5–5.3)
RBC # BLD: 5.02 M/UL — SIGNIFICANT CHANGE UP (ref 4.2–5.8)
RBC # BLD: 5.02 M/UL — SIGNIFICANT CHANGE UP (ref 4.2–5.8)
RBC # FLD: 13.3 % — SIGNIFICANT CHANGE UP (ref 10.3–14.5)
RBC # FLD: 13.3 % — SIGNIFICANT CHANGE UP (ref 10.3–14.5)
SODIUM SERPL-SCNC: 138 MMOL/L — SIGNIFICANT CHANGE UP (ref 135–145)
SODIUM SERPL-SCNC: 138 MMOL/L — SIGNIFICANT CHANGE UP (ref 135–145)
WBC # BLD: 7.47 K/UL — SIGNIFICANT CHANGE UP (ref 3.8–10.5)
WBC # BLD: 7.47 K/UL — SIGNIFICANT CHANGE UP (ref 3.8–10.5)
WBC # FLD AUTO: 7.47 K/UL — SIGNIFICANT CHANGE UP (ref 3.8–10.5)
WBC # FLD AUTO: 7.47 K/UL — SIGNIFICANT CHANGE UP (ref 3.8–10.5)

## 2023-12-26 PROCEDURE — 99232 SBSQ HOSP IP/OBS MODERATE 35: CPT

## 2023-12-26 RX ORDER — APIXABAN 2.5 MG/1
2.5 TABLET, FILM COATED ORAL EVERY 12 HOURS
Refills: 0 | Status: DISCONTINUED | OUTPATIENT
Start: 2023-12-26 | End: 2023-12-28

## 2023-12-26 RX ORDER — SODIUM CHLORIDE 9 MG/ML
1000 INJECTION, SOLUTION INTRAVENOUS
Refills: 0 | Status: DISCONTINUED | OUTPATIENT
Start: 2023-12-26 | End: 2023-12-27

## 2023-12-26 RX ADMIN — ATORVASTATIN CALCIUM 40 MILLIGRAM(S): 80 TABLET, FILM COATED ORAL at 21:53

## 2023-12-26 RX ADMIN — Medication 650 MILLIGRAM(S): at 07:33

## 2023-12-26 RX ADMIN — SODIUM CHLORIDE 90 MILLILITER(S): 9 INJECTION, SOLUTION INTRAVENOUS at 15:40

## 2023-12-26 RX ADMIN — Medication 650 MILLIGRAM(S): at 18:42

## 2023-12-26 RX ADMIN — APIXABAN 5 MILLIGRAM(S): 2.5 TABLET, FILM COATED ORAL at 05:27

## 2023-12-26 RX ADMIN — Medication 650 MILLIGRAM(S): at 17:42

## 2023-12-26 RX ADMIN — SODIUM CHLORIDE 90 MILLILITER(S): 9 INJECTION, SOLUTION INTRAVENOUS at 11:56

## 2023-12-26 RX ADMIN — Medication 30 MILLIGRAM(S): at 17:43

## 2023-12-26 RX ADMIN — Medication 650 MILLIGRAM(S): at 06:33

## 2023-12-26 RX ADMIN — APIXABAN 2.5 MILLIGRAM(S): 2.5 TABLET, FILM COATED ORAL at 17:43

## 2023-12-26 RX ADMIN — SODIUM CHLORIDE 90 MILLILITER(S): 9 INJECTION, SOLUTION INTRAVENOUS at 17:43

## 2023-12-26 NOTE — PROGRESS NOTE ADULT - SUBJECTIVE AND OBJECTIVE BOX
PROGRESS NOTE:     Patient is a 88y old  Male who presents with a chief complaint of TME (25 Dec 2023 15:01)      SUBJECTIVE / OVERNIGHT EVENTS: Vomiting resolved. No abd pain. Pt c/o cough.     ADDITIONAL REVIEW OF SYSTEMS:    MEDICATIONS  (STANDING):  apixaban 2.5 milliGRAM(s) Oral every 12 hours  atorvastatin 40 milliGRAM(s) Oral at bedtime  influenza  Vaccine (HIGH DOSE) 0.7 milliLiter(s) IntraMuscular once  lactated ringers. 1000 milliLiter(s) (90 mL/Hr) IV Continuous <Continuous>    MEDICATIONS  (PRN):  acetaminophen     Tablet .. 650 milliGRAM(s) Oral every 6 hours PRN Temp greater or equal to 38C (100.4F), Mild Pain (1 - 3)  meclizine 12.5 milliGRAM(s) Oral every 12 hours PRN Dizziness      CAPILLARY BLOOD GLUCOSE        I&O's Summary    25 Dec 2023 07:01  -  26 Dec 2023 07:00  --------------------------------------------------------  IN: 0 mL / OUT: 1125 mL / NET: -1125 mL        PHYSICAL EXAM:  Vital Signs Last 24 Hrs  T(C): 36.4 (26 Dec 2023 05:15), Max: 36.6 (25 Dec 2023 16:27)  T(F): 97.6 (26 Dec 2023 05:15), Max: 97.8 (25 Dec 2023 16:27)  HR: 87 (26 Dec 2023 05:15) (66 - 87)  BP: 146/81 (26 Dec 2023 05:15) (125/70 - 146/81)  BP(mean): --  RR: 18 (26 Dec 2023 05:15) (17 - 18)  SpO2: 100% (26 Dec 2023 05:15) (100% - 100%)    Parameters below as of 26 Dec 2023 05:15  Patient On (Oxygen Delivery Method): room air      GENERAL: NAD  EYES: EOMI, PERRLA, conjunctiva and sclera clear  NECK: Supple, No JVD  CHEST/LUNG: Clear to auscultation bilaterally; No wheeze  HEART: Regular rate and rhythm; No murmurs, rubs, or gallops  ABDOMEN: Soft, Nontender, Nondistended; Bowel sounds present  : Alexis (unclear when placed)   EXTREMITIES:  2+ Peripheral Pulses, No clubbing, cyanosis, or edema  PSYCH: AAOx2 person and place (as per son this is baseline)  NEUROLOGY: non-focal  SKIN: No rashes or lesions    LABS:            CARDIAC MARKERS ( 25 Dec 2023 05:54 )  x     / x     / 266 U/L / x     / x              Culture - Urine (collected 24 Dec 2023 00:02)  Source: Clean Catch Clean Catch (Midstream)  Final Report (25 Dec 2023 11:40):    <10,000 CFU/mL Normal Urogenital Lashae        RADIOLOGY & ADDITIONAL TESTS:  Results Reviewed:   Imaging Personally Reviewed:  Electrocardiogram Personally Reviewed:    COORDINATION OF CARE:  Care Discussed with Consultants/Other Providers [Y/N]:  Prior or Outpatient Records Reviewed [Y/N]:

## 2023-12-26 NOTE — PROVIDER CONTACT NOTE (HYPOGLYCEMIA EVENT) - NS PROVIDER CONTACT BACKGROUND-HYPO
Age: 88y    Gender: Male    POCT Blood Glucose:  103 mg/dL (12-26-23 @ 16:08)  59 mg/dL (12-26-23 @ 15:22)  62 mg/dL (12-26-23 @ 15:20)      eMAR:atorvastatin   40 milliGRAM(s) Oral (12-25-23 @ 21:54)      Pt NPO pending speech and swallow

## 2023-12-26 NOTE — PROVIDER CONTACT NOTE (HYPOGLYCEMIA EVENT) - NS PROVIDER CONTACT NOTE-TREATMENT-HYPO
IV dextrose 5% started as per orders/Other (Specify) IV dextrose 5% started as per orders  stat labs sent as per orders/Other (Specify)

## 2023-12-26 NOTE — PROGRESS NOTE ADULT - PROBLEM SELECTOR PLAN 1
As per son patient is Aox2 at baseline, forgets dates often 2/2 dementia   -TME unclear etiology, but now resolved   -head CT noncontrast negative for ICH, masses, strokes  -u/a and urine culture negative for UTI  -RVP neg  -TSH, Folate, B12, ammonia wnl

## 2023-12-27 LAB
ANION GAP SERPL CALC-SCNC: 13 MMOL/L — SIGNIFICANT CHANGE UP (ref 7–14)
ANION GAP SERPL CALC-SCNC: 13 MMOL/L — SIGNIFICANT CHANGE UP (ref 7–14)
BUN SERPL-MCNC: 11 MG/DL — SIGNIFICANT CHANGE UP (ref 7–23)
BUN SERPL-MCNC: 11 MG/DL — SIGNIFICANT CHANGE UP (ref 7–23)
CALCIUM SERPL-MCNC: 9.3 MG/DL — SIGNIFICANT CHANGE UP (ref 8.4–10.5)
CALCIUM SERPL-MCNC: 9.3 MG/DL — SIGNIFICANT CHANGE UP (ref 8.4–10.5)
CHLORIDE SERPL-SCNC: 99 MMOL/L — SIGNIFICANT CHANGE UP (ref 98–107)
CHLORIDE SERPL-SCNC: 99 MMOL/L — SIGNIFICANT CHANGE UP (ref 98–107)
CO2 SERPL-SCNC: 26 MMOL/L — SIGNIFICANT CHANGE UP (ref 22–31)
CO2 SERPL-SCNC: 26 MMOL/L — SIGNIFICANT CHANGE UP (ref 22–31)
CREAT SERPL-MCNC: 0.83 MG/DL — SIGNIFICANT CHANGE UP (ref 0.5–1.3)
CREAT SERPL-MCNC: 0.83 MG/DL — SIGNIFICANT CHANGE UP (ref 0.5–1.3)
EGFR: 85 ML/MIN/1.73M2 — SIGNIFICANT CHANGE UP
EGFR: 85 ML/MIN/1.73M2 — SIGNIFICANT CHANGE UP
GLUCOSE BLDC GLUCOMTR-MCNC: 123 MG/DL — HIGH (ref 70–99)
GLUCOSE BLDC GLUCOMTR-MCNC: 123 MG/DL — HIGH (ref 70–99)
GLUCOSE SERPL-MCNC: 101 MG/DL — HIGH (ref 70–99)
GLUCOSE SERPL-MCNC: 101 MG/DL — HIGH (ref 70–99)
HCT VFR BLD CALC: 46.7 % — SIGNIFICANT CHANGE UP (ref 39–50)
HCT VFR BLD CALC: 46.7 % — SIGNIFICANT CHANGE UP (ref 39–50)
HGB BLD-MCNC: 15.9 G/DL — SIGNIFICANT CHANGE UP (ref 13–17)
HGB BLD-MCNC: 15.9 G/DL — SIGNIFICANT CHANGE UP (ref 13–17)
MAGNESIUM SERPL-MCNC: 2.1 MG/DL — SIGNIFICANT CHANGE UP (ref 1.6–2.6)
MAGNESIUM SERPL-MCNC: 2.1 MG/DL — SIGNIFICANT CHANGE UP (ref 1.6–2.6)
MCHC RBC-ENTMCNC: 30.5 PG — SIGNIFICANT CHANGE UP (ref 27–34)
MCHC RBC-ENTMCNC: 30.5 PG — SIGNIFICANT CHANGE UP (ref 27–34)
MCHC RBC-ENTMCNC: 34 GM/DL — SIGNIFICANT CHANGE UP (ref 32–36)
MCHC RBC-ENTMCNC: 34 GM/DL — SIGNIFICANT CHANGE UP (ref 32–36)
MCV RBC AUTO: 89.6 FL — SIGNIFICANT CHANGE UP (ref 80–100)
MCV RBC AUTO: 89.6 FL — SIGNIFICANT CHANGE UP (ref 80–100)
NRBC # BLD: 0 /100 WBCS — SIGNIFICANT CHANGE UP (ref 0–0)
NRBC # BLD: 0 /100 WBCS — SIGNIFICANT CHANGE UP (ref 0–0)
NRBC # FLD: 0 K/UL — SIGNIFICANT CHANGE UP (ref 0–0)
NRBC # FLD: 0 K/UL — SIGNIFICANT CHANGE UP (ref 0–0)
PHOSPHATE SERPL-MCNC: 3.2 MG/DL — SIGNIFICANT CHANGE UP (ref 2.5–4.5)
PHOSPHATE SERPL-MCNC: 3.2 MG/DL — SIGNIFICANT CHANGE UP (ref 2.5–4.5)
PLATELET # BLD AUTO: 163 K/UL — SIGNIFICANT CHANGE UP (ref 150–400)
PLATELET # BLD AUTO: 163 K/UL — SIGNIFICANT CHANGE UP (ref 150–400)
POTASSIUM SERPL-MCNC: 3.5 MMOL/L — SIGNIFICANT CHANGE UP (ref 3.5–5.3)
POTASSIUM SERPL-MCNC: 3.5 MMOL/L — SIGNIFICANT CHANGE UP (ref 3.5–5.3)
POTASSIUM SERPL-SCNC: 3.5 MMOL/L — SIGNIFICANT CHANGE UP (ref 3.5–5.3)
POTASSIUM SERPL-SCNC: 3.5 MMOL/L — SIGNIFICANT CHANGE UP (ref 3.5–5.3)
RBC # BLD: 5.21 M/UL — SIGNIFICANT CHANGE UP (ref 4.2–5.8)
RBC # BLD: 5.21 M/UL — SIGNIFICANT CHANGE UP (ref 4.2–5.8)
RBC # FLD: 13.3 % — SIGNIFICANT CHANGE UP (ref 10.3–14.5)
RBC # FLD: 13.3 % — SIGNIFICANT CHANGE UP (ref 10.3–14.5)
SODIUM SERPL-SCNC: 138 MMOL/L — SIGNIFICANT CHANGE UP (ref 135–145)
SODIUM SERPL-SCNC: 138 MMOL/L — SIGNIFICANT CHANGE UP (ref 135–145)
WBC # BLD: 6.05 K/UL — SIGNIFICANT CHANGE UP (ref 3.8–10.5)
WBC # BLD: 6.05 K/UL — SIGNIFICANT CHANGE UP (ref 3.8–10.5)
WBC # FLD AUTO: 6.05 K/UL — SIGNIFICANT CHANGE UP (ref 3.8–10.5)
WBC # FLD AUTO: 6.05 K/UL — SIGNIFICANT CHANGE UP (ref 3.8–10.5)

## 2023-12-27 PROCEDURE — 99232 SBSQ HOSP IP/OBS MODERATE 35: CPT

## 2023-12-27 RX ADMIN — ATORVASTATIN CALCIUM 40 MILLIGRAM(S): 80 TABLET, FILM COATED ORAL at 21:40

## 2023-12-27 RX ADMIN — APIXABAN 2.5 MILLIGRAM(S): 2.5 TABLET, FILM COATED ORAL at 04:44

## 2023-12-27 RX ADMIN — APIXABAN 2.5 MILLIGRAM(S): 2.5 TABLET, FILM COATED ORAL at 17:24

## 2023-12-27 RX ADMIN — Medication 30 MILLIGRAM(S): at 17:24

## 2023-12-27 NOTE — SWALLOW BEDSIDE ASSESSMENT ADULT - SWALLOW EVAL: DIAGNOSIS
1. Mild oral dysphagia for soft and bite-sized solids characterized by adequate acceptance and containment, slow mastication/ gumming of soft and bite-sized solids, slow anterior to posterior transport with adequate oral clearance. 2. Functional oral stage for puree, moderately thick liquids, mildly thick liquids and thin liquids characterized by adequate acceptance and containment, adequate anterior to posterior transport with adequate oral clearance. 3. Functional pharyngeal stage suspected for puree, soft and bite-sized solids, moderately thick liquids, and mildly thick liquids characterized by initiation of the pharyngeal swallow and hyolaryngeal excursion upon digital palpation with no overt s/s penetration/ aspiration noted. 4. Moderate pharyngeal dysphagia suspected for thin liquids characterized by initiation of the pharyngeal swallow and hyolarygeal excursion upon digital palpation with a delayed cough response noted post swallow.

## 2023-12-27 NOTE — DIETITIAN INITIAL EVALUATION ADULT - NSFNSPHYEXAMSKINFT_GEN_A_CORE
No pressure ulcers/injuries documented per RN flowsheets  Complex Repair And Graft Additional Text (Will Appearing After The Standard Complex Repair Text): The complex repair was not sufficient to completely close the primary defect. The remaining additional defect was repaired with the graft mentioned below.

## 2023-12-27 NOTE — DIETITIAN INITIAL EVALUATION ADULT - PERTINENT MEDS FT
MEDICATIONS  (STANDING):  apixaban 2.5 milliGRAM(s) Oral every 12 hours  atorvastatin 40 milliGRAM(s) Oral at bedtime  influenza  Vaccine (HIGH DOSE) 0.7 milliLiter(s) IntraMuscular once  oseltamivir 30 milliGRAM(s) Oral every 24 hours    MEDICATIONS  (PRN):  acetaminophen     Tablet .. 650 milliGRAM(s) Oral every 6 hours PRN Temp greater or equal to 38C (100.4F), Mild Pain (1 - 3)  meclizine 12.5 milliGRAM(s) Oral every 12 hours PRN Dizziness

## 2023-12-27 NOTE — PROGRESS NOTE ADULT - PROBLEM SELECTOR PROBLEM 4
H/O: CVA (cerebrovascular accident)
H/O: CVA (cerebrovascular accident)
HLD (hyperlipidemia)
H/O: CVA (cerebrovascular accident)

## 2023-12-27 NOTE — DIETITIAN INITIAL EVALUATION ADULT - OTHER INFO
Noted SLP evaluation 12/27, recommendations made for minced and moist textures, mildly thickened liquids. Patient had been ordered for pureed textures, mildly thick liquids this AM at time of writer's visit. Diet has since been an advanced in-line with SLP recommendations. Patient reports a good appetite and PO intake at meals during course of admission. No report of chewing or swallowing difficulty on altered textures/consistencies. No report of GI distress (nausea, vomiting, diarrhea, constipation). Last BM 12/24/23 per RN flowsheet documentation. Not noted to be on a bowel regimen.     Writer provided verbal education regarding current diet order (pureed, mildly thick at time of writer's visit). Patient verbalized understanding to the discussion.

## 2023-12-27 NOTE — DIETITIAN INITIAL EVALUATION ADULT - ORAL INTAKE PTA/DIET HISTORY
Patient seen at bedside this AM by writer, limited information obtained. Patient reports no known food allergies or food intolerances. Visually observed patient lacks some dentition. Patient reports chewing and swallowing difficulty at baseline. Patient reports a good appetite at baseline, no further details.    Patient unsure of usual body weight or recent weight trends.   [facility]. [facility] noted the following weight history: 47kg (2/1), 47kg (1/31)  Objective weight measurements suggest 3.3kg (7%) weight gain x8 months period of time Patient seen at bedside this AM by writer, limited information obtained. Patient reports no known food allergies or food intolerances. Visually observed patient lacks some dentition. Patient reports chewing and swallowing difficulty at baseline. Patient reports a good appetite at baseline, no further details.    Patient unsure of usual body weight or recent weight trends.   [facility] noted the following weight history: 47kg (2/1), 47kg (1/31)  Objective weight measurements suggest 3.3kg (7%) weight gain x8 months period of time

## 2023-12-27 NOTE — DIETITIAN INITIAL EVALUATION ADULT - ORAL NUTRITION SUPPLEMENTS
Recommend Magic Cup (provides 290kcal, 9gms protein per serving) BID to supplement intake ( Food and Nutrition Department will provide )

## 2023-12-27 NOTE — PROGRESS NOTE ADULT - PROBLEM SELECTOR PLAN 2
-eliquis 5 mg BID
Vomiting as per son, due to possible viral gastroenteritis and now resolved    -tolerating PO   -RVP neg   -procalcitonin: negative  -No indication for CT A/P at this time
Vomiting as per son   -RVP neg   -procalcitonin: negative  -f/up CT A/P
#Vomiting as per son   -RVP, CXr: clear lungs   -procalcitonin: negative  -given vomiting ct a/p ordered on admission- follow up , can dc if patient tolerating diet and no n/v   currently NPO , pending S&S eval

## 2023-12-27 NOTE — SWALLOW BEDSIDE ASSESSMENT ADULT - CONSISTENCIES ADMINISTERED
2 oz/mildly thick 3 tsp trials/soft & bite-sized 5 tsp trials/moderately thick 5 tsp trials/pureed 4 oz/thin liquid

## 2023-12-27 NOTE — DIETITIAN NUTRITION RISK NOTIFICATION - ADDITIONAL COMMENTS/DIETITIAN RECOMMENDATIONS
Please see Dietitian Initial Assessment for complete recommendations.     Ingrid Estrada MS RDN CDN  On [employer] Teams, Pager #[phone]  Please see Dietitian Initial Assessment for complete recommendations.     Ingrid Estrada MS RDN CDN  On [employer] Teams, Pager #[phone]

## 2023-12-27 NOTE — DIETITIAN INITIAL EVALUATION ADULT - REASON FOR ADMISSION
Altered mental status    Patient is an 88y Male with PMH atrial fibrillation, HTN, CVA, vertigo, dementia who presents to [facility] with vomiting episodes. Also has had increased cough and sputum production. Altered mental status    Patient is an 88y Male with PMH atrial fibrillation, HTN, CVA, vertigo, dementia who presents to [facility] with vomiting episodes. Also has had increased cough and sputum production.

## 2023-12-27 NOTE — PROGRESS NOTE ADULT - SUBJECTIVE AND OBJECTIVE BOX
PROGRESS NOTE:     Patient is a 88y old  Male who presents with a chief complaint of TME (26 Dec 2023 11:49)      SUBJECTIVE / OVERNIGHT EVENTS: No acute events.     ADDITIONAL REVIEW OF SYSTEMS:    MEDICATIONS  (STANDING):  apixaban 2.5 milliGRAM(s) Oral every 12 hours  atorvastatin 40 milliGRAM(s) Oral at bedtime  influenza  Vaccine (HIGH DOSE) 0.7 milliLiter(s) IntraMuscular once  oseltamivir 30 milliGRAM(s) Oral every 24 hours    MEDICATIONS  (PRN):  acetaminophen     Tablet .. 650 milliGRAM(s) Oral every 6 hours PRN Temp greater or equal to 38C (100.4F), Mild Pain (1 - 3)  meclizine 12.5 milliGRAM(s) Oral every 12 hours PRN Dizziness      CAPILLARY BLOOD GLUCOSE      POCT Blood Glucose.: 127 mg/dL (26 Dec 2023 18:52)  POCT Blood Glucose.: 103 mg/dL (26 Dec 2023 16:08)  POCT Blood Glucose.: 59 mg/dL (26 Dec 2023 15:22)  POCT Blood Glucose.: 62 mg/dL (26 Dec 2023 15:20)    I&O's Summary    26 Dec 2023 07:01  -  27 Dec 2023 07:00  --------------------------------------------------------  IN: 1080 mL / OUT: 2600 mL / NET: -1520 mL    27 Dec 2023 07:01  -  27 Dec 2023 13:16  --------------------------------------------------------  IN: 330 mL / OUT: 0 mL / NET: 330 mL        PHYSICAL EXAM:  Vital Signs Last 24 Hrs  T(C): 36.7 (27 Dec 2023 04:40), Max: 36.7 (26 Dec 2023 14:33)  T(F): 98.1 (27 Dec 2023 04:40), Max: 98.1 (27 Dec 2023 04:40)  HR: 68 (27 Dec 2023 04:40) (61 - 68)  BP: 121/76 (27 Dec 2023 04:40) (121/76 - 142/84)  BP(mean): --  RR: 17 (27 Dec 2023 04:40) (17 - 17)  SpO2: 97% (27 Dec 2023 04:40) (97% - 100%)    Parameters below as of 27 Dec 2023 04:40  Patient On (Oxygen Delivery Method): room air      GENERAL: NAD  EYES: EOMI, PERRLA, conjunctiva and sclera clear  NECK: Supple, No JVD  CHEST/LUNG: Clear to auscultation bilaterally; No wheeze  HEART: Regular rate and rhythm; No murmurs, rubs, or gallops  ABDOMEN: Soft, Nontender, Nondistended; Bowel sounds present  : Alexis (unclear when placed)   EXTREMITIES:  2+ Peripheral Pulses, No clubbing, cyanosis, or edema  PSYCH: AAOx2 person and place (as per son this is baseline)  NEUROLOGY: non-focal  SKIN: No rashes or lesions    LABS:                        15.9   6.05  )-----------( 163      ( 27 Dec 2023 05:55 )             46.7     12-27    138  |  99  |  11  ----------------------------<  101<H>  3.5   |  26  |  0.83    Ca    9.3      27 Dec 2023 05:55  Phos  3.2     12-27  Mg     2.10     12-27            Urinalysis Basic - ( 27 Dec 2023 05:55 )    Color: x / Appearance: x / SG: x / pH: x  Gluc: 101 mg/dL / Ketone: x  / Bili: x / Urobili: x   Blood: x / Protein: x / Nitrite: x   Leuk Esterase: x / RBC: x / WBC x   Sq Epi: x / Non Sq Epi: x / Bacteria: x          RADIOLOGY & ADDITIONAL TESTS:  Results Reviewed:   Imaging Personally Reviewed:  Electrocardiogram Personally Reviewed:    COORDINATION OF CARE:  Care Discussed with Consultants/Other Providers [Y/N]:  Prior or Outpatient Records Reviewed [Y/N]:

## 2023-12-27 NOTE — SWALLOW BEDSIDE ASSESSMENT ADULT - COMMENTS
Hospitalist , "88 year old M hx of afib on eliquis, HTN, CVA, vertigo, dementia a/o times 2 here w/ AMS and vomiting."    CT Brain : IMPRESSION: No acute intra-cranial hemorrhage or mass effect. If clinical symptoms persist or worsen, more sensitive evaluation for acute ischemia with brain MRI may be obtained, if no contraindications exist.    CXR : IMPRESSION: Clear lungs.    Patient received upright awake/ alert, oriented to self/ , patient confused; however, able to make basic wants/ needs known and follow simple directives.

## 2023-12-27 NOTE — PROGRESS NOTE ADULT - ASSESSMENT
88 year old M hx of afib on eliquis, HTN, CVA, vertigo, dementia a/o times 2 here w/ AMS and vomiting. 
88 year old M hx of afib on eliquis, HTN, CVA, vertigo, dementia a/o times 2 here w/ AMS and vomiting. 
88 year old M hx of afib on eliquis, HTN, CVA, vertigo, dementia a/o times 2 here w/ AMS.
88 year old M hx of afib on eliquis, HTN, CVA, vertigo, dementia a/o times 2 here w/ AMS and vomiting.

## 2023-12-27 NOTE — PROGRESS NOTE ADULT - PROBLEM SELECTOR PLAN 1
As per son patient is Aox2 at baseline, forgets dates often 2/2 dementia   -metabolic encephalopathy possibly d/t viral gastroenteritis, returned to baseline    -head CT noncontrast negative for ICH, masses, strokes  -u/a and urine culture negative for UTI  -RVP neg  -TSH, Folate, B12, ammonia wnl

## 2023-12-27 NOTE — PROGRESS NOTE ADULT - PROBLEM SELECTOR PROBLEM 3
Chronic atrial fibrillation
Chronic atrial fibrillation
H/O: CVA (cerebrovascular accident)
Chronic atrial fibrillation

## 2023-12-27 NOTE — DIETITIAN INITIAL EVALUATION ADULT - NAME AND PHONE
Ingrid Estrada MS RDN CDN  On [employer] Teams, Pager #[phone]  Ingrid Estrada MS RDN CDN  On [employer] Teams, Pager #[phone]

## 2023-12-27 NOTE — PROGRESS NOTE ADULT - PROBLEM SELECTOR PLAN 4
-as above
-Continue Eliquis and statin
-lipitor 40 mg  -email sent to clinical pharmacy for med rec
-Continue Eliquis and statin

## 2023-12-27 NOTE — DIETITIAN INITIAL EVALUATION ADULT - PERTINENT LABORATORY DATA
12-27    138  |  99  |  11  ----------------------------<  101<H>  3.5   |  26  |  0.83    Ca    9.3      27 Dec 2023 05:55  Phos  3.2     12-27  Mg     2.10     12-27    POCT Blood Glucose.: 127 mg/dL (12-26-23 @ 18:52)

## 2023-12-27 NOTE — SWALLOW BEDSIDE ASSESSMENT ADULT - ADDITIONAL RECOMMENDATIONS
1. This service to follow for diet tolerance as schedule permits. 2. Medical team advised to reconsult this service if patient is with a change in medical status or change in tolerance of recommended PO.

## 2023-12-27 NOTE — SWALLOW BEDSIDE ASSESSMENT ADULT - ASR SWALLOW LINGUAL MOBILITY
able to protrude/ lateralize A-T Advancement Flap Text: The defect edges were debeveled with a #15 scalpel blade. Given the location of the defect, shape of the defect and the proximity to free margins an A-T advancement flap was deemed most appropriate. Using a sterile surgical marker, an appropriate advancement flap was drawn incorporating the defect and placing the expected incisions within the relaxed skin tension lines where possible. The area thus outlined was incised deep to adipose tissue with a #15 scalpel blade. The skin margins were undermined to an appropriate distance in all directions utilizing iris scissors. Following this, the designed flap was advanced and carried over into the primary defect and sutured into place.

## 2023-12-28 ENCOUNTER — TRANSCRIPTION ENCOUNTER (OUTPATIENT)
Age: 88
End: 2023-12-28

## 2023-12-28 VITALS
TEMPERATURE: 98 F | RESPIRATION RATE: 18 BRPM | HEART RATE: 86 BPM | DIASTOLIC BLOOD PRESSURE: 75 MMHG | OXYGEN SATURATION: 100 % | SYSTOLIC BLOOD PRESSURE: 115 MMHG

## 2023-12-28 LAB
ANION GAP SERPL CALC-SCNC: 13 MMOL/L — SIGNIFICANT CHANGE UP (ref 7–14)
ANION GAP SERPL CALC-SCNC: 13 MMOL/L — SIGNIFICANT CHANGE UP (ref 7–14)
BASOPHILS # BLD AUTO: 0.02 K/UL — SIGNIFICANT CHANGE UP (ref 0–0.2)
BASOPHILS # BLD AUTO: 0.02 K/UL — SIGNIFICANT CHANGE UP (ref 0–0.2)
BASOPHILS NFR BLD AUTO: 0.2 % — SIGNIFICANT CHANGE UP (ref 0–2)
BASOPHILS NFR BLD AUTO: 0.2 % — SIGNIFICANT CHANGE UP (ref 0–2)
BUN SERPL-MCNC: 17 MG/DL — SIGNIFICANT CHANGE UP (ref 7–23)
BUN SERPL-MCNC: 17 MG/DL — SIGNIFICANT CHANGE UP (ref 7–23)
CALCIUM SERPL-MCNC: 8.7 MG/DL — SIGNIFICANT CHANGE UP (ref 8.4–10.5)
CALCIUM SERPL-MCNC: 8.7 MG/DL — SIGNIFICANT CHANGE UP (ref 8.4–10.5)
CHLORIDE SERPL-SCNC: 101 MMOL/L — SIGNIFICANT CHANGE UP (ref 98–107)
CHLORIDE SERPL-SCNC: 101 MMOL/L — SIGNIFICANT CHANGE UP (ref 98–107)
CO2 SERPL-SCNC: 24 MMOL/L — SIGNIFICANT CHANGE UP (ref 22–31)
CO2 SERPL-SCNC: 24 MMOL/L — SIGNIFICANT CHANGE UP (ref 22–31)
CREAT SERPL-MCNC: 1.04 MG/DL — SIGNIFICANT CHANGE UP (ref 0.5–1.3)
CREAT SERPL-MCNC: 1.04 MG/DL — SIGNIFICANT CHANGE UP (ref 0.5–1.3)
EGFR: 69 ML/MIN/1.73M2 — SIGNIFICANT CHANGE UP
EGFR: 69 ML/MIN/1.73M2 — SIGNIFICANT CHANGE UP
EOSINOPHIL # BLD AUTO: 0.5 K/UL — SIGNIFICANT CHANGE UP (ref 0–0.5)
EOSINOPHIL # BLD AUTO: 0.5 K/UL — SIGNIFICANT CHANGE UP (ref 0–0.5)
EOSINOPHIL NFR BLD AUTO: 6 % — SIGNIFICANT CHANGE UP (ref 0–6)
EOSINOPHIL NFR BLD AUTO: 6 % — SIGNIFICANT CHANGE UP (ref 0–6)
GLUCOSE SERPL-MCNC: 97 MG/DL — SIGNIFICANT CHANGE UP (ref 70–99)
GLUCOSE SERPL-MCNC: 97 MG/DL — SIGNIFICANT CHANGE UP (ref 70–99)
HCT VFR BLD CALC: 41.4 % — SIGNIFICANT CHANGE UP (ref 39–50)
HCT VFR BLD CALC: 41.4 % — SIGNIFICANT CHANGE UP (ref 39–50)
HGB BLD-MCNC: 14.5 G/DL — SIGNIFICANT CHANGE UP (ref 13–17)
HGB BLD-MCNC: 14.5 G/DL — SIGNIFICANT CHANGE UP (ref 13–17)
IANC: 5.2 K/UL — SIGNIFICANT CHANGE UP (ref 1.8–7.4)
IANC: 5.2 K/UL — SIGNIFICANT CHANGE UP (ref 1.8–7.4)
IMM GRANULOCYTES NFR BLD AUTO: 0.2 % — SIGNIFICANT CHANGE UP (ref 0–0.9)
IMM GRANULOCYTES NFR BLD AUTO: 0.2 % — SIGNIFICANT CHANGE UP (ref 0–0.9)
LYMPHOCYTES # BLD AUTO: 1.87 K/UL — SIGNIFICANT CHANGE UP (ref 1–3.3)
LYMPHOCYTES # BLD AUTO: 1.87 K/UL — SIGNIFICANT CHANGE UP (ref 1–3.3)
LYMPHOCYTES # BLD AUTO: 22.4 % — SIGNIFICANT CHANGE UP (ref 13–44)
LYMPHOCYTES # BLD AUTO: 22.4 % — SIGNIFICANT CHANGE UP (ref 13–44)
MAGNESIUM SERPL-MCNC: 2 MG/DL — SIGNIFICANT CHANGE UP (ref 1.6–2.6)
MAGNESIUM SERPL-MCNC: 2 MG/DL — SIGNIFICANT CHANGE UP (ref 1.6–2.6)
MCHC RBC-ENTMCNC: 30.7 PG — SIGNIFICANT CHANGE UP (ref 27–34)
MCHC RBC-ENTMCNC: 30.7 PG — SIGNIFICANT CHANGE UP (ref 27–34)
MCHC RBC-ENTMCNC: 35 GM/DL — SIGNIFICANT CHANGE UP (ref 32–36)
MCHC RBC-ENTMCNC: 35 GM/DL — SIGNIFICANT CHANGE UP (ref 32–36)
MCV RBC AUTO: 87.5 FL — SIGNIFICANT CHANGE UP (ref 80–100)
MCV RBC AUTO: 87.5 FL — SIGNIFICANT CHANGE UP (ref 80–100)
MONOCYTES # BLD AUTO: 0.74 K/UL — SIGNIFICANT CHANGE UP (ref 0–0.9)
MONOCYTES # BLD AUTO: 0.74 K/UL — SIGNIFICANT CHANGE UP (ref 0–0.9)
MONOCYTES NFR BLD AUTO: 8.9 % — SIGNIFICANT CHANGE UP (ref 2–14)
MONOCYTES NFR BLD AUTO: 8.9 % — SIGNIFICANT CHANGE UP (ref 2–14)
NEUTROPHILS # BLD AUTO: 5.2 K/UL — SIGNIFICANT CHANGE UP (ref 1.8–7.4)
NEUTROPHILS # BLD AUTO: 5.2 K/UL — SIGNIFICANT CHANGE UP (ref 1.8–7.4)
NEUTROPHILS NFR BLD AUTO: 62.3 % — SIGNIFICANT CHANGE UP (ref 43–77)
NEUTROPHILS NFR BLD AUTO: 62.3 % — SIGNIFICANT CHANGE UP (ref 43–77)
NRBC # BLD: 0 /100 WBCS — SIGNIFICANT CHANGE UP (ref 0–0)
NRBC # BLD: 0 /100 WBCS — SIGNIFICANT CHANGE UP (ref 0–0)
NRBC # FLD: 0 K/UL — SIGNIFICANT CHANGE UP (ref 0–0)
NRBC # FLD: 0 K/UL — SIGNIFICANT CHANGE UP (ref 0–0)
PHOSPHATE SERPL-MCNC: 3.9 MG/DL — SIGNIFICANT CHANGE UP (ref 2.5–4.5)
PHOSPHATE SERPL-MCNC: 3.9 MG/DL — SIGNIFICANT CHANGE UP (ref 2.5–4.5)
PLATELET # BLD AUTO: 152 K/UL — SIGNIFICANT CHANGE UP (ref 150–400)
PLATELET # BLD AUTO: 152 K/UL — SIGNIFICANT CHANGE UP (ref 150–400)
POTASSIUM SERPL-MCNC: 3.8 MMOL/L — SIGNIFICANT CHANGE UP (ref 3.5–5.3)
POTASSIUM SERPL-MCNC: 3.8 MMOL/L — SIGNIFICANT CHANGE UP (ref 3.5–5.3)
POTASSIUM SERPL-SCNC: 3.8 MMOL/L — SIGNIFICANT CHANGE UP (ref 3.5–5.3)
POTASSIUM SERPL-SCNC: 3.8 MMOL/L — SIGNIFICANT CHANGE UP (ref 3.5–5.3)
RBC # BLD: 4.73 M/UL — SIGNIFICANT CHANGE UP (ref 4.2–5.8)
RBC # BLD: 4.73 M/UL — SIGNIFICANT CHANGE UP (ref 4.2–5.8)
RBC # FLD: 13.2 % — SIGNIFICANT CHANGE UP (ref 10.3–14.5)
RBC # FLD: 13.2 % — SIGNIFICANT CHANGE UP (ref 10.3–14.5)
SODIUM SERPL-SCNC: 138 MMOL/L — SIGNIFICANT CHANGE UP (ref 135–145)
SODIUM SERPL-SCNC: 138 MMOL/L — SIGNIFICANT CHANGE UP (ref 135–145)
WBC # BLD: 8.35 K/UL — SIGNIFICANT CHANGE UP (ref 3.8–10.5)
WBC # BLD: 8.35 K/UL — SIGNIFICANT CHANGE UP (ref 3.8–10.5)
WBC # FLD AUTO: 8.35 K/UL — SIGNIFICANT CHANGE UP (ref 3.8–10.5)
WBC # FLD AUTO: 8.35 K/UL — SIGNIFICANT CHANGE UP (ref 3.8–10.5)

## 2023-12-28 PROCEDURE — 99239 HOSP IP/OBS DSCHRG MGMT >30: CPT

## 2023-12-28 RX ORDER — APIXABAN 2.5 MG/1
1 TABLET, FILM COATED ORAL
Qty: 0 | Refills: 0 | DISCHARGE
Start: 2023-12-28

## 2023-12-28 RX ORDER — APIXABAN 2.5 MG/1
1 TABLET, FILM COATED ORAL
Qty: 60 | Refills: 0
Start: 2023-12-28 | End: 2024-01-26

## 2023-12-28 RX ADMIN — APIXABAN 2.5 MILLIGRAM(S): 2.5 TABLET, FILM COATED ORAL at 05:57

## 2023-12-28 NOTE — DISCHARGE NOTE PROVIDER - NSDCCPCAREPLAN_GEN_ALL_CORE_FT
PRINCIPAL DISCHARGE DIAGNOSIS  Diagnosis: Toxic metabolic encephalopathy  Assessment and Plan of Treatment: Likely due to viral gastroenteritis, resolved      SECONDARY DISCHARGE DIAGNOSES  Diagnosis: Vomiting  Assessment and Plan of Treatment: Likely due to viral gastroenteritis, resolved    Diagnosis: Chronic atrial fibrillation  Assessment and Plan of Treatment: Continue Eliquis    Diagnosis: Dysphagia  Assessment and Plan of Treatment: Continue minced and moist and mildly thick liquids.    Diagnosis: Exposure to influenza  Assessment and Plan of Treatment: Continue prophylactic Tamiflu

## 2023-12-28 NOTE — DISCHARGE NOTE PROVIDER - ATTENDING DISCHARGE PHYSICAL EXAMINATION:
GENERAL: NAD  EYES: EOMI, PERRLA, conjunctiva and sclera clear  NECK: Supple, No JVD  CHEST/LUNG: Clear to auscultation bilaterally; No wheeze  HEART: Regular rate and rhythm; No murmurs, rubs, or gallops  ABDOMEN: Soft, Nontender, Nondistended; Bowel sounds present  : Alexis (unclear when placed)   EXTREMITIES:  2+ Peripheral Pulses, No clubbing, cyanosis, or edema  PSYCH: AAOx2 person and place (as per son this is baseline)  NEUROLOGY: non-focal  SKIN: No rashes or lesions

## 2023-12-28 NOTE — DISCHARGE NOTE NURSING/CASE MANAGEMENT/SOCIAL WORK - PATIENT PORTAL LINK FT
You can access the FollowMyHealth Patient Portal offered by [facility] by registering at the following website: [email or web address]. By joining [employer]’s FollowMyHealth portal, you will also be able to view your health information using other applications (apps) compatible with our system. You can access the FollowMyHealth Patient Portal offered by [facility] by registering at the following website: [email or web address]. By joining [employer]’s FollowMyHealth portal, you will also be able to view your health information using other applications (apps) compatible with our system.

## 2023-12-28 NOTE — DISCHARGE NOTE PROVIDER - HOSPITAL COURSE
88 year old M hx of afib on eliquis, HTN, CVA, vertigo, dementia a/o times 2 here w/ AMS and vomiting.     ·  Problem: Toxic metabolic encephalopathy.   ·  Plan: As per son patient is Aox2 at baseline, forgets dates often 2/2 dementia   -metabolic encephalopathy possibly d/t viral gastroenteritis, returned to baseline    -head CT noncontrast negative for ICH, masses, strokes  -u/a and urine culture negative for UTI  -RVP neg  -TSH, Folate, B12, ammonia wnl.    ·  Problem: Vomiting.   ·  Plan: Vomiting as per son, due to possible viral gastroenteritis and now resolved    -tolerating PO   -RVP neg   -procalcitonin: negative  -No indication for CT A/P at this time.    ·  Problem: Chronic atrial fibrillation.   ·  Plan: -switch Eliquis to 2.5 mg BID.    ·  Problem: H/O: CVA (cerebrovascular accident).   ·  Plan: -Continue Eliquis and statin.    ·  Problem: Dysphagia.   ·  Plan: seen by speech and swallow   Recommend minced and moist and mildly thick liquids.    ·  Problem: Exposure to influenza.   ·  Plan: Started prophylactic Tamiflu.    ·  Problem: HLD (hyperlipidemia).   ·  Plan: -c/w Lipitor 40 mg.

## 2023-12-28 NOTE — DISCHARGE NOTE PROVIDER - NSDCMRMEDTOKEN_GEN_ALL_CORE_FT
apixaban 5 mg oral tablet: 1 tab(s) orally 2 times a day  Lipitor 40 mg oral tablet: 1 tab(s) orally once a day   apixaban 2.5 mg oral tablet: 1 tab(s) orally every 12 hours  Lipitor 40 mg oral tablet: 1 tab(s) orally once a day  oseltamivir 30 mg oral capsule: 1 cap(s) orally every 24 hours

## 2023-12-29 NOTE — PHARMACOTHERAPY INTERVENTION NOTE - COMMENTS
Discharge medications reviewed with the patient's son over the phone. Current medication schedule was discussed in detail including: medication name, indication, dose, administration times, treatment duration, side effects, and special instructions. Discussed change in apixaban dose to 2.5mg BID. Discussed adverse effects in detail and when to seek medical attention (blood in stool, vomit, etc.). Questions and concerns were answered and addressed. Patient's son demonstrated understanding.     Chandrika Olmos, PharmD, [facility]  Clinical Pharmacy Specialist  [id number]  Discharge medications reviewed with the patient's son over the phone. Current medication schedule was discussed in detail including: medication name, indication, dose, administration times, treatment duration, side effects, and special instructions. Discussed change in apixaban dose to 2.5mg BID. Discussed adverse effects in detail and when to seek medical attention (blood in stool, vomit, etc.). Questions and concerns were answered and addressed. Patient's son demonstrated understanding.     Chandrika Olmos, PharmD, [facility]  Clinical Pharmacy Specialist  [id number]

## 2024-01-02 RX ORDER — ATORVASTATIN CALCIUM 80 MG/1
1 TABLET, FILM COATED ORAL
Qty: 0 | Refills: 0 | DISCHARGE

## 2024-01-02 RX ORDER — ATORVASTATIN CALCIUM 80 MG/1
1 TABLET, FILM COATED ORAL
Refills: 0 | DISCHARGE

## 2024-01-02 RX ORDER — APIXABAN 2.5 MG/1
1 TABLET, FILM COATED ORAL
Qty: 0 | Refills: 0 | DISCHARGE

## 2024-01-02 RX ORDER — MECLIZINE HCL 12.5 MG
1 TABLET ORAL
Qty: 0 | Refills: 0 | DISCHARGE

## 2024-01-02 RX ORDER — APIXABAN 2.5 MG/1
1 TABLET, FILM COATED ORAL
Refills: 0 | DISCHARGE

## 2024-01-02 RX ORDER — PANTOPRAZOLE SODIUM 20 MG/1
1 TABLET, DELAYED RELEASE ORAL
Qty: 0 | Refills: 0 | DISCHARGE

## 2024-01-03 NOTE — ED ADULT NURSE NOTE - CHPI ED NUR SYMPTOMS NEG
Fever since 12/28 per mom, tmax 101.8. Mom endorses cough/congestion, 2 episodes of post-tussive emesis today. Decreased PO and UOP. 2 wet diapers in 24 hours. Last Motrin 1600, last Tylenol 1800   no chills

## 2024-01-18 NOTE — PROGRESS NOTE ADULT - PROBLEM SELECTOR PROBLEM 4
Cerebrovascular accident (CVA)
Detail Level: Simple
Render Risk Assessment In Note?: no
Additional Notes: Minimal pink change to skin with triangular fashion in upper middle quadrant. No epidermal changes, no firmness noted. Area was previously biopsied by breast surgeon one year but no recurrent breast cancer noted per path results, pathology showed more lupus.  \\nDeeper lupus vs lymphedema from breast surgery, continue to follow
Cerebrovascular accident (CVA)

## 2024-03-20 NOTE — DISCHARGE NOTE NURSING/CASE MANAGEMENT/SOCIAL WORK - NSDCPECAREGIVERED_GEN_ALL_CORE
Called and started a prior auth on Praluent, the reference number is [id number].   midline catheter care  ertepenum  apixaban

## 2024-04-02 NOTE — PATIENT PROFILE ADULT - FUNCTIONAL ASSESSMENT - DAILY ACTIVITY SCORE.
[facility] - Plan of Care  [address], [address]  [place] [address]    [facility] Team Members: Renae Ortez MD; Fariba Neal NP; Manuela Clancy NP; Carito Milan, RN; Jesus Devi, RN; Lizeth Solis, RN; Sarah Almanza LCSW    Mamie Kenyon  1937 / [id number]  female    Date of Initial Visit (Start of Care): 9/22/22    Diagnoses  Patient Active Problem List   Diagnosis    Essential hypertension    Edema    Carpal tunnel syndrome    Degeneration of cervical intervertebral disc    Tachy-oly syndrome (HCC)    Dysphagia    Pure hypercholesterolemia    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)    Gastroesophageal reflux disease without esophagitis    Presence of cardiac pacemaker    SSS (sick sinus syndrome) (HCC)    CAD (coronary artery disease)    CVA, old, hemiparesis (HCC)    Vascular dementia without behavioral disturbance (HCC)    Squamous cell carcinoma of scalp    Seizure disorder (HCC)    PEG (percutaneous endoscopic gastrostomy) status (HCC)    Swelling of breast    Severe obesity (BMI 35.0-39.9) with comorbidity (HCC)    Candidal intertrigo       Advance Care Planning:    Code Status: DNR        Primary Decision Maker (Active): Pranay Alvarez - Child - [phone]    Primary Decision Maker: Valarie Alvarez - Child - [phone]    Primary Decision Maker: Brandi Alvarez - Child - [phone]       4/2/2024    10:20 AM   Demographics   Marital Status        DME/Supplies:  Hospital Bed, Nirmal Lift, and tube feeding, suction     Allergies   Allergen Reactions    Latex Rash    Sulfa Antibiotics Hives    Aspirin Other (See Comments)     Anything higher than 81mg makes pt jittery    Codeine Hives and Itching    Diltiazem Other (See Comments)    Iodinated Contrast Media Itching    Penicillins Hives and Itching    Adhesive Tape Rash    Clindamycin Rash       Nutritional Requirements:   Tube feeds with G / J tube    Functional/Activity Level:  Bedbound only    Safety Measures: 
18

## 2024-07-07 ENCOUNTER — INPATIENT (INPATIENT)
Facility: HOSPITAL | Age: 89
LOS: 2 days | Discharge: HOME HEALTH | End: 2024-07-10
Attending: STUDENT IN AN ORGANIZED HEALTH CARE EDUCATION/TRAINING PROGRAM | Admitting: STUDENT IN AN ORGANIZED HEALTH CARE EDUCATION/TRAINING PROGRAM
Payer: MEDICARE

## 2024-07-07 VITALS
DIASTOLIC BLOOD PRESSURE: 76 MMHG | OXYGEN SATURATION: 100 % | TEMPERATURE: 103 F | SYSTOLIC BLOOD PRESSURE: 141 MMHG | HEART RATE: 105 BPM | RESPIRATION RATE: 22 BRPM

## 2024-07-07 DIAGNOSIS — Z98.890 OTHER SPECIFIED POSTPROCEDURAL STATES: Chronic | ICD-10-CM

## 2024-07-07 DIAGNOSIS — A41.9 SEPSIS, UNSPECIFIED ORGANISM: ICD-10-CM

## 2024-07-07 DIAGNOSIS — Z96.0 PRESENCE OF UROGENITAL IMPLANTS: Chronic | ICD-10-CM

## 2024-07-07 DIAGNOSIS — R65.10 SYSTEMIC INFLAMMATORY RESPONSE SYNDROME (SIRS) OF NON-INFECTIOUS ORIGIN WITHOUT ACUTE ORGAN DYSFUNCTION: ICD-10-CM

## 2024-07-07 DIAGNOSIS — Z29.9 ENCOUNTER FOR PROPHYLACTIC MEASURES, UNSPECIFIED: ICD-10-CM

## 2024-07-07 DIAGNOSIS — J18.9 PNEUMONIA, UNSPECIFIED ORGANISM: ICD-10-CM

## 2024-07-07 DIAGNOSIS — G93.41 METABOLIC ENCEPHALOPATHY: ICD-10-CM

## 2024-07-07 DIAGNOSIS — G93.40 ENCEPHALOPATHY, UNSPECIFIED: ICD-10-CM

## 2024-07-07 DIAGNOSIS — F03.90 UNSPECIFIED DEMENTIA, UNSPECIFIED SEVERITY, WITHOUT BEHAVIORAL DISTURBANCE, PSYCHOTIC DISTURBANCE, MOOD DISTURBANCE, AND ANXIETY: ICD-10-CM

## 2024-07-07 DIAGNOSIS — I48.91 UNSPECIFIED ATRIAL FIBRILLATION: ICD-10-CM

## 2024-07-07 PROBLEM — I10 ESSENTIAL (PRIMARY) HYPERTENSION: Chronic | Status: ACTIVE | Noted: 2023-12-24

## 2024-07-07 PROBLEM — I63.9 CEREBRAL INFARCTION, UNSPECIFIED: Chronic | Status: ACTIVE | Noted: 2023-12-24

## 2024-07-07 LAB
ALBUMIN SERPL ELPH-MCNC: 4.2 G/DL — SIGNIFICANT CHANGE UP (ref 3.3–5)
ALP SERPL-CCNC: 106 U/L — SIGNIFICANT CHANGE UP (ref 40–120)
ALT FLD-CCNC: 19 U/L — SIGNIFICANT CHANGE UP (ref 4–41)
ANION GAP SERPL CALC-SCNC: 17 MMOL/L — HIGH (ref 7–14)
APPEARANCE UR: ABNORMAL
APTT BLD: 28.6 SEC — SIGNIFICANT CHANGE UP (ref 24.5–35.6)
AST SERPL-CCNC: 24 U/L — SIGNIFICANT CHANGE UP (ref 4–40)
BACTERIA # UR AUTO: ABNORMAL /HPF
BASE EXCESS BLDV CALC-SCNC: -0.9 MMOL/L — SIGNIFICANT CHANGE UP (ref -2–3)
BASE EXCESS BLDV CALC-SCNC: -2.2 MMOL/L — LOW (ref -2–3)
BASOPHILS # BLD AUTO: 0.02 K/UL — SIGNIFICANT CHANGE UP (ref 0–0.2)
BASOPHILS NFR BLD AUTO: 0.2 % — SIGNIFICANT CHANGE UP (ref 0–2)
BILIRUB SERPL-MCNC: 1 MG/DL — SIGNIFICANT CHANGE UP (ref 0.2–1.2)
BILIRUB UR-MCNC: NEGATIVE — SIGNIFICANT CHANGE UP
BUN SERPL-MCNC: 17 MG/DL — SIGNIFICANT CHANGE UP (ref 7–23)
CA-I SERPL-SCNC: 1.08 MMOL/L — LOW (ref 1.15–1.33)
CA-I SERPL-SCNC: 1.17 MMOL/L — SIGNIFICANT CHANGE UP (ref 1.15–1.33)
CALCIUM SERPL-MCNC: 9.3 MG/DL — SIGNIFICANT CHANGE UP (ref 8.4–10.5)
CHLORIDE BLDV-SCNC: 105 MMOL/L — SIGNIFICANT CHANGE UP (ref 96–108)
CHLORIDE BLDV-SCNC: 105 MMOL/L — SIGNIFICANT CHANGE UP (ref 96–108)
CHLORIDE SERPL-SCNC: 99 MMOL/L — SIGNIFICANT CHANGE UP (ref 98–107)
CO2 BLDV-SCNC: 24.5 MMOL/L — SIGNIFICANT CHANGE UP (ref 22–26)
CO2 BLDV-SCNC: 26.3 MMOL/L — HIGH (ref 22–26)
CO2 SERPL-SCNC: 21 MMOL/L — LOW (ref 22–31)
COLOR SPEC: YELLOW — SIGNIFICANT CHANGE UP
CREAT SERPL-MCNC: 1.09 MG/DL — SIGNIFICANT CHANGE UP (ref 0.5–1.3)
DIFF PNL FLD: ABNORMAL
EGFR: 65 ML/MIN/1.73M2 — SIGNIFICANT CHANGE UP
EOSINOPHIL # BLD AUTO: 0 K/UL — SIGNIFICANT CHANGE UP (ref 0–0.5)
EOSINOPHIL NFR BLD AUTO: 0 % — SIGNIFICANT CHANGE UP (ref 0–6)
FLUAV AG NPH QL: SIGNIFICANT CHANGE UP
FLUBV AG NPH QL: SIGNIFICANT CHANGE UP
GAS PNL BLDV: 130 MMOL/L — LOW (ref 136–145)
GAS PNL BLDV: 132 MMOL/L — LOW (ref 136–145)
GAS PNL BLDV: SIGNIFICANT CHANGE UP
GLUCOSE BLDV-MCNC: 106 MG/DL — HIGH (ref 70–99)
GLUCOSE BLDV-MCNC: 121 MG/DL — HIGH (ref 70–99)
GLUCOSE SERPL-MCNC: 116 MG/DL — HIGH (ref 70–99)
GLUCOSE UR QL: NEGATIVE MG/DL — SIGNIFICANT CHANGE UP
HCO3 BLDV-SCNC: 23 MMOL/L — SIGNIFICANT CHANGE UP (ref 22–29)
HCO3 BLDV-SCNC: 25 MMOL/L — SIGNIFICANT CHANGE UP (ref 22–29)
HCT VFR BLD CALC: 47.7 % — SIGNIFICANT CHANGE UP (ref 39–50)
HCT VFR BLDA CALC: 37 % — LOW (ref 39–51)
HCT VFR BLDA CALC: 47 % — SIGNIFICANT CHANGE UP (ref 39–51)
HGB BLD CALC-MCNC: 12.2 G/DL — LOW (ref 12.6–17.4)
HGB BLD CALC-MCNC: 15.8 G/DL — SIGNIFICANT CHANGE UP (ref 12.6–17.4)
HGB BLD-MCNC: 15.4 G/DL — SIGNIFICANT CHANGE UP (ref 13–17)
IANC: 8.06 K/UL — HIGH (ref 1.8–7.4)
IMM GRANULOCYTES NFR BLD AUTO: 0.3 % — SIGNIFICANT CHANGE UP (ref 0–0.9)
INR BLD: 1.27 RATIO — HIGH (ref 0.85–1.18)
KETONES UR-MCNC: NEGATIVE MG/DL — SIGNIFICANT CHANGE UP
LACTATE BLDV-MCNC: 1.7 MMOL/L — SIGNIFICANT CHANGE UP (ref 0.5–2)
LACTATE BLDV-MCNC: 4.5 MMOL/L — CRITICAL HIGH (ref 0.5–2)
LEUKOCYTE ESTERASE UR-ACNC: NEGATIVE — SIGNIFICANT CHANGE UP
LYMPHOCYTES # BLD AUTO: 1.56 K/UL — SIGNIFICANT CHANGE UP (ref 1–3.3)
LYMPHOCYTES # BLD AUTO: 15 % — SIGNIFICANT CHANGE UP (ref 13–44)
MCHC RBC-ENTMCNC: 29.6 PG — SIGNIFICANT CHANGE UP (ref 27–34)
MCHC RBC-ENTMCNC: 32.3 GM/DL — SIGNIFICANT CHANGE UP (ref 32–36)
MCV RBC AUTO: 91.6 FL — SIGNIFICANT CHANGE UP (ref 80–100)
MONOCYTES # BLD AUTO: 0.72 K/UL — SIGNIFICANT CHANGE UP (ref 0–0.9)
MONOCYTES NFR BLD AUTO: 6.9 % — SIGNIFICANT CHANGE UP (ref 2–14)
NEUTROPHILS # BLD AUTO: 8.06 K/UL — HIGH (ref 1.8–7.4)
NEUTROPHILS NFR BLD AUTO: 77.6 % — HIGH (ref 43–77)
NITRITE UR-MCNC: NEGATIVE — SIGNIFICANT CHANGE UP
NRBC # BLD: 0 /100 WBCS — SIGNIFICANT CHANGE UP (ref 0–0)
NRBC # FLD: 0 K/UL — SIGNIFICANT CHANGE UP (ref 0–0)
PCO2 BLDV: 41 MMHG — LOW (ref 42–55)
PCO2 BLDV: 44 MMHG — SIGNIFICANT CHANGE UP (ref 42–55)
PH BLDV: 7.36 — SIGNIFICANT CHANGE UP (ref 7.32–7.43)
PH BLDV: 7.36 — SIGNIFICANT CHANGE UP (ref 7.32–7.43)
PH UR: 6 — SIGNIFICANT CHANGE UP (ref 5–8)
PLATELET # BLD AUTO: 123 K/UL — LOW (ref 150–400)
PO2 BLDV: 39 MMHG — SIGNIFICANT CHANGE UP (ref 25–45)
PO2 BLDV: 56 MMHG — HIGH (ref 25–45)
POTASSIUM BLDV-SCNC: 4.2 MMOL/L — SIGNIFICANT CHANGE UP (ref 3.5–5.1)
POTASSIUM BLDV-SCNC: 5.5 MMOL/L — HIGH (ref 3.5–5.1)
POTASSIUM SERPL-MCNC: 4.6 MMOL/L — SIGNIFICANT CHANGE UP (ref 3.5–5.3)
POTASSIUM SERPL-SCNC: 4.6 MMOL/L — SIGNIFICANT CHANGE UP (ref 3.5–5.3)
PROCALCITONIN SERPL-MCNC: 0.2 NG/ML — HIGH (ref 0.02–0.1)
PROT SERPL-MCNC: 8.3 G/DL — SIGNIFICANT CHANGE UP (ref 6–8.3)
PROT UR-MCNC: 100 MG/DL
PROTHROM AB SERPL-ACNC: 14.3 SEC — HIGH (ref 9.5–13)
RBC # BLD: 5.21 M/UL — SIGNIFICANT CHANGE UP (ref 4.2–5.8)
RBC # FLD: 13.2 % — SIGNIFICANT CHANGE UP (ref 10.3–14.5)
RBC CASTS # UR COMP ASSIST: SIGNIFICANT CHANGE UP /HPF (ref 0–4)
RSV RNA NPH QL NAA+NON-PROBE: SIGNIFICANT CHANGE UP
SAO2 % BLDV: 64.8 % — LOW (ref 67–88)
SAO2 % BLDV: 86.1 % — SIGNIFICANT CHANGE UP (ref 67–88)
SARS-COV-2 RNA SPEC QL NAA+PROBE: SIGNIFICANT CHANGE UP
SODIUM SERPL-SCNC: 137 MMOL/L — SIGNIFICANT CHANGE UP (ref 135–145)
SP GR SPEC: 1.02 — SIGNIFICANT CHANGE UP (ref 1–1.03)
UROBILINOGEN FLD QL: 1 MG/DL — SIGNIFICANT CHANGE UP (ref 0.2–1)
WBC # BLD: 10.39 K/UL — SIGNIFICANT CHANGE UP (ref 3.8–10.5)
WBC # FLD AUTO: 10.39 K/UL — SIGNIFICANT CHANGE UP (ref 3.8–10.5)
WBC UR QL: SIGNIFICANT CHANGE UP /HPF (ref 0–5)

## 2024-07-07 PROCEDURE — 74177 CT ABD & PELVIS W/CONTRAST: CPT | Mod: 26,MC

## 2024-07-07 PROCEDURE — 70450 CT HEAD/BRAIN W/O DYE: CPT | Mod: 26,MC

## 2024-07-07 PROCEDURE — 99223 1ST HOSP IP/OBS HIGH 75: CPT | Mod: GC

## 2024-07-07 PROCEDURE — 71045 X-RAY EXAM CHEST 1 VIEW: CPT | Mod: 26

## 2024-07-07 PROCEDURE — 71260 CT THORAX DX C+: CPT | Mod: 26,MC

## 2024-07-07 PROCEDURE — 99285 EMERGENCY DEPT VISIT HI MDM: CPT

## 2024-07-07 RX ORDER — AZITHROMYCIN 250 MG/1
TABLET, FILM COATED ORAL
Refills: 0 | Status: DISCONTINUED | OUTPATIENT
Start: 2024-07-07 | End: 2024-07-10

## 2024-07-07 RX ORDER — AZITHROMYCIN 250 MG/1
500 TABLET, FILM COATED ORAL EVERY 24 HOURS
Refills: 0 | Status: DISCONTINUED | OUTPATIENT
Start: 2024-07-08 | End: 2024-07-10

## 2024-07-07 RX ORDER — VANCOMYCIN HYDROCHLORIDE 50 MG/ML
1000 KIT ORAL ONCE
Refills: 0 | Status: COMPLETED | OUTPATIENT
Start: 2024-07-07 | End: 2024-07-07

## 2024-07-07 RX ORDER — SENNOSIDES 8.6 MG
1 TABLET ORAL DAILY
Refills: 0 | Status: DISCONTINUED | OUTPATIENT
Start: 2024-07-07 | End: 2024-07-10

## 2024-07-07 RX ORDER — PIPERACILLIN SODIUM AND TAZOBACTAM SODIUM 3; .375 G/15ML; G/15ML
3.38 INJECTION, POWDER, LYOPHILIZED, FOR SOLUTION INTRAVENOUS EVERY 8 HOURS
Refills: 0 | Status: DISCONTINUED | OUTPATIENT
Start: 2024-07-07 | End: 2024-07-08

## 2024-07-07 RX ORDER — ACETAMINOPHEN 325 MG
1000 TABLET ORAL ONCE
Refills: 0 | Status: COMPLETED | OUTPATIENT
Start: 2024-07-07 | End: 2024-07-07

## 2024-07-07 RX ORDER — SODIUM CHLORIDE 0.9 % (FLUSH) 0.9 %
1000 SYRINGE (ML) INJECTION ONCE
Refills: 0 | Status: COMPLETED | OUTPATIENT
Start: 2024-07-07 | End: 2024-07-07

## 2024-07-07 RX ORDER — ATORVASTATIN CALCIUM 20 MG/1
40 TABLET, FILM COATED ORAL AT BEDTIME
Refills: 0 | Status: DISCONTINUED | OUTPATIENT
Start: 2024-07-07 | End: 2024-07-10

## 2024-07-07 RX ORDER — DEXTROSE MONOHYDRATE AND SODIUM CHLORIDE 5; .3 G/100ML; G/100ML
1000 INJECTION, SOLUTION INTRAVENOUS
Refills: 0 | Status: DISCONTINUED | OUTPATIENT
Start: 2024-07-07 | End: 2024-07-08

## 2024-07-07 RX ORDER — APIXABAN 5 MG/1
5 TABLET, FILM COATED ORAL ONCE
Refills: 0 | Status: DISCONTINUED | OUTPATIENT
Start: 2024-07-07 | End: 2024-07-07

## 2024-07-07 RX ORDER — PIPERACILLIN SODIUM AND TAZOBACTAM SODIUM 3; .375 G/15ML; G/15ML
3.38 INJECTION, POWDER, LYOPHILIZED, FOR SOLUTION INTRAVENOUS ONCE
Refills: 0 | Status: COMPLETED | OUTPATIENT
Start: 2024-07-07 | End: 2024-07-07

## 2024-07-07 RX ORDER — AZITHROMYCIN 250 MG/1
500 TABLET, FILM COATED ORAL ONCE
Refills: 0 | Status: COMPLETED | OUTPATIENT
Start: 2024-07-07 | End: 2024-07-07

## 2024-07-07 RX ORDER — APIXABAN 5 MG/1
1 TABLET, FILM COATED ORAL
Refills: 0 | DISCHARGE

## 2024-07-07 RX ORDER — PANTOPRAZOLE SODIUM 40 MG/10ML
40 INJECTION, POWDER, FOR SOLUTION INTRAVENOUS DAILY
Refills: 0 | Status: DISCONTINUED | OUTPATIENT
Start: 2024-07-07 | End: 2024-07-10

## 2024-07-07 RX ORDER — APIXABAN 5 MG/1
2.5 TABLET, FILM COATED ORAL EVERY 12 HOURS
Refills: 0 | Status: DISCONTINUED | OUTPATIENT
Start: 2024-07-07 | End: 2024-07-10

## 2024-07-07 RX ORDER — POLYETHYLENE GLYCOL 3350 1 G/G
17 POWDER ORAL DAILY
Refills: 0 | Status: DISCONTINUED | OUTPATIENT
Start: 2024-07-07 | End: 2024-07-10

## 2024-07-07 RX ADMIN — Medication 1000 MILLILITER(S): at 11:09

## 2024-07-07 RX ADMIN — Medication 1000 MILLIGRAM(S): at 13:14

## 2024-07-07 RX ADMIN — VANCOMYCIN HYDROCHLORIDE 1000 MILLIGRAM(S): KIT at 14:55

## 2024-07-07 RX ADMIN — AZITHROMYCIN 255 MILLIGRAM(S): 250 TABLET, FILM COATED ORAL at 18:57

## 2024-07-07 RX ADMIN — PIPERACILLIN SODIUM AND TAZOBACTAM SODIUM 25 GRAM(S): 3; .375 INJECTION, POWDER, LYOPHILIZED, FOR SOLUTION INTRAVENOUS at 15:45

## 2024-07-07 RX ADMIN — DEXTROSE MONOHYDRATE AND SODIUM CHLORIDE 60 MILLILITER(S): 5; .3 INJECTION, SOLUTION INTRAVENOUS at 20:04

## 2024-07-07 RX ADMIN — PIPERACILLIN SODIUM AND TAZOBACTAM SODIUM 200 GRAM(S): 3; .375 INJECTION, POWDER, LYOPHILIZED, FOR SOLUTION INTRAVENOUS at 11:10

## 2024-07-07 RX ADMIN — Medication 400 MILLIGRAM(S): at 11:30

## 2024-07-07 RX ADMIN — PIPERACILLIN SODIUM AND TAZOBACTAM SODIUM 25 GRAM(S): 3; .375 INJECTION, POWDER, LYOPHILIZED, FOR SOLUTION INTRAVENOUS at 23:02

## 2024-07-07 RX ADMIN — VANCOMYCIN HYDROCHLORIDE 250 MILLIGRAM(S): KIT at 13:23

## 2024-07-07 NOTE — PATIENT PROFILE ADULT - LEGAL HELP
S-(situation): Patient sent in my chart message and was told to call for appointment.     B-(background): Patient reports a fall on Saturday, right rib cage hurts when she takes a deep breath in and when she tries to get in/out of bed. She denies SOB, chest pain, fever. Patient does not want to be seen if she doesn't need to. Denies any other symptoms.     A-(assessment):Advised to continue to monitor for worsening symptoms or SOB, chest pain.    R-(recommendations): Patient verbally understood and will monitor her symptoms, use ice and ibuprofen as needed.     ELZA Gallagher on 11/7/2023 at 1:28 PM      
no

## 2024-07-07 NOTE — H&P ADULT - NSHPPHYSICALEXAM_GEN_ALL_CORE
V/S: CT: 70, RR: 18, BP:80/53, T: V/S: OR: 70, RR: 18, BP:80/53, T: 39    The patient is not  to time, place, person and situation. Is not in distress. Does not talk, Is lethargic. Opens eyes to voice and produce unintelligible sounds. Does not follow commands or communicate.       HEENT: Normocephalic, atraumatic. Sclera non-icteric, pupils are myotic but reactive to light.  Cardiovascular: RRR, no murmurs, rubs, gallops, peripheral edema   Respiratory: No rales, wheezes or rhonchi noted. No accessory muscle usage   Gastrointestinal: abdomen soft, non-tender, no mass, no hepatosplenomegaly noted.    Extremities: Increased rigidity noted in all four extremities. No deformities, no cyanosis.   Skin: No lesion, scar noted

## 2024-07-07 NOTE — ED PROVIDER NOTE - ATTENDING CONTRIBUTION TO CARE
ill-appearing no acute distress mildly tachypneic not responsive verbally pupils reactive neck supple no adenopathy unable to examine throat due to lack of cooperation.  Heart sounds S1-S2 lungs diffuse rhonchi abdomen soft mild tenderness to deep palpation extremities no edema no rashes no bedsores noted, pulses intact.  Not cooperative for neuroexam.

## 2024-07-07 NOTE — ED ADULT TRIAGE NOTE - CHIEF COMPLAINT QUOTE
ems reports aps referral.- house not clean ,hot. pt noted  decreased appetite  x 2 days with inability to walk today. pt noted not responsive to pain. cough noted ems reports aps referral.- house not clean ,hot. pt noted  decreased appetite  x 2 days with inability to walk today. pt noted not responsive to pain. cough noted.

## 2024-07-07 NOTE — H&P ADULT - NSICDXPASTMEDICALHX_GEN_ALL_CORE_FT
PAST MEDICAL HISTORY:  Atrial fibrillation     CVA (cerebrovascular accident)     Dementia     GERD (gastroesophageal reflux disease)     HTN (hypertension)     Hyperlipidemia     Prostate cancer 18 years ago

## 2024-07-07 NOTE — H&P ADULT - PROBLEM SELECTOR PLAN 2
Plan:    -Blood culture, urine culture  - check i/o Plan:  The patient does meet the criteria for severe sepsis, with lactate 4.5 suggestive for end organ damage      -Blood culture, urine culture  - check i/o  - add azithromycin until legionella

## 2024-07-07 NOTE — PATIENT PROFILE ADULT - FALL HARM RISK - HARM RISK INTERVENTIONS
Communicate Risk of Fall with Harm to all staff/Monitor for mental status changes/Monitor gait and stability/Reinforce activity limits and safety measures with patient and family/Reorient to person, place and time as needed/Review medications for side effects contributing to fall risk/Sit up slowly, dangle for a short time, stand at bedside before walking/Tailored Fall Risk Interventions/Use of alarms - bed, chair and/or voice tab/Visual Cue: Yellow wristband and red socks/Bed in lowest position, wheels locked, appropriate side rails in place/Call bell, personal items and telephone in reach/Instruct patient to call for assistance before getting out of bed or chair/Non-slip footwear when patient is out of bed/[place] to call system/Physically safe environment - no spills, clutter or unnecessary equipment/Purposeful Proactive Rounding/Room/bathroom lighting operational, light cord in reach

## 2024-07-07 NOTE — ED ADULT NURSE REASSESSMENT NOTE - NS ED NURSE REASSESS COMMENT FT1
Pt endorsed at noon from QUIRINO Limon, evaluated for fever/sepsis, pt in no acute distress, asleep but arousable, a/o/1-2, was able to state his birthday and he knows he is in the hospital, utilizing 2L O2 via NC, attached to CM with NSR, non ambulatory, safety precautions in place per protocol, bed rails upx2 locked and in lowest position, made comfortable in bed, plan of care continues.

## 2024-07-07 NOTE — H&P ADULT - ASSESSMENT
The patient is an 89-y/o male with history of dementia, CVA, A-fib, and hypertension transferred to the ED by EMS following a APS referral. The patient is SIRS+, tachypneic, and has a LLL patchy consolidation suggestive for pneumonia.

## 2024-07-07 NOTE — H&P ADULT - ATTENDING COMMENTS
89M with hx of dementia (AAOx2), CVA, Afib on Eliquis, HTN BIBEMS for AMS after APS referral, admitted with sepsis and metabolic encephalopathy likely d/t LLL PNA. VS - notable for Tmax to 103, tachypneic to 22. Labs - procalcitonin 0.2, lactate 4.5 --> 1.7, SCr 1.09 (prior baseline 0.8-0.9). CT chest with LLL PNA. Empiric zosyn pending cultures. BCx/UCx pending. Send MRSA/Urine legionella and strep antigen. If clinically declines can add vancomycin. Can de-escalatate azithro if legionella negative. Agree with gentle IV hydration, trend creatinine. Incidental adrenal nodule on CTAP, BPs and electrolytes stable, outpatient surveillance imaging. Dysphagia screen - if passes can start soft diet, formal SLP eval in AM. Afib - check weight if <60kg would reduce Eliquis to 2.5mg BID given age. If does not pass dysphagia screen and unable to take PO Eliquis, no need to bridge with heparin gtt given low CHADSVASC score of 3, can resume Eliquis when able. SW evaluation, patient referred by APS.

## 2024-07-07 NOTE — ED ADULT NURSE NOTE - OBJECTIVE STATEMENT
Facilitator RN: Pt received to room 13. Pt is lethargic, responsive to voice, not speaking at this time. Pt mildly tachypneic. maintaining SpO2 greater than 95% on 2L nasal cannula. cough noted. abdomen is soft, nontender, nondistended. no edema noted. skin is intact. Placed on cardiac monitor and continuous pulse oximetry. EKG in chart. VS as noted in flowsheet. left hand 20G and right hand 22G IV placed, +blood return, flushes without difficulty. awaiting imaging. incontinence care provided. comfort measures provided. stretcher set in lowest position, safety maintained. handoff report given to primary RN Ashly.

## 2024-07-07 NOTE — H&P ADULT - PROBLEM SELECTOR PLAN 4
Plan:    - aspiration precautions   - dysphagia screening - c/w eliquis if passes dysphagia test  - check the weight, if < 60 kg then elliquis 2.5 BD   - if not heparin

## 2024-07-07 NOTE — H&P ADULT - NSICDXPASTSURGICALHX_GEN_ALL_CORE_FT
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

## 2024-07-07 NOTE — H&P ADULT - HISTORY OF PRESENT ILLNESS
The patient is an 89-year-old male with history of  The patient is an 89-year-old male with history of dementia, CVA, atrial fibrillation, and HTN transferred to ED by EMS for reported adult protective services referral. He was not eating for 3 days, unable to walk, difficult to arouse, and had altered mental status compared to his baseline based on the EMS report.     In the ED, the patient was febrile (103F/39.6C), tachycardic (105) and tachypneic (22), lethargic, opened eyes to voice, and was not alert and oriented to time, place, and person.  The patient is an 89-year-old male with history of dementia, CVA, atrial fibrillation, and HTN transferred to ED by EMS for reported adult protective services referral. He was not eating for 3 days, unable to walk, difficult to arouse, and had altered mental status compared to his baseline based on the EMS report.     Patient's family reports his baseline is A&O 2. He was doing okay until the day prior to admission where he began coughing and wheezing. Pt was brought in by APS.     In the ED, the patient was febrile (103F/39.6C), tachycardic (105) and tachypneic (22), lethargic, opened eyes to voice, and was not alert and oriented to time, place, and person.

## 2024-07-07 NOTE — H&P ADULT - PROBLEM SELECTOR PLAN 5
DVT ppx: eluquis, pending dysphagia screening   Diet:  PT/OT;    GI:   Code: Full code    Dispo: Home. Plan:    - aspiration precautions   - dysphagia screening

## 2024-07-07 NOTE — PATIENT PROFILE ADULT - FUNCTIONAL ASSESSMENT - BASIC MOBILITY 6.
2-calculated by average/Not able to assess (calculate score using [facility] averaging method)

## 2024-07-07 NOTE — ED ADULT NURSE NOTE - NSFALLHARMRISKINTERV_ED_ALL_ED
Assistance OOB with selected safe patient handling equipment if applicable/Assistance with ambulation/Communicate risk of Fall with Harm to all staff, patient, and family/Monitor gait and stability/Monitor for mental status changes and reorient to person, place, and time, as needed/Provide visual cue: red socks, yellow wristband, yellow gown, etc/Reinforce activity limits and safety measures with patient and family/Toileting schedule using arm’s reach rule for commode and bathroom/Use of alarms - bed, stretcher, chair and/or video monitoring/Bed in lowest position, wheels locked, appropriate side rails in place/Call bell, personal items and telephone in reach/Instruct patient to call for assistance before getting out of bed/chair/stretcher/Non-slip footwear applied when patient is off stretcher/[place] to call system/Physically safe environment - no spills, clutter or unnecessary equipment/Purposeful Proactive Rounding/Room/bathroom lighting operational, light cord in reach

## 2024-07-07 NOTE — ED PROVIDER NOTE - OBJECTIVE STATEMENT
Patient is an 89-year-old male with by chart review past medical history of A-fib on Eliquis, hypertension, CVA, dementia A+Ox2 at baseline?)  Brought in by EMS for reported Adult Protective Services referral, reportedly coming from a dirty hot house not eating for 2 days and unable to walk today with altered mental status difficult to arouse.  Patient was noted to be coughing but offers no complaints at this time.  On my examination patient opens eyes to voice but is ANO x 0 and does not follow commands. No obvious facial droop. No family members at bedside to assist in history.   In triage patient febrile to 103.3, mildly tachypneic and tachycardic. see MDM

## 2024-07-07 NOTE — PATIENT PROFILE ADULT - LIVING ENVIRONMENT
Initial Treatment Date: 05-   Occupation: Retired   Referred by:Arnold Zhao MD  Primary Care Physician: Alex Umana cold compresses over the involved regions at home to help reduce pain and inflammation as needed. Patient stated understanding of, and was in agreement with, the discussed instructions.    Other treatment options discussed with patient:  Further recommendations will be guided, in part, by the outcome of care. No further recommendations or referrals will be needed unless patient fails to adequately respond to conservative care.       no

## 2024-07-07 NOTE — H&P ADULT - PROBLEM SELECTOR PLAN 1
The patient is presented with pneumo-sepsis, is febrile and tachypneic.     Plan:  - broad antibacterial coverage with Zosyn started on 7/7 The patient is presented with pneumo-sepsis, is febrile and tachypneic.     Plan:  - broad antibacterial coverage with Zosyn started on 7/7  - R/O legionella, MRSA, Strep  - chest pt   - airway clearance   - pulse oxymetry The patient presents with altered mental status compared to the baseline, most probably due to an infectious etiology.   CT head did not show any acute changes       Plan:  - bladder measurement for retaining urine  - bedside dysphagia screen if passes bite size   - formal speech evaluation to adjust diet

## 2024-07-07 NOTE — ED ADULT NURSE NOTE - CHIEF COMPLAINT QUOTE
ems reports aps referral.- house not clean ,hot. pt noted  decreased appetite  x 2 days with inability to walk today. pt noted not responsive to pain. cough noted.

## 2024-07-07 NOTE — H&P ADULT - PROBLEM SELECTOR PLAN 6
DVT ppx: eluquis, pending dysphagia screening   Diet: NPO for now  PT/OT;   GI: senna, Miralax   Code: Full code (APS referral)  Dispo: Home.

## 2024-07-07 NOTE — ED PROVIDER NOTE - CLINICAL SUMMARY MEDICAL DECISION MAKING FREE TEXT BOX
Patient is an 89-year-old male with by chart review past medical history of A-fib on Eliquis, hypertension, CVA, dementia A+Ox2 at baseline?)  Brought in by EMS for reported Adult Protective Services referral, reportedly coming from a dirty hot house not eating for 2 days and unable to walk today with altered mental status difficult to arouse.  Patient was noted to be coughing but offers no complaints at this time.  On my examination patient opens eyes to voice but is ANO x 0 and does not follow commands. No obvious facial droop. No family members at bedside to assist in history.   In triage patient febrile to 103.3 orally, mildly tachypneic and tachycardic.  On exam patient is somnolent. NCAT. Arousable to touch. Disoriented.  VS notable for febrile 103.9 rectally.   DDx- sepsis vs toxic metabolic vs heat stroke? will obtain blood cultures and CT chest abd head to eval for ICH. Very limited collateral information unable to contact son via phone. Not known to be on any drugs to cause malignant hyperthermia.

## 2024-07-07 NOTE — H&P ADULT - NSHPLABSRESULTS_GEN_ALL_CORE
15.4   10.39 )-----------( 123      ( 2024 11:17 )             47.7           137  |  99  |  17  ----------------------------<  116<H>  4.6   |  21<L>  |  1.09    Ca    9.3      2024 11:17    TPro  8.3  /  Alb  4.2  /  TBili  1.0  /  DBili  x   /  AST  24  /  ALT  19  /  AlkPhos  106        PTT: 28.6, PT: 14.3, INR: 1.27    Procalcitonin 0.20    Urinalysis Basic - ( 2024 11:34 )    Color: Yellow / Appearance: Cloudy / S.021 / pH: x  Gluc: x / Ketone: Negative mg/dL  / Bili: Negative / Urobili: 1.0 mg/dL   Blood: x / Protein: 100 mg/dL / Nitrite: Negative   Leuk Esterase: Negative / RBC: 2-5 /HPF / WBC 0-2 /HPF   Sq Epi: x / Non Sq Epi: x / Bacteria: Few /HPF    Negative for Covid, FluA, FluB, RSV    CXR:  Left retrocardiac opacity which is better visualized on same-day CT.    Abdomen/Pelvis CT:  Left lower lobe patchy consolidation, suspicious for pneumonia.  1.1 cm right adrenal nodule.    Head CT:  Advanced periventricular and deep white matter ischemia.   Old infarctions are seen in the BILATERAL anterior frontal, parietal and   occipital lobes and BILATERAL cerebellum.    Mild global atrophy.

## 2024-07-07 NOTE — H&P ADULT - PROBLEM SELECTOR PLAN 3
- c/w eliquis pending dysphagia test The patient is presented with pneumo-sepsis, is febrile and tachypneic.     Plan:  - broad antibacterial coverage with Zosyn started on 7/7  - atypical bacterial coverage with Zithromax on 7/7  - w/u legionella, MRSA, Strep  - chest pt   - airway clearance   - pulse oxymetry

## 2024-07-08 DIAGNOSIS — Z65.9 PROBLEM RELATED TO UNSPECIFIED PSYCHOSOCIAL CIRCUMSTANCES: ICD-10-CM

## 2024-07-08 LAB
ALBUMIN SERPL ELPH-MCNC: 3.6 G/DL — SIGNIFICANT CHANGE UP (ref 3.3–5)
ALP SERPL-CCNC: 83 U/L — SIGNIFICANT CHANGE UP (ref 40–120)
ALT FLD-CCNC: 17 U/L — SIGNIFICANT CHANGE UP (ref 4–41)
ANION GAP SERPL CALC-SCNC: 14 MMOL/L — SIGNIFICANT CHANGE UP (ref 7–14)
AST SERPL-CCNC: 29 U/L — SIGNIFICANT CHANGE UP (ref 4–40)
BASOPHILS # BLD AUTO: 0 K/UL — SIGNIFICANT CHANGE UP (ref 0–0.2)
BASOPHILS NFR BLD AUTO: 0 % — SIGNIFICANT CHANGE UP (ref 0–2)
BILIRUB SERPL-MCNC: 1.2 MG/DL — SIGNIFICANT CHANGE UP (ref 0.2–1.2)
BUN SERPL-MCNC: 15 MG/DL — SIGNIFICANT CHANGE UP (ref 7–23)
CALCIUM SERPL-MCNC: 8.8 MG/DL — SIGNIFICANT CHANGE UP (ref 8.4–10.5)
CHLORIDE SERPL-SCNC: 102 MMOL/L — SIGNIFICANT CHANGE UP (ref 98–107)
CO2 SERPL-SCNC: 22 MMOL/L — SIGNIFICANT CHANGE UP (ref 22–31)
CREAT SERPL-MCNC: 0.94 MG/DL — SIGNIFICANT CHANGE UP (ref 0.5–1.3)
CULTURE RESULTS: SIGNIFICANT CHANGE UP
EGFR: 78 ML/MIN/1.73M2 — SIGNIFICANT CHANGE UP
EOSINOPHIL # BLD AUTO: 0 K/UL — SIGNIFICANT CHANGE UP (ref 0–0.5)
EOSINOPHIL NFR BLD AUTO: 0 % — SIGNIFICANT CHANGE UP (ref 0–6)
GLUCOSE SERPL-MCNC: 97 MG/DL — SIGNIFICANT CHANGE UP (ref 70–99)
HCT VFR BLD CALC: 44.8 % — SIGNIFICANT CHANGE UP (ref 39–50)
HGB BLD-MCNC: 14.7 G/DL — SIGNIFICANT CHANGE UP (ref 13–17)
IANC: 6.1 K/UL — SIGNIFICANT CHANGE UP (ref 1.8–7.4)
LYMPHOCYTES # BLD AUTO: 1.67 K/UL — SIGNIFICANT CHANGE UP (ref 1–3.3)
LYMPHOCYTES # BLD AUTO: 19.8 % — SIGNIFICANT CHANGE UP (ref 13–44)
MAGNESIUM SERPL-MCNC: 2 MG/DL — SIGNIFICANT CHANGE UP (ref 1.6–2.6)
MANUAL SMEAR VERIFICATION: SIGNIFICANT CHANGE UP
MCHC RBC-ENTMCNC: 29.6 PG — SIGNIFICANT CHANGE UP (ref 27–34)
MCHC RBC-ENTMCNC: 32.8 GM/DL — SIGNIFICANT CHANGE UP (ref 32–36)
MCV RBC AUTO: 90.3 FL — SIGNIFICANT CHANGE UP (ref 80–100)
MONOCYTES # BLD AUTO: 0.45 K/UL — SIGNIFICANT CHANGE UP (ref 0–0.9)
MONOCYTES NFR BLD AUTO: 5.4 % — SIGNIFICANT CHANGE UP (ref 2–14)
MRSA PCR RESULT.: DETECTED
NEUTROPHILS # BLD AUTO: 6.29 K/UL — SIGNIFICANT CHANGE UP (ref 1.8–7.4)
NEUTROPHILS NFR BLD AUTO: 65.8 % — SIGNIFICANT CHANGE UP (ref 43–77)
NEUTS BAND # BLD: 9 % — HIGH (ref 0–6)
PHOSPHATE SERPL-MCNC: 2.7 MG/DL — SIGNIFICANT CHANGE UP (ref 2.5–4.5)
PLAT MORPH BLD: NORMAL — SIGNIFICANT CHANGE UP
PLATELET # BLD AUTO: 126 K/UL — LOW (ref 150–400)
PLATELET COUNT - ESTIMATE: ABNORMAL
POTASSIUM SERPL-MCNC: 3.6 MMOL/L — SIGNIFICANT CHANGE UP (ref 3.5–5.3)
POTASSIUM SERPL-SCNC: 3.6 MMOL/L — SIGNIFICANT CHANGE UP (ref 3.5–5.3)
PROT SERPL-MCNC: 7.8 G/DL — SIGNIFICANT CHANGE UP (ref 6–8.3)
RBC # BLD: 4.96 M/UL — SIGNIFICANT CHANGE UP (ref 4.2–5.8)
RBC # FLD: 13.2 % — SIGNIFICANT CHANGE UP (ref 10.3–14.5)
RBC BLD AUTO: NORMAL — SIGNIFICANT CHANGE UP
S AUREUS DNA NOSE QL NAA+PROBE: DETECTED
SMUDGE CELLS # BLD: PRESENT — SIGNIFICANT CHANGE UP
SODIUM SERPL-SCNC: 138 MMOL/L — SIGNIFICANT CHANGE UP (ref 135–145)
SPECIMEN SOURCE: SIGNIFICANT CHANGE UP
WBC # BLD: 8.41 K/UL — SIGNIFICANT CHANGE UP (ref 3.8–10.5)
WBC # FLD AUTO: 8.41 K/UL — SIGNIFICANT CHANGE UP (ref 3.8–10.5)

## 2024-07-08 PROCEDURE — 99233 SBSQ HOSP IP/OBS HIGH 50: CPT | Mod: GC

## 2024-07-08 RX ORDER — MUPIROCIN 20 MG/G
1 OINTMENT TOPICAL
Refills: 0 | Status: DISCONTINUED | OUTPATIENT
Start: 2024-07-08 | End: 2024-07-10

## 2024-07-08 RX ORDER — MUPIROCIN 20 MG/G
1 OINTMENT TOPICAL
Refills: 0 | Status: DISCONTINUED | OUTPATIENT
Start: 2024-07-08 | End: 2024-07-08

## 2024-07-08 RX ORDER — CEFTRIAXONE SODIUM 500 MG
1000 VIAL (EA) INJECTION EVERY 24 HOURS
Refills: 0 | Status: DISCONTINUED | OUTPATIENT
Start: 2024-07-08 | End: 2024-07-08

## 2024-07-08 RX ORDER — POTASSIUM CHLORIDE 600 MG/1
40 TABLET, FILM COATED, EXTENDED RELEASE ORAL ONCE
Refills: 0 | Status: COMPLETED | OUTPATIENT
Start: 2024-07-08 | End: 2024-07-08

## 2024-07-08 RX ORDER — VANCOMYCIN HYDROCHLORIDE 50 MG/ML
750 KIT ORAL EVERY 24 HOURS
Refills: 0 | Status: DISCONTINUED | OUTPATIENT
Start: 2024-07-08 | End: 2024-07-09

## 2024-07-08 RX ADMIN — AZITHROMYCIN 255 MILLIGRAM(S): 250 TABLET, FILM COATED ORAL at 17:35

## 2024-07-08 RX ADMIN — PANTOPRAZOLE SODIUM 40 MILLIGRAM(S): 40 INJECTION, POWDER, FOR SOLUTION INTRAVENOUS at 13:30

## 2024-07-08 RX ADMIN — APIXABAN 2.5 MILLIGRAM(S): 5 TABLET, FILM COATED ORAL at 17:36

## 2024-07-08 RX ADMIN — Medication 1 TABLET(S): at 13:09

## 2024-07-08 RX ADMIN — POLYETHYLENE GLYCOL 3350 17 GRAM(S): 1 POWDER ORAL at 13:09

## 2024-07-08 RX ADMIN — PIPERACILLIN SODIUM AND TAZOBACTAM SODIUM 25 GRAM(S): 3; .375 INJECTION, POWDER, LYOPHILIZED, FOR SOLUTION INTRAVENOUS at 06:24

## 2024-07-08 RX ADMIN — ATORVASTATIN CALCIUM 40 MILLIGRAM(S): 20 TABLET, FILM COATED ORAL at 21:07

## 2024-07-08 RX ADMIN — Medication 100 MILLIGRAM(S): at 13:08

## 2024-07-08 RX ADMIN — POTASSIUM CHLORIDE 40 MILLIEQUIVALENT(S): 600 TABLET, FILM COATED, EXTENDED RELEASE ORAL at 13:09

## 2024-07-08 RX ADMIN — VANCOMYCIN HYDROCHLORIDE 250 MILLIGRAM(S): KIT at 21:07

## 2024-07-08 NOTE — DIETITIAN INITIAL EVALUATION ADULT - PROBLEM SELECTOR PLAN 2
Plan:  The patient does meet the criteria for severe sepsis, with lactate 4.5 suggestive for end organ damage      -Blood culture, urine culture  - check i/o  - add azithromycin until legionella

## 2024-07-08 NOTE — DIETITIAN INITIAL EVALUATION ADULT - PERTINENT LABORATORY DATA
07-08    138  |  102  |  15  ----------------------------<  97  3.6   |  22  |  0.94    Ca    8.8      08 Jul 2024 11:00  Phos  2.7     07-08  Mg     2.00     07-08    TPro  7.8  /  Alb  3.6  /  TBili  1.2  /  DBili  x   /  AST  29  /  ALT  17  /  AlkPhos  83  07-08  POCT Blood Glucose.: 127 mg/dL (07-07-24 @ 16:04)

## 2024-07-08 NOTE — SWALLOW BEDSIDE ASSESSMENT ADULT - SWALLOW EVAL: DIAGNOSIS
1. Functional oral phase for puree, mildly-thick and thin liquids marked by adequate acceptance and containment, adequate bolus manipulation, adequate oral transit and adequate oral clearance. 2. Moderate oral dysphagia for minced and moist solids marked by absent bolus manipulation/mastication followed by immediate anterior to posterior transport and adequate oral clearance. 3. Functional pharyngeal phase for puree, minced and moist and mildly-thick liquids marked by hyolaryngeal excursion present upon palpation and no overt s/s of penetration/aspiration evidenced. 4. Moderate pharyngeal dysphagia for thin liquids marked by hyolaryngeal excursion present upon palpation and presence of overt s/s of reduced airway protection evidenced by delayed coughing.

## 2024-07-08 NOTE — PROGRESS NOTE ADULT - PROBLEM SELECTOR PLAN 5
Plan:    - aspiration precautions   - NPO due to failed dysphagia Currently back to the baseline cognitive function      Plan:  - aspiration precautions   - PO  with puree and thick liquid

## 2024-07-08 NOTE — DIETITIAN INITIAL EVALUATION ADULT - PROBLEM SELECTOR PLAN 4
- c/w eliquis if passes dysphagia test  - check the weight, if < 60 kg then elliquis 2.5 BD   - if not heparin

## 2024-07-08 NOTE — DIETITIAN INITIAL EVALUATION ADULT - PROBLEM SELECTOR PLAN 1
The patient presents with altered mental status compared to the baseline, most probably due to an infectious etiology.   CT head did not show any acute changes       Plan:  - bladder measurement for retaining urine  - bedside dysphagia screen if passes bite size   - formal speech evaluation to adjust diet

## 2024-07-08 NOTE — SWALLOW BEDSIDE ASSESSMENT ADULT - ASR SWALLOW RECOMMEND DIAG
Objective testing is NOT indicated given functional swallow for recommended diet and presence of overt s/s of aspiration with thin liquids

## 2024-07-08 NOTE — PROGRESS NOTE ADULT - PROBLEM SELECTOR PLAN 4
the patient failed dysphagia test, consider non-oral anticoagulant     Plan:  - d/c eliquis   - Heparin the patient failed dysphagia test, consider non-oral anticoagulant     Plan:  - d/c eliquis   - given low CHADSVASC score of 3, can resume Eliquis when able, no need to bridge with heparin gtt Pt has the hx of a-fib and can tolerate po per speech/swallow evaluation.    Plan:  - c/w eliquis, based on the weight the dose was adjusted to 2.5 mg BD

## 2024-07-08 NOTE — PROGRESS NOTE ADULT - PROBLEM SELECTOR PLAN 6
DVT ppx: eluquis, pending dysphagia screening   Diet: NPO for now  PT/OT;   GI: senna, Miralax   Code: Full code (APS referral)  Dispo: Home. DVT ppx: --  Diet: NPO for now  PT/OT;   GI: senna, Miralax   Code: Full code (APS referral)  Dispo: Home. The patient was brought to the hospital on APS referral. Has poor living condition (hot, dirty house) based on EMS report. Now back to baseline cognitive functioning.     Plan  - c/s social work

## 2024-07-08 NOTE — PROGRESS NOTE ADULT - PROBLEM SELECTOR PLAN 1
The patient presents with altered mental status compared to the baseline, most probably due to an infectious etiology.   CT head did not show any acute changes  Metabolic assessment including LFT, GFR, VBG were normal.  Dysphagia screen: failed (contraindicated)      Plan:  - bladder measurement for retaining urine  - bedside dysphagia screen if passes bite size   - formal speech evaluation to adjust diet The patient presents with altered mental status compared to the baseline, most probably due to an infectious etiology. Other etiologies were investigate as follows.  CT head did not show any acute changes  Metabolic assessment including LFT, GFR, VBG was normal.  Based on speech/swallow evaluation the patient can receive puree and thick liquid.   No urine retention was observed based on bladder measurement   After treating the underlying infectious disease the patient is back to his baseline mental functioning    Plan:  - c/w treating the underlying infection

## 2024-07-08 NOTE — DIETITIAN INITIAL EVALUATION ADULT - REASON
Unable to get consent, however noted w/ overt signs of severe muscle loss in temporal region and severe fat loss in orbita	l/buccal region per observation.

## 2024-07-08 NOTE — DIETITIAN INITIAL EVALUATION ADULT - PERTINENT MEDS FT
MEDICATIONS  (STANDING):  apixaban 2.5 milliGRAM(s) Oral every 12 hours  atorvastatin 40 milliGRAM(s) Oral at bedtime  azithromycin  IVPB 500 milliGRAM(s) IV Intermittent every 24 hours  azithromycin  IVPB      cefTRIAXone   IVPB 1000 milliGRAM(s) IV Intermittent every 24 hours  pantoprazole  Injectable 40 milliGRAM(s) IV Push daily  polyethylene glycol 3350 17 Gram(s) Oral daily  senna 1 Tablet(s) Oral daily    MEDICATIONS  (PRN):

## 2024-07-08 NOTE — PROGRESS NOTE ADULT - SUBJECTIVE AND OBJECTIVE BOX
******************  Authored By: Kacie Lane MD (PGY-1)  MS Teams Preferred  ******************  Admission Day: 2    HPI: The patient is an 89-year-old male with history of dementia, CVA, atrial fibrillation, and HTN transferred to ED by EMS for reported adult protective services referral. He was not eating for 3 days, unable to walk, difficult to arouse, and had altered mental status compared to his baseline based on the EMS report.   Patient's family reports his baseline is A&O 2. He was doing okay until the day prior to admission where he began coughing and wheezing. Pt was brought in by APS.     ED/Hospital Course: In the ED, the patient was febrile (103F/39.6C), tachycardic (105) and tachypneic (22), lethargic, opened eyes to voice, and was not alert and oriented to time, place, and person. Broad-spectrum bacterial coverage with Zosyn initiated.     PMH/PSH:  Atrial fibrillation   CVA (cerebrovascular accident)   Dementia   GERD (gastroesophageal reflux disease)   HTN (hypertension)   Hyperlipidemia   Prostate cancer 18 years ago.  History of prostate surgery   History of sinus surgery     Home Medication:  Eliquis 5 mg oral tablet: 1 tab(s) orally 2 times a day  meclizine 12.5 mg oral tablet: 1 tab(s) orally 3 times a day, As Needed  atorvastatin 40 mg oral tablet: 1 tab(s) orally once a day    Allergies:  No Known Allergies    Social Hx:  Retired. Lives with with son and daughter-in-law in a reportedly poor condition, according to APS patient's house was dirty and hot    Lifestyle:  Negative for smoking, alcohol, and recreational drugs.    Family Hx  No pertinent family history    Patient seen and examined at bedside.     SUBJECTIVE EVALUATION:  Patient does not communicate in words    OBJECTIVE EVALUATION:    VITAL SIGNS:  T(F): 99 (24 @ 05:33)  HR: 63 (24 @ 05:33)  BP: 120/66 (24 @ 05:33)  RR: 18 (24 @ 05:33)  SpO2: 100% (24 @ 05:33) on room air    The patient is not  to time, place, person and situation. Is not in distress. Does not talk, Is lethargic. Opens eyes to voice and produce unintelligible sounds. Does not follow commands or communicate.       HEENT: Normocephalic, atraumatic. Sclera non-icteric, pupils are myotic but reactive to light.  Cardiovascular: RRR, no murmurs, rubs, gallops, peripheral edema   Respiratory: No rales, wheezes or rhonchi noted. No accessory muscle usage   Gastrointestinal: abdomen soft, non-tender, no mass, no hepatosplenomegaly noted.    Extremities: Increased rigidity noted in all four extremities. No deformities, no cyanosis.   Skin: No lesion, scar noted      LABS:  No leucocytosis or anemia noted                          15.4   10.39 )-----------( 123      ( 2024 11:17 )             47.7         137  |  99  |  17  ----------------------------<  116<H>  4.6   |  21<L>  |  1.09    Ca    9.3      2024 11:17    TPro  8.3  /  Alb  4.2  /  TBili  1.0  /  DBili  x   /  AST  24  /  ALT  19  /  AlkPhos  106  07    PT/INR - ( 2024 11:17 )   PT: 14.3 sec;   INR: 1.27 ratio    PTT - ( 2024 11:17 )  PTT:28.6 sec    Proteinuria noted in U/A:  Urinalysis Basic - ( 2024 11:34 )  Color: Yellow / Appearance: Cloudy / S.021 / pH: x  Gluc: x / Ketone: Negative mg/dL  / Bili: Negative / Urobili: 1.0 mg/dL   Blood: x / Protein: 100 mg/dL / Nitrite: Negative   Leuk Esterase: Negative / RBC: 2-5 /HPF / WBC 0-2 /HPF   Sq Epi: x / Non Sq Epi: x / Bacteria: Few /HPF      Procalcitonin: 0.20  Total serum protein: 8.3  Lactate on VB.5 -> 1.7    Last VBG:  pH: 7.36  HCO3: 24  pCO2: 41    CXR:  Left retrocardiac opacity which is better visualized on same-day CT.    Abdomen/Pelvis CT:  Left lower lobe patchy consolidation, suspicious for pneumonia.  1.1 cm right adrenal nodule.    Head CT:  Advanced periventricular and deep white matter ischemia.   Old infarctions are seen in the BILATERAL anterior frontal, parietal and   occipital lobes and BILATERAL cerebellum.    Mild global atrophy.      Current Medication:  MEDICATIONS  (STANDING):  apixaban 2.5 milliGRAM(s) Oral every 12 hours  atorvastatin 40 milliGRAM(s) Oral at bedtime  azithromycin  IVPB      azithromycin  IVPB 500 milliGRAM(s) IV Intermittent every 24 hours  lactated ringers. 1000 milliLiter(s) (60 mL/Hr) IV Continuous <Continuous>  pantoprazole  Injectable 40 milliGRAM(s) IV Push daily  piperacillin/tazobactam IVPB.. 3.375 Gram(s) IV Intermittent every 8 hours  polyethylene glycol 3350 17 Gram(s) Oral daily  senna 1 Tablet(s) Oral daily    MEDICATIONS  (PRN):      RADIOLOGY & ADDITIONAL TESTS:  Reviewed ******************  Authored By: Kacie Lane MD (PGY-1)  MS Teams Preferred  ******************  Admission Day: 2    HPI: The patient is an 89-year-old male with history of dementia, CVA, atrial fibrillation, and HTN transferred to ED by EMS for reported adult protective services referral. He was not eating for 3 days, unable to walk, difficult to arouse, and had altered mental status compared to his baseline based on the EMS report.   Patient's family reports his baseline is A&O 2. He was doing okay until the day prior to admission where he began coughing and wheezing. Pt was brought in by APS.     ED/Hospital Course: In the ED, the patient was febrile (103F/39.6C), tachycardic (105) and tachypneic (22), lethargic, opened eyes to voice, and was not alert and oriented to time, place, and person. Broad-spectrum bacterial coverage with Zosyn and Vancomycin initiated.     PMH/PSH:  Atrial fibrillation   CVA (cerebrovascular accident)   Dementia   GERD (gastroesophageal reflux disease)   HTN (hypertension)   Hyperlipidemia   Prostate cancer 18 years ago.  History of prostate surgery   History of sinus surgery     Home Medication:  Eliquis 5 mg oral tablet: 1 tab(s) orally 2 times a day  meclizine 12.5 mg oral tablet: 1 tab(s) orally 3 times a day, As Needed  atorvastatin 40 mg oral tablet: 1 tab(s) orally once a day    Allergies:  No Known Allergies    Social Hx:  Retired. Lives with with son and daughter-in-law in a reportedly poor condition, according to APS patient's house was dirty and hot    Lifestyle:  Negative for smoking, alcohol, and recreational drugs.    Family Hx  No pertinent family history    Patient seen and examined at bedside.     SUBJECTIVE EVALUATION:  Patient does not communicate in words and cannot provide information.    OBJECTIVE EVALUATION:    VITAL SIGNS:  T(F): 99 (24 @ 05:33)  HR: 63 (24 @ 05:33)  BP: 120/66 (24 @ 05:33)  RR: 18 (24 @ 05:33)  SpO2: 100% (24 @ 05:33) on room air    The patient is not alert and oriented to time, place, person and situation. Is not in distress. Does not talk, Is lethargic. Opens eyes to voice and produce unintelligible sounds. Does not follow commands or communicate.       HEENT: Normocephalic, atraumatic. Sclera non-icteric, pupils are myotic in the room light but reactive to light.  Cardiovascular: RRR, no murmurs, rubs, gallops, peripheral edema   Respiratory: No rales, wheezes or rhonchi noted. No accessory muscle usage   Gastrointestinal: abdomen soft, non-tender, no mass, no hepatosplenomegaly noted.    Extremities: Increased rigidity noted in all four extremities. No deformities, no cyanosis.   Skin: No lesion, scar noted      LABS:  No leucocytosis or anemia noted                          15.4   10.39 )-----------( 123      ( 2024 11:17 )             47.7     07    137  |  99  |  17  ----------------------------<  116<H>  4.6   |  21<L>  |  1.09    Ca    9.3      2024 11:17    TPro  8.3  /  Alb  4.2  /  TBili  1.0  /  DBili  x   /  AST  24  /  ALT  19  /  AlkPhos  106  07-07    PT/INR - ( 2024 11:17 )   PT: 14.3 sec;   INR: 1.27 ratio    PTT - ( 2024 11:17 )  PTT:28.6 sec    Proteinuria noted in U/A:  Urinalysis Basic - ( 2024 11:34 )  Color: Yellow / Appearance: Cloudy / S.021 / pH: x  Gluc: x / Ketone: Negative mg/dL  / Bili: Negative / Urobili: 1.0 mg/dL   Blood: x / Protein: 100 mg/dL / Nitrite: Negative   Leuk Esterase: Negative / RBC: 2-5 /HPF / WBC 0-2 /HPF   Sq Epi: x / Non Sq Epi: x / Bacteria: Few /HPF      Procalcitonin: 0.20  Total serum protein: 8.3  Lactate on VB.5 -> 1.7    Last VBG:  pH: 7.36  HCO3: 24  pCO2: 41    CXR:  Left retrocardiac opacity which is better visualized on same-day CT.    Abdomen/Pelvis CT:  Left lower lobe patchy consolidation, suspicious for pneumonia.  1.1 cm right adrenal nodule.    Head CT:  Advanced periventricular and deep white matter ischemia.   Old infarctions are seen in the BILATERAL anterior frontal, parietal and   occipital lobes and BILATERAL cerebellum.    Mild global atrophy.      Current Medication:  MEDICATIONS  (STANDING):  apixaban 2.5 milliGRAM(s) Oral every 12 hours  atorvastatin 40 milliGRAM(s) Oral at bedtime  azithromycin  IVPB      azithromycin  IVPB 500 milliGRAM(s) IV Intermittent every 24 hours  lactated ringers. 1000 milliLiter(s) (60 mL/Hr) IV Continuous <Continuous>  pantoprazole  Injectable 40 milliGRAM(s) IV Push daily  piperacillin/tazobactam IVPB.. 3.375 Gram(s) IV Intermittent every 8 hours  polyethylene glycol 3350 17 Gram(s) Oral daily  senna 1 Tablet(s) Oral daily    MEDICATIONS  (PRN):      RADIOLOGY & ADDITIONAL TESTS:  Reviewed ******************  Authored By: Kacie Lane MD (PGY-1)  MS Teams Preferred  ******************  Admission Day: 2    HPI: The patient is an 89-year-old male with history of dementia, CVA, atrial fibrillation, and HTN transferred to ED by EMS for reported adult protective services referral. He was not eating for 3 days, unable to walk, difficult to arouse, and had altered mental status compared to his baseline based on the EMS report.   Patient's family reports his baseline is A&O 2. He was doing okay until the day prior to admission where he began coughing and wheezing. Pt was brought in by APS.     ED/Hospital Course: In the ED, the patient was febrile (103F/39.6C), tachycardic (105) and tachypneic (22), lethargic, opened eyes to voice, and was not alert and oriented to time, place, and person. Broad-spectrum bacterial coverage with Zosyn and Vancomycin initiated.     PMH/PSH:  Atrial fibrillation   CVA (cerebrovascular accident)   Dementia   GERD (gastroesophageal reflux disease)   HTN (hypertension)   Hyperlipidemia   Prostate cancer 18 years ago.  History of prostate surgery   History of sinus surgery     Home Medication:  Eliquis 5 mg oral tablet: 1 tab(s) orally 2 times a day  meclizine 12.5 mg oral tablet: 1 tab(s) orally 3 times a day, As Needed  atorvastatin 40 mg oral tablet: 1 tab(s) orally once a day    Allergies:  No Known Allergies    Social Hx:  Retired. Lives with with son and daughter-in-law in a reportedly poor condition, according to APS patient's house was dirty and hot    Lifestyle:  Negative for smoking, alcohol, and recreational drugs.    Family Hx  No pertinent family history    Patient seen and examined at bedside.     SUBJECTIVE EVALUATION:  Patient does not communicate in words and cannot provide information.    OBJECTIVE EVALUATION:    VITAL SIGNS:  T(F): 99 (24 @ 05:33)  HR: 63 (24 @ 05:33)  BP: 120/66 (24 @ 05:33)  RR: 18 (24 @ 05:33)  SpO2: 100% (24 @ 05:33) on room air    The patient is not alert and oriented to time, place, person and situation. Is cachectic (BMI=16.3). Is not in distress. Does not talk, Is lethargic. Opens eyes to voice and produce unintelligible sounds. Does not follow commands or communicate.       HEENT: Normocephalic, atraumatic. Sclera non-icteric, pupils are myotic in the room light but reactive to light.  Cardiovascular: RRR, no murmurs, rubs, gallops, peripheral edema   Respiratory: No rales, wheezes or rhonchi noted. No accessory muscle usage   Gastrointestinal: abdomen soft, non-tender, no mass, no hepatosplenomegaly noted.    Extremities: Increased rigidity noted in all four extremities. No deformities, no cyanosis.   Skin: No lesion, scar noted      LABS:  No leucocytosis or anemia noted                          15.4   10.39 )-----------( 123      ( 2024 11:17 )             47.7     07-    137  |  99  |  17  ----------------------------<  116<H>  4.6   |  21<L>  |  1.09    Ca    9.3      2024 11:17    TPro  8.3  /  Alb  4.2  /  TBili  1.0  /  DBili  x   /  AST  24  /  ALT  19  /  AlkPhos  106  07-07    PT/INR - ( 2024 11:17 )   PT: 14.3 sec;   INR: 1.27 ratio    PTT - ( 2024 11:17 )  PTT:28.6 sec    Proteinuria noted in U/A:  Urinalysis Basic - ( 2024 11:34 )  Color: Yellow / Appearance: Cloudy / S.021 / pH: x  Gluc: x / Ketone: Negative mg/dL  / Bili: Negative / Urobili: 1.0 mg/dL   Blood: x / Protein: 100 mg/dL / Nitrite: Negative   Leuk Esterase: Negative / RBC: 2-5 /HPF / WBC 0-2 /HPF   Sq Epi: x / Non Sq Epi: x / Bacteria: Few /HPF      Procalcitonin: 0.20  Total serum protein: 8.3  Lactate on VB.5 -> 1.7    Last VBG:  pH: 7.36  HCO3: 24  pCO2: 41    CXR:  Left retrocardiac opacity which is better visualized on same-day CT.    Abdomen/Pelvis CT:  Left lower lobe patchy consolidation, suspicious for pneumonia.  1.1 cm right adrenal nodule.    Head CT:  Advanced periventricular and deep white matter ischemia.   Old infarctions are seen in the BILATERAL anterior frontal, parietal and   occipital lobes and BILATERAL cerebellum.    Mild global atrophy.      Current Medication:  MEDICATIONS  (STANDING):  apixaban 2.5 milliGRAM(s) Oral every 12 hours  atorvastatin 40 milliGRAM(s) Oral at bedtime  azithromycin  IVPB      azithromycin  IVPB 500 milliGRAM(s) IV Intermittent every 24 hours  lactated ringers. 1000 milliLiter(s) (60 mL/Hr) IV Continuous <Continuous>  pantoprazole  Injectable 40 milliGRAM(s) IV Push daily  piperacillin/tazobactam IVPB.. 3.375 Gram(s) IV Intermittent every 8 hours  polyethylene glycol 3350 17 Gram(s) Oral daily  senna 1 Tablet(s) Oral daily    MEDICATIONS  (PRN):      RADIOLOGY & ADDITIONAL TESTS:  Reviewed ******************  Authored By: Kacie Lane MD (PGY-1)  MS Teams Preferred  ******************  Admission Day: 2    HPI: The patient is an 89-year-old male with history of dementia, CVA, atrial fibrillation, and HTN transferred to ED by EMS for reported adult protective services referral. He was not eating for 3 days, unable to walk, difficult to arouse, and had altered mental status compared to his baseline based on the EMS report.   Patient's family reports his baseline is A&O 2. He was doing okay until the day prior to admission where he began coughing and wheezing. Pt was brought in by APS.     ED/Hospital Course: In the ED, the patient was febrile (103F/39.6C), tachycardic (105) and tachypneic (22), lethargic, opened eyes to voice, and was not alert and oriented to time, place, and person. Broad-spectrum bacterial coverage with Zosyn and Vancomycin initiated.     PMH/PSH:  Atrial fibrillation   CVA (cerebrovascular accident)   Dementia   GERD (gastroesophageal reflux disease)   HTN (hypertension)   Hyperlipidemia   Prostate cancer 18 years ago.  History of prostate surgery   History of sinus surgery     Home Medication:  Eliquis 5 mg oral tablet: 1 tab(s) orally 2 times a day  meclizine 12.5 mg oral tablet: 1 tab(s) orally 3 times a day, As Needed  atorvastatin 40 mg oral tablet: 1 tab(s) orally once a day    Allergies:  No Known Allergies    Social Hx:  Retired. Lives with with son and daughter-in-law in a reportedly poor condition, according to APS patient's house was dirty and hot    Lifestyle:  Negative for smoking, alcohol, and recreational drugs.    Family Hx  No pertinent family history    Patient seen and examined at bedside.     SUBJECTIVE EVALUATION:  Patient does not communicate in words and cannot provide information.    OBJECTIVE EVALUATION:    VITAL SIGNS:  T(F): 99 (24 @ 05:33)  HR: 63 (24 @ :33)  BP: 120/66 (24 @ 05:33)  RR: 18 (24 @ 05:33)  SpO2: 100% (24 @ 05:33) on room air    The patient is awake, eyes are open, responds to questions with incoherent sentences. can mention his and his son's name but is not oriented to time, place and situation. The patients cachectic (BMI=16.3). Is not in distress.     HEENT: Normocephalic, atraumatic. Sclera non-icteric, pupils are myotic in the room light but reactive to light.  Cardiovascular: RRR, no murmurs, rubs, gallops, peripheral edema   Respiratory: No rales, wheezes or rhonchi noted. No accessory muscle usage   Gastrointestinal: abdomen soft, non-tender, no mass, no hepatosplenomegaly noted.    Extremities: Increased rigidity noted in all four extremities. No deformities, no cyanosis.   Skin: No lesion, scar noted      LABS:  No leucocytosis or anemia noted                          15.4   10.39 )-----------( 123      ( 2024 11:17 )             47.7     07-    137  |  99  |  17  ----------------------------<  116<H>  4.6   |  21<L>  |  1.09    Ca    9.3      2024 11:17    TPro  8.3  /  Alb  4.2  /  TBili  1.0  /  DBili  x   /  AST  24  /  ALT  19  /  AlkPhos  106  07-07    PT/INR - ( 2024 11:17 )   PT: 14.3 sec;   INR: 1.27 ratio    PTT - ( 2024 11:17 )  PTT:28.6 sec    Proteinuria noted in U/A:  Urinalysis Basic - ( 2024 11:34 )  Color: Yellow / Appearance: Cloudy / S.021 / pH: x  Gluc: x / Ketone: Negative mg/dL  / Bili: Negative / Urobili: 1.0 mg/dL   Blood: x / Protein: 100 mg/dL / Nitrite: Negative   Leuk Esterase: Negative / RBC: 2-5 /HPF / WBC 0-2 /HPF   Sq Epi: x / Non Sq Epi: x / Bacteria: Few /HPF      Procalcitonin: 0.20  Total serum protein: 8.3  Lactate on VB.5 -> 1.7    Last VBG:  pH: 7.36  HCO3: 24  pCO2: 41    CXR:  Left retrocardiac opacity which is better visualized on same-day CT.    Abdomen/Pelvis CT:  Left lower lobe patchy consolidation, suspicious for pneumonia.  1.1 cm right adrenal nodule.    Head CT:  Advanced periventricular and deep white matter ischemia.   Old infarctions are seen in the BILATERAL anterior frontal, parietal and   occipital lobes and BILATERAL cerebellum.    Mild global atrophy.      Current Medication:  MEDICATIONS  (STANDING):  apixaban 2.5 milliGRAM(s) Oral every 12 hours  atorvastatin 40 milliGRAM(s) Oral at bedtime  azithromycin  IVPB      azithromycin  IVPB 500 milliGRAM(s) IV Intermittent every 24 hours  lactated ringers. 1000 milliLiter(s) (60 mL/Hr) IV Continuous <Continuous>  pantoprazole  Injectable 40 milliGRAM(s) IV Push daily  piperacillin/tazobactam IVPB.. 3.375 Gram(s) IV Intermittent every 8 hours  polyethylene glycol 3350 17 Gram(s) Oral daily  senna 1 Tablet(s) Oral daily    MEDICATIONS  (PRN):      RADIOLOGY & ADDITIONAL TESTS:  Reviewed ******************  Authored By: Kacie Lane MD (PGY-1)  MS Teams Preferred  ******************  Admission Day: 2    HPI: The patient is an 89-year-old male with history of dementia, CVA, atrial fibrillation, and HTN transferred to ED by EMS for reported adult protective services referral. He was not eating for 3 days, unable to walk, difficult to arouse, and had altered mental status compared to his baseline based on the EMS report.   Patient's family reports his baseline is A&O 2. He could walk and eat by himself. Has been speaking incoherently and occasionally confused in the past two years. Otherwise, he was doing okay until the day prior to admission where he became sleepy, could not tolerate food, and began coughing and wheezing. .     ED/Hospital Course: In the ED, the patient was febrile (103F/39.6C), tachycardic (105) and tachypneic (22), lethargic, opened eyes to voice, and was not alert and oriented to time, place, and person. Broad-spectrum bacterial coverage with Zosyn and Vancomycin initiated.     PMH/PSH:  Atrial fibrillation   CVA (cerebrovascular accident), 2 years ago  Dementia  GERD (gastroesophageal reflux disease)   HTN (hypertension)   Hyperlipidemia   Prostate cancer 18 years ago.  History of prostate surgery   History of sinus surgery     Home Medication:  Eliquis 5 mg oral tablet: 1 tab(s) orally 2 times a day  meclizine 12.5 mg oral tablet: 1 tab(s) orally 3 times a day, As Needed  atorvastatin 40 mg oral tablet: 1 tab(s) orally once a day    Allergies:  No Known Allergies    Social Hx:  Retired. Lives with with son and daughter-in-law in a reportedly poor condition, according to APS patient's house was dirty and hot    Lifestyle:  Negative for smoking, alcohol, and recreational drugs.    Family Hx  No pertinent family history    Patient seen and examined at bedside.     SUBJECTIVE EVALUATION:  Patient does not communicate in words and cannot provide information.    OBJECTIVE EVALUATION:    VITAL SIGNS:  T(F): 99 (24 @ 05:33)  HR: 63 (24 @ 05:33)  BP: 120/66 (24 @ 05:33)  RR: 18 (24 @ 05:33)  SpO2: 100% (24 @ 05:33) on room air    The patient is awake, eyes are open, responds to questions with incoherent sentences. can mention his and his son's name but is not oriented to time, place and situation. The patients cachectic (BMI=16.3). Is not in distress.     HEENT: Normocephalic, atraumatic. Sclera non-icteric, pupils are myotic in the room light but reactive to light.  Cardiovascular: RRR, no murmurs, rubs, gallops, peripheral edema   Respiratory: No rales, wheezes or rhonchi noted. No accessory muscle usage   Gastrointestinal: abdomen soft, non-tender, no mass, no hepatosplenomegaly noted.    Extremities: Increased rigidity noted in all four extremities. No deformities, no cyanosis.   Skin: No lesion, scar noted      LABS:  No leucocytosis or anemia noted                          15.4   10.39 )-----------( 123      ( 2024 11:17 )             47.7     07    137  |  99  |  17  ----------------------------<  116<H>  4.6   |  21<L>  |  1.09    Ca    9.3      2024 11:17    TPro  8.3  /  Alb  4.2  /  TBili  1.0  /  DBili  x   /  AST  24  /  ALT  19  /  AlkPhos  106  07-07    PT/INR - ( 2024 11:17 )   PT: 14.3 sec;   INR: 1.27 ratio    PTT - ( 2024 11:17 )  PTT:28.6 sec    Proteinuria noted in U/A:  Urinalysis Basic - ( 2024 11:34 )  Color: Yellow / Appearance: Cloudy / S.021 / pH: x  Gluc: x / Ketone: Negative mg/dL  / Bili: Negative / Urobili: 1.0 mg/dL   Blood: x / Protein: 100 mg/dL / Nitrite: Negative   Leuk Esterase: Negative / RBC: 2-5 /HPF / WBC 0-2 /HPF   Sq Epi: x / Non Sq Epi: x / Bacteria: Few /HPF      Procalcitonin: 0.20  Total serum protein: 8.3  Lactate on VB.5 -> 1.7    Last VBG:  pH: 7.36  HCO3: 24  pCO2: 41    CXR:  Left retrocardiac opacity which is better visualized on same-day CT.    Abdomen/Pelvis CT:  Left lower lobe patchy consolidation, suspicious for pneumonia.  1.1 cm right adrenal nodule.    Head CT:  Advanced periventricular and deep white matter ischemia.   Old infarctions are seen in the BILATERAL anterior frontal, parietal and   occipital lobes and BILATERAL cerebellum.    Mild global atrophy.      Current Medication:  MEDICATIONS  (STANDING):  apixaban 2.5 milliGRAM(s) Oral every 12 hours  atorvastatin 40 milliGRAM(s) Oral at bedtime  azithromycin  IVPB      azithromycin  IVPB 500 milliGRAM(s) IV Intermittent every 24 hours  lactated ringers. 1000 milliLiter(s) (60 mL/Hr) IV Continuous <Continuous>  pantoprazole  Injectable 40 milliGRAM(s) IV Push daily  piperacillin/tazobactam IVPB.. 3.375 Gram(s) IV Intermittent every 8 hours  polyethylene glycol 3350 17 Gram(s) Oral daily  senna 1 Tablet(s) Oral daily    MEDICATIONS  (PRN):      RADIOLOGY & ADDITIONAL TESTS:  Reviewed ******************  Authored By: Kacie Lane MD (PGY-1)  MS Teams Preferred  ******************  Admission Day: 2    HPI: The patient is an 89-year-old male with history of dementia, CVA, atrial fibrillation, and HTN transferred to ED by EMS for reported adult protective services referral. He was not eating for 3 days, unable to walk, difficult to arouse, and had altered mental status compared to his baseline based on the EMS report.   Patient's family reports his baseline is A&O 2. He could walk and eat by himself. Has been speaking incoherently and occasionally confused in the past two years. Otherwise, he was doing okay until the day prior to admission where he became sleepy, could not tolerate food, and began coughing and wheezing. .     ED/Hospital Course: In the ED, the patient was febrile (103F/39.6C), tachycardic (105) and tachypneic (22), lethargic, opened eyes to voice, and was not alert and oriented to time, place, and person. Broad-spectrum bacterial coverage with Zosyn and Vancomycin initiated.     PMH/PSH:  Atrial fibrillation   CVA (cerebrovascular accident), 2 years ago  Dementia  GERD (gastroesophageal reflux disease)   HTN (hypertension)   Hyperlipidemia   Prostate cancer 18 years ago.  History of prostate surgery   History of sinus surgery     Home Medication:  Eliquis 5 mg oral tablet: 1 tab(s) orally 2 times a day  meclizine 12.5 mg oral tablet: 1 tab(s) orally 3 times a day, As Needed  atorvastatin 40 mg oral tablet: 1 tab(s) orally once a day    Allergies:  No Known Allergies    Social Hx:  Retired. Lives with with son and daughter-in-law in a reportedly poor condition, according to APS patient's house was dirty and hot    Lifestyle:  Negative for smoking, alcohol, and recreational drugs.    Family Hx  No pertinent family history    Patient seen and examined at bedside.     SUBJECTIVE EVALUATION:  Patient communicates incoherently. When prompted, does not endorse any pain or complaint.    OBJECTIVE EVALUATION:    VITAL SIGNS:  T(F): 99 (24 @ 05:33)  HR: 63 (24 @ 05:33)  BP: 120/66 (24 @ 05:33)  RR: 18 (24 @ 05:33)  SpO2: 100% (24 @ 05:33) on room air    The patient is awake, eyes are open, responds to questions with incoherent sentences. can mention his and his son's name but is not oriented to time, place and situation. The patients cachectic (BMI=16.3). Is not in distress.     HEENT: Normocephalic, atraumatic. Sclera non-icteric, pupils are myotic in the room light but reactive to light.  Cardiovascular: RRR, no murmurs, rubs, gallops, peripheral edema   Respiratory: No rales, wheezes or rhonchi noted. No accessory muscle usage   Gastrointestinal: abdomen soft, non-tender, no mass, no hepatosplenomegaly noted.    Extremities: Increased rigidity noted in all four extremities. No deformities, no cyanosis.   Skin: No lesion, scar noted      LABS:  No leucocytosis or anemia noted                          15.4   10.39 )-----------( 123      ( 2024 11:17 )             47.7     07    137  |  99  |  17  ----------------------------<  116<H>  4.6   |  21<L>  |  1.09    Ca    9.3      2024 11:17    TPro  8.3  /  Alb  4.2  /  TBili  1.0  /  DBili  x   /  AST  24  /  ALT  19  /  AlkPhos  106  07-07    PT/INR - ( 2024 11:17 )   PT: 14.3 sec;   INR: 1.27 ratio    PTT - ( 2024 11:17 )  PTT:28.6 sec    Proteinuria noted in U/A:  Urinalysis Basic - ( 2024 11:34 )  Color: Yellow / Appearance: Cloudy / S.021 / pH: x  Gluc: x / Ketone: Negative mg/dL  / Bili: Negative / Urobili: 1.0 mg/dL   Blood: x / Protein: 100 mg/dL / Nitrite: Negative   Leuk Esterase: Negative / RBC: 2-5 /HPF / WBC 0-2 /HPF   Sq Epi: x / Non Sq Epi: x / Bacteria: Few /HPF      Procalcitonin: 0.20  Total serum protein: 8.3  Lactate on VB.5 -> 1.7    Last VBG:  pH: 7.36  HCO3: 24  pCO2: 41    CXR:  Left retrocardiac opacity which is better visualized on same-day CT.    Abdomen/Pelvis CT:  Left lower lobe patchy consolidation, suspicious for pneumonia.  1.1 cm right adrenal nodule.    Head CT:  Advanced periventricular and deep white matter ischemia.   Old infarctions are seen in the BILATERAL anterior frontal, parietal and   occipital lobes and BILATERAL cerebellum.    Mild global atrophy.      Current Medication:  MEDICATIONS  (STANDING):  apixaban 2.5 milliGRAM(s) Oral every 12 hours  atorvastatin 40 milliGRAM(s) Oral at bedtime  azithromycin  IVPB      azithromycin  IVPB 500 milliGRAM(s) IV Intermittent every 24 hours  lactated ringers. 1000 milliLiter(s) (60 mL/Hr) IV Continuous <Continuous>  pantoprazole  Injectable 40 milliGRAM(s) IV Push daily  piperacillin/tazobactam IVPB.. 3.375 Gram(s) IV Intermittent every 8 hours  polyethylene glycol 3350 17 Gram(s) Oral daily  senna 1 Tablet(s) Oral daily    MEDICATIONS  (PRN):      RADIOLOGY & ADDITIONAL TESTS:  Reviewed

## 2024-07-08 NOTE — DIETITIAN INITIAL EVALUATION ADULT - PROBLEM SELECTOR PLAN 3
The patient is presented with pneumo-sepsis, is febrile and tachypneic.     Plan:  - broad antibacterial coverage with Zosyn started on 7/7  - atypical bacterial coverage with Zithromax on 7/7  - w/u legionella, MRSA, Strep  - chest pt   - airway clearance   - pulse oxymetry

## 2024-07-08 NOTE — DIETITIAN INITIAL EVALUATION ADULT - OTHER INFO
89 year old male with a PMH of dementia, CVA and hypertension transferred to the ED by EMS following a APS referral. LLL patchy consolidation on CXR was noted suggestive for pneumonia per chart.    Patient seen for swallow eval w/ rec for pureed/mildly thick liquids (7/8) and previously NPO per chart. No GI distress noted. Has no food allergies. Per HIE, noted w/ weight 50.3 kg (12/24/23). ABW is 48.2 kg (7/7) per chart indicating a -4.2% weight loss x 7 months. No edema or pressure injuries noted per RN flow sheet.

## 2024-07-08 NOTE — PROGRESS NOTE ADULT - PROBLEM SELECTOR PLAN 2
The patient does meet the criteria for severe sepsis, with lactate 4.5 suggestive for end organ damage.     Plan:  - c/w zosyn   - add azithromycin until legionella  - hold vancomycin givem community acquired presentation  - Blood culture, urine culture  - check i/o Patient presented with high fever, tachypnea, tachycardia, and AMS, consistent with sepsis most probably due to community acquired pneumonia as suggested by CXR and CT scan findings. The patient was resuscitated with 3 Lit crystalloids and broad-spectrum antibiotic for sepsis, with which the sepsis was resolved. The patient is currently afebrile, has normal HR, RR, O2Sat on room air. Lactate decreased from 4.5 to 1.7.    Plan:  - d/c zosyn (7/7-7/8)  - Ceftriaxone (7/8)  - azithromycin (7/7) until legionella  - hold vancomycin given community acquired presentation  - Blood culture, urine culture Patient presented with high fever, tachypnea, tachycardia, and AMS, consistent with sepsis most probably due to community acquired pneumonia as suggested by CXR and CT scan findings. The patient was resuscitated with 3 Lit crystalloids and broad-spectrum antibiotic for sepsis, with which the sepsis was resolved. The patient is currently afebrile, has normal HR, RR, O2Sat on room air. Lactate decreased from 4.5 to 1.7.    Plan:  - d/c zosyn (7/7-7/8)  - Ceftriaxone (7/8)  - azithromycin (7/7) until legionella  - hold vancomycin given community acquired presentation  - Blood cx negative after 24 h  - urine culture

## 2024-07-08 NOTE — PROGRESS NOTE ADULT - ASSESSMENT
The patient is an 89-y/o male with history of dementia, CVA, A-fib, and hypertension transferred to the ED by EMS following a APS referral. The patient had altered mental status compared to the baseline, was septic and tachypneic on admission. LLL patchy consolidation on CXR was noted suggestive for pneumonia.

## 2024-07-08 NOTE — PROGRESS NOTE ADULT - PROBLEM SELECTOR PLAN 7
DVT ppx: elliquis  Diet: PO with puree and liquid  PT/OT;   GI: senna, Miralax   Code: Full code (APS referral)  Dispo: Home. DVT ppx: elliquis  Diet: PO with pureed and thick liquid  PT/OT;   GI: senna, Miralax   Code: Full code (APS referral)  Dispo: Home.

## 2024-07-08 NOTE — DIETITIAN INITIAL EVALUATION ADULT - ORAL INTAKE PTA/DIET HISTORY
Patient seen for assessment. Unable to obtain information from patient 2/2 cognitive status. Information obtained from chart review. Per H&P, patient was not eating 3 days prior to admission.

## 2024-07-08 NOTE — PROGRESS NOTE ADULT - PROBLEM SELECTOR PLAN 3
The patient is presented with pneumo-sepsis, is febrile and tachypneic. Normal PTT. No bleeding or oozing noted, INR elevated marginally (1.27), does not indicate DIC    Plan:  - broad antibacterial coverage with Zosyn started on 7/7  - atypical bacterial coverage with Zithromax on 7/7  - w/u legionella, MRSA, Strep  - chest pt   - airway clearance   - pulse oxymetry The patient was presented with pneumo-sepsis, with fever, tachypnea and consolidation on Chest CT. The patient improved with broad-spectrum antibacterial coverage with Zosyn, Vancomycin, and Zithromax. Antibiotic de-escalation to Ceftriaxone and Zithromax.     - d/c zosyn (7/7-7/8)  - Ceftriaxone (7/8-)  - azithromycin (7/7-) until legionella  - hold vancomycin given community acquired presentation  - w/u legionella, MRSA, Strep  - chest pt   - airway clearance   - pulse oxymetry

## 2024-07-08 NOTE — SWALLOW BEDSIDE ASSESSMENT ADULT - COMMENTS
As per Internal Medicine Note dated 7/8/24 "The patient is an 89-y/o male with history of dementia, CVA, A-fib, and hypertension transferred to the ED by EMS following a APS referral. The patient had altered mental status compared to the baseline, was septic and tachypneic on admission. LLL patchy consolidation on CXR was noted suggestive for pneumonia. "    CT Chest 7/7/24 "IMPRESSION: Left lower lobe patchy consolidation, suspicious for pneumonia."    Patient is known to this service, seen for a clinical swallow evaluation on 12/27/23 with recommendation of Minced and Moist Solids with Mildly-Thick Liquids (see note for details).     Patient visited at bedside for clinical swallow evaluation. Patient initially in a sleep state, able to rouse with verbal and tactile cues. Patient presents as pleasantly confused, however able to follow 1-step directives.

## 2024-07-08 NOTE — DIETITIAN INITIAL EVALUATION ADULT - ADD RECOMMEND
Continue diet as ordered. Nutrition department will provide H[employer]el Vital Shake 1x daily (520 kcal, 2 g pro) and magic cup 1x daily (290 kcal, 9 g pro). Monitor PO intake.

## 2024-07-09 ENCOUNTER — TRANSCRIPTION ENCOUNTER (OUTPATIENT)
Age: 89
End: 2024-07-09

## 2024-07-09 DIAGNOSIS — J18.9 PNEUMONIA, UNSPECIFIED ORGANISM: ICD-10-CM

## 2024-07-09 DIAGNOSIS — R64 CACHEXIA: ICD-10-CM

## 2024-07-09 DIAGNOSIS — A41.9 SEPSIS, UNSPECIFIED ORGANISM: ICD-10-CM

## 2024-07-09 LAB
ALBUMIN SERPL ELPH-MCNC: 3.4 G/DL — SIGNIFICANT CHANGE UP (ref 3.3–5)
ALP SERPL-CCNC: 78 U/L — SIGNIFICANT CHANGE UP (ref 40–120)
ALT FLD-CCNC: 26 U/L — SIGNIFICANT CHANGE UP (ref 4–41)
ANION GAP SERPL CALC-SCNC: 12 MMOL/L — SIGNIFICANT CHANGE UP (ref 7–14)
AST SERPL-CCNC: 37 U/L — SIGNIFICANT CHANGE UP (ref 4–40)
BILIRUB SERPL-MCNC: 0.7 MG/DL — SIGNIFICANT CHANGE UP (ref 0.2–1.2)
BUN SERPL-MCNC: 16 MG/DL — SIGNIFICANT CHANGE UP (ref 7–23)
CALCIUM SERPL-MCNC: 8.6 MG/DL — SIGNIFICANT CHANGE UP (ref 8.4–10.5)
CHLORIDE SERPL-SCNC: 103 MMOL/L — SIGNIFICANT CHANGE UP (ref 98–107)
CO2 SERPL-SCNC: 22 MMOL/L — SIGNIFICANT CHANGE UP (ref 22–31)
CREAT SERPL-MCNC: 0.89 MG/DL — SIGNIFICANT CHANGE UP (ref 0.5–1.3)
EGFR: 82 ML/MIN/1.73M2 — SIGNIFICANT CHANGE UP
GLUCOSE SERPL-MCNC: 83 MG/DL — SIGNIFICANT CHANGE UP (ref 70–99)
HCT VFR BLD CALC: 42 % — SIGNIFICANT CHANGE UP (ref 39–50)
HGB BLD-MCNC: 14.3 G/DL — SIGNIFICANT CHANGE UP (ref 13–17)
MAGNESIUM SERPL-MCNC: 2.1 MG/DL — SIGNIFICANT CHANGE UP (ref 1.6–2.6)
MCHC RBC-ENTMCNC: 30.3 PG — SIGNIFICANT CHANGE UP (ref 27–34)
MCHC RBC-ENTMCNC: 34 GM/DL — SIGNIFICANT CHANGE UP (ref 32–36)
MCV RBC AUTO: 89 FL — SIGNIFICANT CHANGE UP (ref 80–100)
NRBC # BLD: 0 /100 WBCS — SIGNIFICANT CHANGE UP (ref 0–0)
NRBC # FLD: 0 K/UL — SIGNIFICANT CHANGE UP (ref 0–0)
PHOSPHATE SERPL-MCNC: 2.8 MG/DL — SIGNIFICANT CHANGE UP (ref 2.5–4.5)
PLATELET # BLD AUTO: 117 K/UL — LOW (ref 150–400)
POTASSIUM SERPL-MCNC: 3.6 MMOL/L — SIGNIFICANT CHANGE UP (ref 3.5–5.3)
POTASSIUM SERPL-SCNC: 3.6 MMOL/L — SIGNIFICANT CHANGE UP (ref 3.5–5.3)
PROT SERPL-MCNC: 7.3 G/DL — SIGNIFICANT CHANGE UP (ref 6–8.3)
RBC # BLD: 4.72 M/UL — SIGNIFICANT CHANGE UP (ref 4.2–5.8)
RBC # FLD: 13.2 % — SIGNIFICANT CHANGE UP (ref 10.3–14.5)
SODIUM SERPL-SCNC: 137 MMOL/L — SIGNIFICANT CHANGE UP (ref 135–145)
WBC # BLD: 5.53 K/UL — SIGNIFICANT CHANGE UP (ref 3.8–10.5)
WBC # FLD AUTO: 5.53 K/UL — SIGNIFICANT CHANGE UP (ref 3.8–10.5)

## 2024-07-09 PROCEDURE — 99232 SBSQ HOSP IP/OBS MODERATE 35: CPT | Mod: GC

## 2024-07-09 RX ORDER — CEFTRIAXONE SODIUM 500 MG
1000 VIAL (EA) INJECTION EVERY 24 HOURS
Refills: 0 | Status: DISCONTINUED | OUTPATIENT
Start: 2024-07-09 | End: 2024-07-10

## 2024-07-09 RX ADMIN — PANTOPRAZOLE SODIUM 40 MILLIGRAM(S): 40 INJECTION, POWDER, FOR SOLUTION INTRAVENOUS at 11:43

## 2024-07-09 RX ADMIN — MUPIROCIN 1 APPLICATION(S): 20 OINTMENT TOPICAL at 17:12

## 2024-07-09 RX ADMIN — Medication 100 MILLIGRAM(S): at 11:43

## 2024-07-09 RX ADMIN — ATORVASTATIN CALCIUM 40 MILLIGRAM(S): 20 TABLET, FILM COATED ORAL at 21:52

## 2024-07-09 RX ADMIN — APIXABAN 2.5 MILLIGRAM(S): 5 TABLET, FILM COATED ORAL at 05:24

## 2024-07-09 RX ADMIN — APIXABAN 2.5 MILLIGRAM(S): 5 TABLET, FILM COATED ORAL at 17:11

## 2024-07-09 RX ADMIN — MUPIROCIN 1 APPLICATION(S): 20 OINTMENT TOPICAL at 05:23

## 2024-07-09 RX ADMIN — Medication 1 TABLET(S): at 17:12

## 2024-07-09 RX ADMIN — AZITHROMYCIN 255 MILLIGRAM(S): 250 TABLET, FILM COATED ORAL at 17:12

## 2024-07-09 RX ADMIN — Medication 1 APPLICATION(S): at 17:12

## 2024-07-09 NOTE — PROGRESS NOTE ADULT - PROBLEM SELECTOR PLAN 1
The patient was presented with pneumo-sepsis, manifested with fever, tachypnea, cough, weakness and consolidation on CXR and CT scan. The patient improved with broad-spectrum antibacterial coverage with Zosyn, Vancomycin, and Zithromax. Antibiotic de-escalation to Ceftriaxone and Zithromax was planned. The patient's MRSA and SA PCR came positive.     - d/c zosyn (7/7-7/8)  /- Ceftriaxone (7/8-)  - azithromycin (7/7-) until legionella  - hold vancomycin given community acquired presentation  - w/u legionella, MRSA, Strep  - chest pt   - airway clearance   - pulse oxymetry The patient was presented with pneumo-sepsis, manifested with fever, tachypnea, cough, weakness and consolidation on CXR and CT scan. The patient improved with broad-spectrum antibacterial coverage with Zosyn, Vancomycin, and Zithromax. Sepsis resolved. Antibiotic de-escalation to Ceftriaxone and Zithromax was planned. The patient's MRSA and SA PCR came positive, and vancomycin was initiated.    - Vancomycin (7/8- )  - azithromycin (7/7-) until legionella  - d/c zosyn (7/7-7/8)  - d/c Ceftriaxone (7/8-7/8)  - chest pt   - airway clearance   - pulse oxymetry  - f/u legionella and strep The patient was presented with pneumo-sepsis, manifested with fever, tachypnea, cough, weakness and consolidation on CXR and CT scan. The patient improved with broad-spectrum antibacterial coverage with Zosyn, Vancomycin, and Zithromax. Sepsis resolved. Antibiotic de-escalation to Ceftriaxone and Zithromax was planned. The patient's MRSA and SA PCR came positive, and vancomycin was initiated.    - Vancomycin (7/8- )  - azithromycin (7/7-) until legionella  - Nasal mupirocin 2% ND for SA decolinization   - d/c zosyn (7/7-7/8)  - d/c Ceftriaxone (7/8-7/8)  - chest pt   - airway clearance   - pulse oxymetry  - f/u legionella and strep The patient was presented with pneumo-sepsis, manifested with fever, tachypnea, cough, weakness and consolidation on CXR and CT scan. The patient improved with broad-spectrum antibacterial coverage with Zosyn, Vancomycin, and Zithromax. Sepsis resolved. Antibiotic de-escalation to Ceftriaxone and Zithromax was planned. The patient's MRSA and SA PCR came positive, and vancomycin was initiated.    - Vancomycin (7/8- )  - azithromycin (7/7-) until legionella  - Nasal mupirocin 2% ND for SA decolonization  - CHG bath per infection control   - chest pt   - airway clearance   - pulse oxymetry  - d/c zosyn (7/7-7/8)  - d/c Ceftriaxone (7/8-7/8)  - f/u legionella and strep Patient initially presented with high fever, tachypnea, tachycardia, AMS, and positive CXR/CT, consistent with sepsis d/t pneumonia. Resuscitated with 3 Lit crystalloids and broad-spectrum antibiotic resulted in resolution of sepsis. The patient is currently afebrile, has normal HR, RR, O2Sat on room air. Lactate decreased from 4.5 to 1.7. Condition resolved.     Plan:  - c/w tx underlying infection  - Blood cx negative after 24 h  - f/u blood/urine cx

## 2024-07-09 NOTE — PROGRESS NOTE ADULT - PROBLEM SELECTOR PLAN 5
Patient presented with high fever, tachypnea, tachycardia, and AMS, consistent with sepsis most probably due to community acquired pneumonia as suggested by CXR and CT scan findings. The patient was resuscitated with 3 Lit crystalloids and broad-spectrum antibiotic for sepsis, with which the sepsis was resolved. The patient is currently afebrile, has normal HR, RR, O2Sat on room air. Lactate decreased from 4.5 to 1.7.    Plan:  - d/c zosyn (7/7-7/8)  - Ceftriaxone (7/8)  - azithromycin (7/7) until legionella  - hold vancomycin given community acquired presentation  - Blood cx negative after 24 h  - urine culture Patient initially presented with high fever, tachypnea, tachycardia, AMS, and positive CXR/CT, consistent with sepsis d/t pneumonia. Resuscitated with 3 Lit crystalloids and broad-spectrum antibiotic resulted in resolution of sepsis. The patient is currently afebrile, has normal HR, RR, O2Sat on room air. Lactate decreased from 4.5 to 1.7.    Plan:  - c/w tx underlying infection  - Blood cx negative after 24 h  - urine culture pending The patient was brought to the hospital on APS referral. Has poor living condition (hot, dirty house) based on EMS report. Now back to baseline cognitive functioning.     Plan  - c/s social work EMS suggested assessing APS referral due to dirty and hot condition of pt's house. Based on the 's report, there is no indication for APS referral.    Plan  - home dispo

## 2024-07-09 NOTE — PROGRESS NOTE ADULT - PROBLEM SELECTOR PLAN 7
The patient was brought to the hospital on APS referral. Has poor living condition (hot, dirty house) based on EMS report. Now back to baseline cognitive functioning.     Plan  - c/s social work Pt has the hx of a-fib and can tolerate po per speech/swallow evaluation.    Plan:  - c/w eliquis, based on the weight the dose was adjusted to 2.5 mg BD

## 2024-07-09 NOTE — DISCHARGE NOTE PROVIDER - NSDCFUADDAPPT_GEN_ALL_CORE_FT
APPTS ARE READY TO BE MADE: [x] YES    Best Family or Patient Contact (if needed): Son, Mr. Caden Guaman    Additional Information about above appointments (if needed):    Established care with Dr. Cespedes in two weeks     APPTS ARE READY TO BE MADE: [x] YES    Best Family or Patient Contact (if needed): Son, Mr. Caden Guaman    Additional Information about above appointments (if needed):    Established care with Dr. Cespedes in two weeks    Met with the patient face to face, however they were too tired to discuss care so I called the son on file and their family member advised they prefer to coordinate the care on their own.

## 2024-07-09 NOTE — PROGRESS NOTE ADULT - PROBLEM SELECTOR PLAN 4
The patient presents with altered mental status compared to the baseline, most probably due to an infectious etiology. Other etiologies were investigate as follows.  CT head did not show any acute changes  Metabolic assessment including LFT, GFR, VBG was normal.  Based on speech/swallow evaluation the patient can receive puree and thick liquid.   No urine retention was observed based on bladder measurement   After treating the underlying infectious disease the patient is back to his baseline mental functioning    Plan:  - c/w treating the underlying infection The patient presents with altered mental status compared to the baseline, most probably due to an infectious etiology. Other etiologies were investigated and ruled out, including structural (no acute changes in head CT), metabolic (normal LFT, GFR, electrolytes, no urine retention), and toxic (normal VBG).     Plan:  - c/w treating the underlying infection Pt with dementia, BMI of 16, and poor home condition per APS report.  - Dietitian evaluation: severe malnutrition due to chronic illness and decreased ability to consume sufficient energy in the setting dementia    Plan:  - Obtain weekly weigh   - c/w diet  - Vital Shake 1 Daily and magic cup daily by nutrition department  - Monitor PO intake Pt with dementia, BMI of 16, and poor home condition per APS report.  - Dietitian evaluation: severe malnutrition due to chronic illness and decreased ability to consume sufficient energy in the setting dementia    Plan:  - Obtain weekly weigh   - c/w diet  - Vital Shake 1 Daily and magic cup daily by nutrition department  - Multivitamin   - Monitor PO intake

## 2024-07-09 NOTE — DISCHARGE NOTE PROVIDER - PROVIDER TOKENS
FREE:[LAST:[Rom],FIRST:[Gavino],PHONE:[phone]],FAX:[(   )    -],ADDRESS:[[facility] [place] [address]]]

## 2024-07-09 NOTE — PROGRESS NOTE ADULT - ATTENDING COMMENTS
89M hx of HTN, CVA, afib and dementia presents after being BIBEMS for APS referral, found in a hot/dirty house and coughing.     #Severe sepsis 2/2 PNA, febrile, tachy and with a lactate. Now resolved. LLL infiltrate on imaging. UA negative. BCx and UCx pending. Pt. transitioned from zosyn/azithromycin to ceftriaxone + azithromycin. Continue for now. F/u legionella and strep antigen. Tylenol PRN fever.     #AMS, pt presenting with lethargy and coughing. A&Ox1. Family reporting change in mental status. Today, mental status improved. Toxic metabolic encephalopathy 2/2 PNA/infection and dehydration. Improved. Seems to be at baseline.    #Afib, c/w Eliquis.     #Dispo: CM/SW. PT consult. Will need to discuss with family to determine safe dispo.     Plan discussed with HS.
89M hx of HTN, CVA, afib and dementia presents after being BIBEMS for APS referral, found in a hot/dirty house and coughing.     #Severe sepsis 2/2 PNA, febrile, tachy and with a lactate. Now resolved. LLL infiltrate on imaging. UA negative. BCx NGTD. UCx >3 organisms possible contamination. Pt. transitioned from zosyn/azithromycin to ceftriaxone + azithromycin. Continue for now. F/u legionella and strep antigen still pending. MRSA swab was positive however patient clinically improving and less likely MRSA PNA so no need for vancomycin. Tylenol PRN fever.     #AMS, pt presenting with lethargy and coughing. A&Ox1. Family reporting change in mental status. Today, mental status improved. Toxic metabolic encephalopathy 2/2 PNA/infection and dehydration. Improved. Seems to be at baseline.    #Afib, c/w Eliquis.     #Dispo: CM/SW. PT consult. Will need to discuss with family to determine safe dispo. Attempted to call but no answer. Will try again. Pending PT eval. APS was not called. There is no case. From prior documentation, it seems more likely that EMS brought the patient to the hospital for APS referral given conditions in the home and was subsequently found to be septic 2/2 PNA.    Plan discussed with HS.

## 2024-07-09 NOTE — PROGRESS NOTE ADULT - PROBLEM SELECTOR PLAN 6
Pt has the hx of a-fib and can tolerate po per speech/swallow evaluation.    Plan:  - c/w eliquis, based on the weight the dose was adjusted to 2.5 mg BD Currently back to the baseline cognitive function. The patient can tolerate PO based on speech/swallow evaluation exam. Can walk with assistance.       Plan:  - aspiration precautions   - PO with pureed and thick liquid  - evaluate safe discharge

## 2024-07-09 NOTE — PROGRESS NOTE ADULT - ASSESSMENT
The patient is an 89-y/o male with history of dementia, CVA, A-fib, and hypertension transferred to the ED by EMS following a APS referral. The patient had altered mental status compared to the baseline, was septic and tachypneic on admission. LLL patchy consolidation on CXR was noted suggestive for pneumonia.  The patient is an 89-y/o male with history of dementia, CVA, A-fib, and hypertension transferred to the ED by EMS due to AMS, cough and fever. The patient was septic and cachectic on admission, Had Left retrocardiac opacity on CXR, and LLL patchy consolidation on CT, suggestive for sepsis due to an infectious etiology most probably pneumonia.

## 2024-07-09 NOTE — DISCHARGE NOTE PROVIDER - CARE PROVIDER_API CALL
Detail Level: Detailed
Rom [facility] [place]  [address] [place] [address]  Phone: [phone]  Fax: (   )    -  Follow Up Time:

## 2024-07-09 NOTE — PROGRESS NOTE ADULT - PROBLEM SELECTOR PLAN 2
Currently back to the baseline cognitive function      Plan:  - aspiration precautions   - PO  with puree and thick liquid Currently back to the baseline cognitive function      Plan:  - aspiration precautions   - PO with pureed and thick liquid Currently back to the baseline cognitive function. The patient can tolerate PO based on speech/swallow evaluation exam.      Plan:  - aspiration precautions   - PO with pureed and thick liquid Patient initially presented with high fever, tachypnea, tachycardia, AMS, and positive CXR/CT, consistent with sepsis d/t pneumonia. Resuscitated with 3 Lit crystalloids and broad-spectrum antibiotic resulted in resolution of sepsis. The patient is currently afebrile, has normal HR, RR, O2Sat on room air. Lactate decreased from 4.5 to 1.7. Condition resolved.    Plan:  - c/w tx underlying infection  - Blood cx negative after 24 h  - f/u blood/urine cx The patient was presented with pneumonia from a community setting, manifested with fever, tachypnea, cough, weakness and consolidation on CXR and CT scan. The patient improved with broad-spectrum antibacterial coverage with Zosyn, Vancomycin, and Zithromax. Sepsis resolved. Antibiotic de-escalation to Ceftriaxone and Zithromax was planned. The patient's MRSA and SA PCR came positive. community acquired pneumonia is more consistent with patient's clinical presentation    - Ceftriaxone (7/8 - )  - azithromycin (7/7 - ) until legionella  - Nasal mupirocin 2% ND for SA decolonization  - CHG bath per infection control   - chest pt   - airway clearance   - pulse oxymetry  - d/c zosyn (7/7-7/8)  - f/u legionella and strep

## 2024-07-09 NOTE — PROGRESS NOTE ADULT - PROBLEM SELECTOR PLAN 3
Pt with dementia, BMI of 16, and poor home condition per APS report.  - Dietitian evaluation: severe malnutrition due to chronic illness and decreased ability to consume sufficient energy in the setting dementia    Plan:  - Obtain weekly weigh   - c/w diet  - Vital Shake 1 Daily and magic cup daily by nutrition department  - Pt with dementia, BMI of 16, and poor home condition per APS report.  - Dietitian evaluation: severe malnutrition due to chronic illness and decreased ability to consume sufficient energy in the setting dementia    Plan:  - Obtain weekly weigh   - c/w diet  - Vital Shake 1 Daily and magic cup daily by nutrition department  - Monitor PO intake The patient presents with altered mental status compared to the baseline, most probably due to pneumonia and dehydration. Other etiologies were investigated and ruled out, including structural (no acute changes in head CT), other metabolic (normal LFT, GFR, electrolytes, no urine retention), and toxic (normal VBG). Responsive to hydration and AB. Condition resolved, back to baseline.    Plan:  - c/w treating the underlying infection

## 2024-07-09 NOTE — DISCHARGE NOTE PROVIDER - HOSPITAL COURSE
HPI:  The patient is an 89-year-old male with history of dementia, CVA, atrial fibrillation, and HTN transferred to ED by EMS. He was not eating for 3 days, unable to walk, difficult to arouse, and had altered mental status compared to his baseline based on the EMS report. Patient's family reports his baseline was A&O 2. He could walk and eat by himself. He have been speaking incoherently and occasionally confused in the past two years. On the admission day, the patient became sleepy, could not tolerate food, and began coughing and wheezing. On admission the patient was febrile, tachycardic, tachypneic, and lethargic. He was not alert and oriented to time, place, and situation.     Hospital Course:   In ED, the patient was resuscitated with 3 liters of crystalloids. Broad-spectrum antibiotic coverage was started with vancomycin (1g), zosyn, and azithromycin (500mg). CXR showed left retrocardiac opacity. CT showed LLL patchy consolidation, suspicious for pneumonia, and an incidentally found 1.1 adrenal nodule. Head CT indicated advanced periv   The patient was responsive to the treatment. Sepsis signs and symptoms resolved on the second day. The patient had an episode of agitation yesterday, but was reoriented with reassurance and having someone sit with him.     Medication Change:    Follow-ups:    Code Status:  Full-code    Discharge Diagnosis:  Sepsis due to community acquired pneumonia and dehydration    HPI:  The patient is an 89-year-old male with history of dementia (most prominent in the past 2 years), CVA (1 year ago), atrial fibrillation (treated with Eliquis), and HTN transferred to ED by EMS due to after mental status. He had poor feeding for 3 days, and on the admission day developed fever, productive coughs and stupor. According to family report he had baseline cognitive function of A&O2, capable of walking and eating by himself, and was occasionally confused in the past two years. On admission the patient was febrile, tachycardic, tachypneic, cachectic and lethargic. He was not alert and oriented to time, place, and situation, and was dehydrated.    Hospital Course:   The patient was resuscitated with 3 liters of crystalloids. Broad-spectrum antibiotic coverage was started with vancomycin (1g), zosyn, and azithromycin (500mg). CXR showed left retrocardiac opacity. CT showed LLL patchy consolidation, suspicious for pneumonia, and an incidentally found 1.1 adrenal nodule. Head CT indicated advanced periventricular and deep white matter ischemia, old bilateral infarctions, and global mild atrophy. Following fluid and antibiotic therapy, the patient's condition improved rapidly. Antibiotic therapy was de-escalated to Ceftiaxone (1g) and Zithromax (500mg). MRSA colonization was treated with nasal mupirocin 2% ointment and chlorhexidine bath. Given the resolution of symptoms and improvement of mental status to the baseline the patient is being discharged on ***    Medication Change:  Eliquis 5mg BD changed to Eliquis 2.5mg BD given the weight and age of the patient      Follow-ups:    Code Status:  Full-code    Discharge Diagnosis:  Sepsis due to community acquired pneumonia and dehydration    HPI:  The patient is an 89-year-old male with history of dementia (most prominent in the past 2 years), CVA (1 year ago), atrial fibrillation (treated with Eliquis), and HTN transferred to ED by EMS due to after mental status. He had poor feeding for 3 days, and on the admission day developed fever, productive coughs and stupor. According to family report he had baseline cognitive function of A&O2, capable of walking and eating by himself, and was occasionally confused in the past two years. On admission the patient was febrile, tachycardic, tachypneic, cachectic and lethargic. He was not alert and oriented to time, place, and situation, and was dehydrated.    Hospital Course:   The patient was resuscitated with 3 liters of crystalloids. Broad-spectrum antibiotic coverage was started with vancomycin (1g), zosyn, and azithromycin (500mg). CXR showed left retrocardiac opacity. CT showed LLL patchy consolidation, suspicious for pneumonia, and an incidentally found 1.1 adrenal nodule. Head CT indicated advanced periventricular and deep white matter ischemia, old bilateral infarctions, and global mild atrophy. Following fluid and antibiotic therapy, the patient's condition improved rapidly. Antibiotic therapy was de-escalated to Ceftiaxone (1g) and Zithromax (500mg). MRSA colonization was treated with nasal mupirocin 2% ointment and chlorhexidine bath. Given the resolution of symptoms and improvement of mental status to the baseline the patient is being discharged on Cefpodoxime 200 mg BD and Azithromycin 500 mg Daily for two days to complete antibiotic therapy post discharge.    Medication Change:  Eliquis 5mg BD changed to Eliquis 2.5mg BD given the weight and age of the patient  Cefpodoxime 200 mg BD added for two days  Azithromycin 500 mg Daily added for two days      Follow-ups:  Follow up with PCP      Code Status:  Full-code    Discharge Diagnosis:  Sepsis due to community acquired pneumonia and dehydration    HPI:  The patient is an 89-year-old male with history of dementia (most prominent in the past 2 years), CVA (1 year ago), atrial fibrillation (treated with Eliquis), and HTN transferred to ED by EMS due to after mental status. He had poor feeding for 3 days, and on the admission day developed fever, productive coughs and stupor. According to family report he had baseline cognitive function of A&O2, capable of walking and eating by himself, and was occasionally confused in the past two years. On admission the patient was febrile, tachycardic, tachypneic, cachectic and lethargic. He was not alert and oriented to time, place, and situation, and was dehydrated.    Hospital Course:   The patient was resuscitated with 3 liters of crystalloids. Broad-spectrum antibiotic coverage was started with vancomycin (1g), zosyn, and azithromycin (500mg). CXR showed left retrocardiac opacity. CT showed LLL patchy consolidation, suspicious for pneumonia, and an incidentally found 1.1 adrenal nodule. Head CT indicated advanced periventricular and deep white matter ischemia, old bilateral infarctions, and global mild atrophy but not cause of the acute altered mental status. Following fluid and antibiotic therapy, the patient's condition improved rapidly. Given improvement it is believed that the altered mental status was due to infection, given negative work ups for other potential causes. Antibiotic therapy was de-escalated to Ceftriaxone (1g) and Zithromax (500mg) each administered for three days 7/8 to 7/10/2024. MRSA colonization was treated with nasal mupirocin 2% ointment and chlorhexidine bath. Given the resolution of symptoms and improvement of mental status to the baseline the patient is being discharged on Cefpodoxime 200 mg BD and Azithromycin 500 mg Daily for two days to complete the course of antibiotic therapy post discharge. Given the weight and patient's age it was determined that decreased dosage of Eliquis is indicated to minimize the risk of bleeding.    Medication Change:  Eliquis 5mg BD changed to Eliquis 2.5mg BD given the weight and age of the patient  Cefpodoxime 200 mg BD added for two days completion date 7/12/2024  Azithromycin 500 mg Daily added for two days completion date 7/12/2024      Follow-ups:  Follow up with PCP    Code Status:  Full-code    Discharge Diagnosis:  Sepsis due to community acquired pneumonia and dehydration    HPI:  The patient is an 89-year-old male with history of dementia (most prominent in the past 2 years), CVA (1 year ago), atrial fibrillation (treated with Eliquis), and HTN transferred to ED by EMS due to after mental status. He had poor feeding for 3 days, and on the admission day developed fever, productive coughs and stupor. According to family report he had baseline cognitive function of A&O2, capable of walking and eating by himself, and was occasionally confused in the past two years. On admission the patient was febrile, tachycardic, tachypneic, cachectic and lethargic. He was not alert and oriented to time, place, and situation, and was dehydrated.    Hospital Course:   The patient was resuscitated with 3 liters of crystalloids. Broad-spectrum antibiotic coverage was started with vancomycin (1g), zosyn, and azithromycin (500mg). CXR showed left retrocardiac opacity. CT showed LLL patchy consolidation, suspicious for pneumonia, and an incidentally found 1.1 adrenal nodule. Head CT indicated advanced periventricular and deep white matter ischemia, old bilateral infarctions, and global mild atrophy but not cause of the acute altered mental status. Following fluid and antibiotic therapy, the patient's condition improved rapidly. Given improvement it is believed that the altered mental status was due to infection, given negative work ups for other potential causes. Antibiotic therapy was de-escalated to Ceftriaxone (1g) and Zithromax (500mg) each administered for three days 7/8 to 7/10/2024. MRSA colonization was treated with nasal mupirocin 2% ointment and chlorhexidine bath. Given the resolution of symptoms and improvement of mental status to the baseline the patient is being discharged on Cefpodoxime 200 mg BD and Azithromycin 500 mg Daily for two days to complete the course of antibiotic therapy post discharge to completed on 7/12. Given the weight and patient's age it was determined that decreased dosage of Eliquis is indicated to minimize the risk of bleeding.    Medication Change:  Eliquis 5mg BD changed to Eliquis 2.5mg BD given the weight and age of the patient  Cefpodoxime 200 mg BD added for two days completion date 7/12/2024  Azithromycin 500 mg Daily added for two days completion date 7/12/2024      Follow-ups:  Follow up with PCP    Code Status:  Full-code    Discharge Diagnosis:  Sepsis due to community acquired pneumonia and dehydration

## 2024-07-09 NOTE — DISCHARGE NOTE PROVIDER - NSDCMRMEDTOKEN_GEN_ALL_CORE_FT
atorvastatin 40 mg oral tablet: 1 tab(s) orally once a day  Eliquis 5 mg oral tablet: 1 tab(s) orally 2 times a day  meclizine 12.5 mg oral tablet: 1 tab(s) orally 3 times a day, As Needed   atorvastatin 40 mg oral tablet: 1 tab(s) orally once a day  cefpodoxime 200 mg oral tablet: 1 tab(s) orally 2 times a day  Eliquis 5 mg oral tablet: 1 tab(s) orally 2 times a day  meclizine 12.5 mg oral tablet: 1 tab(s) orally 3 times a day, As Needed   atorvastatin 40 mg oral tablet: 1 tab(s) orally once a day  azithromycin 500 mg oral tablet: 1 tab(s) orally once a day (at bedtime)  cefpodoxime 200 mg oral tablet: 1 tab(s) orally 2 times a day  Eliquis 2.5 mg oral tablet: 1 tab(s) orally 2 times a day  meclizine 12.5 mg oral tablet: 1 tab(s) orally 3 times a day, As Needed

## 2024-07-09 NOTE — PROGRESS NOTE ADULT - PROBLEM SELECTOR PLAN 8
DVT ppx: elliquis  Diet: PO with pureed and thick liquid  PT/OT;   GI: senna, Miralax   Code: Full code ([facility] referral)  Dispo: Home. DVT ppx: elliquis  Diet: PO with pureed and thick liquid  PT/OT;   GI: senna, Miralax, pantoprazole   Code: Full code ([facility] referral)  Dispo: Home.

## 2024-07-09 NOTE — DISCHARGE NOTE PROVIDER - NSDCCPTREATMENT_GEN_ALL_CORE_FT
PRINCIPAL PROCEDURE  Procedure: CT chest wo con  Findings and Treatment: FINDINGS:  CHEST:  LUNGS AND LARGE AIRWAYS: Patent central airways. Left lower lobe patchy consolidation. Dependent and right apical atelectasis.  PLEURA: No pleural effusion.  VESSELS: Aortic and coronary artery calcifications.  HEART: Heart size is normal. No pericardial effusion.  MEDIASTINUM AND GABRIELA: No lymphadenopathy.  CHEST WALL AND LOWER NECK: Within normal limits.  ABDOMEN AND PELVIS:  LIVER: Within normal limits.  BILE DUCTS: Normal caliber.  GALLBLADDER: Within normal limits.  SPLEEN: Within normal limits.  PANCREAS: Within normal limits.  ADRENALS: 1.1 cm right adrenal nodule (301, 67).  KIDNEYS/URETERS: Symmetric renal enhancement.  BLADDER: Within normal limits.  REPRODUCTIVE ORGANS: Prostatectomy.  BOWEL: No bowel obstruction. Appendix is not visualized. Colonic diverticulosis.  PERITONEUM/RETROPERITONEUM: Within normal limits.  VESSELS: Atherosclerotic changes.  LYMPH NODES: No lymphadenopathy.  ABDOMINAL WALL: Small fat-containing umbilical hernia.  BONES: Degenerative changes.  IMPRESSION:  Left lower lobe patchy consolidation, suspicious for pneumonia.  1.1 cm right adrenal nodule.      SECONDARY PROCEDURE  Procedure: XR chest, AP view  Findings and Treatment: FINDINGS:  The heart is normal in size.  Left retrocardiac opacity. There is no pneumothorax or pleural effusion.  No acute bony abnormality.  IMPRESSION:  Left retrocardiac opacity which is better visualized on same-day CT.    Procedure: CT head  Findings and Treatment: FINDINGS: CT dated 12/23/2023 available for review.  The brain demonstrates advanced periventricular and deep white matter ischemia. Old infarctions are seen in the BILATERAL anterior frontal, parietal and occipital lobes and BILATERAL cerebellum. No acute cerebral cortical infarct is seen. No intracranial hemorrhage is found. No mass effect is found in the brain.  The ventricles, sulci and basal cisterns show mild global atrophy.  The orbits are unremarkable. The paranasal sinuses are clear. The nasal cavity appears intact. The nasopharynx is symmetric. The central skull base, petrous temporal bones and calvarium remain intact.  IMPRESSION: Advanced periventricular and deep white matter ischemia. Old infarctions are seen in the BILATERAL anterior frontal, parietal and occipital lobes and BILATERAL cerebellum. Mild global atrophy.

## 2024-07-09 NOTE — DISCHARGE NOTE PROVIDER - ATTENDING DISCHARGE PHYSICAL EXAMINATION:
.  VITAL SIGNS:  T(C): 36.3 (07-10-24 @ 12:38), Max: 36.6 (07-09-24 @ 21:15)  T(F): 97.3 (07-10-24 @ 12:38), Max: 97.9 (07-09-24 @ 21:15)  HR: 84 (07-10-24 @ 12:38) (74 - 84)  BP: 144/80 (07-10-24 @ 12:38) (133/71 - 144/80)  BP(mean): --  RR: 18 (07-10-24 @ 12:38) (17 - 18)  SpO2: 100% (07-10-24 @ 12:38) (96% - 100%)  Wt(kg): --    PHYSICAL EXAM:    Constitutional: Comfortable and resting in bed   Head: NC/AT  Eyes: PERRL, EOMI, anicteric sclera  ENT: no nasal discharge; uvula midline, no oropharyngeal erythema or exudates; MMM  Respiratory: CTA B/L; no W/R/R, no retractions  Cardiac: +S1/S2; RRR; no M/R/G; PMI non-displaced  Gastrointestinal: abdomen soft, NT/ND; no rebound or guarding; +BSx4  Extremities: WWP, no clubbing or cyanosis; no peripheral edema  Musculoskeletal: NROM x4; no joint swelling, tenderness or erythema  Vascular: 2+ radial, femoral, DP/PT pulses B/L  Neurologic: AAOx1-2; CNII-XII grossly intact; no focal deficits

## 2024-07-09 NOTE — DISCHARGE NOTE PROVIDER - NSDCCPCAREPLAN_GEN_ALL_CORE_FT
PRINCIPAL DISCHARGE DIAGNOSIS  Diagnosis: Sepsis due to pneumonia  Assessment and Plan of Treatment:       SECONDARY DISCHARGE DIAGNOSES  Diagnosis: Encephalopathy  Assessment and Plan of Treatment:     Diagnosis: Cachexia  Assessment and Plan of Treatment:      PRINCIPAL DISCHARGE DIAGNOSIS  Diagnosis: Sepsis due to pneumonia  Assessment and Plan of Treatment: You were brought to the hospital by EMS with high fever, productive cough, shortness of breath, inability to eat, and confusion due to penumonia (i.e., lung infection). You received IV serum and antibiotics and your condition improved followig this treatment. You are being discharged on oral antibiotics (cefpodoxime 200mg twice a day and azithromycin 500 mg daily) to complete your treatment at home. Please be aware that if you experience fever, worsening coughs, shortness of breath, or confusion you need to come back to the hospital immediately.      SECONDARY DISCHARGE DIAGNOSES  Diagnosis: Encephalopathy  Assessment and Plan of Treatment: On your admission to the hospital, you had altered mental status compared to your baseline cognitive functioning. You were confused and disoriented due to dehydration (losing body fluids) and infection (as described above). After receiving serum fluid and antibiotic you got back to your usual functioning. You could understand that you are in a hospital, you recalled your name, date of birth and your son's name which is consistent with your cognitive capacities. Please follow up with your PCP regularly and make sure to come back to the hospital if you experience any change in your mental status.    Diagnosis: Cachexia  Assessment and Plan of Treatment: During admission, we noted that you have a low body mass index unit, suggestive for malnutrition. In hospital, you received vitamin and nutrition supplements, please make sure to follow a healthy diet at home and follow up regularly with your PCP regarding this issue.     PRINCIPAL DISCHARGE DIAGNOSIS  Diagnosis: Sepsis due to pneumonia  Assessment and Plan of Treatment: You were brought to the hospital by EMS with high fever, productive cough, shortness of breath, inability to eat, and confusion due to penumonia (i.e., lung infection). You received IV serum and antibiotics and your condition improved followig this treatment. You are being discharged on oral antibiotics (cefpodoxime 200mg twice a day and azithromycin 500 mg daily) to complete your treatment at home on 7/12. Please be aware that if you experience fever, worsening coughs, shortness of breath, or confusion you need to come back to the hospital immediately.      SECONDARY DISCHARGE DIAGNOSES  Diagnosis: Encephalopathy  Assessment and Plan of Treatment: On your admission to the hospital, you had altered mental status compared to your baseline cognitive functioning. You were confused and disoriented due to dehydration (losing body fluids) and infection (as described above). After receiving serum fluid and antibiotic you got back to your usual functioning. You could understand that you are in a hospital, you recalled your name, date of birth and your son's name which is consistent with your cognitive capacities. Please follow up with your PCP regularly and make sure to come back to the hospital if you experience any change in your mental status.    Diagnosis: Cachexia  Assessment and Plan of Treatment: During admission, we noted that you have a low body mass index unit, suggestive for malnutrition. In hospital, you received vitamin and nutrition supplements, please make sure to follow a healthy diet at home and follow up regularly with your PCP regarding this issue.     PRINCIPAL DISCHARGE DIAGNOSIS  Diagnosis: Sepsis due to pneumonia  Assessment and Plan of Treatment: You were brought to the hospital by EMS with high fever, productive cough, shortness of breath, inability to eat, and confusion due to penumonia (i.e., lung infection). You received IV antibiotics and your condition improved. You are being discharged on oral antibiotics (cefpodoxime 200mg twice a day and azithromycin 500 mg daily) to complete your treatment at home on 7/12. Please be aware that if you experience fever, worsening coughs, shortness of breath, or confusion you need to come back to the hospital immediately.      SECONDARY DISCHARGE DIAGNOSES  Diagnosis: Encephalopathy  Assessment and Plan of Treatment: On your admission to the hospital, you had altered mental status compared to your baseline cognitive functioning. You were confused and disoriented due to dehydration (losing body fluids) and infection (as described above). After receiving serum fluid and antibiotic you got back to your usual functioning. You could understand that you are in a hospital, you recalled your name, date of birth and your son's name which is consistent with your cognitive capacities. Please follow up with your PCP regularly and make sure to come back to the hospital if you experience any change in your mental status.    Diagnosis: Cachexia  Assessment and Plan of Treatment: During admission, we noted that you have a low body mass index unit, suggestive for malnutrition. In hospital, you received vitamin and nutrition supplements, please make sure to follow a healthy diet at home and follow up regularly with your PCP regarding this issue.

## 2024-07-09 NOTE — PROGRESS NOTE ADULT - SUBJECTIVE AND OBJECTIVE BOX
******************  Authored By: Kacie Lane MD (PGY-1)  MS Teams Preferred  ******************  Admission Day: 3    HPI: The patient is an 89-year-old male with history of dementia, CVA, atrial fibrillation, and HTN transferred to ED by EMS for reported adult protective services referral. He was not eating for 3 days, unable to walk, difficult to arouse, and had altered mental status compared to his baseline based on the EMS report.   Patient's family reports his baseline is A&O 2. He could walk and eat by himself. Has been speaking incoherently and occasionally confused in the past two years. Otherwise, he was doing okay until the day prior to admission where he became sleepy, could not tolerate food, and began coughing and wheezing. .     ED/Hospital Course: In the ED, the patient was febrile (103F/39.6C), tachycardic (105) and tachypneic (22), lethargic, opened eyes to voice, and was not alert and oriented to time, place, and person. Broad-spectrum bacterial coverage with Zosyn and Vancomycin initiated with the impression of sepsis due to infectious etiology. The patient was responsive to the treatment. Sepsis signs and symptoms resolved on the second day. The patient had an episode of agitation yesterday, but was reoriented with reassurance and having someone sit with him.     PMH/PSH:  Atrial fibrillation   CVA (cerebrovascular accident), 1 year ago  Dementia  GERD (gastroesophageal reflux disease)   HTN (hypertension)   Hyperlipidemia   Prostate cancer 18 years ago.  History of prostate surgery   History of sinus surgery     Home Medication:  Eliquis 5 mg oral tablet: 1 tab(s) orally 2 times a day  meclizine 12.5 mg oral tablet: 1 tab(s) orally 3 times a day, As Needed  atorvastatin 40 mg oral tablet: 1 tab(s) orally once a day    Allergies:  No Known Allergies    Social Hx:  Retired. Lives with with son and daughter-in-law in a reportedly poor condition, according to APS patient's house was dirty and hot    Lifestyle:  Negative for smoking, alcohol, and recreational drugs.    Family Hx  No pertinent family history    Patient seen and examined at bedside.     SUBJECTIVE EVALUATION:    Patient communicates incoherently. When prompted, does not endorse any pain or complaint.    OBJECTIVE EVALUATION:    VITAL SIGNS:  T(F): 97.7 (07-09-24 @ 05:17)  HR: 82 (07-09-24 @ 05:17)  BP: 138/75 (07-09-24 @ 05:17)  RR: 18 (07-09-24 @ 05:17)  SpO2: 97% (07-09-24 @ 05:17) on room air  Wt(kg): 48.2    The patient is awake, eyes are open, responds to questions with incoherent sentences. can mention his and his son's name but is not oriented to time, place and situation. The patients cachectic (BMI=16.3). Is not in distress.     HEENT: Normocephalic, atraumatic. Sclera non-icteric, pupils are myotic in the room light but reactive to light.  Cardiovascular: RRR, no murmurs, rubs, gallops, peripheral edema   Respiratory: No rales, wheezes or rhonchi noted. No accessory muscle usage   Gastrointestinal: abdomen soft, non-tender, no mass, no hepatosplenomegaly noted.    Extremities: Increased rigidity noted in all four extremities. No deformities, no cyanosis.   Skin: No lesion, scar noted      LABS:                          14.7   8.41  )-----------( 126      ( 08 Jul 2024 11:00 )             44.8     07-08    138  |  102  |  15  ----------------------------<  97  3.6   |  22  |  0.94    Ca    8.8      08 Jul 2024 11:00  Phos  2.7     07-08  Mg     2.00     07-08    Staph aureus and MRSA PCR positive       TPro  7.8  /  Alb  3.6  /  TBili  1.2  /  DBili  x   /  AST  29  /  ALT  17  /  AlkPhos  83  07-08    PT/INR - ( 07 Jul 2024 11:17 )   PT: 14.3 sec;   INR: 1.27 ratio         PTT - ( 07 Jul 2024 11:17 )  PTT:28.6 sec  Urinalysis Basic - ( 08 Jul 2024 11:00 )    Color: x / Appearance: x / SG: x / pH: x  Gluc: 97 mg/dL / Ketone: x  / Bili: x / Urobili: x   Blood: x / Protein: x / Nitrite: x   Leuk Esterase: x / RBC: x / WBC x   Sq Epi: x / Non Sq Epi: x / Bacteria: x          Current Medication:  MEDICATIONS  (STANDING):  apixaban 2.5 milliGRAM(s) Oral every 12 hours  atorvastatin 40 milliGRAM(s) Oral at bedtime  azithromycin  IVPB      azithromycin  IVPB 500 milliGRAM(s) IV Intermittent every 24 hours  mupirocin 2% Ointment 1 Application(s) Both Nostrils two times a day  pantoprazole  Injectable 40 milliGRAM(s) IV Push daily  polyethylene glycol 3350 17 Gram(s) Oral daily  senna 1 Tablet(s) Oral daily  vancomycin  IVPB 750 milliGRAM(s) IV Intermittent every 24 hours    MEDICATIONS  (PRN):      RADIOLOGY & ADDITIONAL TESTS:    CXR:  Left retrocardiac opacity which is better visualized on same-day CT.    Abdomen/Pelvis CT:  Left lower lobe patchy consolidation, suspicious for pneumonia.  1.1 cm right adrenal nodule.    Head CT:  Advanced periventricular and deep white matter ischemia.   Old infarctions are seen in the BILATERAL anterior frontal, parietal and   occipital lobes and BILATERAL cerebellum.    Mild global atrophy.   ******************  Authored By: Kacie Lane MD (PGY-1)  MS Teams Preferred  ******************  Admission Day: 3    HPI: The patient is an 89-year-old male with history of dementia, CVA, atrial fibrillation, and HTN transferred to ED by EMS for reported adult protective services referral. He was not eating for 3 days, unable to walk, difficult to arouse, and had altered mental status compared to his baseline based on the EMS report.   Patient's family reports his baseline is A&O 2. He could walk and eat by himself. Has been speaking incoherently and occasionally confused in the past two years. Otherwise, he was doing okay until the day prior to admission where he became sleepy, could not tolerate food, and began coughing and wheezing. .     ED/Hospital Course: In the ED, the patient was febrile (103F/39.6C), tachycardic (105) and tachypneic (22), lethargic, opened eyes to voice, and was not alert and oriented to time, place, and person. Broad-spectrum bacterial coverage with Zosyn and Vancomycin initiated with the impression of sepsis due to infectious etiology. The patient was responsive to the treatment. Sepsis signs and symptoms resolved on the second day. The patient had an episode of agitation yesterday, but was reoriented with reassurance and having someone sit with him.     PMH/PSH:  Atrial fibrillation   CVA (cerebrovascular accident), 1 year ago  Dementia  GERD (gastroesophageal reflux disease)   HTN (hypertension)   Hyperlipidemia   Prostate cancer 18 years ago.  History of prostate surgery   History of sinus surgery     Home Medication:  Eliquis 5 mg oral tablet: 1 tab(s) orally 2 times a day  meclizine 12.5 mg oral tablet: 1 tab(s) orally 3 times a day, As Needed  atorvastatin 40 mg oral tablet: 1 tab(s) orally once a day    Allergies:  No Known Allergies    Social Hx:  Retired. Lives with with son and daughter-in-law in a reportedly poor condition, according to APS patient's house was dirty and hot    Lifestyle:  Negative for smoking, alcohol, and recreational drugs.    Family Hx  No pertinent family history    Patient seen and examined at bedside.     SUBJECTIVE EVALUATION:    Patient communicates incoherently. When prompted, does not endorse any pain or complaint.    OBJECTIVE EVALUATION:    VITAL SIGNS:  T(F): 97.7 (07-09-24 @ 05:17)  HR: 82 (07-09-24 @ 05:17)  BP: 138/75 (07-09-24 @ 05:17)  RR: 18 (07-09-24 @ 05:17)  SpO2: 97% (07-09-24 @ 05:17) on room air  Wt(kg): 48.2    The patient is awake, eyes are open, responds to questions with incoherent sentences. can mention his and his son's name but is not oriented to time, place and situation. The patients cachectic (BMI=16.3). Is not in distress.     HEENT: Normocephalic, atraumatic. Sclera non-icteric, pupils are myotic in the room light but reactive to light.  Cardiovascular: RRR, no murmurs, rubs, gallops, peripheral edema   Respiratory: No rales, wheezes or rhonchi noted. No accessory muscle usage   Gastrointestinal: abdomen soft, non-tender, no mass, no hepatosplenomegaly noted.    Extremities: Increased rigidity noted in all four extremities. No deformities, no cyanosis.   Skin: No lesion, scar noted      LABS:                            14.7   8.41  )-----------( 126      ( 08 Jul 2024 11:00 )             44.8       Morning Lab (07-09):    137  |  103  |  16  ----------------------------<  83  3.6   |  22  |  0.89    Ca    8.6      09 Jul 2024 06:35  Phos  2.8     07-09  Mg     2.10     07-09    TPro  7.3  /  Alb  3.4  /  TBili  0.7  /  DBili  x   /  AST  37  /  ALT  26  /  AlkPhos  78  07-09        Staph aureus and MRSA PCR positive           PT/INR - ( 07 Jul 2024 11:17 )   PT: 14.3 sec;   INR: 1.27 ratio         PTT - ( 07 Jul 2024 11:17 )  PTT:28.6 sec  Urinalysis Basic - ( 08 Jul 2024 11:00 )    Color: x / Appearance: x / SG: x / pH: x  Gluc: 97 mg/dL / Ketone: x  / Bili: x / Urobili: x   Blood: x / Protein: x / Nitrite: x   Leuk Esterase: x / RBC: x / WBC x   Sq Epi: x / Non Sq Epi: x / Bacteria: x          Current Medication:  MEDICATIONS  (STANDING):  apixaban 2.5 milliGRAM(s) Oral every 12 hours  atorvastatin 40 milliGRAM(s) Oral at bedtime  azithromycin  IVPB      azithromycin  IVPB 500 milliGRAM(s) IV Intermittent every 24 hours  mupirocin 2% Ointment 1 Application(s) Both Nostrils two times a day  pantoprazole  Injectable 40 milliGRAM(s) IV Push daily  polyethylene glycol 3350 17 Gram(s) Oral daily  senna 1 Tablet(s) Oral daily  vancomycin  IVPB 750 milliGRAM(s) IV Intermittent every 24 hours    MEDICATIONS  (PRN):      RADIOLOGY & ADDITIONAL TESTS:    CXR:  Left retrocardiac opacity which is better visualized on same-day CT.    Abdomen/Pelvis CT:  Left lower lobe patchy consolidation, suspicious for pneumonia.  1.1 cm right adrenal nodule.    Head CT:  Advanced periventricular and deep white matter ischemia.   Old infarctions are seen in the BILATERAL anterior frontal, parietal and   occipital lobes and BILATERAL cerebellum.    Mild global atrophy.   ******************  Authored By: Kacie Lane MD (PGY-1)  MS Teams Preferred  ******************  Admission Day: 3    HPI: The patient is an 89-year-old male with history of dementia, CVA, atrial fibrillation, and HTN transferred to ED by EMS for reported adult protective services referral. He was not eating for 3 days, unable to walk, difficult to arouse, and had altered mental status compared to his baseline based on the EMS report.   Patient's family reports his baseline is A&O 2. He could walk and eat by himself. Has been speaking incoherently and occasionally confused in the past two years. Otherwise, he was doing okay until the day prior to admission where he became sleepy, could not tolerate food, and began coughing and wheezing. .     ED/Hospital Course: In the ED, the patient was febrile (103F/39.6C), tachycardic (105) and tachypneic (22), lethargic, opened eyes to voice, and was not alert and oriented to time, place, and person. Broad-spectrum bacterial coverage with Zosyn and Vancomycin initiated with the impression of sepsis due to infectious etiology. The patient was responsive to the treatment. Sepsis signs and symptoms resolved on the second day. The patient had an episode of agitation yesterday, but was reoriented with reassurance and having someone sit with him.     PMH/PSH:  Atrial fibrillation   CVA (cerebrovascular accident), 1 year ago  Dementia  GERD (gastroesophageal reflux disease)   HTN (hypertension)   Hyperlipidemia   Prostate cancer 18 years ago.  History of prostate surgery   History of sinus surgery     Home Medication:  Eliquis 5 mg oral tablet: 1 tab(s) orally 2 times a day  meclizine 12.5 mg oral tablet: 1 tab(s) orally 3 times a day, As Needed  atorvastatin 40 mg oral tablet: 1 tab(s) orally once a day    Allergies:  No Known Allergies    Social Hx:  Retired. Lives with with son and daughter-in-law in a reportedly poor condition, according to APS patient's house was dirty and hot    Lifestyle:  Negative for smoking, alcohol, and recreational drugs.    Family Hx  No pertinent family history    Patient seen and examined at bedside.     SUBJECTIVE EVALUATION:    Patient communicates incoherently. ate/slept well. Mentions a hypogastric pain when prompted.    OBJECTIVE EVALUATION:    VITAL SIGNS:  T(F): 97.7 (07-09-24 @ 05:17)  HR: 82 (07-09-24 @ 05:17)  BP: 138/75 (07-09-24 @ 05:17)  RR: 18 (07-09-24 @ 05:17)  SpO2: 97% (07-09-24 @ 05:17) on room air  Wt(kg): 48.2    The patient was fully awake, without distress, walking with assistance. Was alert and oriented to person and situation. Knows his name, date of birth and his son's name. When asked where's this place, he says here I live. When asked what year it is, repeats his year of birth (1935). asks whether son is here, and was reassured that his son knows he is here. Cachexia  still present (BMI=16.3).    HEENT: Normocephalic, atraumatic. Sclera non-icteric, pupils are myotic in the room light but reactive to light.  Cardiovascular: RRR, no murmurs, rubs, gallops, peripheral edema   Respiratory: No rales, wheezes or rhonchi noted. No accessory muscle usage   Gastrointestinal: abdomen soft, non-tender, no mass, no hepatosplenomegaly noted. No hypogastric tenderness.  Extremities: Increased rigidity noted in all four extremities. No deformities, no cyanosis.   Skin: No lesion, scar noted      LABS:                            14.7   8.41  )-----------( 126      ( 08 Jul 2024 11:00 )             44.8       Morning Lab (07-09):    137  |  103  |  16  ----------------------------<  83  3.6   |  22  |  0.89    Ca    8.6      09 Jul 2024 06:35  Phos  2.8     07-09  Mg     2.10     07-09    TPro  7.3  /  Alb  3.4  /  TBili  0.7  /  DBili  x   /  AST  37  /  ALT  26  /  AlkPhos  78  07-09        Staph aureus and MRSA PCR positive       PT/INR - ( 07 Jul 2024 11:17 )   PT: 14.3 sec;   INR: 1.27 ratio         PTT - ( 07 Jul 2024 11:17 )  PTT:28.6 sec  Urinalysis Basic - ( 08 Jul 2024 11:00 )    Color: x / Appearance: x / SG: x / pH: x  Gluc: 97 mg/dL / Ketone: x  / Bili: x / Urobili: x   Blood: x / Protein: x / Nitrite: x   Leuk Esterase: x / RBC: x / WBC x   Sq Epi: x / Non Sq Epi: x / Bacteria: x          Current Medication:  MEDICATIONS  (STANDING):  apixaban 2.5 milliGRAM(s) Oral every 12 hours  atorvastatin 40 milliGRAM(s) Oral at bedtime  azithromycin  IVPB      azithromycin  IVPB 500 milliGRAM(s) IV Intermittent every 24 hours  mupirocin 2% Ointment 1 Application(s) Both Nostrils two times a day  pantoprazole  Injectable 40 milliGRAM(s) IV Push daily  polyethylene glycol 3350 17 Gram(s) Oral daily  senna 1 Tablet(s) Oral daily  vancomycin  IVPB 750 milliGRAM(s) IV Intermittent every 24 hours    MEDICATIONS  (PRN):      RADIOLOGY & ADDITIONAL TESTS:    CXR:  Left retrocardiac opacity which is better visualized on same-day CT.    Abdomen/Pelvis CT:  Left lower lobe patchy consolidation, suspicious for pneumonia.  1.1 cm right adrenal nodule.    Head CT:  Advanced periventricular and deep white matter ischemia.   Old infarctions are seen in the BILATERAL anterior frontal, parietal and   occipital lobes and BILATERAL cerebellum.    Mild global atrophy.   ******************  Authored By: Kacie Lane MD (PGY-1)  MS Teams Preferred  ******************  Admission Day: 3    HPI: The patient is an 89-year-old male with history of dementia, CVA, atrial fibrillation, and HTN transferred to ED by EMS. He was not eating for 3 days, unable to walk, difficult to arouse, and had altered mental status compared to his baseline based on the EMS report.   Patient's family reports his baseline is A&O 2. He could walk and eat by himself. Has been speaking incoherently and occasionally confused in the past two years. Otherwise, he was doing okay until the day prior to admission where he became sleepy, could not tolerate food, and began coughing and wheezing. .     ED/Hospital Course: In the ED, the patient was febrile (103F/39.6C), tachycardic (105) and tachypneic (22), lethargic, opened eyes to voice, and was not alert and oriented to time, place, and person. Broad-spectrum bacterial coverage with Zosyn and Vancomycin initiated with the impression of sepsis due to infectious etiology. The patient was responsive to the treatment. Sepsis signs and symptoms resolved on the second day. The patient had an episode of agitation yesterday, but was reoriented with reassurance and having someone sit with him.     PMH/PSH:  Atrial fibrillation   CVA (cerebrovascular accident), 1 year ago  Dementia  GERD (gastroesophageal reflux disease)   HTN (hypertension)   Hyperlipidemia   Prostate cancer 18 years ago.  History of prostate surgery   History of sinus surgery     Home Medication:  Eliquis 5 mg oral tablet: 1 tab(s) orally 2 times a day  meclizine 12.5 mg oral tablet: 1 tab(s) orally 3 times a day, As Needed  atorvastatin 40 mg oral tablet: 1 tab(s) orally once a day    Allergies:  No Known Allergies    Social Hx:  Retired. Lives with with son and daughter-in-law in a reportedly poor condition, according to APS patient's house was dirty and hot    Lifestyle:  Negative for smoking, alcohol, and recreational drugs.    Family Hx  No pertinent family history    Patient seen and examined at bedside.     SUBJECTIVE EVALUATION:    Patient communicates incoherently. ate/slept well. Mentions a hypogastric pain when prompted.    OBJECTIVE EVALUATION:    VITAL SIGNS:  T(F): 97.7 (07-09-24 @ 05:17)  HR: 82 (07-09-24 @ 05:17)  BP: 138/75 (07-09-24 @ 05:17)  RR: 18 (07-09-24 @ 05:17)  SpO2: 97% (07-09-24 @ 05:17) on room air  Wt(kg): 48.2    The patient was fully awake, without distress, walking with assistance. Was alert and oriented to person and situation. Knows his name, date of birth and his son's name. When asked where's this place, he says here I live. When asked what year it is, repeats his year of birth (1935). asks whether son is here, and was reassured that his son knows he is here. Cachexia  still present (BMI=16.3).    HEENT: Normocephalic, atraumatic. Sclera non-icteric, pupils are myotic in the room light but reactive to light.  Cardiovascular: RRR, no murmurs, rubs, gallops, peripheral edema   Respiratory: No rales, wheezes or rhonchi noted. No accessory muscle usage   Gastrointestinal: abdomen soft, non-tender, no mass, no hepatosplenomegaly noted. No hypogastric tenderness.  Extremities: Increased rigidity noted in all four extremities. No deformities, no cyanosis.   Skin: No lesion, scar noted      LABS:                            14.7   8.41  )-----------( 126      ( 08 Jul 2024 11:00 )             44.8       Morning Lab (07-09):    137  |  103  |  16  ----------------------------<  83  3.6   |  22  |  0.89    Ca    8.6      09 Jul 2024 06:35  Phos  2.8     07-09  Mg     2.10     07-09    TPro  7.3  /  Alb  3.4  /  TBili  0.7  /  DBili  x   /  AST  37  /  ALT  26  /  AlkPhos  78  07-09        Staph aureus and MRSA PCR positive       PT/INR - ( 07 Jul 2024 11:17 )   PT: 14.3 sec;   INR: 1.27 ratio         PTT - ( 07 Jul 2024 11:17 )  PTT:28.6 sec  Urinalysis Basic - ( 08 Jul 2024 11:00 )    Color: x / Appearance: x / SG: x / pH: x  Gluc: 97 mg/dL / Ketone: x  / Bili: x / Urobili: x   Blood: x / Protein: x / Nitrite: x   Leuk Esterase: x / RBC: x / WBC x   Sq Epi: x / Non Sq Epi: x / Bacteria: x          Current Medication:  MEDICATIONS  (STANDING):  apixaban 2.5 milliGRAM(s) Oral every 12 hours  atorvastatin 40 milliGRAM(s) Oral at bedtime  azithromycin  IVPB      azithromycin  IVPB 500 milliGRAM(s) IV Intermittent every 24 hours  mupirocin 2% Ointment 1 Application(s) Both Nostrils two times a day  pantoprazole  Injectable 40 milliGRAM(s) IV Push daily  polyethylene glycol 3350 17 Gram(s) Oral daily  senna 1 Tablet(s) Oral daily  vancomycin  IVPB 750 milliGRAM(s) IV Intermittent every 24 hours    MEDICATIONS  (PRN):      RADIOLOGY & ADDITIONAL TESTS:    CXR:  Left retrocardiac opacity which is better visualized on same-day CT.    Abdomen/Pelvis CT:  Left lower lobe patchy consolidation, suspicious for pneumonia.  1.1 cm right adrenal nodule.    Head CT:  Advanced periventricular and deep white matter ischemia.   Old infarctions are seen in the BILATERAL anterior frontal, parietal and   occipital lobes and BILATERAL cerebellum.    Mild global atrophy.

## 2024-07-09 NOTE — PROGRESS NOTE ADULT - TIME BILLING
Review of laboratory data, radiology results, consultants' recommendations, documentation in [place], discussion with patient/advanced care providers and interdisciplinary staff (such as , social workers, etc). Interventions were performed as documented above.
Review of laboratory data, radiology results, consultants' recommendations, documentation in [place], discussion with patient/advanced care providers and interdisciplinary staff (such as , social workers, etc). Interventions were performed as documented above.

## 2024-07-10 ENCOUNTER — TRANSCRIPTION ENCOUNTER (OUTPATIENT)
Age: 89
End: 2024-07-10

## 2024-07-10 VITALS
TEMPERATURE: 97 F | HEART RATE: 84 BPM | OXYGEN SATURATION: 100 % | DIASTOLIC BLOOD PRESSURE: 80 MMHG | SYSTOLIC BLOOD PRESSURE: 144 MMHG | RESPIRATION RATE: 18 BRPM

## 2024-07-10 LAB
ALBUMIN SERPL ELPH-MCNC: 3.3 G/DL — SIGNIFICANT CHANGE UP (ref 3.3–5)
ALP SERPL-CCNC: 83 U/L — SIGNIFICANT CHANGE UP (ref 40–120)
ALT FLD-CCNC: 34 U/L — SIGNIFICANT CHANGE UP (ref 4–41)
ANION GAP SERPL CALC-SCNC: 13 MMOL/L — SIGNIFICANT CHANGE UP (ref 7–14)
AST SERPL-CCNC: 41 U/L — HIGH (ref 4–40)
BILIRUB SERPL-MCNC: 0.7 MG/DL — SIGNIFICANT CHANGE UP (ref 0.2–1.2)
BUN SERPL-MCNC: 16 MG/DL — SIGNIFICANT CHANGE UP (ref 7–23)
CALCIUM SERPL-MCNC: 8.9 MG/DL — SIGNIFICANT CHANGE UP (ref 8.4–10.5)
CHLORIDE SERPL-SCNC: 104 MMOL/L — SIGNIFICANT CHANGE UP (ref 98–107)
CO2 SERPL-SCNC: 21 MMOL/L — LOW (ref 22–31)
CREAT SERPL-MCNC: 0.93 MG/DL — SIGNIFICANT CHANGE UP (ref 0.5–1.3)
EGFR: 79 ML/MIN/1.73M2 — SIGNIFICANT CHANGE UP
GLUCOSE SERPL-MCNC: 85 MG/DL — SIGNIFICANT CHANGE UP (ref 70–99)
HCT VFR BLD CALC: 40.3 % — SIGNIFICANT CHANGE UP (ref 39–50)
HGB BLD-MCNC: 13.7 G/DL — SIGNIFICANT CHANGE UP (ref 13–17)
LEGIONELLA AG UR QL: NEGATIVE — SIGNIFICANT CHANGE UP
MAGNESIUM SERPL-MCNC: 2.2 MG/DL — SIGNIFICANT CHANGE UP (ref 1.6–2.6)
MCHC RBC-ENTMCNC: 30 PG — SIGNIFICANT CHANGE UP (ref 27–34)
MCHC RBC-ENTMCNC: 34 GM/DL — SIGNIFICANT CHANGE UP (ref 32–36)
MCV RBC AUTO: 88.4 FL — SIGNIFICANT CHANGE UP (ref 80–100)
NRBC # BLD: 0 /100 WBCS — SIGNIFICANT CHANGE UP (ref 0–0)
NRBC # FLD: 0 K/UL — SIGNIFICANT CHANGE UP (ref 0–0)
PHOSPHATE SERPL-MCNC: 3.3 MG/DL — SIGNIFICANT CHANGE UP (ref 2.5–4.5)
PLATELET # BLD AUTO: 143 K/UL — LOW (ref 150–400)
POTASSIUM SERPL-MCNC: 3.6 MMOL/L — SIGNIFICANT CHANGE UP (ref 3.5–5.3)
POTASSIUM SERPL-SCNC: 3.6 MMOL/L — SIGNIFICANT CHANGE UP (ref 3.5–5.3)
PROT SERPL-MCNC: 7.6 G/DL — SIGNIFICANT CHANGE UP (ref 6–8.3)
RBC # BLD: 4.56 M/UL — SIGNIFICANT CHANGE UP (ref 4.2–5.8)
RBC # FLD: 12.9 % — SIGNIFICANT CHANGE UP (ref 10.3–14.5)
SODIUM SERPL-SCNC: 138 MMOL/L — SIGNIFICANT CHANGE UP (ref 135–145)
WBC # BLD: 6.07 K/UL — SIGNIFICANT CHANGE UP (ref 3.8–10.5)
WBC # FLD AUTO: 6.07 K/UL — SIGNIFICANT CHANGE UP (ref 3.8–10.5)

## 2024-07-10 PROCEDURE — 99239 HOSP IP/OBS DSCHRG MGMT >30: CPT | Mod: GC

## 2024-07-10 RX ORDER — AZITHROMYCIN 250 MG/1
1 TABLET, FILM COATED ORAL
Qty: 2 | Refills: 0
Start: 2024-07-10 | End: 2024-07-11

## 2024-07-10 RX ORDER — ATORVASTATIN CALCIUM 20 MG/1
1 TABLET, FILM COATED ORAL
Qty: 0 | Refills: 0 | DISCHARGE
Start: 2024-07-10

## 2024-07-10 RX ORDER — APIXABAN 5 MG/1
1 TABLET, FILM COATED ORAL
Qty: 60 | Refills: 0
Start: 2024-07-10 | End: 2024-08-08

## 2024-07-10 RX ORDER — APIXABAN 5 MG/1
1 TABLET, FILM COATED ORAL
Qty: 0 | Refills: 0 | DISCHARGE
Start: 2024-07-10

## 2024-07-10 RX ORDER — OLANZAPINE 2.5 MG/1
2.5 TABLET, FILM COATED ORAL ONCE
Refills: 0 | Status: DISCONTINUED | OUTPATIENT
Start: 2024-07-10 | End: 2024-07-10

## 2024-07-10 RX ORDER — CEFPODOXIME PROXETIL 50 MG/5 ML
1 SUSPENSION, RECONSTITUTED, ORAL (ML) ORAL
Qty: 4 | Refills: 0
Start: 2024-07-10 | End: 2024-07-11

## 2024-07-10 RX ADMIN — POLYETHYLENE GLYCOL 3350 17 GRAM(S): 1 POWDER ORAL at 12:02

## 2024-07-10 RX ADMIN — MUPIROCIN 1 APPLICATION(S): 20 OINTMENT TOPICAL at 05:47

## 2024-07-10 RX ADMIN — APIXABAN 2.5 MILLIGRAM(S): 5 TABLET, FILM COATED ORAL at 05:46

## 2024-07-10 RX ADMIN — Medication 1 TABLET(S): at 12:02

## 2024-07-10 RX ADMIN — PANTOPRAZOLE SODIUM 40 MILLIGRAM(S): 40 INJECTION, POWDER, FOR SOLUTION INTRAVENOUS at 12:02

## 2024-07-10 RX ADMIN — Medication 1 APPLICATION(S): at 05:46

## 2024-07-10 RX ADMIN — Medication 100 MILLIGRAM(S): at 12:02

## 2024-07-10 NOTE — PROGRESS NOTE ADULT - PROBLEM SELECTOR PLAN 2
The patient was presented with pneumonia from a community setting, manifested with fever, tachypnea, cough, weakness and consolidation on CXR and CT scan. The patient improved with broad-spectrum antibacterial coverage with Zosyn, Vancomycin, and Zithromax. Sepsis resolved. Antibiotic de-escalation to Ceftriaxone and Zithromax was planned. The patient's MRSA and SA PCR came positive. community acquired pneumonia is more consistent with patient's clinical presentation    - Ceftriaxone (7/8 - )  - azithromycin (7/7 - ) until legionella  - Nasal mupirocin 2% ND for SA decolonization  - CHG bath per infection control   - chest pt   - airway clearance   - pulse oxymetry  - d/c zosyn (7/7-7/8)  - f/u legionella and strep The patient was presented with pneumonia from a community setting, manifested with fever, tachypnea, cough, weakness and consolidation on CXR and CT scan. The patient improved with broad-spectrum antibacterial coverage with Zosyn, Vancomycin, and Zithromax. Sepsis resolved. Antibiotic de-escalation to Ceftriaxone and Zithromax was planned. The patient's MRSA and SA PCR came positive. community acquired pneumonia is more consistent with patient's clinical presentation    plan:  - d/c per above

## 2024-07-10 NOTE — DISCHARGE NOTE NURSING/CASE MANAGEMENT/SOCIAL WORK - PATIENT PORTAL LINK FT
You can access the FollowMyHealth Patient Portal offered by [facility] by registering at the following website: [email or web address]. By joining [employer]’s FollowMyHealth portal, you will also be able to view your health information using other applications (apps) compatible with our system.

## 2024-07-10 NOTE — PHYSICAL THERAPY INITIAL EVALUATION ADULT - ADDITIONAL COMMENTS
Pt lives with his son in an apartment with 3-4 steps to enter. Pt uses a can and was independent wit ADLs. Per case management note pt lives with his family in a house and used a rolling walker. Pt reports no falls in the past 6 months.  Pt left semi supine in bed in NAD, call bell in reach, bed alarm on and QUIRINO Gastelum made aware.

## 2024-07-10 NOTE — PROGRESS NOTE ADULT - NUTRITIONAL ASSESSMENT
This patient has been assessed with a concern for Malnutrition and has been determined to have a diagnosis/diagnoses of Severe protein-calorie malnutrition and Underweight (BMI < 19).    This patient is being managed with:   Diet Pureed-  Mildly Thick Liquids (MILDTHICKLIQS)  Entered: Jul 8 2024 10:36AM  
This patient has been assessed with a concern for Malnutrition and has been determined to have a diagnosis/diagnoses of Severe protein-calorie malnutrition and Underweight (BMI < 19).    This patient is being managed with:   Diet Pureed-  Mildly Thick Liquids (MILDTHICKLIQS)  Entered: Jul 8 2024 10:36AM

## 2024-07-10 NOTE — PHYSICAL THERAPY INITIAL EVALUATION ADULT - GENERAL OBSERVATIONS, REHAB EVAL
Chart reviewed and cleared for PT by QUIRINO Gastelum. Pt received semi supine in bed in NAD, HR 70s.

## 2024-07-10 NOTE — PROGRESS NOTE ADULT - PROBLEM SELECTOR PLAN 4
Pt with dementia, BMI of 16, and poor home condition per APS report.  - Dietitian evaluation: severe malnutrition due to chronic illness and decreased ability to consume sufficient energy in the setting dementia    Plan:  - Obtain weekly weigh   - c/w diet  - Vital Shake 1 Daily and magic cup daily by nutrition department  - Multivitamin   - Monitor PO intake Pt with dementia, BMI of 16, and poor home condition per APS report.  - Dietitian evaluation: severe malnutrition due to chronic illness and decreased ability to consume sufficient energy in the setting dementia    Plan:  - f/u with PCP post discharge  - home rehab

## 2024-07-10 NOTE — PROGRESS NOTE ADULT - PROBLEM SELECTOR PLAN 7
Pt has the hx of a-fib and can tolerate po per speech/swallow evaluation.    Plan:  - c/w eliquis, based on the weight the dose was adjusted to 2.5 mg BD

## 2024-07-10 NOTE — PHYSICAL THERAPY INITIAL EVALUATION ADULT - IMPAIRMENTS CONTRIBUTING TO GAIT DEVIATIONS, PT EVAL
Pt demonstrated difficulty using rolling walker, pt required assistance to negotiate rolling walker/impaired balance/impaired coordination/impaired motor control/decreased strength

## 2024-07-10 NOTE — PROGRESS NOTE ADULT - ASSESSMENT
The patient is an 89-y/o male with history of dementia, CVA, A-fib, and hypertension transferred to the ED by EMS due to AMS, cough and fever. The patient was septic and cachectic on admission, Had Left retrocardiac opacity on CXR, and LLL patchy consolidation on CT, suggestive for sepsis due to an infectious etiology most probably pneumonia.  The patient is an 89-y/o male with history of dementia, CVA, A-fib, and hypertension transferred to the ED by EMS due to AMS, cough and fever. The patient was septic and cachectic on admission, Had Left retrocardiac opacity on CXR, and LLL patchy consolidation on CT, suggestive for sepsis due to an infectious etiology most probably pneumonia. Condition improved with fluid resuscitation and antibiotic therapy.

## 2024-07-10 NOTE — PROGRESS NOTE ADULT - PROBLEM SELECTOR PLAN 1
Patient initially presented with high fever, tachypnea, tachycardia, AMS, and positive CXR/CT, consistent with sepsis d/t pneumonia. Resuscitated with 3 Lit crystalloids and broad-spectrum antibiotic resulted in resolution of sepsis. The patient is currently afebrile, has normal HR, RR, O2Sat on room air. Lactate decreased from 4.5 to 1.7. Condition resolved. Blood/urine culture negative after 48hr.    Plan:  - c/w tx underlying infection Patient initially presented with high fever, tachypnea, tachycardia, AMS, and positive CXR/CT, consistent with sepsis d/t pneumonia. Resuscitated with 3 Lit crystalloids and broad-spectrum antibiotic resulted in resolution of sepsis. The patient is currently afebrile, has normal HR, RR, O2Sat on room air. Lactate decreased from 4.5 to 1.7. Condition resolved. Blood/urine culture negative after 48hr.    Plan:  - ready to discharge  - c/w cefpodoxime 200 mg bd interchange for ceftriaxone 1g IV until 7/12  - c/w Azithromycin 500 mg daily po until 7/12

## 2024-07-10 NOTE — PHYSICAL THERAPY INITIAL EVALUATION ADULT - LEVEL OF INDEPENDENCE: GAIT, REHAB EVAL
pt with poor safety awareness, pt required extra assistance with rolling walker/minimum assist (75% patients effort)/moderate assist (50% patients effort)

## 2024-07-10 NOTE — PHYSICAL THERAPY INITIAL EVALUATION ADULT - PATIENT/FAMILY/SIGNIFICANT OTHER GOALS STATEMENT, PT EVAL
Gillian Owens was seen and treated in our emergency department on 10/11/2022. She may return to work on 10/14/2022. If you have any questions or concerns, please don't hesitate to call.       Jean-Pierre Dennis, MARILOU - CNP To go home

## 2024-07-10 NOTE — PROGRESS NOTE ADULT - SUBJECTIVE AND OBJECTIVE BOX
******************  Authored By: Kacie Lane MD (PGY-1)  MS Teams Preferred  ******************  Admission Day: 4      HPI: The patient is an 89-year-old male with history of dementia, CVA, atrial fibrillation, and HTN transferred to ED by EMS. He was not eating for 3 days, unable to walk, difficult to arouse, and had altered mental status compared to his baseline based on the EMS report.   Patient's family reports his baseline is A&O 2. He could walk and eat by himself. Has been speaking incoherently and occasionally confused in the past two years. Otherwise, he was doing okay until the day prior to admission where he became sleepy, could not tolerate food, and began coughing and wheezing. .     ED/Hospital Course: In the ED, the patient was febrile (103F/39.6C), tachycardic (105) and tachypneic (22), lethargic, opened eyes to voice, and was not alert and oriented to time, place, and person. Broad-spectrum bacterial coverage with Zosyn and Vancomycin initiated with the impression of sepsis due to infectious etiology. The patient was responsive to the treatment. Sepsis signs and symptoms resolved on the second day. The patient had an episode of agitation yesterday, but was reoriented with reassurance and having someone sit with him.     PMH/PSH:  Atrial fibrillation   CVA (cerebrovascular accident), 1 year ago  Dementia  GERD (gastroesophageal reflux disease)   HTN (hypertension)   Hyperlipidemia   Prostate cancer 18 years ago.  History of prostate surgery   History of sinus surgery     Home Medication:  Eliquis 5 mg oral tablet: 1 tab(s) orally 2 times a day  meclizine 12.5 mg oral tablet: 1 tab(s) orally 3 times a day, As Needed  atorvastatin 40 mg oral tablet: 1 tab(s) orally once a day    Allergies:  No Known Allergies    Social Hx:  Retired. Lives with with son and daughter-in-law in a reportedly poor condition, according to APS patient's house was dirty and hot    Lifestyle:  Negative for smoking, alcohol, and recreational drugs.    Family Hx  No pertinent family history    Patient seen and examined at bedside.     SUBJECTIVE EVALUATION:    Patient communicates incoherently. ate/slept well. Mentions a hypogastric pain when prompted.    OBJECTIVE EVALUATION:    VITAL SIGNS:  T(F): 97.7 (07-10-24 @ 05:41)  HR: 83 (07-10-24 @ 05:41)  BP: 133/76 (07-10-24 @ 05:41)  RR: 17 (07-10-24 @ 05:41)  SpO2: 96% (07-10-24 @ 05:41) on RA      The patient was fully awake, without distress, walking with assistance. Was alert and oriented to person and situation. Knows his name, date of birth and his son's name. When asked where's this place, he says here I live. When asked what year it is, repeats his year of birth (1935). asks whether son is here, and was reassured that his son knows he is here. Cachexia  still present (BMI=16.3).    HEENT: Normocephalic, atraumatic. Sclera non-icteric, pupils are myotic in the room light but reactive to light.  Cardiovascular: RRR, no murmurs, rubs, gallops, peripheral edema   Respiratory: No rales, wheezes or rhonchi noted. No accessory muscle usage   Gastrointestinal: abdomen soft, non-tender, no mass, no hepatosplenomegaly noted. No hypogastric tenderness.  Extremities: Increased rigidity noted in all four extremities. No deformities, no cyanosis.   Skin: No lesion, scar noted      LABS:                          13.7   6.07  )-----------( 143      ( 10 Jul 2024 06:00 )             40.3     07-10    138  |  104  |  16  ----------------------------<  85  3.6   |  21<L>  |  0.93    Ca    8.9      10 Jul 2024 06:00  Phos  3.3     07-10  Mg     2.20     07-10    TPro  7.6  /  Alb  3.3  /  TBili  0.7  /  DBili  x   /  AST  41<H>  /  ALT  34  /  AlkPhos  83  07-10      Urinalysis Basic - ( 10 Jul 2024 06:00 )    Color: x / Appearance: x / SG: x / pH: x  Gluc: 85 mg/dL / Ketone: x  / Bili: x / Urobili: x   Blood: x / Protein: x / Nitrite: x   Leuk Esterase: x / RBC: x / WBC x   Sq Epi: x / Non Sq Epi: x / Bacteria: x          Current Medication:  MEDICATIONS  (STANDING):  apixaban 2.5 milliGRAM(s) Oral every 12 hours  atorvastatin 40 milliGRAM(s) Oral at bedtime  azithromycin  IVPB      azithromycin  IVPB 500 milliGRAM(s) IV Intermittent every 24 hours  cefTRIAXone   IVPB 1000 milliGRAM(s) IV Intermittent every 24 hours  chlorhexidine 2% Cloths 1 Application(s) Topical <User Schedule>  mupirocin 2% Ointment 1 Application(s) Both Nostrils two times a day  pantoprazole  Injectable 40 milliGRAM(s) IV Push daily  polyethylene glycol 3350 17 Gram(s) Oral daily  senna 1 Tablet(s) Oral daily    MEDICATIONS  (PRN):      RADIOLOGY & ADDITIONAL TESTS:  Reviewed ******************  Authored By: Kacie Lane MD (PGY-1)  MS Teams Preferred  ******************  Admission Day: 4      HPI: The patient is an 89-year-old male with history of dementia, CVA, atrial fibrillation, and HTN transferred to ED by EMS. He was not eating for 3 days, unable to walk, difficult to arouse, and had altered mental status compared to his baseline based on the EMS report.   Patient's family reports his baseline is A&O 2. He could walk and eat by himself. Has been speaking incoherently and occasionally confused in the past two years. Otherwise, he was doing okay until the day prior to admission where he became sleepy, could not tolerate food, and began coughing and wheezing. .     ED/Hospital Course: In the ED, the patient was febrile (103F/39.6C), tachycardic (105) and tachypneic (22), lethargic, opened eyes to voice, and was not alert and oriented to time, place, and person. Broad-spectrum bacterial coverage with Zosyn and Vancomycin initiated with the impression of sepsis due to infectious etiology. The patient was responsive to the treatment. Sepsis signs and symptoms resolved on the second day and antibiotic therapy was deescalated to Ceftriaxone (1g) and Azithromycin (500mg).        Patient seen and examined at bedside.     SUBJECTIVE EVALUATION:    Patient was sleeping. Opens his eyes on calling his names. Communicates verbally. Mentions that he has slept well last night. Has eaten but is hungry. Does not endorse any complaint or pain.    OBJECTIVE EVALUATION:    VITAL SIGNS:  T(F): 97.7 (07-10-24 @ 05:41)  HR: 83 (07-10-24 @ 05:41)  BP: 133/76 (07-10-24 @ 05:41)  RR: 17 (07-10-24 @ 05:41)  SpO2: 96% (07-10-24 @ 05:41) on RA      The patient was sleeping but wakes up easily. He is alert and oriented to person, place, and situation. Knows he is in hospital and being examined by a doctor. Cachexia still present (BMI=16.3).    HEENT: Normocephalic, atraumatic. Sclera non-icteric, pupils are myotic in the room light but reactive to light.  Cardiovascular: RRR, no murmurs, rubs, gallops, peripheral edema   Respiratory: Rhonchi in left lower lobe. No accessory muscle usage   Gastrointestinal: abdomen soft, non-tender, no mass, no hepatosplenomegaly noted. No hypogastric tenderness.  Extremities: Increased rigidity noted in all four extremities. No deformities, no cyanosis.   Skin: No lesion, scar noted      LABS:                          13.7   6.07  )-----------( 143      ( 10 Jul 2024 06:00 )             40.3     07-10    138  |  104  |  16  ----------------------------<  85  3.6   |  21<L>  |  0.93    Ca    8.9      10 Jul 2024 06:00  Phos  3.3     07-10  Mg     2.20     07-10    TPro  7.6  /  Alb  3.3  /  TBili  0.7  /  DBili  x   /  AST  41<H>  /  ALT  34  /  AlkPhos  83  07-10      Blood and urine culture negative after 48hr.       Current Medication:  MEDICATIONS  (STANDING):  apixaban 2.5 milliGRAM(s) Oral every 12 hours  atorvastatin 40 milliGRAM(s) Oral at bedtime  azithromycin  IVPB      azithromycin  IVPB 500 milliGRAM(s) IV Intermittent every 24 hours  cefTRIAXone   IVPB 1000 milliGRAM(s) IV Intermittent every 24 hours  chlorhexidine 2% Cloths 1 Application(s) Topical <User Schedule>  mupirocin 2% Ointment 1 Application(s) Both Nostrils two times a day  pantoprazole  Injectable 40 milliGRAM(s) IV Push daily  polyethylene glycol 3350 17 Gram(s) Oral daily  senna 1 Tablet(s) Oral daily    MEDICATIONS  (PRN):      RADIOLOGY & ADDITIONAL TESTS:  Reviewed ******************  Authored By: Kacie Lane MD (PGY-1)  MS Teams Preferred  ******************  Admission Day: 4      HPI: The patient is an 89-year-old male with history of dementia, CVA, atrial fibrillation, and HTN transferred to ED by EMS. He was not eating for 3 days, unable to walk, difficult to arouse, and had altered mental status compared to his baseline based on the EMS report.   Patient's family reports his baseline is A&O 2. He could walk and eat by himself. Has been speaking incoherently and occasionally confused in the past two years. Otherwise, he was doing okay until the day prior to admission where he became sleepy, could not tolerate food, and began coughing and wheezing. .     ED/Hospital Course: In the ED, the patient was febrile (103F/39.6C), tachycardic (105) and tachypneic (22), lethargic, opened eyes to voice, and was not alert and oriented to time, place, and person. Broad-spectrum bacterial coverage with Zosyn and Vancomycin initiated with the impression of sepsis due to infectious etiology. The patient was responsive to the treatment. Sepsis signs and symptoms resolved on the second day and antibiotic therapy was deescalated to Ceftriaxone (1g) and Azithromycin (500mg).        Patient seen and examined at bedside.     SUBJECTIVE EVALUATION:    Patient was sleeping. Opens his eyes on calling his names. Communicates verbally. Mentions that he has slept well last night. Has eaten but is hungry. Does not endorse any complaint or pain.    OBJECTIVE EVALUATION:    VITAL SIGNS:  T(F): 97.7 (07-10-24 @ 05:41)  HR: 83 (07-10-24 @ 05:41)  BP: 133/76 (07-10-24 @ 05:41)  RR: 17 (07-10-24 @ 05:41)  SpO2: 96% (07-10-24 @ 05:41) on RA      The patient was sleeping but wakes up easily. He is alert and oriented to person, place, and situation. Knows he is in hospital and being examined by a doctor. Cachexia still present (BMI=16.3).    HEENT: Normocephalic, atraumatic. Sclera non-icteric, pupils are myotic in the room light but reactive to light.  Cardiovascular: RRR, no murmurs, rubs, gallops, peripheral edema   Respiratory: Rhonchi in left lower lobe. No accessory muscle usage   Gastrointestinal: abdomen soft, non-tender, no mass, no hepatosplenomegaly noted. No hypogastric tenderness.  Extremities: Increased rigidity noted in all four extremities. No deformities, no cyanosis.   Skin: No lesion, scar noted      LABS:                          13.7   6.07  )-----------( 143      ( 10 Jul 2024 06:00 )             40.3     07-10    138  |  104  |  16  ----------------------------<  85  3.6   |  21<L>  |  0.93    Ca    8.9      10 Jul 2024 06:00  Phos  3.3     07-10  Mg     2.20     07-10    TPro  7.6  /  Alb  3.3  /  TBili  0.7  /  DBili  x   /  AST  41<H>  /  ALT  34  /  AlkPhos  83  07-10      Blood and urine culture negative after 48hr.       Current Medication:  MEDICATIONS  (STANDING):  apixaban 2.5 milliGRAM(s) Oral every 12 hours  atorvastatin 40 milliGRAM(s) Oral at bedtime  azithromycin  IVPB      azithromycin  IVPB 500 milliGRAM(s) IV Intermittent every 24 hours  cefTRIAXone   IVPB 1000 milliGRAM(s) IV Intermittent every 24 hours  chlorhexidine 2% Cloths 1 Application(s) Topical <User Schedule>  mupirocin 2% Ointment 1 Application(s) Both Nostrils two times a day  pantoprazole  Injectable 40 milliGRAM(s) IV Push daily  polyethylene glycol 3350 17 Gram(s) Oral daily  senna 1 Tablet(s) Oral daily    SW assessment:    Anticipated Discharge Plan and Services    Notes: No anticipated discharge plan at this time. Son shared pt will return  home once cleared for discharge.

## 2024-07-10 NOTE — PROGRESS NOTE ADULT - PROBLEM SELECTOR PLAN 5
EMS suggested assessing APS referral due to dirty and hot condition of pt's house. Based on the 's report, there is no indication for APS referral.    Plan  - home dispo EMS suggested assessing APS referral due to dirty and hot condition of pt's house. Based on the 's report, there is no indication for APS referral. Patient returns home once cleared for discharge.      Plan  - home dispo

## 2024-07-10 NOTE — PROGRESS NOTE ADULT - PROBLEM SELECTOR PLAN 6
Currently back to the baseline cognitive function. The patient can tolerate PO based on speech/swallow evaluation exam. Can walk with assistance.       Plan:  - aspiration precautions   - PO with pureed and thick liquid  - evaluate safe discharge

## 2024-07-10 NOTE — PROGRESS NOTE ADULT - PROBLEM SELECTOR PLAN 8
DVT ppx: elliquis  Diet: PO with pureed and thick liquid  PT/OT;   GI: senna, Miralax, pantoprazole   Code: Full code (APS referral)  Dispo: Home. DVT ppx: elliquis  Diet: PO with pureed and thick liquid  PT/OT:  GI: senna, Miralax, pantoprazole   Code: Full code (APS referral)  Dispo: Home. DVT ppx: elliquis  Diet: PO with pureed and thick liquid  PT/OT: [facility] post-discharge  GI: senna, Miralax, pantoprazole   Code: Full code (APS referral)  Dispo: Home.

## 2024-07-10 NOTE — PROGRESS NOTE ADULT - PROBLEM SELECTOR PLAN 3
The patient presents with altered mental status compared to the baseline, most probably due to pneumonia and dehydration. Other etiologies were investigated and ruled out, including structural (no acute changes in head CT), other metabolic (normal LFT, GFR, electrolytes, no urine retention), and toxic (normal VBG). Responsive to hydration and AB. Condition resolved, back to baseline.    Plan:  - c/w treating the underlying infection The patient presents with altered mental status compared to the baseline, most probably due to pneumonia and dehydration. Other etiologies were investigated and ruled out, including structural (no acute changes in head CT), other metabolic (normal LFT, GFR, electrolytes, no urine retention), and toxic (normal VBG). Responsive to hydration and AB. Condition resolved, back to baseline.    Plan:  - c/w treating underlying infection per above

## 2024-07-10 NOTE — DISCHARGE NOTE NURSING/CASE MANAGEMENT/SOCIAL WORK - NSDCFUADDAPPT_GEN_ALL_CORE_FT
APPTS ARE READY TO BE MADE: [x] YES    Best Family or Patient Contact (if needed): Son, Mr. Caden Guaman    Additional Information about above appointments (if needed):    Established care with Dr. Cespedes in two weeks

## 2024-07-10 NOTE — PHYSICAL THERAPY INITIAL EVALUATION ADULT - PERTINENT HX OF CURRENT PROBLEM, REHAB EVAL
Pt is an 89 year old male with history of dementia, CVA, A-fib, and hypertension transferred to the ED by EMS due to AMS, cough and fever. The patient was septic and cachectic on admission, Had Left retrocardiac opacity on CXR, and LLL patchy consolidation on CT, suggestive for sepsis due to an infectious etiology most probably pneumonia. Condition improved with fluid resuscitation and antibiotic therapy. He was not eating for 3 days, unable to walk, difficult to arouse, and had altered mental status compared to his baseline based on the EMS report.   Patient's family reports his baseline is A&O 2. He could walk and eat by himself. Has been speaking incoherently and occasionally confused in the past two years.

## 2024-07-11 ENCOUNTER — TRANSCRIPTION ENCOUNTER (OUTPATIENT)
Age: 89
End: 2024-07-11

## 2024-07-11 RX ORDER — CEFPODOXIME PROXETIL 50 MG/5 ML
1 SUSPENSION, RECONSTITUTED, ORAL (ML) ORAL
Qty: 4 | Refills: 0
Start: 2024-07-11 | End: 2024-07-12

## 2024-07-11 RX ORDER — APIXABAN 5 MG/1
1 TABLET, FILM COATED ORAL
Qty: 60 | Refills: 0
Start: 2024-07-11 | End: 2024-08-09

## 2024-07-11 RX ORDER — AZITHROMYCIN 250 MG/1
1 TABLET, FILM COATED ORAL
Qty: 2 | Refills: 0
Start: 2024-07-11 | End: 2024-07-12

## 2024-07-15 ENCOUNTER — TRANSCRIPTION ENCOUNTER (OUTPATIENT)
Age: 89
End: 2024-07-15

## 2024-07-30 ENCOUNTER — TRANSCRIPTION ENCOUNTER (OUTPATIENT)
Age: 89
End: 2024-07-30

## 2024-08-17 NOTE — ED ADULT NURSE NOTE - GENITOURINARY ASSESSMENT
ICC VISIT NOTE       Chief Complaint   Patient presents with   • Congestion     Pt arrives with c/o sinus congestion x 1 week. Pt denies any measurable fevers. Pt reports slight chest congestion as well. Pt denies any other symptoms. Pt denies any use of OTC medication for symptoms PTA.      Patient seen at 10:05 AM    History Of Present Illness  Joe is a 32 year old male presenting with sinus congestion and chest congestion for about a week. Occasional clear sputum. No fever, chills, sore throat, or ear pain. Hasn't tried anything. No home COVID testing. Requesting Steroid Rx since this happens 2 - 3 times/year and that usually helps.      Past Medical History  Past Medical History:   Diagnosis Date   • Allergy         Surgical History  Past Surgical History:   Procedure Laterality Date   • Hb dental procedure          Social History  Social History     Tobacco Use   • Smoking status: Former     Current packs/day: 0.50     Types: Cigarettes   • Smokeless tobacco: Never   Vaping Use   • Vaping status: Some Days   • Substances: Nicotine   Substance Use Topics   • Alcohol use: Yes     Comment: social   • Drug use: Not Currently       Family History    Family History   Problem Relation Age of Onset   • Patient is unaware of any medical problems Mother    • Patient is unaware of any medical problems Father         FH reviewed and negative for any heart or lung disease, bleeding disorders, or issues related to this complaint.     Allergies  ALLERGIES:  Patient has no known allergies.    Medications  Current Outpatient Medications   Medication Sig   • montelukast (SINGULAIR) 10 MG tablet Take 10 mg by mouth nightly.   • IBUPROFEN PO      No current facility-administered medications for this visit.        Review of Systems  Constitutional: Negative for fever and chills.  Skin: Negative for rash.  HEENT: Negative for eye drainage, ear pain,  sore throat. Positive for rhinorrhea, sinus congestion.   Respiratory:  Negative wheezing, shortness of breath. Positive for cough.  Cardiovascular: Negative for chest pain, chest pressure, palpitations or diaphoresis.  Gastrointestinal: Negative for nausea, vomiting, diarrhea, abdominal pain.  Genitourinary: Negative for dysuria, urgency, frequency, hematuria or flank pain.  Extremities:  Negative for joint swelling, joint pain.  Neurologic:  Negative for change in sensory or motor function.  Negative for headache.  Endocrine: Negative for heat or cold intolerance, weight loss or gain.  Hematological: Negative for bleeding, bruising or adenopathy.  Psychiatric: Negative for change in affect, change in mentation or sleep disturbance.  All other systems reviewed and otherwise negative unless noted.      Last Recorded Vitals  Vitals:    08/17/24 0931   BP: 129/82   BP Location: LUE - Left upper extremity   Patient Position: Sitting   Cuff Size: Regular   Pulse: 75   Resp: 14   Temp: 97.8 °F (36.6 °C)   TempSrc: Oral   SpO2: 97%   Weight: 86.2 kg (190 lb)   Height: 6' 2\" (1.88 m)   PainSc:  0        Physical Exam  Vitals and nursing note reviewed.   Constitutional:       General: He is not in acute distress.     Appearance: Normal appearance. He is not ill-appearing or toxic-appearing.   HENT:      Head: Normocephalic and atraumatic.      Right Ear: Hearing, tympanic membrane, ear canal and external ear normal.      Left Ear: Hearing, tympanic membrane, ear canal and external ear normal.      Nose: Nose normal.      Mouth/Throat:      Mouth: Mucous membranes are moist.      Pharynx: No oropharyngeal exudate or posterior oropharyngeal erythema.   Eyes:      Conjunctiva/sclera: Conjunctivae normal.      Pupils: Pupils are equal, round, and reactive to light.   Cardiovascular:      Rate and Rhythm: Normal rate and regular rhythm.      Heart sounds: Normal heart sounds.   Pulmonary:      Effort: Pulmonary effort is normal. No respiratory distress.      Breath sounds: Normal breath sounds. No  wheezing.   Musculoskeletal:         General: Normal range of motion.      Cervical back: Normal range of motion and neck supple.   Lymphadenopathy:      Cervical: No cervical adenopathy.   Skin:     General: Skin is warm and dry.      Findings: No ecchymosis or lesion.   Neurological:      Mental Status: He is alert.      Gait: Gait is intact.   Psychiatric:         Mood and Affect: Mood and affect normal.         Judgment: Judgment normal.               Differential Diagnosis/MDM:  Diagnoses that have been ruled out:   Sinusitis unlikely.  Declined COVID testing since symptoms have been present for a week.  Discussed the complications of frequent steroid use.    Diagnoses that are still under consideration:   Patient presents to [facility] for evaluation of URI symptoms. Suspect that patient has viral URI as etiology for their symptoms.   They were instructed to follow up with PCP. All questions were answered and criteria necessitating return visit to [facility]/ED was discussed     Ddx: URI, COVID, sinusitis.  2.  Symptomatic care discussed.  See patient AVS instructions for any further details.  3.  Instructed patient on appropriate medical follow-up.  See below  4.  Further evaluation: PCP for BP and recheck.   5.  Patient will be contacted with any positive or discordant results via phone.      The plan was discussed verbally and key components were summarized in the discharge instructions. All questions were answered. In discussing management and follow-up, they are advised that the plan is based only on information that was available at the time of the [facility] evaluation. Additional testing and treatment may be necessary, and outpatient evaluation is frequently required to ensure complete care. At the same time, there is always potential for symptoms to recur or worsen, or for additional signs or symptoms to develop that could substantially affect the treatment plan. If any new or uncontrolled  symptoms occur, or if there are  any other concerns, they are advised to seek medical evaluation immediately.     Condition on Discharge: Good   Final diagnoses:   1. Viral URI with cough    2. Elevated blood pressure reading            Diagnosis:   (J06.9) Viral URI with cough  (primary encounter diagnosis)    (R03.0) Elevated blood pressure reading      New Prescriptions    No medications on file       Follow-up Information     Follow up With Specialties Details Why Contact Info    Jeronimo Bryant MD Family Practice Follow up in 5 day(s) For elevated blood pressure., For recheck [address] [facility] [id number] [phone]              Patient Instructions   Tylenol or Ibuprofen  Mucinex D twice daily (get from pharmacist)  Fluids/steam   Follow up with PCP within a week for your elevated blood pressure reading.     Thank you for choosing [facility] for your healthcare needs.    We strive to provide you with excellent service and hope that we have exceeded your expectations.     If you receive a survey in the mail, we hope that you will complete it and agree that we have provided \"Very Good\" care today.  If you would like to speak to us about your visit, please contact our center at:    [facility]  [phone]    [facility]  [phone]    [facility]  [phone]      Primary Care Physician  Pcp, No      Tate Cleary MD    The 21st Century Cures Act makes medical notes like these available to patients in the interest of transparency. However, be advised this is a medical document. It is intended as peer to peer communication. It is written in medical language and may contain abbreviations, jargon, and other verbiage that could be misleading or confusing to lay persons. It may appear blunt or direct. Medical documents are intended to carry relevant information, facts as evident, and the clinical opinion of the medical provider.   - - -

## 2024-08-21 NOTE — H&P ADULT - PROBLEM SELECTOR PROBLEM 5
Did you want to order labs for downstairs?  Since she wants to do labs before her visit.   Essential hypertension

## 2024-11-26 NOTE — ED ADULT NURSE NOTE - NSFALLRISKFACTORS_ED_ALL_ED
Coagulation: Bleeding disorder either through use of anticoagulants or underlying clinical condition(s)
Offered and provided

## 2025-03-25 NOTE — ED PROVIDER NOTE - WR ORDER ID 1
Pt has lots of questions regarding Mohs procedure and need for ENT plastic surgery. Scheduled for consult with Dr. Russell on 4/2 at 2:50. Confirmed date and time.    [id number]

## 2025-04-30 ENCOUNTER — TRANSCRIPTION ENCOUNTER (OUTPATIENT)
Age: 89
End: 2025-04-30

## 2025-06-07 ENCOUNTER — INPATIENT (INPATIENT)
Facility: HOSPITAL | Age: 89
LOS: 10 days | Discharge: NURSING, REHAB OR LONG-TERM CARE | End: 2025-06-18
Attending: HOSPITALIST | Admitting: HOSPITALIST
Payer: MEDICARE

## 2025-06-07 VITALS
HEART RATE: 89 BPM | OXYGEN SATURATION: 100 % | SYSTOLIC BLOOD PRESSURE: 110 MMHG | RESPIRATION RATE: 19 BRPM | TEMPERATURE: 98 F | DIASTOLIC BLOOD PRESSURE: 57 MMHG

## 2025-06-07 DIAGNOSIS — Z96.0 PRESENCE OF UROGENITAL IMPLANTS: Chronic | ICD-10-CM

## 2025-06-07 DIAGNOSIS — Z98.890 OTHER SPECIFIED POSTPROCEDURAL STATES: Chronic | ICD-10-CM

## 2025-06-07 LAB
ALBUMIN SERPL ELPH-MCNC: 3.4 G/DL — SIGNIFICANT CHANGE UP (ref 3.3–5)
ALP SERPL-CCNC: 78 U/L — SIGNIFICANT CHANGE UP (ref 40–120)
ALT FLD-CCNC: 15 U/L — SIGNIFICANT CHANGE UP (ref 4–41)
ANION GAP SERPL CALC-SCNC: 15 MMOL/L — HIGH (ref 7–14)
APTT BLD: 28.4 SEC — SIGNIFICANT CHANGE UP (ref 26.1–36.8)
AST SERPL-CCNC: 28 U/L — SIGNIFICANT CHANGE UP (ref 4–40)
BASOPHILS # BLD AUTO: 0.02 K/UL — SIGNIFICANT CHANGE UP (ref 0–0.2)
BASOPHILS NFR BLD AUTO: 0.2 % — SIGNIFICANT CHANGE UP (ref 0–2)
BILIRUB SERPL-MCNC: 0.4 MG/DL — SIGNIFICANT CHANGE UP (ref 0.2–1.2)
BUN SERPL-MCNC: 11 MG/DL — SIGNIFICANT CHANGE UP (ref 7–23)
CALCIUM SERPL-MCNC: 8.5 MG/DL — SIGNIFICANT CHANGE UP (ref 8.4–10.5)
CHLORIDE SERPL-SCNC: 103 MMOL/L — SIGNIFICANT CHANGE UP (ref 98–107)
CK MB BLD-MCNC: 3.3 % — HIGH (ref 0–2.5)
CK MB CFR SERPL CALC: 3.2 NG/ML — SIGNIFICANT CHANGE UP
CK SERPL-CCNC: 96 U/L — SIGNIFICANT CHANGE UP (ref 30–200)
CO2 SERPL-SCNC: 20 MMOL/L — LOW (ref 22–31)
CREAT SERPL-MCNC: 0.93 MG/DL — SIGNIFICANT CHANGE UP (ref 0.5–1.3)
EGFR: 78 ML/MIN/1.73M2 — SIGNIFICANT CHANGE UP
EGFR: 78 ML/MIN/1.73M2 — SIGNIFICANT CHANGE UP
EOSINOPHIL # BLD AUTO: 0.79 K/UL — HIGH (ref 0–0.5)
EOSINOPHIL NFR BLD AUTO: 9.4 % — HIGH (ref 0–6)
GAS PNL BLDV: SIGNIFICANT CHANGE UP
GLUCOSE SERPL-MCNC: 98 MG/DL — SIGNIFICANT CHANGE UP (ref 70–99)
HCT VFR BLD CALC: 37.7 % — LOW (ref 39–50)
HGB BLD-MCNC: 12.6 G/DL — LOW (ref 13–17)
IANC: 4.05 K/UL — SIGNIFICANT CHANGE UP (ref 1.8–7.4)
IMM GRANULOCYTES NFR BLD AUTO: 0.4 % — SIGNIFICANT CHANGE UP (ref 0–0.9)
INR BLD: 1.21 RATIO — HIGH (ref 0.85–1.16)
LIDOCAIN IGE QN: 58 U/L — SIGNIFICANT CHANGE UP (ref 7–60)
LYMPHOCYTES # BLD AUTO: 2.6 K/UL — SIGNIFICANT CHANGE UP (ref 1–3.3)
LYMPHOCYTES # BLD AUTO: 31.1 % — SIGNIFICANT CHANGE UP (ref 13–44)
MAGNESIUM SERPL-MCNC: 2.2 MG/DL — SIGNIFICANT CHANGE UP (ref 1.6–2.6)
MCHC RBC-ENTMCNC: 31.4 PG — SIGNIFICANT CHANGE UP (ref 27–34)
MCHC RBC-ENTMCNC: 33.4 G/DL — SIGNIFICANT CHANGE UP (ref 32–36)
MCV RBC AUTO: 94 FL — SIGNIFICANT CHANGE UP (ref 80–100)
MONOCYTES # BLD AUTO: 0.88 K/UL — SIGNIFICANT CHANGE UP (ref 0–0.9)
MONOCYTES NFR BLD AUTO: 10.5 % — SIGNIFICANT CHANGE UP (ref 2–14)
NEUTROPHILS # BLD AUTO: 4.05 K/UL — SIGNIFICANT CHANGE UP (ref 1.8–7.4)
NEUTROPHILS NFR BLD AUTO: 48.4 % — SIGNIFICANT CHANGE UP (ref 43–77)
NRBC # BLD AUTO: 0 K/UL — SIGNIFICANT CHANGE UP (ref 0–0)
NRBC # FLD: 0 K/UL — SIGNIFICANT CHANGE UP (ref 0–0)
NRBC BLD AUTO-RTO: 0 /100 WBCS — SIGNIFICANT CHANGE UP (ref 0–0)
PLATELET # BLD AUTO: 239 K/UL — SIGNIFICANT CHANGE UP (ref 150–400)
POTASSIUM SERPL-MCNC: 4.7 MMOL/L — SIGNIFICANT CHANGE UP (ref 3.5–5.3)
POTASSIUM SERPL-SCNC: 4.7 MMOL/L — SIGNIFICANT CHANGE UP (ref 3.5–5.3)
PROT SERPL-MCNC: 7.1 G/DL — SIGNIFICANT CHANGE UP (ref 6–8.3)
PROTHROM AB SERPL-ACNC: 14 SEC — HIGH (ref 9.9–13.4)
RBC # BLD: 4.01 M/UL — LOW (ref 4.2–5.8)
RBC # FLD: 13.5 % — SIGNIFICANT CHANGE UP (ref 10.3–14.5)
SODIUM SERPL-SCNC: 138 MMOL/L — SIGNIFICANT CHANGE UP (ref 135–145)
TROPONIN T, HIGH SENSITIVITY RESULT: 39 NG/L — SIGNIFICANT CHANGE UP
WBC # BLD: 8.37 K/UL — SIGNIFICANT CHANGE UP (ref 3.8–10.5)
WBC # FLD AUTO: 8.37 K/UL — SIGNIFICANT CHANGE UP (ref 3.8–10.5)

## 2025-06-07 PROCEDURE — 71275 CT ANGIOGRAPHY CHEST: CPT | Mod: 26

## 2025-06-07 PROCEDURE — 99285 EMERGENCY DEPT VISIT HI MDM: CPT

## 2025-06-07 PROCEDURE — 74174 CTA ABD&PLVS W/CONTRAST: CPT | Mod: 26

## 2025-06-07 PROCEDURE — 70450 CT HEAD/BRAIN W/O DYE: CPT | Mod: 26

## 2025-06-07 RX ORDER — VANCOMYCIN HCL IN 5 % DEXTROSE 1.5G/250ML
1000 PLASTIC BAG, INJECTION (ML) INTRAVENOUS ONCE
Refills: 0 | Status: COMPLETED | OUTPATIENT
Start: 2025-06-07 | End: 2025-06-07

## 2025-06-07 RX ORDER — PIPERACILLIN-TAZO-DEXTROSE,ISO 3.375G/5
3.38 IV SOLUTION, PIGGYBACK PREMIX FROZEN(ML) INTRAVENOUS ONCE
Refills: 0 | Status: COMPLETED | OUTPATIENT
Start: 2025-06-07 | End: 2025-06-07

## 2025-06-07 RX ORDER — SODIUM CHLORIDE 9 G/1000ML
1000 INJECTION, SOLUTION INTRAVENOUS ONCE
Refills: 0 | Status: COMPLETED | OUTPATIENT
Start: 2025-06-07 | End: 2025-06-07

## 2025-06-07 RX ORDER — IPRATROPIUM BROMIDE AND ALBUTEROL SULFATE .5; 2.5 MG/3ML; MG/3ML
3 SOLUTION RESPIRATORY (INHALATION) ONCE
Refills: 0 | Status: COMPLETED | OUTPATIENT
Start: 2025-06-07 | End: 2025-06-07

## 2025-06-07 RX ORDER — SALINE 7; 19 G/118ML; G/118ML
1 ENEMA RECTAL ONCE
Refills: 0 | Status: COMPLETED | OUTPATIENT
Start: 2025-06-07 | End: 2025-06-07

## 2025-06-07 RX ADMIN — SODIUM CHLORIDE 1000 MILLILITER(S): 9 INJECTION, SOLUTION INTRAVENOUS at 20:55

## 2025-06-07 RX ADMIN — IPRATROPIUM BROMIDE AND ALBUTEROL SULFATE 3 MILLILITER(S): .5; 2.5 SOLUTION RESPIRATORY (INHALATION) at 20:55

## 2025-06-07 NOTE — ED ADULT NURSE REASSESSMENT NOTE - NS ED NURSE REASSESS COMMENT FT1
Report received from PRISCILLA Valiente. Pt's at baseline mental status. Breathing unlabored in bed. Pt has no signs of pain, SOB, chest pain, dizziness, n/v, chills. NSR on telemetry. Jaki care rendered. Made comfortable in bed. Bed on lowest position; pt within visual range from staff. Pt awaiting CT scan result. Care ongoing. Report received from PRISCILLA Valiente. Pt's at baseline mental status. Breathing unlabored in bed. Pt has no signs of pain, SOB, chest pain, dizziness, n/v, chills. NSR on telemetry. Noted with moderate amount of BM. Jaki care rendered. Made comfortable in bed. Bed on lowest position; pt within visual range from staff. Pt awaiting CT scan result. Care ongoing.

## 2025-06-07 NOTE — ED PROVIDER NOTE - PHYSICAL EXAMINATION
See MDM See MDM    DD ED ATTG:   VS:  unremarkable    GEN - malaise, ill appearing.   Sleepy but arousable, mumbling and moaning, maintaining airway. Productive cough  HEAD - NC/AT     ENT - PEERL, EOMI, mucous membranes  dry, no discharge      NECK: Neck supple, non-tender without lymphadenopathy, no masses, no JVD  PULM -Transmitted BS,  symmetric breath sounds  COR -  normal heart sounds    ABD - , ND, NT, soft,  BACK - no CVA tenderness, nontender spine     EXTREMS - no edema, no deformity, warm and well perfused  (+)LUE contracture.  SKIN - no rash    or bruising      NEUROLOGIC - somnolent but arousable, face symmetric, speech mumbling, sensation nl, motor LUE, LLE 2/5 strength.  RUE/RLE appropriately moving with good strength when stimulated.

## 2025-06-07 NOTE — ED PROVIDER NOTE - PROGRESS NOTE DETAILS
Nayeli AMADOR, EM/IM PGY-4: labs nonactionable, CT with known pneumonia, will give ceftriaxone since course was not finished. On RA. Accepted to hospitalist. Did have 2 episodes of hypoxia while sleeping that self resolved. Admit to tele pulse ox

## 2025-06-07 NOTE — ED PROVIDER NOTE - CLINICAL SUMMARY MEDICAL DECISION MAKING FREE TEXT BOX
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  Patient was discharged today at 4:30 PM after being treated for pneumonia at an outside hospital, was to continue on oral antibiotics, 30 minutes after the patient got home he was more sleepy than usual, and was difficult to arouse so EMS was called and he was brought here.  Son Caden at bedside assisting with history, and daughter Winifred spoken to on the phone.  He has been complaining of chest pain secondary to cough throughout his hospitalization, additionally has been very sleepy only waking up for meals and activities but is usually easily arousable.  His left arm has been contracted for a number of months inability to move it, otherwise he is typically able to walk as A&O 2 at baseline with dementia.    Vital signs on arrival with normal heart rate and blood pressure, arrives on nonrebreather, blood pressure for EMS was 80 over palp, sat 90, fingerstick 131.  Patient is lethargic, arouses to sternal rub only, did open eyes and say that he had chest pain was oriented to self only.  Patient has a rigid contracted left extremity, spontaneously moved bilateral legs per bedside tech but I personally did not witness during exam. Right arm moves spontaneously but not able to follow commands at this time.  Abdomen is soft and nondis tended does not appear to be tender, lungs with rhonchi without overt wheezing or rales bilaterally.  At this time we will get a CT head to rule intracranial hemorrhage or other acute abnormality, will get CT aortogram due to his chest pain plus altered mental status.  This time we will check for metabolic causes of encephalopathy and obtain a CBC, CMP, coagulation factors, troponin CK CK-MB to check for signs of cardiac ischemia. Will Get a urinalysis.

## 2025-06-07 NOTE — ED PROVIDER NOTE - CHIEF COMPLAINT
Patient is a 77 y.o. male presenting for cystoscopy with PMH of  BPH, history of prostate cancer, and history abnormal urinalysis with negative culture.    SUBJECTIVE:  HPI   He presents for cystoscopy.   He has had a recent change to his cardiac medication.  He has had mildly increased nocturia since the change to his medications.    He has history of BPH. He takes tamsulosin as needed. He is satisfied with his treatment at this time.     Urinalysis had moderate blood and large leukocytes in October 2024 with a negative culture. He has experienced urgency, that has now resolved. He denies hematuria or dysuria today.     He has history of prostate cancer treated with brachytherapy in 2014. His last PSA in January 2025 was undetectable. His levels in November 2023, November 2022 and November 2021 were also 0.1.    He has an allergie to penicillin.    Past Medical History:   Diagnosis Date    Personal history of malignant neoplasm of prostate     History of malignant neoplasm of prostate    Personal history of other diseases of the circulatory system     History of hypertension      Past Surgical History:   Procedure Laterality Date    OTHER SURGICAL HISTORY  04/12/2022    Prostate surgery    OTHER SURGICAL HISTORY  04/12/2022    Hernia repair      No family history on file.   Social History     Socioeconomic History    Marital status:    Tobacco Use    Smoking status: Former     Types: Cigarettes    Smokeless tobacco: Never     Social Drivers of Health     Financial Resource Strain: Patient Declined (4/9/2025)    Received from [facility]    Overall Financial Resource Strain (CARDIA)     Difficulty of Paying Living Expenses: Patient declined   Food Insecurity: Patient Declined (4/9/2025)    Received from [facility]    Hunger Vital Sign     Worried About Running Out of Food in the Last Year: Patient declined     Ran Out of Food in the Last Year: Patient declined   Transportation Needs: Patient  Declined (4/9/2025)    Received from [facility]    PRAPARE - Transportation     Lack of Transportation (Medical): Patient declined     Lack of Transportation (Non-Medical): Patient declined   Physical Activity: Patient Declined (4/9/2025)    Received from [facility]    Exercise Vital Sign     Days of Exercise per Week: Patient declined     Minutes of Exercise per Session: Patient declined   Stress: Patient Declined (4/9/2025)    Received from [facility]    Citizen of [place] of Occupational Health - Occupational Stress Questionnaire     Feeling of Stress : Patient declined   Social Connections: Unknown (4/9/2025)    Received from [facility]    Social Connection and Isolation Panel [NHANES]     Frequency of Communication with Friends and Family: Patient declined     Attends Nondenominational Services: Patient declined     Active Member of Clubs or Organizations: Patient declined     Attends Club or Organization Meetings: Patient declined   Housing Stability: Unknown (4/9/2025)    Received from [facility]    Housing Stability Vital Sign     Unable to Pay for Housing in the Last Year: Patient declined        Review of Systems   Constitutional: denies any unintentional weight loss or change in strength.  Integumentary: denies any rashes or pruritus.  Eyes: denies any double vision or eye pain.  Ear/Nose/Mouth/Throat: denies any nosebleeds or gum bleeds.  Cardiovascular: denies any chest pain or syncope.  Respiratory: denies hemoptysis.  Gastrointestinal: denies nausea or vomiting.  Musculoskeletal: denies muscle cramping or weakness.  Neurologic: denies convulsions or seizures.  Hematologic/Lymphatic: denies bleeding tendencies.  Endocrine: denies heat/cold intolerance.  All other systems have been reviewed and are negative unless otherwise noted in the HPI.    OBJECTIVE:  Visit Vitals  /61 (BP Location: Left arm, Patient Position: Sitting, BP Cuff Size: Adult)   Pulse 73   Temp 36.5 °C (97.7  °F)     Physical Exam   Constitutional: No obvious distress.  Eyes: Non-injected conjunctiva, sclera clear, EOMI.  Ears/Nose/Mouth/Throat: No obvious drainage per ears or nose.  Cardiovascular: Extremities are warm and well perfused. No edema, cyanosis or pallor.  Respiratory: No audible wheezing/stridor; respirations do not appear labored.  Gastrointestinal: Abdomen soft, not distended.  Musculoskeletal: Normal ROM of extremities.  Skin: No obvious rashes or open sores.  Neurologic: Alert and oriented, CN 2-12 grossly intact.  Psychiatric: Answers questions appropriately with normal affect.  Hematologic/Lymphatic/Immunologic: No obvious bruises or sites of spontaneous bleeding.  Genitourinary: No CVA tenderness, bladder not palpable. No scrotal masses or tenderness. No penile lesions.    Labs:  Lab Results   Component Value Date    GLUCOSE 117 (H) 01/17/2025    CALCIUM 9.3 01/17/2025     (L) 01/17/2025    K 4.2 01/17/2025    CO2 28 01/17/2025     01/17/2025    BUN 20 01/17/2025    CREATININE 1.15 01/17/2025    EGFR 66 01/17/2025    PSA <0.1 01/06/2025    URINECULTURE No significant growth 10/07/2024     IMAGING:  === 01/23/25 ===  CT UROGRAPHY WITH 3D VOLUME RENDERED IMAGING  - Impression -  NO ACUTE OR CONTRIBUTORY ABNORMALITY IN THE URINARY TRACT WITHIN THE  LIMITATIONS OF THIS EXAM    NO STONE ANYWHERE IN THE URINARY TRACT    NO HYDROURETERONEPHROSIS ON EITHER SIDE    NO EVIDENCE OF ACUTE INFLAMMATION ANYWHERE IN THE URINARY TRACT    NO SOLID RENAL PARENCHYMAL MASS    NO EVIDENCE OF UROTHELIAL LESION IN THE OPACIFIED URINARY TRACT,  NOTING THAT THE FOLLOWING WAS / WERE NOT WELL ENOUGH OPACIFIED WITH  EXCRETED CONTRAST DURING THE EXCRETORY PHASE OF TODAY'S EXAM TO  EVALUATE DIRECTLY: THE NONDEPENDENT ONE HALF OF THE URINARY BLADDER;  THE DISTAL ONE HALF OF THE RIGHT URETER.  OF THE PORTIONS OF THE  RIGHT URETER NOT WELL ENOUGH OPACIFIED TO EVALUATE DIRECTLY, NONE WAS  PARTICULARLY EXPANSILE TO SUGGEST  AND UNDERLYING SUBSTANTIAL SIZED  OCCULT UROTHELIAL LESION    NO VARIANT ANATOMY SUCH AS URACHAL REMNANT OR DUPLICATED/ECTOPIC  URETERS    NO ACUTE UNANTICIPATED PROCESS IN THE ABDOMEN OR PELVIS OUTSIDE THE  URINARY TRACT    PROCEDURES:  Cystoscopy    Date/Time: 4/21/2025 11:52 AM    Performed by: Joe Garsia MD  Authorized by: Joe Garsia MD    Procedure - Bladder Cystoscopy:     Procedure details: cystoscopy    Post-procedure:     Patient tolerance: Patient tolerated the procedure well with no immediate complications      Comments:      Risks of infection and bleeding reviewed.  Urethra: no urethral lesions.  Prostate: no prostatic obstructing.   Bladder: no bladder tumors or stones. There is a small diverticulum near the dome of the bladder and mild trabeculation.      ASSESSMENT/PLAN:  Problem List Items Addressed This Visit       Microscopic hematuria - Primary     Other Visit Diagnoses         Urinary symptom or sign        Relevant Orders    POCT UA Automated manually resulted (Completed)          Urinalysis had moderate blood and large leukocytes in October 2024 with a negative culture. Urine was negative today (4/21/25). Cystoscopy was negative for tumors or stones (4/21/25). There was small diverticulum and mild trabeculation. Keflex given for prophylaxis.   UA was negative today.    He has history of prostate cancer treated with brachytherapy in 2014.    PSA summary  02/04/25 <0.10  01/06/25 <0.1  He will follow up in 6 months with a PSA.    He has history of BPH, treated with tamsulosin.   We discussed his BPH, and small prostate on cystoscopy.  He uses tamsulosin only prn for a weak stream.    All questions were answered to the patient’s satisfaction.  Patient agrees with the plan and wishes to proceed.  Follow-up will be scheduled appropriately.     Scribe Attestation  By signing my name below, FELICIANO, Qamar Li,   attest that this documentation has been prepared under the direction  and in the presence of Joe Garsia MD.      The patient is a 89y Male complaining of hypotension.

## 2025-06-07 NOTE — ED ADULT NURSE NOTE - OBJECTIVE STATEMENT
PRISCILLA RN: Pt received to LINA. Responsive to verbal and tactile stimuli. Son at the bedside reports pt discharged from [facility] today for DX PNA. Reports coughing and complaints of chest pain which prompted his return to the ER. Son states he c/o chest pain with associated cough. Arrived with o2 on NRB 10L, saturating 100%. PMH: HTN, dementia, CVA, afib of eliquis. Son denies changed in mental status, bedbound at baseline. Jaki care provided, skin intact. 20g ayan placed right forearm and labs sent. Pending CT scan and dispo. Safety maintained. Report given to primary RN Jessica.

## 2025-06-07 NOTE — ED ADULT NURSE REASSESSMENT NOTE - NS ED NURSE REASSESS COMMENT FT1
Pt became hypoxic to 60% on room air. Placed on 4L NC with immediate improvement to 100%. MD Campo made aware. Pt remains on continuos pulse oxygen monitoring.

## 2025-06-07 NOTE — ED PROVIDER NOTE - OBJECTIVE STATEMENT
See MDM See NICHOLE    DD ED ATTM p/w BIBEMS as notification for chest pain, decrease in mental status, low blood pressure.  Pt not able to offer much history, moaning at present.  Hx obtained from family at bedside and on the phone.  Recent admission to OSH for pna, they haven't picked up the Po abx yet.  Was treated for aspiration pna, family say he's been having the chest pain since the pneumonia diagnosis.  Apparently at 5pm pt was unresponsive.  No report of fever.  On scene BP was 80/palp, HR was 95 and sat was 90%; pt was placed on NRB.  FS was 131.  Home meds atorvastatin, eliquis, "seizure med"; unknown antibiotics.  Full code.  On exam somnolent but arousable, moaning, mumbling.  Maintaining airway but productive cough.  Dry MM.  Pallor.  Lungs occ crackles bilat.  Nontender abd.  L arm contracture and weakness.  LLE weakness.  Not following commands.  R arm/RLE  moving well.  Impression chest pain, decreased mentation, and hypotension, now improved.  Pt per family has had L side arm and leg weakness for a couple of months.  Plan check labs, give fluids, nebs for cough, check CT CAP eval for dissection.  Check CTH eval for ICH.  Likely admit.    Found to have RLL pna - was recently admitted for this however given hypoxia, low blood pressure, AMS will rx abx.    Also noted to have stercoral colitis, rx abx as above, rx fleet.  Nontender abd, low suspicion for perf.  Admit.

## 2025-06-07 NOTE — ED PROVIDER NOTE - ATTENDING CONTRIBUTION TO CARE
89M p/w BIBEMS as notification for chest pain, decrease in mental status, low blood pressure.  Pt not able to offer much history, moaning at present.  Hx obtained from family at bedside and on the phone.  Recent admission to OSH for pna, they haven't picked up the Po abx yet.  Was treated for aspiration pna, family say he's been having the chest pain since the pneumonia diagnosis.  Apparently at 5pm pt was unresponsive.  No report of fever.  On scene BP was 80/palp, HR was 95 and sat was 90%; pt was placed on NRB.  FS was 131.  Home meds atorvastatin, eliquis, "seizure med"; unknown antibiotics.  Full code.  On exam somnolent but arousable, moaning, mumbling.  Maintaining airway but productive cough.  Dry MM.  Pallor.  Lungs occ crackles bilat.  Nontender abd.  L arm contracture and weakness.  Not following commands.  R arm moving well.  Impression chest pain, decreased mentation, and hypotension, now improved.  Plan check labs, give fluids, nebs for cough, check CT CAP eval for dissection.  Check CTH eval for ICH.  Likely admit.    NOTE INCOMPLETE 89M p/w BIBEMS as notification for chest pain, decrease in mental status, low blood pressure.  Pt not able to offer much history, moaning at present.  Hx obtained from family at bedside and on the phone.  Recent admission to OSH for pna, they haven't picked up the Po abx yet.  Was treated for aspiration pna, family say he's been having the chest pain since the pneumonia diagnosis.  Apparently at 5pm pt was unresponsive.  No report of fever.  On scene BP was 80/palp, HR was 95 and sat was 90%; pt was placed on NRB.  FS was 131.  Home meds atorvastatin, eliquis, "seizure med"; unknown antibiotics.  Full code.  On exam somnolent but arousable, moaning, mumbling.  Maintaining airway but productive cough.  Dry MM.  Pallor.  Lungs occ crackles bilat.  Nontender abd.  L arm contracture and weakness.  LLE weakness.  Not following commands.  R arm/RLE  moving well.  Impression chest pain, decreased mentation, and hypotension, now improved.  Pt per family has had L side arm and leg weakness for a couple of months.  Plan check labs, give fluids, nebs for cough, check CT CAP eval for dissection.  Check CTH eval for ICH.  Likely admit.    Found to have RLL pna - was recently admitted for this however given hypoxia, low blood pressure, AMS will rx abx.    Also noted to have stercoral colitis, rx abx as above, rx fleet.  Nontender abd, low suspicion for perf.  Admit.  VS:  unremarkable    GEN - malaise, ill appearing.   Sleepy but arousable, mumbling and moaning, maintaining airway. Productive cough  HEAD - NC/AT     ENT - PEERL, EOMI, mucous membranes  dry, no discharge      NECK: Neck supple, non-tender without lymphadenopathy, no masses, no JVD  PULM -Transmitted BS,  symmetric breath sounds  COR -  normal heart sounds    ABD - , ND, NT, soft,  BACK - no CVA tenderness, nontender spine     EXTREMS - no edema, no deformity, warm and well perfused  (+)LUE contracture.  SKIN - no rash    or bruising      NEUROLOGIC - somnolent but arousable, face symmetric, speech mumbling, sensation nl, motor LUE, LLE 2/5 strength.  RUE/RLE appropriately moving with good strength when stimulated.

## 2025-06-08 DIAGNOSIS — I95.9 HYPOTENSION, UNSPECIFIED: ICD-10-CM

## 2025-06-08 DIAGNOSIS — R41.82 ALTERED MENTAL STATUS, UNSPECIFIED: ICD-10-CM

## 2025-06-08 DIAGNOSIS — I48.0 PAROXYSMAL ATRIAL FIBRILLATION: ICD-10-CM

## 2025-06-08 DIAGNOSIS — Z29.9 ENCOUNTER FOR PROPHYLACTIC MEASURES, UNSPECIFIED: ICD-10-CM

## 2025-06-08 DIAGNOSIS — G93.41 METABOLIC ENCEPHALOPATHY: ICD-10-CM

## 2025-06-08 DIAGNOSIS — R41.89 OTHER SYMPTOMS AND SIGNS INVOLVING COGNITIVE FUNCTIONS AND AWARENESS: ICD-10-CM

## 2025-06-08 DIAGNOSIS — R13.10 DYSPHAGIA, UNSPECIFIED: ICD-10-CM

## 2025-06-08 DIAGNOSIS — G40.909 EPILEPSY, UNSPECIFIED, NOT INTRACTABLE, WITHOUT STATUS EPILEPTICUS: ICD-10-CM

## 2025-06-08 DIAGNOSIS — Z79.899 OTHER LONG TERM (CURRENT) DRUG THERAPY: ICD-10-CM

## 2025-06-08 DIAGNOSIS — J18.9 PNEUMONIA, UNSPECIFIED ORGANISM: ICD-10-CM

## 2025-06-08 LAB
APPEARANCE UR: CLEAR — SIGNIFICANT CHANGE UP
BILIRUB UR-MCNC: NEGATIVE — SIGNIFICANT CHANGE UP
COLOR SPEC: YELLOW — SIGNIFICANT CHANGE UP
DIFF PNL FLD: NEGATIVE — SIGNIFICANT CHANGE UP
GLUCOSE UR QL: NEGATIVE MG/DL — SIGNIFICANT CHANGE UP
KETONES UR QL: NEGATIVE MG/DL — SIGNIFICANT CHANGE UP
LEUKOCYTE ESTERASE UR-ACNC: NEGATIVE — SIGNIFICANT CHANGE UP
NITRITE UR-MCNC: NEGATIVE — SIGNIFICANT CHANGE UP
PH UR: 7.5 — SIGNIFICANT CHANGE UP (ref 5–8)
PROT UR-MCNC: NEGATIVE MG/DL — SIGNIFICANT CHANGE UP
SP GR SPEC: 1.05 — HIGH (ref 1–1.03)
TROPONIN T, HIGH SENSITIVITY RESULT: 38 NG/L — SIGNIFICANT CHANGE UP
UROBILINOGEN FLD QL: 1 MG/DL — SIGNIFICANT CHANGE UP (ref 0.2–1)

## 2025-06-08 PROCEDURE — 99223 1ST HOSP IP/OBS HIGH 75: CPT

## 2025-06-08 RX ORDER — CEFTRIAXONE 500 MG/1
1000 INJECTION, POWDER, FOR SOLUTION INTRAMUSCULAR; INTRAVENOUS ONCE
Refills: 0 | Status: COMPLETED | OUTPATIENT
Start: 2025-06-08 | End: 2025-06-08

## 2025-06-08 RX ORDER — CEFTRIAXONE 500 MG/1
1000 INJECTION, POWDER, FOR SOLUTION INTRAMUSCULAR; INTRAVENOUS
Refills: 0 | Status: DISCONTINUED | OUTPATIENT
Start: 2025-06-09 | End: 2025-06-10

## 2025-06-08 RX ORDER — APIXABAN 2.5 MG/1
2.5 TABLET, FILM COATED ORAL
Refills: 0 | Status: DISCONTINUED | OUTPATIENT
Start: 2025-06-08 | End: 2025-06-08

## 2025-06-08 RX ORDER — LEVETIRACETAM 10 MG/ML
500 INJECTION, SOLUTION INTRAVENOUS
Refills: 0 | Status: DISCONTINUED | OUTPATIENT
Start: 2025-06-08 | End: 2025-06-08

## 2025-06-08 RX ORDER — LEVETIRACETAM 10 MG/ML
500 INJECTION, SOLUTION INTRAVENOUS EVERY 12 HOURS
Refills: 0 | Status: DISCONTINUED | OUTPATIENT
Start: 2025-06-08 | End: 2025-06-15

## 2025-06-08 RX ORDER — ATORVASTATIN CALCIUM 80 MG/1
40 TABLET, FILM COATED ORAL AT BEDTIME
Refills: 0 | Status: DISCONTINUED | OUTPATIENT
Start: 2025-06-08 | End: 2025-06-18

## 2025-06-08 RX ORDER — ENOXAPARIN SODIUM 100 MG/ML
50 INJECTION SUBCUTANEOUS EVERY 12 HOURS
Refills: 0 | Status: DISCONTINUED | OUTPATIENT
Start: 2025-06-08 | End: 2025-06-14

## 2025-06-08 RX ORDER — LEVETIRACETAM 10 MG/ML
1 INJECTION, SOLUTION INTRAVENOUS
Refills: 0 | DISCHARGE

## 2025-06-08 RX ADMIN — ENOXAPARIN SODIUM 50 MILLIGRAM(S): 100 INJECTION SUBCUTANEOUS at 06:52

## 2025-06-08 RX ADMIN — LEVETIRACETAM 500 MILLIGRAM(S): 10 INJECTION, SOLUTION INTRAVENOUS at 06:52

## 2025-06-08 RX ADMIN — LEVETIRACETAM 500 MILLIGRAM(S): 10 INJECTION, SOLUTION INTRAVENOUS at 17:39

## 2025-06-08 RX ADMIN — ENOXAPARIN SODIUM 50 MILLIGRAM(S): 100 INJECTION SUBCUTANEOUS at 17:39

## 2025-06-08 RX ADMIN — Medication 75 MILLILITER(S): at 06:53

## 2025-06-08 RX ADMIN — Medication 200 GRAM(S): at 00:01

## 2025-06-08 RX ADMIN — Medication 250 MILLIGRAM(S): at 01:00

## 2025-06-08 RX ADMIN — CEFTRIAXONE 100 MILLIGRAM(S): 500 INJECTION, POWDER, FOR SOLUTION INTRAMUSCULAR; INTRAVENOUS at 04:09

## 2025-06-08 RX ADMIN — ATORVASTATIN CALCIUM 40 MILLIGRAM(S): 80 TABLET, FILM COATED ORAL at 21:12

## 2025-06-08 RX ADMIN — Medication 40 MILLIGRAM(S): at 11:27

## 2025-06-08 RX ADMIN — Medication 75 MILLILITER(S): at 04:12

## 2025-06-08 RX ADMIN — SALINE 1 ENEMA: 7; 19 ENEMA RECTAL at 01:00

## 2025-06-08 NOTE — SWALLOW BEDSIDE ASSESSMENT ADULT - SWALLOW EVAL: RECOMMENDED FEEDING/EATING TECHNIQUES
3.) Feeding Guidelines: Upright position with PO and 20-30 min after, Allow for swallow between intakes, provide small bites/small sips of liquids.

## 2025-06-08 NOTE — SWALLOW BEDSIDE ASSESSMENT ADULT - SWALLOW EVAL: DIAGNOSIS
Patient presents with Mild Oral stage, marked by reduced labial seal for utensil stripping/cup sips, adequate bolus manipulation and adequate anterior posterior transfer for Puree/Moderately Thick Liquids/Mildly Thick Liquids. Patient presents with Moderate pharyngeal stage, marked by swallow initiation with hyolaryngeal elevation/excursion via digital palpation. Wet vocal quality observed for Mildly Thick Liquids. This was not reduplicated for Puree/Moderately Thick Liquids.

## 2025-06-08 NOTE — H&P ADULT - HISTORY OF PRESENT ILLNESS
89M with history of Parkinson's (AOx1-2? at baseline), Afib on Eliquis, prior PE, prior CVA, seizure disorder, and prostate cancer s/p remote resection BIBEMS for unresponsiveness and hypotension at home. Was unable to reach family overnight, history per ED. Pt was recently admitted at an OSH for suspected aspiration pneumonia, discharged home just yesterday around 4:30PM (6/7). Of note, pt did not complete a full inpatient course, was discharged on oral antibiotics but family did not  yet. Approximately 30 minutes after pt got home he was noted by family to be very lethargic and barely responsive. They called EMS who noted him to be hypotensive with SBP to 80s in field.     ED Course:  Pt arrived here on NRB mask, brought to trauma room, was noted to be lethargic/arousable to sternal rub and reporting chest pain. VS largely unremarkable. Subsequently underwent emergent CTH and CTA C/A/P. Evaluation limtied due to motion artifact but showed known R lung base pneumonia and markedly distended fecal filled rectum with potential stercoral colitis. No evidence of acute intracranial pathology or aortic dissection. Received zosyn, vancomycin, fleet enema, duoneb, and 1L LR bolus. While in ED, had couple of episodes of hypoxia that reportedly self resolved.    89M with history of Parkinson's (AOx1-2? at baseline), Afib on Eliquis, prior PE, prior CVA, seizure disorder, and prostate cancer s/p remote resection BIBEMS for unresponsiveness and hypotension at home. Was unable to reach family overnight, history per ED. Pt was recently admitted at an OSH for suspected aspiration pneumonia, discharged home just yesterday around 4:30PM (6/7). Of note, pt did not complete a full inpatient course, was discharged on oral antibiotics but family did not  yet. Approximately 30 minutes after pt got home he was noted by family to be very lethargic and barely responsive. They called EMS who noted him to be hypotensive with SBP to 80s in field.     ED Course:  Pt arrived here on NRB mask, brought to trauma room, was noted to be lethargic/arousable to sternal rub and reporting chest pain. VS largely unremarkable. Subsequently underwent emergent CTH and CTA C/A/P. Evaluation limtied due to motion artifact but showed known R lung base pneumonia and markedly distended fecal filled rectum with potential stercoral colitis. No evidence of acute intracranial pathology or aortic dissection. Received zosyn, vancomycin, fleet enema, duoneb, and 1L LR bolus. While in ED, had couple of episodes of hypoxia that reportedly self resolved. Pt admitted to medicine for further management.     Upon my evaluation, pt awake, AAOx1, conversive though confused. Denied any chest pain or shortness of breath. Coughing.

## 2025-06-08 NOTE — ED ADULT NURSE REASSESSMENT NOTE - NS ED NURSE REASSESS COMMENT FT1
Pt's at baseline mental status. Breathing unlabored in bed. Pt denies pain, SOB, chest pain, dizziness, n/v, chills. Bed on lowest position, call bell within reach. Pt awaiting disposition.

## 2025-06-08 NOTE — H&P ADULT - PROBLEM SELECTOR PLAN 1
had episode of unresponsiveness at home found to be hypotensive by EMS, was normotensive here, received IVF in ED, unclear if received IVF by EMS. unclear etiology but resolved now, rest of VS also unremarkable, lactate negative, labs otherwise unremarkable. ?vasovagal   - continue to monitor

## 2025-06-08 NOTE — PHYSICAL THERAPY INITIAL EVALUATION ADULT - PERTINENT HX OF CURRENT PROBLEM, REHAB EVAL
89 year old male with history of Parkinson's (nonverbal at baseline), Afib on Eliquis, prior PE, prior CVA, seizure disorder, prostate cancer s/p remote resection with recent admission at OSH for aspiration pneumonia discharge 6/7 BIBEMS for unresponsiveness and hypotension at home, now awake and alert with encephalopathy and intermittent episodes of hypoxia.

## 2025-06-08 NOTE — PHYSICAL THERAPY INITIAL EVALUATION ADULT - ADDITIONAL COMMENTS
Patient is poor historian and limited information available in EMR. Please follow up with care coordination note when available.     Patient left semi-supine in NAD, +bed alarm, +lines/tubes intact, +call bell. RN present.

## 2025-06-08 NOTE — H&P ADULT - NSVTERISKREFERASSESS_GEN_ALL_CORE
neologisms, clanging Refer to the Assessment tab to view/cancel completed assessment. b/l UE resting tremor, R>L Mild cogwheeling of wrists and elbows, L>R

## 2025-06-08 NOTE — SWALLOW BEDSIDE ASSESSMENT ADULT - ASR SWALLOW RECOMMEND DIAG
2.) Consider Cinesophagram for objective assessment of swallow function given CT Chest concerning for "Patchy right lung base opacities could be due to pneumonia".

## 2025-06-08 NOTE — H&P ADULT - ASSESSMENT
89M with history of Parkinson's (nonverbal at baseline), Afib on Eliquis, prior PE, prior CVA, seizure disorder, prostate cancer s/p remote resection with recent admission at OSH for aspiration pneumonia discharge 6/7 BIBEMS for unresponsiveness and hypotension at home, now awake and alert with encephalopathy and intermittent episodes of hypoxia.

## 2025-06-08 NOTE — PHYSICAL THERAPY INITIAL EVALUATION ADULT - GENERAL OBSERVATIONS, REHAB EVAL
Patient received semi-supine in NAD, +bed alarm, +IV, lethargic. Patient cleared to participate by QUIRINO Mcghee.

## 2025-06-08 NOTE — ED ADULT NURSE REASSESSMENT NOTE - NS ED NURSE REASSESS COMMENT FT1
BREAK RN: Pt at baseline mental status, breathing even and unlabored in bed. Pt denies chest pain, SOB, headache, dizziness, blurry vision, N/V and chills at this time. Pt endorses no complaints currently. Bed in lowest position, call bell within reach. All safety precautions in place. Awaiting antibiotics from pharmacy and bed placement at this time.

## 2025-06-08 NOTE — H&P ADULT - NSHPPHYSICALEXAM_GEN_ALL_CORE
VITALS:   Vital Signs Last 24 Hrs  T(C): 36.6 (08 Jun 2025 03:40), Max: 36.7 (07 Jun 2025 23:00)  T(F): 97.9 (08 Jun 2025 03:40), Max: 98 (07 Jun 2025 23:00)  HR: 73 (08 Jun 2025 03:40) (70 - 89)  BP: 142/76 (08 Jun 2025 03:40) (110/57 - 159/80)  BP(mean): --  RR: 14 (08 Jun 2025 03:40) (14 - 19)  SpO2: 100% (08 Jun 2025 03:40) (98% - 100%)    Parameters below as of 08 Jun 2025 03:40  Patient On (Oxygen Delivery Method): room air      I&O's Summary    CAPILLARY BLOOD GLUCOSE          Physical Exam:  General: NAD, non-toxic appearing   HEENT: PERRL, no scleral icterus  CV: RRR, normal S1 and S2  Lungs: normal respiratory effort on RA, CTAB  Abd: soft, nontender, nondistended  Ext: contracted ARNULFO, no PE edema, appears well perfused   Psych: AAOx1 (name), calm, conversive though confused  Neuro: grossly non-focal, moving all extremities spontaneously   Skin: no rashes or lesions

## 2025-06-08 NOTE — H&P ADULT - NSHPLABSRESULTS_GEN_ALL_CORE
.  LABS:                         12.6   8.37  )-----------( 239      ( 2025 20:30 )             37.7         138  |  103  |  11  ----------------------------<  98  4.7   |  20[L]  |  0.93    Ca    8.5      2025 20:30  Mg     2.20         TPro  7.1  /  Alb  3.4  /  TBili  0.4  /  DBili  x   /  AST  28  /  ALT  15  /  AlkPhos  78  06-07    PT/INR - ( 2025 20:46 )   PT: 14.0 sec;   INR: 1.21 ratio      PTT - ( 2025 20:46 )  PTT:28.4 sec  Urinalysis Basic - ( 2025 01:04 )    Color: Yellow / Appearance: Clear / S.051 / pH: x  Gluc: x / Ketone: x  / Bili: Negative / Urobili: 1.0 mg/dL   Blood: x / Protein: Negative mg/dL / Nitrite: Negative   Leuk Esterase: Negative / RBC: x / WBC x   Sq Epi: x / Non Sq Epi: x / Bacteria: x      RADIOLOGY, EKG & ADDITIONAL TESTS: Reviewed.

## 2025-06-08 NOTE — H&P ADULT - PROBLEM SELECTOR PLAN 2
reportedly had episodes of hypoxia in the ED, noted to have good waveform, spontaneously resolved, now breathing comfortably and saturating well on room air. s/p some antibiotics at outside hospital but incomplete course  - CTA chest with patchy opacities over R lung base- PNA vs atelectasis   - continue CTX

## 2025-06-08 NOTE — H&P ADULT - TIME-BASED
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  he washed and applied antibacterial ointment before coming to ER.  last tetanus shot was over 10 yrs ago. 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  he washed and applied antibacterial ointment before coming to ER.  bleeding was not heavy, and he controlled it with direct pressure.  last tetanus shot was over 10 yrs ago.

## 2025-06-08 NOTE — H&P ADULT - PROBLEM SELECTOR PLAN 6
Diet: pending dysphagia screen  DVT ppx: home full anticoagulation as above   Dispo: pending clinical course, PT eval

## 2025-06-08 NOTE — SWALLOW BEDSIDE ASSESSMENT ADULT - COMMENTS
6/8 H&P: "89M with history of Parkinson's (AOx1-2? at baseline), Afib on Eliquis, prior PE, prior CVA, seizure disorder, and prostate cancer s/p remote resection BIBEMS for unresponsiveness and hypotension at home. Was unable to reach family overnight, history per ED. Pt was recently admitted at an OSH for suspected aspiration pneumonia, discharged home just yesterday around 4:30PM (6/7). Of note, pt did not complete a full inpatient course, was discharged on oral antibiotics but family did not  yet. Approximately 30 minutes after pt got home he was noted by family to be very lethargic and barely responsive. They called EMS who noted him to be hypotensive with SBP to 80s in field."    6/7 CT Chest: "IMPRESSION: Evaluation limited due to motion artifact. No intramural hematoma. No aortic aneurysm or evidence of dissection. Patchy right lung base opacities could be due to pneumonia or atelectasis."    Patient is known to this service from previous admissions. Clinical Swallow Evaluation completed 7/8/24 with recommendations made for Puree with Mildly Thick Liquids (See consult for details).     Patient received in bed, asleep. Roused to verbal/tactile cues. Congested cough observed at baseline. Repositioned upright for PO. Patient able to follow some simple directives. Orally receptive to PO trials.

## 2025-06-08 NOTE — H&P ADULT - PROBLEM SELECTOR PLAN 5
pt unable to provide medication list, was unable to reach family overnight. Medred was performed based on medication list from OSH note found on HIE   - f/u with family in AM

## 2025-06-08 NOTE — PHYSICAL THERAPY INITIAL EVALUATION ADULT - NSPTDISCHREC_GEN_A_CORE
Patient not following commands at this time, A&O x 0. Patient will not be placed on skilled PT program at this time. Please reconsult should patient's functional and mental status change.

## 2025-06-08 NOTE — SWALLOW BEDSIDE ASSESSMENT ADULT - ADDITIONAL RECOMMENDATIONS
5.) This service to follow up as schedule permits.   6.) Medical team advised to reconsult this service should there be a change in patient status.

## 2025-06-09 PROCEDURE — 74230 X-RAY XM SWLNG FUNCJ C+: CPT | Mod: 26

## 2025-06-09 PROCEDURE — 99233 SBSQ HOSP IP/OBS HIGH 50: CPT

## 2025-06-09 PROCEDURE — 95720 EEG PHY/QHP EA INCR W/VEEG: CPT

## 2025-06-09 RX ORDER — POLYETHYLENE GLYCOL 3350 17 G/17G
17 POWDER, FOR SOLUTION ORAL
Refills: 0 | Status: DISCONTINUED | OUTPATIENT
Start: 2025-06-09 | End: 2025-06-18

## 2025-06-09 RX ORDER — SENNA 187 MG
2 TABLET ORAL AT BEDTIME
Refills: 0 | Status: DISCONTINUED | OUTPATIENT
Start: 2025-06-09 | End: 2025-06-18

## 2025-06-09 RX ADMIN — CEFTRIAXONE 100 MILLIGRAM(S): 500 INJECTION, POWDER, FOR SOLUTION INTRAMUSCULAR; INTRAVENOUS at 05:14

## 2025-06-09 RX ADMIN — ENOXAPARIN SODIUM 50 MILLIGRAM(S): 100 INJECTION SUBCUTANEOUS at 18:30

## 2025-06-09 RX ADMIN — Medication 2 TABLET(S): at 22:39

## 2025-06-09 RX ADMIN — LEVETIRACETAM 500 MILLIGRAM(S): 10 INJECTION, SOLUTION INTRAVENOUS at 05:14

## 2025-06-09 RX ADMIN — Medication 40 MILLIGRAM(S): at 18:30

## 2025-06-09 RX ADMIN — ATORVASTATIN CALCIUM 40 MILLIGRAM(S): 80 TABLET, FILM COATED ORAL at 22:39

## 2025-06-09 RX ADMIN — LEVETIRACETAM 500 MILLIGRAM(S): 10 INJECTION, SOLUTION INTRAVENOUS at 18:31

## 2025-06-09 RX ADMIN — ENOXAPARIN SODIUM 50 MILLIGRAM(S): 100 INJECTION SUBCUTANEOUS at 05:15

## 2025-06-09 NOTE — SWALLOW VFSS/MBS ASSESSMENT ADULT - COMMENTS
6/8 Hospitalist Note: "89M with history of Parkinson's (nonverbal at baseline), Afib on Eliquis, prior PE, prior CVA, seizure disorder, prostate cancer s/p remote resection with recent admission at OSH for aspiration pneumonia discharge 6/7 BIBEMS for unresponsiveness and hypotension at home, now awake and alert with encephalopathy and intermittent episodes of hypoxia."    6/7 CT Chest: "IMPRESSION: Evaluation limited due to motion artifact. No intramural hematoma. No aortic aneurysm or evidence of dissection. Patchy right lung base opacities could be due to pneumonia or atelectasis."    OF NOTE 1: Patient is known to this service from previous admissions. Clinical Swallow Evaluation completed 7/8/24 with recommendations made for Puree with Mildly Thick Liquids (See consult for details).     OF NOTE 2: Patient is known to this service from this admission. Clinical Swallow Evaluation completed yesterday 6/8 with recommendations for Puree with Moderately Thick Liquids and a Cinesophagram (See consult for details).    Patient received in Radiology Suite via stretcher for Cinesophagram. Patient transferred to specialized seating unit with lateral view projection. Patient able to follow some simple directives throughout. Orally receptive to PO trials for purpose of participation in Cinesophagram.

## 2025-06-09 NOTE — SWALLOW VFSS/MBS ASSESSMENT ADULT - RECOMMENDED FEEDING/EATING TECHNIQUES
2.) FEEDING GUIDELINES: Upright position with PO and 20-30 min after, feed slowly (allow for swallow between intakes), provide small bites/small sips of liquids.

## 2025-06-09 NOTE — SWALLOW VFSS/MBS ASSESSMENT ADULT - PHARYNGEAL PHASE COMMENTS
Reduced laryngeal elevation, reduced base of tongue retraction, and reduced pharyngeal constriction. Mild-moderate pharyngeal clearance deficits post primary swallow, located primarily in vallecular and pyriform sinuses. Reduced laryngeal elevation, reduced base of tongue retraction, and reduced pharyngeal constriction. Mild-moderate pharyngeal clearance deficits observed post primary swallow, located primarily in vallecular and pyriform sinuses. reduced laryngeal elevation, reduced base of tongue retraction, and reduced pharyngeal constriction Delayed swallow initiation, reduced laryngeal elevation, reduced base of tongue retraction, and reduced pharyngeal constriction.

## 2025-06-09 NOTE — SWALLOW VFSS/MBS ASSESSMENT ADULT - ADDITIONAL RECOMMENDATIONS
4.) This service to follow up as schedule permits for diet tolerance.   5.) Medical Team advised to reconsult this service should there be a change in patient status.   6.) Patient may benefit from dysphagia therapy pending discharge plans (Homecare vs. Rehab vs. Outpatient at [facility]- [phone]).

## 2025-06-09 NOTE — SWALLOW VFSS/MBS ASSESSMENT ADULT - ORAL PREP COMMENTS
reduced labial seal for utensil stripping/cup sips, incomplete tongue to palate seal, resulting in premature loss reduced labial seal for utensil stripping/cup sips

## 2025-06-09 NOTE — SWALLOW VFSS/MBS ASSESSMENT ADULT - DIAGNOSTIC IMPRESSIONS
Patient presents with Mild Oral Stage and Mild-Moderate Pharyngeal stage dysphagia. Oral stage is marked by reduced labial seal for utensil stripping/cup sips, adequate bolus manipulation, adequate tongue motion for anterior posterior transfer, incomplete tongue to palate seal, resulting in premature loss of Puree/Moderately Thick Liquids/Mildly Thick Liquids/Thin Liquids to hypopharynx, and adequate oral clearance. Pharyngeal stage is marked by delayed swallow initiation ([place] at pyriform sinuses for Thin Liquids), reduced laryngeal elevation, reduced base of tongue retraction, and reduced pharyngeal constriction. Mild-moderate pharyngeal clearance deficits observed post primary swallow for Puree/Moderately Thick Liquids/Mildly Thick Liquids, located primarily in vallecular and pyriform sinuses; intermittent spontaneous re-swallow assists with partial pharyngeal clearance. There is Deep Laryngeal Penetration for Thin Liquids, without retrieval, leaving trace amount of residue at the level of the vocal folds. There is NO Laryngeal Penetration or Aspiration for Puree/Moderately Thick Liquids/Mildly Thick Liquids.

## 2025-06-10 LAB
ANION GAP SERPL CALC-SCNC: 14 MMOL/L — SIGNIFICANT CHANGE UP (ref 7–14)
BUN SERPL-MCNC: 14 MG/DL — SIGNIFICANT CHANGE UP (ref 7–23)
CALCIUM SERPL-MCNC: 9 MG/DL — SIGNIFICANT CHANGE UP (ref 8.4–10.5)
CHLORIDE SERPL-SCNC: 101 MMOL/L — SIGNIFICANT CHANGE UP (ref 98–107)
CO2 SERPL-SCNC: 23 MMOL/L — SIGNIFICANT CHANGE UP (ref 22–31)
CREAT SERPL-MCNC: 0.78 MG/DL — SIGNIFICANT CHANGE UP (ref 0.5–1.3)
EGFR: 85 ML/MIN/1.73M2 — SIGNIFICANT CHANGE UP
EGFR: 85 ML/MIN/1.73M2 — SIGNIFICANT CHANGE UP
GLUCOSE SERPL-MCNC: 59 MG/DL — LOW (ref 70–99)
HCT VFR BLD CALC: 39.6 % — SIGNIFICANT CHANGE UP (ref 39–50)
HGB BLD-MCNC: 12.9 G/DL — LOW (ref 13–17)
MAGNESIUM SERPL-MCNC: 2.2 MG/DL — SIGNIFICANT CHANGE UP (ref 1.6–2.6)
MCHC RBC-ENTMCNC: 30.7 PG — SIGNIFICANT CHANGE UP (ref 27–34)
MCHC RBC-ENTMCNC: 32.6 G/DL — SIGNIFICANT CHANGE UP (ref 32–36)
MCV RBC AUTO: 94.3 FL — SIGNIFICANT CHANGE UP (ref 80–100)
NRBC # BLD AUTO: 0 K/UL — SIGNIFICANT CHANGE UP (ref 0–0)
NRBC # FLD: 0 K/UL — SIGNIFICANT CHANGE UP (ref 0–0)
NRBC BLD AUTO-RTO: 0 /100 WBCS — SIGNIFICANT CHANGE UP (ref 0–0)
PHOSPHATE SERPL-MCNC: 3 MG/DL — SIGNIFICANT CHANGE UP (ref 2.5–4.5)
PLATELET # BLD AUTO: 221 K/UL — SIGNIFICANT CHANGE UP (ref 150–400)
POTASSIUM SERPL-MCNC: 3.9 MMOL/L — SIGNIFICANT CHANGE UP (ref 3.5–5.3)
POTASSIUM SERPL-SCNC: 3.9 MMOL/L — SIGNIFICANT CHANGE UP (ref 3.5–5.3)
RBC # BLD: 4.2 M/UL — SIGNIFICANT CHANGE UP (ref 4.2–5.8)
RBC # FLD: 13.6 % — SIGNIFICANT CHANGE UP (ref 10.3–14.5)
SODIUM SERPL-SCNC: 138 MMOL/L — SIGNIFICANT CHANGE UP (ref 135–145)
WBC # BLD: 5.81 K/UL — SIGNIFICANT CHANGE UP (ref 3.8–10.5)
WBC # FLD AUTO: 5.81 K/UL — SIGNIFICANT CHANGE UP (ref 3.8–10.5)

## 2025-06-10 PROCEDURE — 95720 EEG PHY/QHP EA INCR W/VEEG: CPT

## 2025-06-10 PROCEDURE — 99233 SBSQ HOSP IP/OBS HIGH 50: CPT

## 2025-06-10 RX ADMIN — Medication 40 MILLIGRAM(S): at 12:09

## 2025-06-10 RX ADMIN — LEVETIRACETAM 500 MILLIGRAM(S): 10 INJECTION, SOLUTION INTRAVENOUS at 05:41

## 2025-06-10 RX ADMIN — ENOXAPARIN SODIUM 50 MILLIGRAM(S): 100 INJECTION SUBCUTANEOUS at 05:43

## 2025-06-10 RX ADMIN — ATORVASTATIN CALCIUM 40 MILLIGRAM(S): 80 TABLET, FILM COATED ORAL at 21:47

## 2025-06-10 RX ADMIN — POLYETHYLENE GLYCOL 3350 17 GRAM(S): 17 POWDER, FOR SOLUTION ORAL at 05:40

## 2025-06-10 RX ADMIN — LEVETIRACETAM 500 MILLIGRAM(S): 10 INJECTION, SOLUTION INTRAVENOUS at 17:33

## 2025-06-10 RX ADMIN — Medication 2 TABLET(S): at 21:48

## 2025-06-10 RX ADMIN — ENOXAPARIN SODIUM 50 MILLIGRAM(S): 100 INJECTION SUBCUTANEOUS at 17:33

## 2025-06-10 RX ADMIN — CEFTRIAXONE 100 MILLIGRAM(S): 500 INJECTION, POWDER, FOR SOLUTION INTRAMUSCULAR; INTRAVENOUS at 05:51

## 2025-06-10 RX ADMIN — POLYETHYLENE GLYCOL 3350 17 GRAM(S): 17 POWDER, FOR SOLUTION ORAL at 17:33

## 2025-06-10 NOTE — EEG REPORT - NS EEG TEXT BOX
TENNILLE MAHMOOD [id number]     Study Date: 06-09-25 13:15 to 06-10-25 08:00  Duration: 18 hr 24 min    --------------------------------------------------------------------------------------------------  History:  CC/ HPI Patient is a 89y old  Male who presents with a chief complaint of pneumonia, encephalopathy, acute hypoxic respiratory failure (09 Jun 2025 15:58)    MEDICATIONS  (STANDING):  atorvastatin 40 milliGRAM(s) Oral at bedtime  cefTRIAXone   IVPB 1000 milliGRAM(s) IV Intermittent <User Schedule>  enoxaparin Injectable 50 milliGRAM(s) SubCutaneous every 12 hours  levETIRAcetam   Injectable 500 milliGRAM(s) IV Push every 12 hours  pantoprazole  Injectable 40 milliGRAM(s) IV Push daily  polyethylene glycol 3350 17 Gram(s) Oral two times a day  senna 2 Tablet(s) Oral at bedtime  sodium chloride 0.9%. 1000 milliLiter(s) (75 mL/Hr) IV Continuous <Continuous>    --------------------------------------------------------------------------------------------------  Study Interpretation:    [[[Abbreviation Key:  PDR=alpha rhythm/posterior dominant rhythm. A-P=anterior posterior gradient.  Amplitude: ‘very low’:<20; ‘low’:20-50; ‘medium’:; ‘high’:>200uV.  Persistence for periodic/rhythmic patterns (% of epoch) ‘rare’:<1%; ‘occasional’:1-10%; ‘frequent’:10-50%; ‘abundant’:50-90%; ‘continuous’:>90%.  Persistence for sporadic discharges: ‘rare’:<1/hr; ‘occasional’:1/min-1/hr; ‘frequent’:>1/min; ‘abundant’:>1/10 sec.  GRDA=generalized rhythmic delta activity; FIRDA=frontal intermittent GRDA; LRDA=lateralized rhythmic delta activity; TIRDA=temporal intermittent rhythmic delta activity;  LPD=PLED=lateralized periodic discharges; GPD=generalized periodic discharges; BiPDs=BiPLEDs=bilateral independent periodic epileptiform discharges; SIRPID=stimulus induced rhythmic, periodic, or ictal appearing discharges; BIRDs=brief potentially ictal rhythmic discharges >4 Hz, lasting .5-10s; PFA=paroxysmal bursts of beta/gamma; LVFA=low voltage fast activity.  Modifiers: +F=with fast component; +S=with spike component; +R=with rhythmic component.  S-B=burst suppression pattern.  Max=maximal. N1-drowsy; N2-stage II sleep; N3-slow wave sleep. SSS/BETS=small sharp spikes/benign epileptiform transients of sleep. HV=hyperventilation; PS=photic stimulation]]]    FINDINGS:      Background:  Continuity: Continuous  Symmetry: Symmetric  PDR: 7 Hz activity, at times poorly modulated with amplitude to 40 uV, that attenuated to eye opening.    Reactivity: Present  Voltage: Normal (between 20-150uV)  Anterior Posterior Gradient: Present  Other background findings: None  Breach: Absent    Background Slowing:  Generalized slowing: Diffuse irregular delta and theta activity.  Focal slowing: None was present.    State Changes:   -Drowsiness noted with increased slowing, attenuation of fast activity, vertex transients.  -Present with N2 sleep transients with symmetric spindles and K-complexes.    Interictal Findings:  None    Electrographic and Electroclinical seizures:  None    Other Clinical Events:  None    Activation Procedures:   -Hyperventilation was not performed.     -Photic stimulation was performed and did not elicit any abnormalities.       Artifacts:  Intermittent myogenic and movement artifacts were noted.    ECG:  The heart rate on single channel ECG was predominantly between 80 - 90 BPM.    EEG Classification / Summary:  Abnormal EEG study in the awake / drowsy / asleep states  -Background slowing, moderate    -----------------------------------------------------------------------------------------------------    Clinical Impression:  Abnormal EEG study  Mild to moderate diffuse/multi-focal cerebral dysfunction, not specific as to etiology.  There were no epileptiform abnormalities or seizures recorded.      This is a preliminary impression still pending attendings attestation.     -------------------------------------------------------------------------------------------------------  EEG reading room: [phone]    After-hours epilepsy service: [phone]    Bib Ortiz DO  Epilepsy Fellow   TENNILLE MAHMOOD [id number]     Study Date: 06-09-25 13:15 to 06-10-25 09:49  Duration: 20 hr 11 min    --------------------------------------------------------------------------------------------------  History:  CC/ HPI Patient is a 89y old  Male who presents with a chief complaint of pneumonia, encephalopathy, acute hypoxic respiratory failure (09 Jun 2025 15:58)    MEDICATIONS  (STANDING):  atorvastatin 40 milliGRAM(s) Oral at bedtime  cefTRIAXone   IVPB 1000 milliGRAM(s) IV Intermittent <User Schedule>  enoxaparin Injectable 50 milliGRAM(s) SubCutaneous every 12 hours  levETIRAcetam   Injectable 500 milliGRAM(s) IV Push every 12 hours  pantoprazole  Injectable 40 milliGRAM(s) IV Push daily  polyethylene glycol 3350 17 Gram(s) Oral two times a day  senna 2 Tablet(s) Oral at bedtime  sodium chloride 0.9%. 1000 milliLiter(s) (75 mL/Hr) IV Continuous <Continuous>    --------------------------------------------------------------------------------------------------  Study Interpretation:    [[[Abbreviation Key:  PDR=alpha rhythm/posterior dominant rhythm. A-P=anterior posterior gradient.  Amplitude: ‘very low’:<20; ‘low’:20-50; ‘medium’:; ‘high’:>200uV.  Persistence for periodic/rhythmic patterns (% of epoch) ‘rare’:<1%; ‘occasional’:1-10%; ‘frequent’:10-50%; ‘abundant’:50-90%; ‘continuous’:>90%.  Persistence for sporadic discharges: ‘rare’:<1/hr; ‘occasional’:1/min-1/hr; ‘frequent’:>1/min; ‘abundant’:>1/10 sec.  GRDA=generalized rhythmic delta activity; FIRDA=frontal intermittent GRDA; LRDA=lateralized rhythmic delta activity; TIRDA=temporal intermittent rhythmic delta activity;  LPD=PLED=lateralized periodic discharges; GPD=generalized periodic discharges; BiPDs=BiPLEDs=bilateral independent periodic epileptiform discharges; SIRPID=stimulus induced rhythmic, periodic, or ictal appearing discharges; BIRDs=brief potentially ictal rhythmic discharges >4 Hz, lasting .5-10s; PFA=paroxysmal bursts of beta/gamma; LVFA=low voltage fast activity.  Modifiers: +F=with fast component; +S=with spike component; +R=with rhythmic component.  S-B=burst suppression pattern.  Max=maximal. N1-drowsy; N2-stage II sleep; N3-slow wave sleep. SSS/BETS=small sharp spikes/benign epileptiform transients of sleep. HV=hyperventilation; PS=photic stimulation]]]    FINDINGS:      Background:  Continuity: Continuous  Symmetry: Symmetric  PDR: 7.5 Hz, reactive to eye closure  Voltage: Normal  Anterior Posterior Gradient: Present, low-amplitude frontal beta  Other background findings: None  Breach: Absent    Background Slowing:  Generalized slowing: As above  Focal slowing: None    State Changes:   Drowsiness is characterized by slowing of the background activity.  Stage 2 sleep is characterized by symmetric K complexes.    Interictal Findings:  None    Electrographic and Electroclinical seizures:  None    Other Clinical Events:  None    Activation Procedures:   -Hyperventilation was not performed.     -Photic stimulation was performed and did not elicit any abnormalities.       Artifacts:  Intermittent myogenic and movement artifacts    Single-lead EKG: Regular rhythm    EEG Classification / Summary:  Abnormal EEG study in the awake / drowsy / asleep states  -Mild diffuse slowing  -No focal or epileptiform abnormalities  -No electrographic seizures    Clinical Impression:  -Mild diffuse cerebral dysfunction is nonspecific in etiology.  -No epileptiform abnormalities or seizures captured.     -------------------------------------------------------------------------------------------------------  EEG reading room: [phone]  After-hours epilepsy service: [phone]    Bib Ortiz DO  Epilepsy Fellow    Esther Hernandez MD  Attending Physician, [facility]ilepsy [place]    TENNILLE MAHMOOD [id number]     Study Date: 06-09-25 13:15 to 06-10-25 09:49  Duration: 20 hr 11 min    --------------------------------------------------------------------------------------------------  History:  CC/ HPI Patient is a 89y old  Male who presents with a chief complaint of pneumonia, encephalopathy, acute hypoxic respiratory failure (09 Jun 2025 15:58)    MEDICATIONS  (STANDING):  atorvastatin 40 milliGRAM(s) Oral at bedtime  cefTRIAXone   IVPB 1000 milliGRAM(s) IV Intermittent <User Schedule>  enoxaparin Injectable 50 milliGRAM(s) SubCutaneous every 12 hours  levETIRAcetam   Injectable 500 milliGRAM(s) IV Push every 12 hours  pantoprazole  Injectable 40 milliGRAM(s) IV Push daily  polyethylene glycol 3350 17 Gram(s) Oral two times a day  senna 2 Tablet(s) Oral at bedtime  sodium chloride 0.9%. 1000 milliLiter(s) (75 mL/Hr) IV Continuous <Continuous>    --------------------------------------------------------------------------------------------------  Study Interpretation:    [[[Abbreviation Key:  PDR=alpha rhythm/posterior dominant rhythm. A-P=anterior posterior gradient.  Amplitude: ‘very low’:<20; ‘low’:20-50; ‘medium’:; ‘high’:>200uV.  Persistence for periodic/rhythmic patterns (% of epoch) ‘rare’:<1%; ‘occasional’:1-10%; ‘frequent’:10-50%; ‘abundant’:50-90%; ‘continuous’:>90%.  Persistence for sporadic discharges: ‘rare’:<1/hr; ‘occasional’:1/min-1/hr; ‘frequent’:>1/min; ‘abundant’:>1/10 sec.  GRDA=generalized rhythmic delta activity; FIRDA=frontal intermittent GRDA; LRDA=lateralized rhythmic delta activity; TIRDA=temporal intermittent rhythmic delta activity;  LPD=PLED=lateralized periodic discharges; GPD=generalized periodic discharges; BiPDs=BiPLEDs=bilateral independent periodic epileptiform discharges; SIRPID=stimulus induced rhythmic, periodic, or ictal appearing discharges; BIRDs=brief potentially ictal rhythmic discharges >4 Hz, lasting .5-10s; PFA=paroxysmal bursts of beta/gamma; LVFA=low voltage fast activity.  Modifiers: +F=with fast component; +S=with spike component; +R=with rhythmic component.  S-B=burst suppression pattern.  Max=maximal. N1-drowsy; N2-stage II sleep; N3-slow wave sleep. SSS/BETS=small sharp spikes/benign epileptiform transients of sleep. HV=hyperventilation; PS=photic stimulation]]]    FINDINGS:      Background:  Continuity: Continuous  Symmetry: Symmetric  PDR: 7.5-8 Hz, reactive to eye closure  Voltage: Normal  Anterior Posterior Gradient: Present, low-amplitude frontal beta  Other background findings: None  Breach: Absent    Background Slowing:  Generalized slowing: As above  Focal slowing: None    State Changes:   Drowsiness is characterized by slowing of the background activity.  Stage 2 sleep is characterized by symmetric K complexes.    Interictal Findings:  None    Electrographic and Electroclinical seizures:  None    Other Clinical Events:  None    Activation Procedures:   -Hyperventilation was not performed.     -Photic stimulation was performed and did not elicit any abnormalities.       Artifacts:  Intermittent myogenic and movement artifacts    Single-lead EKG: Regular rhythm    EEG Classification / Summary:  Abnormal EEG study in the awake / drowsy / asleep states  -Mild diffuse slowing  -No focal or epileptiform abnormalities  -No electrographic seizures    Clinical Impression:  -Mild diffuse cerebral dysfunction is nonspecific in etiology.  -No epileptiform abnormalities or seizures captured.     -------------------------------------------------------------------------------------------------------  EEG reading room: [phone]  After-hours epilepsy service: [phone]    Bib Ortiz DO  Epilepsy Fellow    Esther Hernandez MD  Attending Physician, [facility] Epilepsy [place]

## 2025-06-11 ENCOUNTER — TRANSCRIPTION ENCOUNTER (OUTPATIENT)
Age: 89
End: 2025-06-11

## 2025-06-11 ENCOUNTER — RESULT REVIEW (OUTPATIENT)
Age: 89
End: 2025-06-11

## 2025-06-11 LAB
AMMONIA BLD-MCNC: 19 UMOL/L — SIGNIFICANT CHANGE UP (ref 11–55)
ANION GAP SERPL CALC-SCNC: 14 MMOL/L — SIGNIFICANT CHANGE UP (ref 7–14)
BASOPHILS # BLD AUTO: 0.02 K/UL — SIGNIFICANT CHANGE UP (ref 0–0.2)
BASOPHILS NFR BLD AUTO: 0.3 % — SIGNIFICANT CHANGE UP (ref 0–2)
BUN SERPL-MCNC: 16 MG/DL — SIGNIFICANT CHANGE UP (ref 7–23)
CALCIUM SERPL-MCNC: 8.7 MG/DL — SIGNIFICANT CHANGE UP (ref 8.4–10.5)
CHLORIDE SERPL-SCNC: 101 MMOL/L — SIGNIFICANT CHANGE UP (ref 98–107)
CO2 SERPL-SCNC: 21 MMOL/L — LOW (ref 22–31)
CREAT SERPL-MCNC: 0.75 MG/DL — SIGNIFICANT CHANGE UP (ref 0.5–1.3)
EGFR: 86 ML/MIN/1.73M2 — SIGNIFICANT CHANGE UP
EGFR: 86 ML/MIN/1.73M2 — SIGNIFICANT CHANGE UP
EOSINOPHIL # BLD AUTO: 0.3 K/UL — SIGNIFICANT CHANGE UP (ref 0–0.5)
EOSINOPHIL NFR BLD AUTO: 3.9 % — SIGNIFICANT CHANGE UP (ref 0–6)
GLUCOSE SERPL-MCNC: 93 MG/DL — SIGNIFICANT CHANGE UP (ref 70–99)
HCT VFR BLD CALC: 44.2 % — SIGNIFICANT CHANGE UP (ref 39–50)
HGB BLD-MCNC: 14.5 G/DL — SIGNIFICANT CHANGE UP (ref 13–17)
IANC: 5.08 K/UL — SIGNIFICANT CHANGE UP (ref 1.8–7.4)
IMM GRANULOCYTES NFR BLD AUTO: 0.3 % — SIGNIFICANT CHANGE UP (ref 0–0.9)
LYMPHOCYTES # BLD AUTO: 1.62 K/UL — SIGNIFICANT CHANGE UP (ref 1–3.3)
LYMPHOCYTES # BLD AUTO: 21.2 % — SIGNIFICANT CHANGE UP (ref 13–44)
MAGNESIUM SERPL-MCNC: 2.1 MG/DL — SIGNIFICANT CHANGE UP (ref 1.6–2.6)
MCHC RBC-ENTMCNC: 31.2 PG — SIGNIFICANT CHANGE UP (ref 27–34)
MCHC RBC-ENTMCNC: 32.8 G/DL — SIGNIFICANT CHANGE UP (ref 32–36)
MCV RBC AUTO: 95.1 FL — SIGNIFICANT CHANGE UP (ref 80–100)
MONOCYTES # BLD AUTO: 0.59 K/UL — SIGNIFICANT CHANGE UP (ref 0–0.9)
MONOCYTES NFR BLD AUTO: 7.7 % — SIGNIFICANT CHANGE UP (ref 2–14)
MRSA PCR RESULT.: SIGNIFICANT CHANGE UP
NEUTROPHILS # BLD AUTO: 5.08 K/UL — SIGNIFICANT CHANGE UP (ref 1.8–7.4)
NEUTROPHILS NFR BLD AUTO: 66.6 % — SIGNIFICANT CHANGE UP (ref 43–77)
NRBC # BLD AUTO: 0 K/UL — SIGNIFICANT CHANGE UP (ref 0–0)
NRBC # FLD: 0 K/UL — SIGNIFICANT CHANGE UP (ref 0–0)
NRBC BLD AUTO-RTO: 0 /100 WBCS — SIGNIFICANT CHANGE UP (ref 0–0)
PHOSPHATE SERPL-MCNC: 2.7 MG/DL — SIGNIFICANT CHANGE UP (ref 2.5–4.5)
PLATELET # BLD AUTO: 220 K/UL — SIGNIFICANT CHANGE UP (ref 150–400)
POTASSIUM SERPL-MCNC: 3.7 MMOL/L — SIGNIFICANT CHANGE UP (ref 3.5–5.3)
POTASSIUM SERPL-SCNC: 3.7 MMOL/L — SIGNIFICANT CHANGE UP (ref 3.5–5.3)
RBC # BLD: 4.65 M/UL — SIGNIFICANT CHANGE UP (ref 4.2–5.8)
RBC # FLD: 13.6 % — SIGNIFICANT CHANGE UP (ref 10.3–14.5)
S AUREUS DNA NOSE QL NAA+PROBE: SIGNIFICANT CHANGE UP
SODIUM SERPL-SCNC: 136 MMOL/L — SIGNIFICANT CHANGE UP (ref 135–145)
WBC # BLD: 7.63 K/UL — SIGNIFICANT CHANGE UP (ref 3.8–10.5)
WBC # FLD AUTO: 7.63 K/UL — SIGNIFICANT CHANGE UP (ref 3.8–10.5)

## 2025-06-11 PROCEDURE — 93971 EXTREMITY STUDY: CPT | Mod: 26,RT

## 2025-06-11 PROCEDURE — 93306 TTE W/DOPPLER COMPLETE: CPT | Mod: 26

## 2025-06-11 PROCEDURE — 99233 SBSQ HOSP IP/OBS HIGH 50: CPT

## 2025-06-11 RX ADMIN — ATORVASTATIN CALCIUM 40 MILLIGRAM(S): 80 TABLET, FILM COATED ORAL at 22:12

## 2025-06-11 RX ADMIN — LEVETIRACETAM 500 MILLIGRAM(S): 10 INJECTION, SOLUTION INTRAVENOUS at 19:28

## 2025-06-11 RX ADMIN — LEVETIRACETAM 500 MILLIGRAM(S): 10 INJECTION, SOLUTION INTRAVENOUS at 05:38

## 2025-06-11 RX ADMIN — Medication 1 APPLICATION(S): at 22:13

## 2025-06-11 RX ADMIN — ENOXAPARIN SODIUM 50 MILLIGRAM(S): 100 INJECTION SUBCUTANEOUS at 05:38

## 2025-06-11 RX ADMIN — Medication 2 TABLET(S): at 22:11

## 2025-06-11 RX ADMIN — ENOXAPARIN SODIUM 50 MILLIGRAM(S): 100 INJECTION SUBCUTANEOUS at 19:29

## 2025-06-11 RX ADMIN — Medication 40 MILLIGRAM(S): at 11:02

## 2025-06-11 RX ADMIN — POLYETHYLENE GLYCOL 3350 17 GRAM(S): 17 POWDER, FOR SOLUTION ORAL at 19:29

## 2025-06-11 RX ADMIN — POLYETHYLENE GLYCOL 3350 17 GRAM(S): 17 POWDER, FOR SOLUTION ORAL at 05:37

## 2025-06-11 NOTE — DIETITIAN INITIAL EVALUATION ADULT - ORAL NUTRITION SUPPLEMENTS
RD will provide Hormel Shake 1x daily(520 elina, 22gm pro) and Mighty Shake 2x daily (220kcal, 6gm pro per each serving).

## 2025-06-11 NOTE — CHART NOTE - NSCHARTNOTEFT_GEN_A_CORE
PAtient will require a standard wheelchair due to CVA. The beneficiary has a mobility limitation that significantly impairs his ability to participate in one or more MRADL's such as toileting , feeding , dressing, grooming and bathing in customary locations in the home. The patients mobility limitation cannot  be sufficiently resolved by the use of an appropriately fitted cane or walker. The patient is unable to ambulate with a walker. Use of a manual wheelchair will significantly improve the beneficiary's ability to participate in MRADLS and the beneficiary will use it on a regular basis in the home. The beneficiary is able and willing to use the wheelchair in the home. The patient has sufficient upper extremity function and other physical and mental capabilitiees needed to safely self propel the manual wheelchair that is provided in the home during a typical day
The CT scan is emergent, and as treating physicians we are ok with administering contrast without creatinine resulted.    Ronda Tyler MD  EM/IM PGY-4

## 2025-06-11 NOTE — DIETITIAN INITIAL EVALUATION ADULT - ORAL INTAKE PTA/DIET HISTORY
Met patient at bedside. Patient A&Ox0. Obtained collateral from RN and his son via phone. Per son, patient has decreased appetite / PO intake PTA. Denies prior use of nutrition shakes/ vitamins PTA. No other reported issues.

## 2025-06-11 NOTE — DISCHARGE NOTE PROVIDER - NSDCCPCAREPLAN_GEN_ALL_CORE_FT
PRINCIPAL DISCHARGE DIAGNOSIS  Diagnosis: Altered mental status  Assessment and Plan of Treatment:      PRINCIPAL DISCHARGE DIAGNOSIS  Diagnosis: Dementia  Assessment and Plan of Treatment: You came to the hospital for fatigue and failure to thrive at home. We treated you for aspiration pneumonia with antibiotics. We treated you for constipation. Your mental status did not improve. We did an MRI of your head that showed old strokes but nothing that we can treat to improve your condition. We did an EEG that did not show any seizure activity. Your mental status is probably from progression of your dementia. Unfortunately dementia is a progressive disease and there are no treatment to improve your mental status.      SECONDARY DISCHARGE DIAGNOSES  Diagnosis: RLL pneumonia  Assessment and Plan of Treatment: You were treated for pneumonia with antibiotics. It is likely that you will continue to get pneumonias as your dementia progresses and you have trouble swallowing.     PRINCIPAL DISCHARGE DIAGNOSIS  Diagnosis: Dementia  Assessment and Plan of Treatment: You came to the hospital for fatigue and failure to thrive at home. We treated you for aspiration pneumonia with antibiotics. We treated you for constipation. Your mental status did not improve. We did an MRI of your head that showed old strokes but nothing that we can treat to improve your condition. We did an EEG that did not show any seizure activity. Your mental status is probably from progression of your dementia. Unfortunately dementia is a progressive disease and there are no treatment to improve your mental status.      SECONDARY DISCHARGE DIAGNOSES  Diagnosis: RLL pneumonia  Assessment and Plan of Treatment: You were treated for pneumonia with antibiotics. It is likely that you will continue to get pneumonias as your dementia progresses and you have trouble swallowing.    Diagnosis: Elevated TSH  Assessment and Plan of Treatment: please get a repeat tsh and free t4 level in 1 week and follow up with pcp at the long term care facility

## 2025-06-11 NOTE — DIETITIAN INITIAL EVALUATION ADULT - PHYSCIAL ASSESSMENT
Son unsure pt's UBW. Dosing weight 48.5kg/106.9lbs (6/8). [facility] weight history is not available at this time. Unable to conduct NFPE as patient doesn't follow commands. However, pt visibly observed with severe depletion on orbital, buccal and temples.

## 2025-06-11 NOTE — DIETITIAN INITIAL EVALUATION ADULT - ADD RECOMMEND
1. RD will provide Hormel Shake 1x daily(520 elina, 22gm pro) and Mighty Shake 2x daily (220kcal, 6gm pro per each serving).    2. Please consider appetite stimulants to promote po intake.   3. Please consistently document %PO intake in nursing flowsheets   4. Monitor PO intake, Labs, weights, BMs, and skin integrity.

## 2025-06-11 NOTE — DISCHARGE NOTE PROVIDER - CARE PROVIDER_API CALL
[facility]  [id number] [place] [id number]  Phone: [phone]  Fax: [phone]  Follow Up Time:

## 2025-06-11 NOTE — DIETITIAN INITIAL EVALUATION ADULT - OTHER INFO
Per RN, patient has very poor appetite in house. Per RN flowsheets intake is 0-25%. No reported any in house nausea, vomiting, diarrhea, or constipation. Last BM noted on 6/9, fecal incontinence noted per RN flowsheets. Bowel regimen is in placed. Patient receives pureed and mildly thick liquid diet per SLP evaluation on 6/9/25. No known food allergies. RD will provide Hormel Shake 1x daily(520 elina, 22gm pro) and Mighty Shake 2x daily (220kcal, 6gm pro per each serving). Please consider appetite stimulants to promote po intake. RD to remain available for further nutritional interventions as indicated

## 2025-06-11 NOTE — DISCHARGE NOTE PROVIDER - HOSPITAL COURSE
HPI:  89M with history of Parkinson's (AOx1-2? at baseline), Afib on Eliquis, prior PE, prior CVA, seizure disorder, and prostate cancer s/p remote resection BIBEMS for unresponsiveness and hypotension at home. Was unable to reach family overnight, history per ED. Pt was recently admitted at an OSH for suspected aspiration pneumonia, discharged home just yesterday around 4:30PM (6/7). Of note, pt did not complete a full inpatient course, was discharged on oral antibiotics but family did not  yet. Approximately 30 minutes after pt got home he was noted by family to be very lethargic and barely responsive. They called EMS who noted him to be hypotensive with SBP to 80s in field.     ED Course:  Pt arrived here on NRB mask, brought to trauma room, was noted to be lethargic/arousable to sternal rub and reporting chest pain. VS largely unremarkable. Subsequently underwent emergent CTH and CTA C/A/P. Evaluation limtied due to motion artifact but showed known R lung base pneumonia and markedly distended fecal filled rectum with potential stercoral colitis. No evidence of acute intracranial pathology or aortic dissection. Received zosyn, vancomycin, fleet enema, duoneb, and 1L LR bolus. While in ED, had couple of episodes of hypoxia that reportedly self resolved. Pt admitted to medicine for further management.     Upon my evaluation, pt awake, AAOx1, conversive though confused. Denied any chest pain or shortness of breath. Coughing.  (08 Jun 2025 03:48)    Hospital Course:  Discussed with pt's sons. Pt has had slow cognitive decline. However, 2 weeks PTA had a seizure for which he was admitted to the hospital and discharged on keppra. Denies brain imaging at that time. Pt then was coughing a lot and was brought back to the hospital and was found to have aspiration pneumonia. Son is concerned that patient has not returned back to baseline since seizure and pneumonia. Son reports that patient is sleeping a lot and needs to be woken up in order to get feedings. CTA CAP showing R pneumonia and stercoral colitis. Treated for aspiration pneumonia with ceftriaxone. Also started on bowel regimen and having BMs. Pt's mental status did not improve so further workup was done for AMS.  CT head: No acute hemorrhage, hydrocephalus or extra-axial collection. Chronic infarcts and extensive white matter changes similar to the prior exam. EEG without seizure activity. ammonia negative. MRI with chronic strokes and microvascular changes. mental status likely 2/2 progression of dementia. discussed with son Caden. pt not a [facility] candidate as he cannot work with PT. family wants to discuss SNF vs home. also broached the subject of hospice and family is amenable to further discussions after they have discussed as a family.     passed speech and swallow - pureed with mildly thick liquids.    Important Medication Changes and Reason:    Active or Pending Issues Requiring Follow-up:    Advanced Directives:   [ ] Full code  [ ] DNR  [ ] Hospice    Discharge Diagnoses:  failure to thrive  functional quadriplegia  aspiration pneumonia of RLL  Paroxysmal atrial fibrillation.   Secondary hypercoagulable state  seizure disorder HPI:  89M with history of Parkinson's (AOx1-2? at baseline), Afib on Eliquis, prior PE, prior CVA, seizure disorder, and prostate cancer s/p remote resection BIBEMS for unresponsiveness and hypotension at home. Was unable to reach family overnight, history per ED. Pt was recently admitted at an OSH for suspected aspiration pneumonia, discharged home just yesterday around 4:30PM (6/7). Of note, pt did not complete a full inpatient course, was discharged on oral antibiotics but family did not  yet. Approximately 30 minutes after pt got home he was noted by family to be very lethargic and barely responsive. They called EMS who noted him to be hypotensive with SBP to 80s in field.     ED Course:  Pt arrived here on NRB mask, brought to trauma room, was noted to be lethargic/arousable to sternal rub and reporting chest pain. VS largely unremarkable. Subsequently underwent emergent CTH and CTA C/A/P. Evaluation limtied due to motion artifact but showed known R lung base pneumonia and markedly distended fecal filled rectum with potential stercoral colitis. No evidence of acute intracranial pathology or aortic dissection. Received zosyn, vancomycin, fleet enema, duoneb, and 1L LR bolus. While in ED, had couple of episodes of hypoxia that reportedly self resolved. Pt admitted to medicine for further management.     Upon my evaluation, pt awake, AAOx1, conversive though confused. Denied any chest pain or shortness of breath. Coughing.  (08 Jun 2025 03:48)    Hospital Course:  Discussed with pt's sons. Pt has had slow cognitive decline. However, 2 weeks PTA had a seizure for which he was admitted to the hospital and discharged on keppra. Denies brain imaging at that time. Pt then was coughing a lot and was brought back to the hospital and was found to have aspiration pneumonia. Son is concerned that patient has not returned back to baseline since seizure and pneumonia. Son reports that patient is sleeping a lot and needs to be woken up in order to get feedings. CTA CAP showing R pneumonia and stercoral colitis. Treated for aspiration pneumonia with ceftriaxone. Also started on bowel regimen and having BMs. Pt's mental status did not improve so further workup was done for AMS.  CT head: No acute hemorrhage, hydrocephalus or extra-axial collection. Chronic infarcts and extensive white matter changes similar to the prior exam. EEG without seizure activity. ammonia negative. MRI with chronic strokes and microvascular changes. mental status likely 2/2 progression of dementia. discussed with son Caden. pt not a [facility] candidate as he cannot work with PT. family wants to discuss SNF vs home. also broached the subject of hospice and family is amenable to further discussions after they have discussed as a family.   Pt is accepted to LTC,   Pt had elevated tsh, with normal free t4, rec to repeat tsh and free t4 in 1 week at the LTC facility  stable for dc    passed speech and swallow - pureed with mildly thick liquids.    Important Medication Changes and Reason:    Active or Pending Issues Requiring Follow-up:    Advanced Directives:   [ ] Full code  [ ] DNR  [ ] Hospice    Discharge Diagnoses:  failure to thrive  functional quadriplegia  aspiration pneumonia of RLL  Paroxysmal atrial fibrillation.   Secondary hypercoagulable state  seizure disorder   HPI:  89M with history of Parkinson's (AOx1-2? at baseline), Afib on Eliquis, prior PE, prior CVA, seizure disorder, and prostate cancer s/p remote resection BIBEMS for unresponsiveness and hypotension at home. Was unable to reach family overnight, history per ED. Pt was recently admitted at an OSH for suspected aspiration pneumonia, discharged home just yesterday around 4:30PM (6/7). Of note, pt did not complete a full inpatient course, was discharged on oral antibiotics but family did not  yet. Approximately 30 minutes after pt got home he was noted by family to be very lethargic and barely responsive. They called EMS who noted him to be hypotensive with SBP to 80s in field.     ED Course:  Pt arrived here on NRB mask, brought to trauma room, was noted to be lethargic/arousable to sternal rub and reporting chest pain. VS largely unremarkable. Subsequently underwent emergent CTH and CTA C/A/P. Evaluation limtied due to motion artifact but showed known R lung base pneumonia and markedly distended fecal filled rectum with potential stercoral colitis. No evidence of acute intracranial pathology or aortic dissection. Received zosyn, vancomycin, fleet enema, duoneb, and 1L LR bolus. While in ED, had couple of episodes of hypoxia that reportedly self resolved. Pt admitted to medicine for further management.     Upon my evaluation, pt awake, AAOx1, conversive though confused. Denied any chest pain or shortness of breath. Coughing.  (08 Jun 2025 03:48)    Hospital Course:  Discussed with pt's sons. Pt has had slow cognitive decline. However, 2 weeks PTA had a seizure for which he was admitted to the hospital and discharged on keppra. Denies brain imaging at that time. Pt then was coughing a lot and was brought back to the hospital and was found to have aspiration pneumonia. Son is concerned that patient has not returned back to baseline since seizure and pneumonia. Son reports that patient is sleeping a lot and needs to be woken up in order to get feedings. CTA CAP showing R pneumonia and stercoral colitis. Treated for aspiration pneumonia with ceftriaxone. Also started on bowel regimen and having BMs. Pt's mental status did not improve so further workup was done for AMS.  CT head: No acute hemorrhage, hydrocephalus or extra-axial collection. Chronic infarcts and extensive white matter changes similar to the prior exam. EEG without seizure activity. ammonia negative. MRI with chronic strokes and microvascular changes. mental status likely 2/2 progression of dementia. discussed with son Caden. pt not a [facility] candidate as he cannot work with PT. family wants to discuss SNF vs home. also broached the subject of hospice and family is amenable to further discussions after they have discussed as a family.   Pt is accepted to LTC,   Pt had elevated tsh, with normal free t4, rec to repeat tsh and free t4 in 1 week at the LTC facility    stable for dc    passed speech and swallow - pureed with mildly thick liquids.    Important Medication Changes and Reason:    Active or Pending Issues Requiring Follow-up:    Advanced Directives:   [ ] Full code  [ ] DNR  [ ] Hospice    Discharge Diagnoses:  failure to thrive  functional quadriplegia  aspiration pneumonia of RLL  Paroxysmal atrial fibrillation.   Secondary hypercoagulable state  seizure disorder

## 2025-06-11 NOTE — DIETITIAN INITIAL EVALUATION ADULT - PERTINENT MEDS FT
MEDICATIONS  (STANDING):  atorvastatin 40 milliGRAM(s) Oral at bedtime  chlorhexidine 2% Cloths 1 Application(s) Topical <User Schedule>  enoxaparin Injectable 50 milliGRAM(s) SubCutaneous every 12 hours  levETIRAcetam   Injectable 500 milliGRAM(s) IV Push every 12 hours  pantoprazole  Injectable 40 milliGRAM(s) IV Push daily  polyethylene glycol 3350 17 Gram(s) Oral two times a day  senna 2 Tablet(s) Oral at bedtime  sodium chloride 0.9%. 1000 milliLiter(s) (75 mL/Hr) IV Continuous <Continuous>    MEDICATIONS  (PRN):

## 2025-06-11 NOTE — DIETITIAN INITIAL EVALUATION ADULT - PERTINENT LABORATORY DATA
06-11    136  |  101  |  16  ----------------------------<  93  3.7   |  21[L]  |  0.75    Ca    8.7      11 Jun 2025 06:30  Phos  2.7     06-11  Mg     2.10     06-11

## 2025-06-11 NOTE — DIETITIAN INITIAL EVALUATION ADULT - REASON FOR ADMISSION
Per chart review, 89-year-old male with history of Parkinson's with dementia (nonverbal at baseline), Afib on Eliquis, prior PE, prior CVA, seizure disorder, prostate cancer s/p remote resection with recent admission at [facility] 6/4-6/7 for aspiration pneumonia now presents with pain and somnolence found to have stercoral colitis with likely progression of dementia +/- acute metabolic encephalopathy.

## 2025-06-11 NOTE — DISCHARGE NOTE PROVIDER - NSDCFUADDAPPT_GEN_ALL_CORE_FT
Follow up with pcp - Elevated Thyroid stimulating hormone level, free t4 was normal, can repeat in 1 week at the LTC facility.

## 2025-06-11 NOTE — EEG REPORT - NS EEG TEXT BOX
TENNILLE MAHMOOD [id number]     Study Date: 06-10-25 08:00 to 06-11-25 09:29  Duration: 25 hours 28 minutes    --------------------------------------------------------------------------------------------------  History:  CC/ HPI Patient is a 89y old  Male who presents with a chief complaint of pneumonia, encephalopathy, acute hypoxic respiratory failure (09 Jun 2025 15:58)    MEDICATIONS  (STANDING):  atorvastatin 40 milliGRAM(s) Oral at bedtime  cefTRIAXone   IVPB 1000 milliGRAM(s) IV Intermittent <User Schedule>  enoxaparin Injectable 50 milliGRAM(s) SubCutaneous every 12 hours  levETIRAcetam   Injectable 500 milliGRAM(s) IV Push every 12 hours  pantoprazole  Injectable 40 milliGRAM(s) IV Push daily  polyethylene glycol 3350 17 Gram(s) Oral two times a day  senna 2 Tablet(s) Oral at bedtime  sodium chloride 0.9%. 1000 milliLiter(s) (75 mL/Hr) IV Continuous <Continuous>    --------------------------------------------------------------------------------------------------  Study Interpretation:    [[[Abbreviation Key:  PDR=alpha rhythm/posterior dominant rhythm. A-P=anterior posterior gradient.  Amplitude: ‘very low’:<20; ‘low’:20-50; ‘medium’:; ‘high’:>200uV.  Persistence for periodic/rhythmic patterns (% of epoch) ‘rare’:<1%; ‘occasional’:1-10%; ‘frequent’:10-50%; ‘abundant’:50-90%; ‘continuous’:>90%.  Persistence for sporadic discharges: ‘rare’:<1/hr; ‘occasional’:1/min-1/hr; ‘frequent’:>1/min; ‘abundant’:>1/10 sec.  GRDA=generalized rhythmic delta activity; FIRDA=frontal intermittent GRDA; LRDA=lateralized rhythmic delta activity; TIRDA=temporal intermittent rhythmic delta activity;  LPD=PLED=lateralized periodic discharges; GPD=generalized periodic discharges; BiPDs=BiPLEDs=bilateral independent periodic epileptiform discharges; SIRPID=stimulus induced rhythmic, periodic, or ictal appearing discharges; BIRDs=brief potentially ictal rhythmic discharges >4 Hz, lasting .5-10s; PFA=paroxysmal bursts of beta/gamma; LVFA=low voltage fast activity.  Modifiers: +F=with fast component; +S=with spike component; +R=with rhythmic component.  S-B=burst suppression pattern.  Max=maximal. N1-drowsy; N2-stage II sleep; N3-slow wave sleep. SSS/BETS=small sharp spikes/benign epileptiform transients of sleep. HV=hyperventilation; PS=photic stimulation]]]    FINDINGS:      Background:  Continuity: Continuous  Symmetry: Symmetric  PDR: 7.5-8 Hz, reactive to eye closure  Voltage: Normal  Anterior Posterior Gradient: Present, low-amplitude frontal beta  Other background findings: None  Breach: Absent    Background Slowing:  Generalized slowing: As above  Focal slowing: None    State Changes:   Drowsiness is characterized by slowing of the background activity.  Stage 2 sleep is characterized by symmetric K complexes.    Interictal Findings:  None    Electrographic and Electroclinical seizures:  None    Other Clinical Events:  None    Activation Procedures:   -Hyperventilation was not performed.     -Photic stimulation was performed and did not elicit any abnormalities.       Artifacts:  Intermittent myogenic and movement artifacts    Single-lead EKG: Regular rhythm    EEG Classification / Summary:  Abnormal EEG study in the awake / drowsy / asleep states  -Mild diffuse slowing  -No focal or epileptiform abnormalities  -No electrographic seizures    Clinical Impression:  -Mild diffuse cerebral dysfunction is nonspecific in etiology.  -No epileptiform abnormalities or seizures captured.     -------------------------------------------------------------------------------------------------------  EEG reading room: [phone]  After-hours epilepsy service: [phone]      Thelma Pruitt MD  Attending Physician, [facility] [place]

## 2025-06-11 NOTE — DISCHARGE NOTE PROVIDER - NSDCMRMEDTOKEN_GEN_ALL_CORE_FT
atorvastatin 40 mg oral tablet: 1 tab(s) orally once a day  Eliquis 2.5 mg oral tablet: 1 tab(s) orally 2 times a day  levETIRAcetam 500 mg oral tablet: 1 tab(s) orally 2 times a day  pantoprazole 40 mg oral delayed release tablet: 1 tab(s) orally once a day   atorvastatin 40 mg oral tablet: 1 tab(s) orally once a day  Eliquis 2.5 mg oral tablet: 1 tab(s) orally 2 times a day  levETIRAcetam 500 mg oral tablet: 1 tab(s) orally 2 times a day  pantoprazole 40 mg oral delayed release tablet: 1 tab(s) orally once a day  polyethylene glycol 3350 oral powder for reconstitution: 17 gram(s) orally 2 times a day  senna leaf extract oral tablet: 2 tab(s) orally once a day (at bedtime)

## 2025-06-12 PROCEDURE — 99232 SBSQ HOSP IP/OBS MODERATE 35: CPT

## 2025-06-12 RX ADMIN — ATORVASTATIN CALCIUM 40 MILLIGRAM(S): 80 TABLET, FILM COATED ORAL at 21:46

## 2025-06-12 RX ADMIN — POLYETHYLENE GLYCOL 3350 17 GRAM(S): 17 POWDER, FOR SOLUTION ORAL at 17:28

## 2025-06-12 RX ADMIN — Medication 1 APPLICATION(S): at 06:27

## 2025-06-12 RX ADMIN — ENOXAPARIN SODIUM 50 MILLIGRAM(S): 100 INJECTION SUBCUTANEOUS at 06:19

## 2025-06-12 RX ADMIN — ENOXAPARIN SODIUM 50 MILLIGRAM(S): 100 INJECTION SUBCUTANEOUS at 17:28

## 2025-06-12 RX ADMIN — LEVETIRACETAM 500 MILLIGRAM(S): 10 INJECTION, SOLUTION INTRAVENOUS at 17:29

## 2025-06-12 RX ADMIN — POLYETHYLENE GLYCOL 3350 17 GRAM(S): 17 POWDER, FOR SOLUTION ORAL at 06:19

## 2025-06-12 RX ADMIN — Medication 2 TABLET(S): at 21:46

## 2025-06-12 RX ADMIN — LEVETIRACETAM 500 MILLIGRAM(S): 10 INJECTION, SOLUTION INTRAVENOUS at 06:20

## 2025-06-12 RX ADMIN — Medication 40 MILLIGRAM(S): at 12:32

## 2025-06-12 NOTE — PROGRESS NOTE ADULT - PROBLEM SELECTOR PLAN 8
DVT ppx: Eliquis   Dispo: pending clinical course
Diet: passed speech and swallow - pureed with mildly thick liquids  DVT ppx: home full anticoagulation as above   Dispo: pending clinical course, PT eval
Diet: passed speech and swallow - pureed with mildly thick liquids  DVT ppx: home full anticoagulation as above   Dispo: pending clinical course
Diet: passed speech and swallow - pureed with mildly thick liquids  DVT ppx: home full anticoagulation as above   Dispo: pending clinical course
no chest pain and no edema.
Diet: passed speech and swallow - pureed with mildly thick liquids  DVT ppx: home full anticoagulation as above   Dispo: pending clinical course

## 2025-06-13 LAB
ANION GAP SERPL CALC-SCNC: 12 MMOL/L — SIGNIFICANT CHANGE UP (ref 7–14)
BUN SERPL-MCNC: 24 MG/DL — HIGH (ref 7–23)
CALCIUM SERPL-MCNC: 9 MG/DL — SIGNIFICANT CHANGE UP (ref 8.4–10.5)
CHLORIDE SERPL-SCNC: 105 MMOL/L — SIGNIFICANT CHANGE UP (ref 98–107)
CO2 SERPL-SCNC: 22 MMOL/L — SIGNIFICANT CHANGE UP (ref 22–31)
CREAT SERPL-MCNC: 0.91 MG/DL — SIGNIFICANT CHANGE UP (ref 0.5–1.3)
CULTURE RESULTS: SIGNIFICANT CHANGE UP
CULTURE RESULTS: SIGNIFICANT CHANGE UP
EGFR: 81 ML/MIN/1.73M2 — SIGNIFICANT CHANGE UP
EGFR: 81 ML/MIN/1.73M2 — SIGNIFICANT CHANGE UP
GLUCOSE SERPL-MCNC: 107 MG/DL — HIGH (ref 70–99)
HCT VFR BLD CALC: 39.2 % — SIGNIFICANT CHANGE UP (ref 39–50)
HGB BLD-MCNC: 12.8 G/DL — LOW (ref 13–17)
MAGNESIUM SERPL-MCNC: 2.2 MG/DL — SIGNIFICANT CHANGE UP (ref 1.6–2.6)
MCHC RBC-ENTMCNC: 30.5 PG — SIGNIFICANT CHANGE UP (ref 27–34)
MCHC RBC-ENTMCNC: 32.7 G/DL — SIGNIFICANT CHANGE UP (ref 32–36)
MCV RBC AUTO: 93.6 FL — SIGNIFICANT CHANGE UP (ref 80–100)
NRBC # BLD AUTO: 0 K/UL — SIGNIFICANT CHANGE UP (ref 0–0)
NRBC # FLD: 0 K/UL — SIGNIFICANT CHANGE UP (ref 0–0)
NRBC BLD AUTO-RTO: 0 /100 WBCS — SIGNIFICANT CHANGE UP (ref 0–0)
PHOSPHATE SERPL-MCNC: 3.2 MG/DL — SIGNIFICANT CHANGE UP (ref 2.5–4.5)
PLATELET # BLD AUTO: 209 K/UL — SIGNIFICANT CHANGE UP (ref 150–400)
POTASSIUM SERPL-MCNC: 3.9 MMOL/L — SIGNIFICANT CHANGE UP (ref 3.5–5.3)
POTASSIUM SERPL-SCNC: 3.9 MMOL/L — SIGNIFICANT CHANGE UP (ref 3.5–5.3)
RBC # BLD: 4.19 M/UL — LOW (ref 4.2–5.8)
RBC # FLD: 13.7 % — SIGNIFICANT CHANGE UP (ref 10.3–14.5)
SODIUM SERPL-SCNC: 139 MMOL/L — SIGNIFICANT CHANGE UP (ref 135–145)
SPECIMEN SOURCE: SIGNIFICANT CHANGE UP
SPECIMEN SOURCE: SIGNIFICANT CHANGE UP
WBC # BLD: 5.72 K/UL — SIGNIFICANT CHANGE UP (ref 3.8–10.5)
WBC # FLD AUTO: 5.72 K/UL — SIGNIFICANT CHANGE UP (ref 3.8–10.5)

## 2025-06-13 PROCEDURE — 70551 MRI BRAIN STEM W/O DYE: CPT | Mod: 26

## 2025-06-13 PROCEDURE — 99233 SBSQ HOSP IP/OBS HIGH 50: CPT

## 2025-06-13 RX ADMIN — ENOXAPARIN SODIUM 50 MILLIGRAM(S): 100 INJECTION SUBCUTANEOUS at 05:21

## 2025-06-13 RX ADMIN — LEVETIRACETAM 500 MILLIGRAM(S): 10 INJECTION, SOLUTION INTRAVENOUS at 05:21

## 2025-06-13 RX ADMIN — ENOXAPARIN SODIUM 50 MILLIGRAM(S): 100 INJECTION SUBCUTANEOUS at 17:43

## 2025-06-13 RX ADMIN — POLYETHYLENE GLYCOL 3350 17 GRAM(S): 17 POWDER, FOR SOLUTION ORAL at 05:21

## 2025-06-13 RX ADMIN — Medication 1 APPLICATION(S): at 05:38

## 2025-06-13 RX ADMIN — Medication 2 TABLET(S): at 22:06

## 2025-06-13 RX ADMIN — LEVETIRACETAM 500 MILLIGRAM(S): 10 INJECTION, SOLUTION INTRAVENOUS at 17:43

## 2025-06-13 RX ADMIN — Medication 40 MILLIGRAM(S): at 11:29

## 2025-06-13 RX ADMIN — ATORVASTATIN CALCIUM 40 MILLIGRAM(S): 80 TABLET, FILM COATED ORAL at 22:06

## 2025-06-14 LAB
ANION GAP SERPL CALC-SCNC: 11 MMOL/L — SIGNIFICANT CHANGE UP (ref 7–14)
BUN SERPL-MCNC: 23 MG/DL — SIGNIFICANT CHANGE UP (ref 7–23)
CALCIUM SERPL-MCNC: 9 MG/DL — SIGNIFICANT CHANGE UP (ref 8.4–10.5)
CHLORIDE SERPL-SCNC: 106 MMOL/L — SIGNIFICANT CHANGE UP (ref 98–107)
CO2 SERPL-SCNC: 24 MMOL/L — SIGNIFICANT CHANGE UP (ref 22–31)
CREAT SERPL-MCNC: 0.76 MG/DL — SIGNIFICANT CHANGE UP (ref 0.5–1.3)
EGFR: 86 ML/MIN/1.73M2 — SIGNIFICANT CHANGE UP
EGFR: 86 ML/MIN/1.73M2 — SIGNIFICANT CHANGE UP
GLUCOSE SERPL-MCNC: 103 MG/DL — HIGH (ref 70–99)
MAGNESIUM SERPL-MCNC: 2 MG/DL — SIGNIFICANT CHANGE UP (ref 1.6–2.6)
PHOSPHATE SERPL-MCNC: 3.3 MG/DL — SIGNIFICANT CHANGE UP (ref 2.5–4.5)
POTASSIUM SERPL-MCNC: 3.7 MMOL/L — SIGNIFICANT CHANGE UP (ref 3.5–5.3)
POTASSIUM SERPL-SCNC: 3.7 MMOL/L — SIGNIFICANT CHANGE UP (ref 3.5–5.3)
SODIUM SERPL-SCNC: 141 MMOL/L — SIGNIFICANT CHANGE UP (ref 135–145)

## 2025-06-14 PROCEDURE — 99232 SBSQ HOSP IP/OBS MODERATE 35: CPT

## 2025-06-14 RX ORDER — APIXABAN 2.5 MG/1
2.5 TABLET, FILM COATED ORAL EVERY 12 HOURS
Refills: 0 | Status: DISCONTINUED | OUTPATIENT
Start: 2025-06-15 | End: 2025-06-18

## 2025-06-14 RX ADMIN — LEVETIRACETAM 500 MILLIGRAM(S): 10 INJECTION, SOLUTION INTRAVENOUS at 17:38

## 2025-06-14 RX ADMIN — Medication 2 TABLET(S): at 21:03

## 2025-06-14 RX ADMIN — LEVETIRACETAM 500 MILLIGRAM(S): 10 INJECTION, SOLUTION INTRAVENOUS at 05:08

## 2025-06-14 RX ADMIN — ENOXAPARIN SODIUM 50 MILLIGRAM(S): 100 INJECTION SUBCUTANEOUS at 05:08

## 2025-06-14 RX ADMIN — ATORVASTATIN CALCIUM 40 MILLIGRAM(S): 80 TABLET, FILM COATED ORAL at 21:02

## 2025-06-14 RX ADMIN — Medication 40 MILLIGRAM(S): at 17:40

## 2025-06-14 RX ADMIN — POLYETHYLENE GLYCOL 3350 17 GRAM(S): 17 POWDER, FOR SOLUTION ORAL at 05:10

## 2025-06-14 RX ADMIN — Medication 1 APPLICATION(S): at 05:10

## 2025-06-14 RX ADMIN — ENOXAPARIN SODIUM 50 MILLIGRAM(S): 100 INJECTION SUBCUTANEOUS at 17:39

## 2025-06-15 PROCEDURE — 99233 SBSQ HOSP IP/OBS HIGH 50: CPT

## 2025-06-15 RX ORDER — LEVETIRACETAM 10 MG/ML
500 INJECTION, SOLUTION INTRAVENOUS
Refills: 0 | Status: DISCONTINUED | OUTPATIENT
Start: 2025-06-15 | End: 2025-06-18

## 2025-06-15 RX ADMIN — ATORVASTATIN CALCIUM 40 MILLIGRAM(S): 80 TABLET, FILM COATED ORAL at 21:50

## 2025-06-15 RX ADMIN — APIXABAN 2.5 MILLIGRAM(S): 2.5 TABLET, FILM COATED ORAL at 05:18

## 2025-06-15 RX ADMIN — LEVETIRACETAM 500 MILLIGRAM(S): 10 INJECTION, SOLUTION INTRAVENOUS at 18:24

## 2025-06-15 RX ADMIN — POLYETHYLENE GLYCOL 3350 17 GRAM(S): 17 POWDER, FOR SOLUTION ORAL at 18:24

## 2025-06-15 RX ADMIN — Medication 2 TABLET(S): at 21:50

## 2025-06-15 RX ADMIN — POLYETHYLENE GLYCOL 3350 17 GRAM(S): 17 POWDER, FOR SOLUTION ORAL at 05:18

## 2025-06-15 RX ADMIN — LEVETIRACETAM 500 MILLIGRAM(S): 10 INJECTION, SOLUTION INTRAVENOUS at 05:18

## 2025-06-15 RX ADMIN — APIXABAN 2.5 MILLIGRAM(S): 2.5 TABLET, FILM COATED ORAL at 18:24

## 2025-06-15 RX ADMIN — Medication 1 APPLICATION(S): at 05:18

## 2025-06-15 RX ADMIN — Medication 40 MILLIGRAM(S): at 11:59

## 2025-06-15 NOTE — PROGRESS NOTE ADULT - TIME BILLING
Time-based billing (NON-critical care).     More than 50% of the visit was spent counseling and / or coordinating care by the attending physician.      The necessity of the time spent during the encounter on this date of service was due to: documentation in [place], reviewing chart and coordinating care with patient/ACPs and interdisciplinary staff (such as , social workers, etc) as well as reviewing vitals, laboratory data, radiology, medication list, and prior records. Interventions were performed as documented above.
Time-based billing (NON-critical care).     More than 50% of the visit was spent counseling and / or coordinating care by the attending physician.      The necessity of the time spent during the encounter on this date of service was due to: documentation in [place], reviewing chart and coordinating care with patient/ACPs and interdisciplinary staff (such as , social workers, etc) as well as reviewing vitals, laboratory data, radiology, medication list, and prior records. Interventions were performed as documented above.
Time-based billing (NON-critical care).     More than 50% of the visit was spent counseling and / or coordinating care by the attending physician.      The necessity of the time spent during the encounter on this date of service was due to: documentation in [place], reviewing chart and coordinating care with patient/ACPs and interdisciplinary staff (such as , social workers, etc) as well as reviewing vitals, laboratory data, radiology, medication list, and prior records. Interventions were performed as documented above.
Time-based billing (NON-critical care).     More than 50% of the visit was spent counseling and / or coordinating care by the attending physician.      The necessity of the time spent during the encounter on this date of service was due to: documentation in [place], reviewing chart and coordinating care with patient/ACPs and interdisciplinary staff (such as , social workers, etc) as well as reviewing vitals, laboratory data, radiology, medication list, and prior records. Interventions were performed as documented above.
Time-based billing (NON-critical care).     More than 50% of the visit was spent counseling and / or coordinating care by the attending physician.      The necessity of the time spent during the encounter on this date of service was due to: documentation in [place], reviewing chart and coordinating care with patient/ACPs and interdisciplinary staff (such as , social workers, etc) as well as reviewing vitals, laboratory data, radiology, medication list, and prior records. Interventions were performed as documented above.
Time-based billing (NON-critical care).     More than 50% of the visit was spent counseling and / or coordinating care by the attending physician.      The necessity of the time spent during the encounter on this date of service was due to: documentation in [place], reviewing chart and coordinating care with patient/ACPs and interdisciplinary staff (such as , social workers, etc) as well as reviewing vitals, laboratory data, radiology, medication list, and prior records. Interventions were performed as documented above.
Time-based billing (NON-critical care).     More than 50% of the visit was spent counseling and / or coordinating care by the attending physician.      The necessity of the time spent during the encounter on this date of service was due to: documentation in [place], reviewing chart and coordinating care with patient/ACPs and interdisciplinary staff (such as , social workers, etc) as well as reviewing vitals, laboratory data, radiology, medication list, and prior records. Interventions were performed as documented above.

## 2025-06-16 LAB
ADD ON TEST-SPECIMEN IN LAB: SIGNIFICANT CHANGE UP
FOLATE SERPL-MCNC: 9.3 NG/ML — SIGNIFICANT CHANGE UP (ref 3.1–17.5)
T3 SERPL-MCNC: 96 NG/DL — SIGNIFICANT CHANGE UP (ref 80–200)
T4 FREE SERPL-MCNC: 1.2 NG/DL — SIGNIFICANT CHANGE UP (ref 0.9–1.8)
TSH SERPL-MCNC: 8.38 UIU/ML — HIGH (ref 0.27–4.2)
VIT B12 SERPL-MCNC: 704 PG/ML — SIGNIFICANT CHANGE UP (ref 200–900)

## 2025-06-16 PROCEDURE — 99232 SBSQ HOSP IP/OBS MODERATE 35: CPT

## 2025-06-16 RX ADMIN — Medication 1 APPLICATION(S): at 05:04

## 2025-06-16 RX ADMIN — LEVETIRACETAM 500 MILLIGRAM(S): 10 INJECTION, SOLUTION INTRAVENOUS at 17:28

## 2025-06-16 RX ADMIN — APIXABAN 2.5 MILLIGRAM(S): 2.5 TABLET, FILM COATED ORAL at 05:04

## 2025-06-16 RX ADMIN — Medication 40 MILLIGRAM(S): at 05:03

## 2025-06-16 RX ADMIN — ATORVASTATIN CALCIUM 40 MILLIGRAM(S): 80 TABLET, FILM COATED ORAL at 22:02

## 2025-06-16 RX ADMIN — LEVETIRACETAM 500 MILLIGRAM(S): 10 INJECTION, SOLUTION INTRAVENOUS at 05:04

## 2025-06-16 RX ADMIN — POLYETHYLENE GLYCOL 3350 17 GRAM(S): 17 POWDER, FOR SOLUTION ORAL at 17:28

## 2025-06-16 RX ADMIN — Medication 2 TABLET(S): at 22:02

## 2025-06-16 RX ADMIN — POLYETHYLENE GLYCOL 3350 17 GRAM(S): 17 POWDER, FOR SOLUTION ORAL at 05:04

## 2025-06-16 RX ADMIN — APIXABAN 2.5 MILLIGRAM(S): 2.5 TABLET, FILM COATED ORAL at 17:28

## 2025-06-17 LAB
T3 SERPL-MCNC: 102 NG/DL — SIGNIFICANT CHANGE UP (ref 80–200)
T4 FREE SERPL-MCNC: 1 NG/DL — SIGNIFICANT CHANGE UP (ref 0.9–1.8)
TSH SERPL-MCNC: 9.34 UIU/ML — HIGH (ref 0.27–4.2)

## 2025-06-17 PROCEDURE — 99232 SBSQ HOSP IP/OBS MODERATE 35: CPT

## 2025-06-17 RX ADMIN — Medication 40 MILLIGRAM(S): at 05:19

## 2025-06-17 RX ADMIN — POLYETHYLENE GLYCOL 3350 17 GRAM(S): 17 POWDER, FOR SOLUTION ORAL at 18:13

## 2025-06-17 RX ADMIN — LEVETIRACETAM 500 MILLIGRAM(S): 10 INJECTION, SOLUTION INTRAVENOUS at 18:13

## 2025-06-17 RX ADMIN — Medication 2 TABLET(S): at 21:34

## 2025-06-17 RX ADMIN — APIXABAN 2.5 MILLIGRAM(S): 2.5 TABLET, FILM COATED ORAL at 18:13

## 2025-06-17 RX ADMIN — Medication 1 APPLICATION(S): at 05:18

## 2025-06-17 RX ADMIN — POLYETHYLENE GLYCOL 3350 17 GRAM(S): 17 POWDER, FOR SOLUTION ORAL at 05:19

## 2025-06-17 RX ADMIN — APIXABAN 2.5 MILLIGRAM(S): 2.5 TABLET, FILM COATED ORAL at 05:19

## 2025-06-17 RX ADMIN — LEVETIRACETAM 500 MILLIGRAM(S): 10 INJECTION, SOLUTION INTRAVENOUS at 05:19

## 2025-06-17 RX ADMIN — ATORVASTATIN CALCIUM 40 MILLIGRAM(S): 80 TABLET, FILM COATED ORAL at 21:35

## 2025-06-18 ENCOUNTER — TRANSCRIPTION ENCOUNTER (OUTPATIENT)
Age: 89
End: 2025-06-18

## 2025-06-18 VITALS
OXYGEN SATURATION: 100 % | DIASTOLIC BLOOD PRESSURE: 90 MMHG | RESPIRATION RATE: 18 BRPM | HEART RATE: 90 BPM | SYSTOLIC BLOOD PRESSURE: 150 MMHG | TEMPERATURE: 98 F

## 2025-06-18 PROCEDURE — 99239 HOSP IP/OBS DSCHRG MGMT >30: CPT

## 2025-06-18 RX ORDER — POLYETHYLENE GLYCOL 3350 17 G/17G
17 POWDER, FOR SOLUTION ORAL
Qty: 0 | Refills: 0 | DISCHARGE
Start: 2025-06-18

## 2025-06-18 RX ORDER — SENNA 187 MG
2 TABLET ORAL
Qty: 0 | Refills: 0 | DISCHARGE
Start: 2025-06-18

## 2025-06-18 RX ORDER — IPRATROPIUM BROMIDE AND ALBUTEROL SULFATE .5; 2.5 MG/3ML; MG/3ML
3 SOLUTION RESPIRATORY (INHALATION) ONCE
Refills: 0 | Status: COMPLETED | OUTPATIENT
Start: 2025-06-18 | End: 2025-06-18

## 2025-06-18 RX ADMIN — Medication 1 APPLICATION(S): at 06:11

## 2025-06-18 RX ADMIN — POLYETHYLENE GLYCOL 3350 17 GRAM(S): 17 POWDER, FOR SOLUTION ORAL at 17:27

## 2025-06-18 RX ADMIN — Medication 40 MILLIGRAM(S): at 06:11

## 2025-06-18 RX ADMIN — IPRATROPIUM BROMIDE AND ALBUTEROL SULFATE 3 MILLILITER(S): .5; 2.5 SOLUTION RESPIRATORY (INHALATION) at 03:52

## 2025-06-18 RX ADMIN — LEVETIRACETAM 500 MILLIGRAM(S): 10 INJECTION, SOLUTION INTRAVENOUS at 06:11

## 2025-06-18 RX ADMIN — LEVETIRACETAM 500 MILLIGRAM(S): 10 INJECTION, SOLUTION INTRAVENOUS at 17:26

## 2025-06-18 RX ADMIN — APIXABAN 2.5 MILLIGRAM(S): 2.5 TABLET, FILM COATED ORAL at 17:27

## 2025-06-18 RX ADMIN — POLYETHYLENE GLYCOL 3350 17 GRAM(S): 17 POWDER, FOR SOLUTION ORAL at 06:16

## 2025-06-18 RX ADMIN — APIXABAN 2.5 MILLIGRAM(S): 2.5 TABLET, FILM COATED ORAL at 06:11

## 2025-06-18 NOTE — PROGRESS NOTE ADULT - ASSESSMENT
89M with history of dementia (nonverbal at baseline), Afib on Eliquis, prior PE, prior CVA, seizure disorder, prostate cancer s/p remote resection with recent admission at [facility] 6/4-6/7 for aspiration pneumonia now presents with pain and somnolence found to have stercoral colitis with poor functional status with no acute changes on MRI, likely progression of dementia pending dispo.  
89M with history of dementia (nonverbal at baseline), Afib on Eliquis, prior PE, prior CVA, seizure disorder, prostate cancer s/p remote resection with recent admission at [facility] 6/4-6/7 for aspiration pneumonia now presents with pain and somnolence found to have stercoral colitis with likely progression of dementia +/- acute metabolic encephalopathy.  
89M with history of dementia (nonverbal at baseline), Afib on Eliquis, prior PE, prior CVA, seizure disorder, prostate cancer s/p remote resection with recent admission at [facility] 6/4-6/7 for aspiration pneumonia now presents with pain and somnolence found to have stercoral colitis with poor functional status with no acute changes on MRI, likely progression of dementia pending dispo.  
89M with history of dementia (nonverbal at baseline), Afib on Eliquis, prior PE, prior CVA, seizure disorder, prostate cancer s/p remote resection with recent admission at [facility] 6/4-6/7 for aspiration pneumonia now presents with pain and somnolence found to have stercoral colitis with likely progression of dementia +/- acute metabolic encephalopathy.  
89M with history of dementia (nonverbal at baseline), Afib on Eliquis, prior PE, prior CVA, seizure disorder, prostate cancer s/p remote resection with recent admission at [facility] 6/4-6/7 for aspiration pneumonia now presents with pain and somnolence found to have stercoral colitis with poor functional status with no acute changes on MRI, likely progression of dementia pending dispo.  
89M with history of dementia (nonverbal at baseline), Afib on Eliquis, prior PE, prior CVA, seizure disorder, prostate cancer s/p remote resection with recent admission at [facility] 6/4-6/7 for aspiration pneumonia now presents with pain and somnolence found to have stercoral colitis with poor functional status with no acute changes on MRI, likely progression of dementia pending dispo.  
89M with history of Parkinson's (nonverbal at baseline), Afib on Eliquis, prior PE, prior CVA, seizure disorder, prostate cancer s/p remote resection with [facility] discharge 6/7 BIBEMS for unresponsiveness and hypotension at home, now awake and alert with encephalopathy and intermittent episodes of hypoxia. 
89M with history of dementia (nonverbal at baseline), Afib on Eliquis, prior PE, prior CVA, seizure disorder, prostate cancer s/p remote resection with recent admission at [facility] 6/4-6/7 for aspiration pneumonia now presents with pain and somnolence found to have stercoral colitis with likely progression of dementia +/- acute metabolic encephalopathy.  
89M with history of Parkinson's with dementia (nonverbal at baseline), Afib on Eliquis, prior PE, prior CVA, seizure disorder, prostate cancer s/p remote resection with recent admission at [facility] 6/4-6/7 for aspiration pneumonia now presents with pain and somnolence found to have stercoral colitis with likely progression of dementia +/- acute metabolic encephalopathy.  
89M with history of Parkinson's with dementia (nonverbal at baseline), Afib on Eliquis, prior PE, prior CVA, seizure disorder, prostate cancer s/p remote resection with recent admission at [facility] 6/4-6/7 for aspiration pneumonia now presents with pain and somnolence found to have stercoral colitis with likely progression of dementia +/- acute metabolic encephalopathy.  
89M with history of dementia (nonverbal at baseline), Afib on Eliquis, prior PE, prior CVA, seizure disorder, prostate cancer s/p remote resection with recent admission at [facility] 6/4-6/7 for aspiration pneumonia now presents with pain and somnolence found to have stercoral colitis with likely progression of dementia +/- acute metabolic encephalopathy.

## 2025-06-18 NOTE — DISCHARGE NOTE NURSING/CASE MANAGEMENT/SOCIAL WORK - NSDCPEFALRISK_GEN_ALL_CORE
For information on Fall & Injury Prevention, visit: [email or web address] OR  [email or web address] OR  https://www.cdc.gov/steadi/patient.html

## 2025-06-18 NOTE — PROGRESS NOTE ADULT - PROBLEM SELECTOR PROBLEM 7
Medication management
Prophylactic measure
Prophylactic measure
Medication management
Prophylactic measure
Prophylactic measure
Dysphagia
Prophylactic measure
Medication management
Medication management
Prophylactic measure

## 2025-06-18 NOTE — PROGRESS NOTE ADULT - REASON FOR ADMISSION
pneumonia, encephalopathy, acute hypoxic respiratory failure

## 2025-06-18 NOTE — DISCHARGE NOTE NURSING/CASE MANAGEMENT/SOCIAL WORK - FINANCIAL ASSISTANCE
[facility] provides services at a reduced cost to those who are determined to be eligible through [facility]’s financial assistance program. Information regarding [facility]’s financial assistance program can be found by going to [email or web address] or by calling [phone].

## 2025-06-18 NOTE — PROGRESS NOTE ADULT - NUTRITIONAL ASSESSMENT
This patient has been assessed with a concern for Malnutrition and has been determined to have a diagnosis/diagnoses of Severe protein-calorie malnutrition and Underweight (BMI < 19).    This patient is being managed with:   Diet Pureed-  Mildly Thick Liquids (MILDTHICKLIQS)  Entered: Jun 9 2025  3:46PM  

## 2025-06-18 NOTE — DISCHARGE NOTE NURSING/CASE MANAGEMENT/SOCIAL WORK - PATIENT PORTAL LINK FT
You can access the FollowMyHealth Patient Portal offered by [facility] by registering at the following website: [email or web address]. By joining [employer]’s FollowMyHealth portal, you will also be able to view your health information using other applications (apps) compatible with our system.

## 2025-06-18 NOTE — PROGRESS NOTE ADULT - PROBLEM SELECTOR PLAN 3
had episode of unresponsiveness at home found to be hypotensive by EMS. Has been normotensive here. Received IVF in ED, unclear if received IVF by EMS. unclear etiology but resolved now, rest of VS also unremarkable, lactate negative, labs otherwise unremarkable.  - continue to monitor
had episode of unresponsiveness at home found to be hypotensive by EMS. Has been normotensive here. Received IVF in ED, unclear if received IVF by EMS. unclear etiology but resolved now, rest of VS also unremarkable, lactate negative, labs otherwise unremarkable.  - continue to monitor
had episode of unresponsiveness at home found to be hypotensive by EMS. Has been normotensive here. Received IVF in ED, unclear if received IVF by EMS. unclear etiology but resolved now, rest of VS also unremarkable, lactate negative, labs otherwise unremarkable.  - continue to monitor  -BP ok for now
had episode of unresponsiveness at home found to be hypotensive by EMS. Has been normotensive here. Received IVF in ED, unclear if received IVF by EMS. unclear etiology but resolved now, rest of VS also unremarkable, lactate negative, labs otherwise unremarkable.  - continue to monitor
had episode of unresponsiveness at home found to be hypotensive by EMS. Has been normotensive here. Received IVF in ED, unclear if received IVF by EMS. unclear etiology but resolved now, rest of VS also unremarkable, lactate negative, labs otherwise unremarkable.  - continue to monitor
had episode of unresponsiveness at home found to be hypotensive by EMS, was normotensive here  - unclear etiology but possible hypovolemia vs vasovagal, resolved now   - s/p IVF     - lactate negative   - continue to monitor
had episode of unresponsiveness at home found to be hypotensive by EMS. Has been normotensive here. Received IVF in ED, unclear if received IVF by EMS. unclear etiology but resolved now, rest of VS also unremarkable, lactate negative, labs otherwise unremarkable.  - continue to monitor
had episode of unresponsiveness at home found to be hypotensive by EMS. Has been normotensive here. Received IVF in ED, unclear if received IVF by EMS. unclear etiology but resolved now, rest of VS also unremarkable, lactate negative, labs otherwise unremarkable.  - continue to monitor  -BP ok for now
had episode of unresponsiveness at home found to be hypotensive by EMS. Has been normotensive here. Received IVF in ED, unclear if received IVF by EMS. unclear etiology but resolved now, rest of VS also unremarkable, lactate negative, labs otherwise unremarkable.  - continue to monitor  -BP ok for now
had episode of unresponsiveness at home found to be hypotensive by EMS. Has been normotensive here. Received IVF in ED, unclear if received IVF by EMS. unclear etiology but resolved now, rest of VS also unremarkable, lactate negative, labs otherwise unremarkable.  - continue to monitor
had episode of unresponsiveness at home found to be hypotensive by EMS. Has been normotensive here. Received IVF in ED, unclear if received IVF by EMS. unclear etiology but resolved now, rest of VS also unremarkable, lactate negative, labs otherwise unremarkable.  - continue to monitor

## 2025-06-18 NOTE — PROGRESS NOTE ADULT - PROBLEM SELECTOR PROBLEM 1
Metabolic encephalopathy
Metabolic encephalopathy
Syncope
Metabolic encephalopathy

## 2025-06-18 NOTE — PROGRESS NOTE ADULT - PROBLEM SELECTOR PLAN 1
Prior team Discussed with pt's sons. Pt has had slow cognitive decline. However, 2 weeks ago had a seizure for which he was admitted to the hospital and discharged on keppra. Denies brain imaging at that time. Pt then was coughing a lot and was brought back to the hospital and was found to have aspiration pneumonia. Son is concerned that patient has not returned back to baseline since seizure and pneumonia. Son reports that patient is sleeping a lot and needs to be woken up in order to get feedings.  - CTA CAP showing R pneumonia and stercoral colitis   - unclear if this is progression of dementia or metabolic encephalopathy i/s/o aspiration pneumonia and stercoral colitis. also unclear of etiology for new seizure  - CT head: No acute hemorrhage, hydrocephalus or extra-axial collection. Chronic infarcts and extensive white matter changes similar to the prior exam.  - EEG without seizure activity  - ammonia negative  - MRI with chronic strokes and microvascular changes  Plan:  - mental status likely 2/2 progression of dementia. Prior team discussed with son Caden. pt not a [facility] candidate as he cannot work with PT. family wants to discuss SNF vs home. also broached the subject of hospice and family is amenable to further discussions after they have discussed as a family.   - treated pneumonia as below  - senna, miralax, stool count
Pt with reported hx of dementia per son. Notes also show Parkinson's Disease, although no sinemet on medrec. Son reports that 2-3 weeks ago pt was feeding himself but coughing a lot. He also reports decline over the past couple of months and pt is now sleeping a lot and needs to be woken up in order to get feedings.  - was being treated for aspiration pneumonia at [facility]  - CTA CAP showing R pneumoina and stercoral colitis   - unclear if this is progression of dementia or metabolic encephalopathy i/s/o aspiration pneumonia and stercoral colitis  - CT head: No acute hemorrhage, hydrocephalus or extra-axial collection. Chronic infarcts and extensive white matter changes similar to the prior exam.  Plan:  - treat pneumonia as below  - senna, miralax, stool count  - discussed GOC with son who said father has never expressed any wishes with regards to resuscitation. will continue to address pending pt's clinical course
Discussed with pt's sons. Pt has had slow cognitive decline. However, 2 weeks ago had a seizure for which he was admitted to the hospital and discharged on keppra. Denies brain imaging at that time. Pt then was coughing a lot and was brought back to the hospital and was found to have aspiration pneumonia. Son is concerned that patient has not returned back to baseline since seizure and pneumonia. Son reports that patient is sleeping a lot and needs to be woken up in order to get feedings.  - CTA CAP showing R pneumonia and stercoral colitis   - unclear if this is progression of dementia or metabolic encephalopathy i/s/o aspiration pneumonia and stercoral colitis. also unclear of etiology for new seizure  - CT head: No acute hemorrhage, hydrocephalus or extra-axial collection. Chronic infarcts and extensive white matter changes similar to the prior exam.  - EEG without seizure activity  - ammonia negative  - MRI with chronic strokes and microvascular changes  Plan:  - mental status likely 2/2 progression of dementia. discussed with son Caden. will discuss dispo options further tomorrow when he comes to the hospital  - treated pneumonia as below  - senna, miralax, stool count
Discussed with pt's sons. Pt has had slow cognitive decline. However, 2 weeks ago had a seizure for which he was admitted to the hospital and discharged on keppra. Denies brain imaging at that time. Pt then was coughing a lot and was brought back to the hospital and was found to have aspiration pneumonia. Son is concerned that patient has not returned back to baseline since seizure and pneumonia. Son reports that patient is sleeping a lot and needs to be woken up in order to get feedings.  - CTA CAP showing R pneumonia and stercoral colitis   - unclear if this is progression of dementia or metabolic encephalopathy i/s/o aspiration pneumonia and stercoral colitis. also unclear of etiology for new seizure  - CT head: No acute hemorrhage, hydrocephalus or extra-axial collection. Chronic infarcts and extensive white matter changes similar to the prior exam.  - EEG without seizure activity  - ammonia negative  Plan:  - treat pneumonia as below  - senna, miralax, stool count  - discussed GOC with son who said father has never expressed any wishes with regards to resuscitation. will continue to address pending pt's clinical course  - f/u MR brain
Prior team Discussed with pt's sons. Pt has had slow cognitive decline. However, 2 weeks ago had a seizure for which he was admitted to the hospital and discharged on keppra. Denies brain imaging at that time. Pt then was coughing a lot and was brought back to the hospital and was found to have aspiration pneumonia. Son is concerned that patient has not returned back to baseline since seizure and pneumonia. Son reports that patient is sleeping a lot and needs to be woken up in order to get feedings.  - CTA CAP showing R pneumonia and stercoral colitis   - unclear if this is progression of dementia or metabolic encephalopathy i/s/o aspiration pneumonia and stercoral colitis. also unclear of etiology for new seizure  - CT head: No acute hemorrhage, hydrocephalus or extra-axial collection. Chronic infarcts and extensive white matter changes similar to the prior exam.  - EEG without seizure activity  - ammonia negative  - MRI with chronic strokes and microvascular changes  Plan:  - mental status likely 2/2 progression of dementia. Prior team discussed with son Caden. pt not a [facility] candidate as he cannot work with PT. family wants to discuss SNF vs home. also broached the subject of hospice and family is amenable to further discussions after they have discussed as a family.   -now waiting for LTC facility  - treated pneumonia as below  - senna, miralax, stool count
Discussed with pt's sons. Pt has had slow cognitive decline. However, 2 weeks ago had a seizure for which he was admitted to the hospital and discharged on keppra. Denies brain imaging at that time. Pt then was coughing a lot and was brought back to the hospital and was found to have aspiration pneumonia. Son is concerned that patient has not returned back to baseline since seizure and pneumonia. Son reports that patient is sleeping a lot and needs to be woken up in order to get feedings.  - CTA CAP showing R pneumonia and stercoral colitis   - unclear if this is progression of dementia or metabolic encephalopathy i/s/o aspiration pneumonia and stercoral colitis. also unclear of etiology for new seizure  - CT head: No acute hemorrhage, hydrocephalus or extra-axial collection. Chronic infarcts and extensive white matter changes similar to the prior exam.  - EEG without seizure activity  - ammonia negative  - MRI with chronic strokes and microvascular changes  Plan:  - mental status likely 2/2 progression of dementia. discussed with son Caden. will discuss dispo options further tomorrow when he comes to the hospital  - treated pneumonia as below  - senna, miralax, stool count
Discussed with pt's sons. Pt has had slow cognitive decline. However, 2 weeks ago had a seizure for which he was admitted to the hospital and discharged on keppra. Denies brain imaging at that time. Pt then was coughing a lot and was brought back to the hospital and was found to have aspiration pneumonia. Son is concerned that patient has not returned back to baseline since seizure and pneumonia. Son reports that patient is sleeping a lot and needs to be woken up in order to get feedings.  - CTA CAP showing R pneumonia and stercoral colitis   - unclear if this is progression of dementia or metabolic encephalopathy i/s/o aspiration pneumonia and stercoral colitis. also unclear of etiology for new seizure  - CT head: No acute hemorrhage, hydrocephalus or extra-axial collection. Chronic infarcts and extensive white matter changes similar to the prior exam.  - EEG without seizure activity  - ammonia negative  - MRI with chronic strokes and microvascular changes  Plan:  - mental status likely 2/2 progression of dementia. discussed with son Caden. pt not a [facility] candidate as he cannot work with PT. family wants to discuss SNF vs home. also broached the subject of hospice and family is amenable to further discussions after they have discussed as a family.   - treated pneumonia as below  - senna, miralax, stool count
Pt with reported hx of dementia per son. Notes also show Parkinson's Disease, although no sinemet on medrec. Son reports that 2-3 weeks ago pt was feeding himself but coughing a lot. He also reports decline over the past couple of months and pt is now sleeping a lot and needs to be woken up in order to get feedings.  - was being treated for aspiration pneumonia at [facility]  - CTA CAP showing R pneumoina and stercoral colitis   - unclear if this is progression of dementia or metabolic encephalopathy i/s/o aspiration pneumonia and stercoral colitis  - CT head: No acute hemorrhage, hydrocephalus or extra-axial collection. Chronic infarcts and extensive white matter changes similar to the prior exam.  Plan:  - treat pneumonia as below  - senna, miralax, stool count  - discussed GOC with son who said father has never expressed any wishes with regards to resuscitation. will continue to address pending pt's clinical course
Unresponsive at home, possible vasovagal syncope   - telemetry monitoring   - check TTE   - check EEG
Prior team Discussed with pt's sons. Pt has had slow cognitive decline. However, 2 weeks ago had a seizure for which he was admitted to the hospital and discharged on keppra. Denies brain imaging at that time. Pt then was coughing a lot and was brought back to the hospital and was found to have aspiration pneumonia. Son is concerned that patient has not returned back to baseline since seizure and pneumonia. Son reports that patient is sleeping a lot and needs to be woken up in order to get feedings.  - CTA CAP showing R pneumonia and stercoral colitis   - unclear if this is progression of dementia or metabolic encephalopathy i/s/o aspiration pneumonia and stercoral colitis. also unclear of etiology for new seizure  - CT head: No acute hemorrhage, hydrocephalus or extra-axial collection. Chronic infarcts and extensive white matter changes similar to the prior exam.  - EEG without seizure activity  - ammonia negative  - MRI with chronic strokes and microvascular changes  Plan:  - mental status likely 2/2 progression of dementia. Prior team discussed with son Caden. pt not a [facility] candidate as he cannot work with PT. family wants to discuss SNF vs home. also broached the subject of hospice and family is amenable to further discussions after they have discussed as a family.   -now waiting for LTC facility  - treated pneumonia as below  - senna, miralax, stool count
Discussed with pt's sons. Pt has had slow cognitive decline. However, 2 weeks ago had a seizure for which he was admitted to the hospital and discharged on keppra. Denies brain imaging at that time. Pt then was coughing a lot and was brought back to the hospital and was found to have aspiration pneumonia. Son is concerned that patient has not returned back to baseline since seizure and pneumonia. Son reports that patient is sleeping a lot and needs to be woken up in order to get feedings.  - CTA CAP showing R pneumonia and stercoral colitis   - unclear if this is progression of dementia or metabolic encephalopathy i/s/o aspiration pneumonia and stercoral colitis. also unclear of etiology for new seizure  - CT head: No acute hemorrhage, hydrocephalus or extra-axial collection. Chronic infarcts and extensive white matter changes similar to the prior exam.  - EEG without seizure activity  - ammonia negative  Plan:  - treat pneumonia as below  - senna, miralax, stool count  - discussed GOC with son who said father has never expressed any wishes with regards to resuscitation. will continue to address pending pt's clinical course  - f/u MR brain

## 2025-06-18 NOTE — PROGRESS NOTE ADULT - PROBLEM SELECTOR PLAN 5
- continue home Eliquis 2.5mg BID

## 2025-06-18 NOTE — PROGRESS NOTE ADULT - PROBLEM SELECTOR PLAN 7
Diet: passed speech and swallow - pureed with mildly thick liquids  DVT ppx: home full anticoagulation as above   Dispo: pending home vs SNF
Diet: passed speech and swallow - pureed with mildly thick liquids  DVT ppx: home full anticoagulation as above   Dispo: pending [facility]
Diet: passed speech and swallow - pureed with mildly thick liquids  DVT ppx: home full anticoagulation as above   Dispo: To LTC    Elevated TSH, free t4 was wnl, can repeat in 1 week at the LTC facility  Bed available at LTC, waiting for auth  Plan discussed with ACP
- SLP: pureed w/ moderate thick liquids   - plan for cinesophagram
pt unable to provide medication list, was unable to reach family overnight. Medred was performed based on medication list from OSH note found on HIE   - f/u with family
Diet: passed speech and swallow - pureed with mildly thick liquids  DVT ppx: home full anticoagulation as above   Dispo: To LTC    Elevated TSH to 8.38, free t4 was wnl on 5/14, can repeat in 1 week at the LTC facility  Bed available at LTC, dc today, dc planning time spent in coordination 40mts ( preparing dc summary and plan)  Plan discussed with ACP
Diet: passed speech and swallow - pureed with mildly thick liquids  DVT ppx: home full anticoagulation as above   Dispo: To LTC    Elevated TSH, free t4 was wnl, can repeat in 1 week at the LTC facility  Bed available at LTC, waiting for auth  Plan discussed with ACP
pt unable to provide medication list, was unable to reach family overnight. Medred was performed based on medication list from OSH note found on HIE   - f/u with family
pt unable to provide medication list, was unable to reach family overnight. Medred was performed based on medication list from OSH note found on HIE   - f/u with family
Diet: passed speech and swallow - pureed with mildly thick liquids  DVT ppx: home full anticoagulation as above   Dispo: pending [facility]
pt unable to provide medication list, was unable to reach family overnight. Medred was performed based on medication list from OSH note found on HIE   - f/u with family

## 2025-06-18 NOTE — PROGRESS NOTE ADULT - PROBLEM SELECTOR PLAN 2
- as above
Possibly d/t recent infection and hypovolemia   - s/p IVF   - antibiotics as below   - CTH w/ no acute findings   - check EEG

## 2025-06-18 NOTE — PROGRESS NOTE ADULT - SUBJECTIVE AND OBJECTIVE BOX
Carlos Vickers MD  Division of Hospitalist Medicine  Pager [phone]  Available on Teams    CC: Patient is a 89y old  Male who presents with a chief complaint of pneumonia, encephalopathy, acute hypoxic respiratory failure (14 Jun 2025 15:47)        SUBJECTIVE / INTERVAL HPI: Patient seen and examined at bedside. Pt is poor historian but denies all complaints.    ROS: negative unless otherwise stated above.    VITAL SIGNS:  Vital Signs Last 24 Hrs  T(C): 36.8 (15 Jeff 2025 12:46), Max: 36.8 (15 Jeff 2025 12:46)  T(F): 98.2 (15 Jeff 2025 12:46), Max: 98.2 (15 Jeff 2025 12:46)  HR: 97 (15 Jeff 2025 12:46) (68 - 97)  BP: 126/70 (15 Jeff 2025 12:46) (102/61 - 126/70)  BP(mean): --  RR: 18 (15 Jeff 2025 12:46) (17 - 18)  SpO2: 98% (15 Jeff 2025 12:46) (97% - 98%)    Parameters below as of 15 Jeff 2025 12:46  Patient On (Oxygen Delivery Method): room air            PHYSICAL EXAM:    General: NAD  HEENT: MMM  Neck: supple  Cardiovascular: +S1/S2; RRR  Respiratory: CTA B/L; no W/R/R  Gastrointestinal: soft, NT/ND  Extremities: WWP; no edema, clubbing or cyanosis  Neurological: somnolent, awakens to tactile stimuli and will answer a question or two but then goes back to sleep; L arm appears contracted; able to squeeze fingers in R hand; not following commands reliably in b/l LEs     MEDICATIONS:  MEDICATIONS  (STANDING):  apixaban 2.5 milliGRAM(s) Oral every 12 hours  atorvastatin 40 milliGRAM(s) Oral at bedtime  chlorhexidine 2% Cloths 1 Application(s) Topical <User Schedule>  levETIRAcetam   Injectable 500 milliGRAM(s) IV Push every 12 hours  pantoprazole  Injectable 40 milliGRAM(s) IV Push daily  polyethylene glycol 3350 17 Gram(s) Oral two times a day  senna 2 Tablet(s) Oral at bedtime  sodium chloride 0.9%. 1000 milliLiter(s) (75 mL/Hr) IV Continuous <Continuous>    MEDICATIONS  (PRN):      ALLERGIES:  Allergies    No Known Allergies    Intolerances        LABS:    06-14    141  |  106  |  23  ----------------------------<  103[H]  3.7   |  24  |  0.76    Ca    9.0      14 Jun 2025 04:15  Phos  3.3     06-14  Mg     2.00     06-14        Urinalysis Basic - ( 14 Jun 2025 04:15 )    Color: x / Appearance: x / SG: x / pH: x  Gluc: 103 mg/dL / Ketone: x  / Bili: x / Urobili: x   Blood: x / Protein: x / Nitrite: x   Leuk Esterase: x / RBC: x / WBC x   Sq Epi: x / Non Sq Epi: x / Bacteria: x      CAPILLARY BLOOD GLUCOSE          RADIOLOGY & ADDITIONAL TESTS: Reviewed.
Patient is a 89y old  Male who presents with a chief complaint of pneumonia, encephalopathy, acute hypoxic respiratory failure (15 Jeff 2025 14:54)      SUBJECTIVE / OVERNIGHT EVENTS: Pt seen and examined at 11:40am, no overnight events, is alert, mumbles, unable to get any history due to his mental status, no other new issues reported.    MEDICATIONS  (STANDING):  apixaban 2.5 milliGRAM(s) Oral every 12 hours  atorvastatin 40 milliGRAM(s) Oral at bedtime  chlorhexidine 2% Cloths 1 Application(s) Topical <User Schedule>  levETIRAcetam 500 milliGRAM(s) Oral two times a day  pantoprazole    Tablet 40 milliGRAM(s) Oral before breakfast  polyethylene glycol 3350 17 Gram(s) Oral two times a day  senna 2 Tablet(s) Oral at bedtime  sodium chloride 0.9%. 1000 milliLiter(s) (75 mL/Hr) IV Continuous <Continuous>    MEDICATIONS  (PRN):      Vital Signs Last 24 Hrs  T(C): 36.4 (16 Jun 2025 12:30), Max: 37.1 (16 Jun 2025 04:45)  T(F): 97.5 (16 Jun 2025 12:30), Max: 98.8 (16 Jun 2025 04:45)  HR: 95 (16 Jun 2025 12:30) (70 - 95)  BP: 100/61 (16 Jun 2025 12:30) (100/61 - 115/62)  BP(mean): --  RR: 17 (16 Jun 2025 12:30) (17 - 18)  SpO2: 100% (16 Jun 2025 12:30) (99% - 100%)    Parameters below as of 16 Jun 2025 12:30  Patient On (Oxygen Delivery Method): room air      CAPILLARY BLOOD GLUCOSE        I&O's Summary    15 Jeff 2025 07:01  -  16 Jun 2025 07:00  --------------------------------------------------------  IN: 300 mL / OUT: 0 mL / NET: 300 mL        PHYSICAL EXAM:  GENERAL: NAD  CHEST/LUNG: Clear to auscultation bilaterally; No wheeze  HEART: Regular rate and rhythm  ABDOMEN: Soft, Nontender, Nondistended  EXTREMITIES: no LE edema, Left arm contracted  PSYCH: calm  NEUROLOGY: Alert, mumbles  SKIN: dry and warm    LABS:                    RADIOLOGY & ADDITIONAL TESTS:    Imaging Personally Reviewed:    Consultant(s) Notes Reviewed:      Care Discussed with Consultants/Other Providers:  
PROGRESS NOTE:     Patient is a 89y old  Male who presents with a chief complaint of pneumonia, encephalopathy, acute hypoxic respiratory failure (2025 03:48)      SUBJECTIVE / OVERNIGHT EVENTS: no acute events.     ADDITIONAL REVIEW OF SYSTEMS:    MEDICATIONS  (STANDING):  atorvastatin 40 milliGRAM(s) Oral at bedtime  enoxaparin Injectable 50 milliGRAM(s) SubCutaneous every 12 hours  levETIRAcetam   Injectable 500 milliGRAM(s) IV Push every 12 hours  pantoprazole  Injectable 40 milliGRAM(s) IV Push daily  sodium chloride 0.9%. 1000 milliLiter(s) (75 mL/Hr) IV Continuous <Continuous>    MEDICATIONS  (PRN):      CAPILLARY BLOOD GLUCOSE        I&O's Summary      PHYSICAL EXAM:  Vital Signs Last 24 Hrs  T(C): 36.4 (2025 12:46), Max: 36.7 (2025 23:00)  T(F): 97.5 (2025 12:46), Max: 98.1 (2025 05:30)  HR: 61 (2025 12:53) (61 - 89)  BP: 139/62 (2025 12:53) (110/57 - 159/80)  BP(mean): --  RR: 16 (2025 12:46) (14 - 19)  SpO2: 100% (2025 12:46) (98% - 100%)    Parameters below as of 2025 12:46  Patient On (Oxygen Delivery Method): room air    General: NAD, lethargic   HEENT: PERRL, no scleral icterus  CV: RRR, normal S1 and S2  Lungs: normal respiratory effort on RA, CTAB  Abd: soft, nontender, nondistended  Ext: contracted ARNULFO, no PE edema, appears well perfused   Psych: AAOx1 (name), calm   Neuro: grossly non-focal, moving all extremities spontaneously   Skin: no rashes or lesions    LABS:                        12.6   8.37  )-----------( 239      ( 2025 20:30 )             37.7     06-07    138  |  103  |  11  ----------------------------<  98  4.7   |  20[L]  |  0.93    Ca    8.5      2025 20:30  Mg     2.20     06-07    TPro  7.1  /  Alb  3.4  /  TBili  0.4  /  DBili  x   /  AST  28  /  ALT  15  /  AlkPhos  78  06-07    PT/INR - ( 2025 20:46 )   PT: 14.0 sec;   INR: 1.21 ratio         PTT - ( 2025 20:46 )  PTT:28.4 sec  CARDIAC MARKERS ( 2025 20:30 )  x     / x     / x     / x     / 3.2 ng/mL      Urinalysis Basic - ( 2025 01:04 )    Color: Yellow / Appearance: Clear / S.051 / pH: x  Gluc: x / Ketone: x  / Bili: Negative / Urobili: 1.0 mg/dL   Blood: x / Protein: Negative mg/dL / Nitrite: Negative   Leuk Esterase: Negative / RBC: x / WBC x   Sq Epi: x / Non Sq Epi: x / Bacteria: x        Urinalysis with Rflx Culture (collected 2025 01:04)        RADIOLOGY & ADDITIONAL TESTS:  Results Reviewed:   Imaging Personally Reviewed:  Electrocardiogram Personally Reviewed:    COORDINATION OF CARE:  Care Discussed with Consultants/Other Providers [Y/N]:  Prior or Outpatient Records Reviewed [Y/N]:  
Carlos Vickers MD  Division of Hospitalist Medicine  Pager [phone]  Available on Teams    CC: Patient is a 89y old  Male who presents with a chief complaint of pneumonia, encephalopathy, acute hypoxic respiratory failure (11 Jun 2025 09:34)        SUBJECTIVE / INTERVAL HPI: Patient seen and examined at bedside. Pt unable to reliably participate in ROS but denies any acute complaints.    ROS: negative unless otherwise stated above.    VITAL SIGNS:  Vital Signs Last 24 Hrs  T(C): 36.6 (11 Jun 2025 08:00), Max: 36.7 (10 Jeff 2025 20:00)  T(F): 97.9 (11 Jun 2025 08:00), Max: 98 (10 Jeff 2025 20:00)  HR: 61 (11 Jun 2025 08:00) (61 - 80)  BP: 111/61 (11 Jun 2025 08:00) (111/61 - 136/84)  BP(mean): --  RR: 18 (11 Jun 2025 08:00) (18 - 18)  SpO2: 100% (11 Jun 2025 08:00) (97% - 100%)    Parameters below as of 11 Jun 2025 08:00  Patient On (Oxygen Delivery Method): room air            PHYSICAL EXAM:    General: NAD  HEENT: MMM  Neck: supple  Cardiovascular: +S1/S2; RRR  Respiratory: CTA B/L; no W/R/R  Gastrointestinal: soft, NT/ND  Extremities: WWP; no edema, clubbing or cyanosis  Neurological: somnolent, awakens to tactile stimuli and will answer a question or two but then goes back to sleep; L arm appears contracted; able to squeeze fingers in R hand; not following commands reliably in b/l LEs     MEDICATIONS:  MEDICATIONS  (STANDING):  atorvastatin 40 milliGRAM(s) Oral at bedtime  chlorhexidine 2% Cloths 1 Application(s) Topical <User Schedule>  enoxaparin Injectable 50 milliGRAM(s) SubCutaneous every 12 hours  levETIRAcetam   Injectable 500 milliGRAM(s) IV Push every 12 hours  pantoprazole  Injectable 40 milliGRAM(s) IV Push daily  polyethylene glycol 3350 17 Gram(s) Oral two times a day  senna 2 Tablet(s) Oral at bedtime  sodium chloride 0.9%. 1000 milliLiter(s) (75 mL/Hr) IV Continuous <Continuous>    MEDICATIONS  (PRN):      ALLERGIES:  Allergies    No Known Allergies    Intolerances        LABS:                        14.5   7.63  )-----------( 220      ( 11 Jun 2025 06:30 )             44.2     06-11    136  |  101  |  16  ----------------------------<  93  3.7   |  21[L]  |  0.75    Ca    8.7      11 Jun 2025 06:30  Phos  2.7     06-11  Mg     2.10     06-11        Urinalysis Basic - ( 11 Jun 2025 06:30 )    Color: x / Appearance: x / SG: x / pH: x  Gluc: 93 mg/dL / Ketone: x  / Bili: x / Urobili: x   Blood: x / Protein: x / Nitrite: x   Leuk Esterase: x / RBC: x / WBC x   Sq Epi: x / Non Sq Epi: x / Bacteria: x      CAPILLARY BLOOD GLUCOSE          RADIOLOGY & ADDITIONAL TESTS: Reviewed.
Carlos Vickers MD  Division of Hospitalist Medicine  Pager [phone]  Available on Teams    CC: Patient is a 89y old  Male who presents with a chief complaint of pneumonia, encephalopathy, acute hypoxic respiratory failure (2025 15:52)        SUBJECTIVE / INTERVAL HPI: Patient seen and examined at bedside. Pt unable to meaningfully participate in ROS but grossly denies any complaints.    ROS: negative unless otherwise stated above.    VITAL SIGNS:  Vital Signs Last 24 Hrs  T(C): 36.9 (2025 05:22), Max: 36.9 (2025 05:22)  T(F): 98.4 (2025 05:22), Max: 98.4 (2025 05:22)  HR: 72 (:) (72 - 88)  BP: 134/72 (2025 05:22) (133/81 - 134/72)  BP(mean): --  RR: 16 (2025 05:) (16 - 16)  SpO2: 100% (:) (100% - 100%)    Parameters below as of 2025 05:22  Patient On (Oxygen Delivery Method): room air            PHYSICAL EXAM:    General: NAD  HEENT: MMM  Neck: supple  Cardiovascular: +S1/S2; RRR  Respiratory: CTA B/L; no W/R/R  Gastrointestinal: soft, NT/ND  Extremities: WWP; no edema, clubbing or cyanosis  Neurological: somnolent, awakens to tactile stimuli and will answer a question or two but then goes back to sleep; L arm appears contracted; able to squeeze fingers in R hand; not following commands reliably in b/l LEs     MEDICATIONS:  MEDICATIONS  (STANDING):  atorvastatin 40 milliGRAM(s) Oral at bedtime  cefTRIAXone   IVPB 1000 milliGRAM(s) IV Intermittent <User Schedule>  enoxaparin Injectable 50 milliGRAM(s) SubCutaneous every 12 hours  levETIRAcetam   Injectable 500 milliGRAM(s) IV Push every 12 hours  pantoprazole  Injectable 40 milliGRAM(s) IV Push daily  sodium chloride 0.9%. 1000 milliLiter(s) (75 mL/Hr) IV Continuous <Continuous>    MEDICATIONS  (PRN):      ALLERGIES:  Allergies    No Known Allergies    Intolerances        LABS:                        12.6   8.37  )-----------( 239      ( 2025 20:30 )             37.7     06-    138  |  103  |  11  ----------------------------<  98  4.7   |  20[L]  |  0.93    Ca    8.5      2025 20:30  Mg     2.20     06-07    TPro  7.1  /  Alb  3.4  /  TBili  0.4  /  DBili  x   /  AST  28  /  ALT  15  /  AlkPhos  78  06-07    PT/INR - ( 2025 20:46 )   PT: 14.0 sec;   INR: 1.21 ratio         PTT - ( 2025 20:46 )  PTT:28.4 sec  Urinalysis Basic - ( 2025 01:04 )    Color: Yellow / Appearance: Clear / S.051 / pH: x  Gluc: x / Ketone: x  / Bili: Negative / Urobili: 1.0 mg/dL   Blood: x / Protein: Negative mg/dL / Nitrite: Negative   Leuk Esterase: Negative / RBC: x / WBC x   Sq Epi: x / Non Sq Epi: x / Bacteria: x      CAPILLARY BLOOD GLUCOSE          RADIOLOGY & ADDITIONAL TESTS: Reviewed.
Carlos Vickers MD  Division of Hospitalist Medicine  Pager [phone]  Available on Teams    CC: Patient is a 89y old  Male who presents with a chief complaint of pneumonia, encephalopathy, acute hypoxic respiratory failure (09 Jun 2025 15:58)        SUBJECTIVE / INTERVAL HPI: Patient seen and examined at bedside. Pt somnolent. When awoken he says nothing is bothering him but then goes back to sleep.    ROS: negative unless otherwise stated above.    VITAL SIGNS:  Vital Signs Last 24 Hrs  T(C): 36.7 (10 Jeff 2025 13:00), Max: 37 (09 Jun 2025 21:49)  T(F): 98 (10 Jeff 2025 13:00), Max: 98.6 (09 Jun 2025 21:49)  HR: 97 (10 Jeff 2025 13:00) (66 - 97)  BP: 147/74 (10 Jeff 2025 13:00) (138/77 - 147/74)  BP(mean): --  RR: 18 (10 Jeff 2025 13:00) (17 - 18)  SpO2: 99% (10 Jeff 2025 13:00) (99% - 100%)    Parameters below as of 10 Jeff 2025 13:00  Patient On (Oxygen Delivery Method): room air          06-09-25 @ 07:01  -  06-10-25 @ 07:00  --------------------------------------------------------  IN: 0 mL / OUT: 520 mL / NET: -520 mL        PHYSICAL EXAM:    General: NAD  HEENT: MMM  Neck: supple  Cardiovascular: +S1/S2; RRR  Respiratory: CTA B/L; no W/R/R  Gastrointestinal: soft, NT/ND  Extremities: WWP; no edema, clubbing or cyanosis  Neurological: somnolent, awakens to tactile stimuli and will answer a question or two but then goes back to sleep; L arm appears contracted; able to squeeze fingers in R hand; not following commands reliably in b/l LEs     MEDICATIONS:  MEDICATIONS  (STANDING):  atorvastatin 40 milliGRAM(s) Oral at bedtime  cefTRIAXone   IVPB 1000 milliGRAM(s) IV Intermittent <User Schedule>  enoxaparin Injectable 50 milliGRAM(s) SubCutaneous every 12 hours  levETIRAcetam   Injectable 500 milliGRAM(s) IV Push every 12 hours  pantoprazole  Injectable 40 milliGRAM(s) IV Push daily  polyethylene glycol 3350 17 Gram(s) Oral two times a day  senna 2 Tablet(s) Oral at bedtime  sodium chloride 0.9%. 1000 milliLiter(s) (75 mL/Hr) IV Continuous <Continuous>    MEDICATIONS  (PRN):      ALLERGIES:  Allergies    No Known Allergies    Intolerances        LABS:                        12.9   5.81  )-----------( 221      ( 10 Jeff 2025 02:58 )             39.6     06-10    138  |  101  |  14  ----------------------------<  59[L]  3.9   |  23  |  0.78    Ca    9.0      10 Jeff 2025 02:58  Phos  3.0     06-10  Mg     2.20     06-10        Urinalysis Basic - ( 10 Jeff 2025 02:58 )    Color: x / Appearance: x / SG: x / pH: x  Gluc: 59 mg/dL / Ketone: x  / Bili: x / Urobili: x   Blood: x / Protein: x / Nitrite: x   Leuk Esterase: x / RBC: x / WBC x   Sq Epi: x / Non Sq Epi: x / Bacteria: x      CAPILLARY BLOOD GLUCOSE          RADIOLOGY & ADDITIONAL TESTS: Reviewed.
Patient is a 89y old  Male who presents with a chief complaint of pneumonia, encephalopathy, acute hypoxic respiratory failure (17 Jun 2025 15:23)      SUBJECTIVE / OVERNIGHT EVENTS: Pt seen and examined at 10:40am, no overnight events, is alert, unable to get any history due to his mental status, no other new issues reported.      MEDICATIONS  (STANDING):  apixaban 2.5 milliGRAM(s) Oral every 12 hours  atorvastatin 40 milliGRAM(s) Oral at bedtime  chlorhexidine 2% Cloths 1 Application(s) Topical <User Schedule>  levETIRAcetam 500 milliGRAM(s) Oral two times a day  pantoprazole    Tablet 40 milliGRAM(s) Oral before breakfast  polyethylene glycol 3350 17 Gram(s) Oral two times a day  senna 2 Tablet(s) Oral at bedtime  sodium chloride 0.9%. 1000 milliLiter(s) (75 mL/Hr) IV Continuous <Continuous>    MEDICATIONS  (PRN):      Vital Signs Last 24 Hrs  T(C): 36.7 (18 Jun 2025 13:07), Max: 36.7 (17 Jun 2025 21:00)  T(F): 98 (18 Jun 2025 13:07), Max: 98 (17 Jun 2025 21:00)  HR: 87 (18 Jun 2025 13:07) (73 - 87)  BP: 101/86 (18 Jun 2025 13:07) (101/86 - 138/88)  BP(mean): --  RR: 18 (18 Jun 2025 13:07) (18 - 20)  SpO2: 100% (18 Jun 2025 13:07) (96% - 100%)    Parameters below as of 18 Jun 2025 13:07  Patient On (Oxygen Delivery Method): room air      CAPILLARY BLOOD GLUCOSE        I&O's Summary      PHYSICAL EXAM:  GENERAL: NAD  CHEST/LUNG: Clear to auscultation bilaterally; No wheeze  HEART: Regular rate and rhythm  ABDOMEN: Soft, Nontender, Nondistended  EXTREMITIES: no LE edema, Left arm contracted  PSYCH: calm  NEUROLOGY: Alert, nonverbal  SKIN: dry and warm    LABS:                    RADIOLOGY & ADDITIONAL TESTS:    Imaging Personally Reviewed:    Consultant(s) Notes Reviewed:      Care Discussed with Consultants/Other Providers:  
Carlos Vickers MD  Division of Hospitalist Medicine  Pager [phone]  Available on Teams    CC: Patient is a 89y old  Male who presents with a chief complaint of pneumonia, encephalopathy, acute hypoxic respiratory failure (13 Jun 2025 14:32)        SUBJECTIVE / INTERVAL HPI: Patient seen and examined at bedside. Pt is poor historian but denies all complaints.    ROS: negative unless otherwise stated above.    VITAL SIGNS:  Vital Signs Last 24 Hrs  T(C): 36.9 (14 Jun 2025 12:30), Max: 36.9 (14 Jun 2025 12:30)  T(F): 98.5 (14 Jun 2025 12:30), Max: 98.5 (14 Jun 2025 12:30)  HR: 85 (14 Jun 2025 12:30) (66 - 85)  BP: 136/61 (14 Jun 2025 12:30) (106/66 - 136/61)  BP(mean): --  RR: 18 (14 Jun 2025 12:30) (17 - 18)  SpO2: 96% (14 Jun 2025 12:30) (96% - 100%)    Parameters below as of 14 Jun 2025 12:30  Patient On (Oxygen Delivery Method): room air            PHYSICAL EXAM:    General: NAD  HEENT: MMM  Neck: supple  Cardiovascular: +S1/S2; RRR  Respiratory: CTA B/L; no W/R/R  Gastrointestinal: soft, NT/ND  Extremities: WWP; no edema, clubbing or cyanosis  Neurological: somnolent, awakens to tactile stimuli and will answer a question or two but then goes back to sleep; L arm appears contracted; able to squeeze fingers in R hand; not following commands reliably in b/l LEs     MEDICATIONS:  MEDICATIONS  (STANDING):  atorvastatin 40 milliGRAM(s) Oral at bedtime  chlorhexidine 2% Cloths 1 Application(s) Topical <User Schedule>  enoxaparin Injectable 50 milliGRAM(s) SubCutaneous every 12 hours  levETIRAcetam   Injectable 500 milliGRAM(s) IV Push every 12 hours  pantoprazole  Injectable 40 milliGRAM(s) IV Push daily  polyethylene glycol 3350 17 Gram(s) Oral two times a day  senna 2 Tablet(s) Oral at bedtime  sodium chloride 0.9%. 1000 milliLiter(s) (75 mL/Hr) IV Continuous <Continuous>    MEDICATIONS  (PRN):      ALLERGIES:  Allergies    No Known Allergies    Intolerances        LABS:                        12.8   5.72  )-----------( 209      ( 13 Jun 2025 04:48 )             39.2     06-14    141  |  106  |  23  ----------------------------<  103[H]  3.7   |  24  |  0.76    Ca    9.0      14 Jun 2025 04:15  Phos  3.3     06-14  Mg     2.00     06-14        Urinalysis Basic - ( 14 Jun 2025 04:15 )    Color: x / Appearance: x / SG: x / pH: x  Gluc: 103 mg/dL / Ketone: x  / Bili: x / Urobili: x   Blood: x / Protein: x / Nitrite: x   Leuk Esterase: x / RBC: x / WBC x   Sq Epi: x / Non Sq Epi: x / Bacteria: x      CAPILLARY BLOOD GLUCOSE          RADIOLOGY & ADDITIONAL TESTS: Reviewed.
Patient is a 89y old  Male who presents with a chief complaint of pneumonia, encephalopathy, acute hypoxic respiratory failure (16 Jun 2025 15:30)      SUBJECTIVE / OVERNIGHT EVENTS: Pt seen and examined at 11:35am, no overnight events, is alert, unable to get any history due to his mental status, no other new issues reported.        MEDICATIONS  (STANDING):  apixaban 2.5 milliGRAM(s) Oral every 12 hours  atorvastatin 40 milliGRAM(s) Oral at bedtime  chlorhexidine 2% Cloths 1 Application(s) Topical <User Schedule>  levETIRAcetam 500 milliGRAM(s) Oral two times a day  pantoprazole    Tablet 40 milliGRAM(s) Oral before breakfast  polyethylene glycol 3350 17 Gram(s) Oral two times a day  senna 2 Tablet(s) Oral at bedtime  sodium chloride 0.9%. 1000 milliLiter(s) (75 mL/Hr) IV Continuous <Continuous>    MEDICATIONS  (PRN):      Vital Signs Last 24 Hrs  T(C): 36.6 (17 Jun 2025 13:00), Max: 36.9 (16 Jun 2025 17:34)  T(F): 97.8 (17 Jun 2025 13:00), Max: 98.4 (16 Jun 2025 17:34)  HR: 78 (17 Jun 2025 13:00) (63 - 80)  BP: 158/85 (17 Jun 2025 13:00) (103/61 - 158/85)  BP(mean): --  RR: 18 (17 Jun 2025 13:00) (17 - 18)  SpO2: 98% (17 Jun 2025 13:00) (98% - 100%)    Parameters below as of 17 Jun 2025 13:00  Patient On (Oxygen Delivery Method): room air      CAPILLARY BLOOD GLUCOSE        I&O's Summary      PHYSICAL EXAM:  GENERAL: NAD  CHEST/LUNG: Clear to auscultation bilaterally; No wheeze  HEART: Regular rate and rhythm  ABDOMEN: Soft, Nontender, Nondistended  EXTREMITIES: no LE edema, Left arm contracted  PSYCH: calm  NEUROLOGY: Alert, nonverbal  SKIN: dry and warm    LABS:                    RADIOLOGY & ADDITIONAL TESTS:    Imaging Personally Reviewed:    Consultant(s) Notes Reviewed:      Care Discussed with Consultants/Other Providers:  
Carlos Vickers MD  Division of Hospitalist Medicine  Pager [phone]  Available on Teams    CC: Patient is a 89y old  Male who presents with a chief complaint of pneumonia, encephalopathy, acute hypoxic respiratory failure (12 Jun 2025 13:59)        SUBJECTIVE / INTERVAL HPI: Patient seen and examined at bedside. Pt is poor historian but denies all complaints.    ROS: negative unless otherwise stated above.    VITAL SIGNS:  Vital Signs Last 24 Hrs  T(C): 36.4 (13 Jun 2025 13:10), Max: 36.8 (12 Jun 2025 21:26)  T(F): 97.5 (13 Jun 2025 13:10), Max: 98.3 (12 Jun 2025 21:26)  HR: 88 (13 Jun 2025 13:10) (58 - 88)  BP: 130/75 (13 Jun 2025 13:10) (105/60 - 130/75)  BP(mean): 77 (13 Jun 2025 05:18) (77 - 77)  RR: 18 (13 Jun 2025 13:10) (17 - 18)  SpO2: 100% (13 Jun 2025 13:10) (97% - 100%)    Parameters below as of 13 Jun 2025 13:10  Patient On (Oxygen Delivery Method): room air            PHYSICAL EXAM:    General: NAD  HEENT: MMM  Neck: supple  Cardiovascular: +S1/S2; RRR  Respiratory: CTA B/L; no W/R/R  Gastrointestinal: soft, NT/ND  Extremities: WWP; no edema, clubbing or cyanosis  Neurological: somnolent, awakens to tactile stimuli and will answer a question or two but then goes back to sleep; L arm appears contracted; able to squeeze fingers in R hand; not following commands reliably in b/l LEs     MEDICATIONS:  MEDICATIONS  (STANDING):  atorvastatin 40 milliGRAM(s) Oral at bedtime  chlorhexidine 2% Cloths 1 Application(s) Topical <User Schedule>  enoxaparin Injectable 50 milliGRAM(s) SubCutaneous every 12 hours  levETIRAcetam   Injectable 500 milliGRAM(s) IV Push every 12 hours  pantoprazole  Injectable 40 milliGRAM(s) IV Push daily  polyethylene glycol 3350 17 Gram(s) Oral two times a day  senna 2 Tablet(s) Oral at bedtime  sodium chloride 0.9%. 1000 milliLiter(s) (75 mL/Hr) IV Continuous <Continuous>    MEDICATIONS  (PRN):      ALLERGIES:  Allergies    No Known Allergies    Intolerances        LABS:                        12.8   5.72  )-----------( 209      ( 13 Jun 2025 04:48 )             39.2     06-13    139  |  105  |  24[H]  ----------------------------<  107[H]  3.9   |  22  |  0.91    Ca    9.0      13 Jun 2025 04:48  Phos  3.2     06-13  Mg     2.20     06-13        Urinalysis Basic - ( 13 Jun 2025 04:48 )    Color: x / Appearance: x / SG: x / pH: x  Gluc: 107 mg/dL / Ketone: x  / Bili: x / Urobili: x   Blood: x / Protein: x / Nitrite: x   Leuk Esterase: x / RBC: x / WBC x   Sq Epi: x / Non Sq Epi: x / Bacteria: x      CAPILLARY BLOOD GLUCOSE          RADIOLOGY & ADDITIONAL TESTS: Reviewed.
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    (11 Jun 2025 16:16)        SUBJECTIVE / INTERVAL HPI: Patient seen and examined at bedside. Pt is poor historian 2/2 somnolence but grossly denies any acute complaints.     ROS: negative unless otherwise stated above.    VITAL SIGNS:  Vital Signs Last 24 Hrs  T(C): 36.6 (12 Jun 2025 06:15), Max: 36.7 (11 Jun 2025 14:00)  T(F): 97.8 (12 Jun 2025 06:15), Max: 98 (11 Jun 2025 14:00)  HR: 81 (12 Jun 2025 06:15) (71 - 81)  BP: 120/65 (12 Jun 2025 06:15) (120/65 - 129/60)  BP(mean): --  RR: 17 (12 Jun 2025 06:15) (17 - 18)  SpO2: 97% (12 Jun 2025 06:15) (97% - 100%)    Parameters below as of 12 Jun 2025 06:15  Patient On (Oxygen Delivery Method): room air            PHYSICAL EXAM:    General: NAD  HEENT: MMM  Neck: supple  Cardiovascular: +S1/S2; RRR  Respiratory: CTA B/L; no W/R/R  Gastrointestinal: soft, NT/ND  Extremities: WWP; no edema, clubbing or cyanosis  Neurological: somnolent, awakens to tactile stimuli and will answer a question or two but then goes back to sleep; L arm appears contracted; able to squeeze fingers in R hand; not following commands reliably in b/l LEs     MEDICATIONS:  MEDICATIONS  (STANDING):  atorvastatin 40 milliGRAM(s) Oral at bedtime  chlorhexidine 2% Cloths 1 Application(s) Topical <User Schedule>  enoxaparin Injectable 50 milliGRAM(s) SubCutaneous every 12 hours  levETIRAcetam   Injectable 500 milliGRAM(s) IV Push every 12 hours  pantoprazole  Injectable 40 milliGRAM(s) IV Push daily  polyethylene glycol 3350 17 Gram(s) Oral two times a day  senna 2 Tablet(s) Oral at bedtime  sodium chloride 0.9%. 1000 milliLiter(s) (75 mL/Hr) IV Continuous <Continuous>    MEDICATIONS  (PRN):      ALLERGIES:  Allergies    No Known Allergies    Intolerances        LABS:                        14.5   7.63  )-----------( 220      ( 11 Jun 2025 06:30 )             44.2     06-11    136  |  101  |  16  ----------------------------<  93  3.7   |  21[L]  |  0.75    Ca    8.7      11 Jun 2025 06:30  Phos  2.7     06-11  Mg     2.10     06-11        Urinalysis Basic - ( 11 Jun 2025 06:30 )    Color: x / Appearance: x / SG: x / pH: x  Gluc: 93 mg/dL / Ketone: x  / Bili: x / Urobili: x   Blood: x / Protein: x / Nitrite: x   Leuk Esterase: x / RBC: x / WBC x   Sq Epi: x / Non Sq Epi: x / Bacteria: x      CAPILLARY BLOOD GLUCOSE          RADIOLOGY & ADDITIONAL TESTS: Reviewed.

## 2025-06-18 NOTE — PROGRESS NOTE ADULT - PROBLEM SELECTOR PLAN 6
- continue home keppra  - EEG without epileptiform activity
- continue home keppra  - f/u EEG
- continue home keppra  - EEG as above
- continue home keppra  - EEG without epileptiform activity

## 2025-06-18 NOTE — PROGRESS NOTE ADULT - PROBLEM SELECTOR PROBLEM 2
Dementia
Dementia
Metabolic encephalopathy
Dementia

## 2025-06-18 NOTE — PROGRESS NOTE ADULT - PROBLEM SELECTOR PLAN 4
reportedly had episodes of hypoxia in the ED, noted to have good waveform, spontaneously resolved, now breathing comfortably and saturating well on room air. s/p antibiotics (ceftriaxone and cipro in the ED transitioned to vanc and zosyn) 6/4-6/7 at [facility]. Pt likely received only about 2 days of antibiotics at [facility] as he was discharged with 5 days of augmentin that was not picked up  - CTA chest with patchy opacities over R lung base- PNA vs atelectasis   - s/p CTX for treatment of aspiration pneumonia, (given additional 3 day course to finish total 5 day course)  - passed speech and swallow - pureed with mildly thick liquids
reportedly had episodes of hypoxia in the ED, noted to have good waveform, spontaneously resolved, now breathing comfortably and saturating well on room air. s/p antibiotics (ceftriaxone and cipro in the ED transitioned to vanc and zosyn) 6/4-6/7 at [facility]. Pt likely received only about 2 days of antibiotics at [facility] as he was discharged with 5 days of augmentin that was not picked up  - CTA chest with patchy opacities over R lung base- PNA vs atelectasis   - s/p CTX for treatment of aspiration pneumonia, (given additional 3 day course to finish total 5 day course)  - passed speech and swallow - pureed with mildly thick liquids
reportedly had episodes of hypoxia in the ED, noted to have good waveform, spontaneously resolved, now breathing comfortably and saturating well on room air. s/p antibiotics (ceftriaxone and cipro in the ED transitioned to vanc and zosyn) 6/4-6/7 at [facility]. Pt likely received only about 2 days of antibiotics at [facility] as he was discharged with 5 days of augmentin that was not picked up  - CTA chest with patchy opacities over R lung base- PNA vs atelectasis   - s/p CTX for treatment of aspiration pneumonia, (given additional 3 day course to finish total 5 day course)  - passed speech and swallow - pureed with mildly thick liquids  - prior team discussed with son Caden at bedside that pt will likely have recurrent pneumonia as dementia progressed and he expressed understanding. amenable to discussing hospice further once he discusses with the rest of the family.
reportedly had episodes of hypoxia in the ED, noted to have good waveform, spontaneously resolved, now breathing comfortably and saturating well on room air. s/p antibiotics (ceftriaxone and cipro in the ED transitioned to vanc and zosyn) 6/4-6/7 at [facility]. Pt likely received only about 2 days of antibiotics at [facility] as he was discharged with 5 days of augmentin that was not picked up  - CTA chest with patchy opacities over R lung base- PNA vs atelectasis   - s/p CTX for treatment of aspiration pneumonia, (given additional 3 day course to finish total 5 day course)  - passed speech and swallow - pureed with mildly thick liquids
reportedly had episodes of hypoxia in the ED, noted to have good waveform, spontaneously resolved, now breathing comfortably and saturating well on room air. s/p antibiotics (ceftriaxone and cipro in the ED transitioned to vanc and zosyn) 6/4-6/7 at [facility]. Pt likely received only about 2 days of antibiotics at [facility] as he was discharged with 5 days of augmentin that was not picked up  - CTA chest with patchy opacities over R lung base- PNA vs atelectasis   - s/p CTX for treatment of aspiration pneumonia, (given additional 3 day course to finish total 5 day course)  - passed speech and swallow - pureed with mildly thick liquids  - prior team discussed with son Caden at bedside that pt will likely have recurrent pneumonia as dementia progressed and he expressed understanding. amenable to discussing hospice further once he discusses with the rest of the family.
reportedly had episodes of hypoxia in the ED, noted to have good waveform, spontaneously resolved, now breathing comfortably and saturating well on room air. s/p antibiotics (ceftriaxone and cipro in the ED transitioned to vanc and zosyn) 6/4-6/7 at [facility]. Pt likely received only about 2 days of antibiotics at [facility] as he was discharged with 5 days of augmentin that was not picked up  - CTA chest with patchy opacities over R lung base- PNA vs atelectasis   - s/p CTX for treatment of aspiration pneumonia, (given additional 3 day course to finish total 5 day course)  - passed speech and swallow - pureed with mildly thick liquids
reportedly had episodes of hypoxia in the ED, noted to have good waveform, spontaneously resolved, now breathing comfortably and saturating well on room air. s/p antibiotics (ceftriaxone and cipro in the ED transitioned to vanc and zosyn) 6/4-6/7 at [facility]. Pt likely received only about 2 days of antibiotics at [facility] as he was discharged with 5 days of augmentin that was not picked up  - CTA chest with patchy opacities over R lung base- PNA vs atelectasis   - s/p CTX for treatment of aspiration pneumonia, (given additional 3 day course to finish total 5 day course)  - passed speech and swallow - pureed with mildly thick liquids  - discussed with son Caden at bedside that pt will likely have recurrent pneumonia as dementia progressed and he expressed understanding. amenable to discussing hospice further once he discusses with the rest of the family.
reportedly had episodes of hypoxia in the ED, noted to have good waveform, spontaneously resolved  - currently saturating well on room air  - s/p antibiotics at outside hospital but incomplete course  - CTA chest with patchy opacities over R lung base- PNA vs atelectasis   - continue CTX
reportedly had episodes of hypoxia in the ED, noted to have good waveform, spontaneously resolved, now breathing comfortably and saturating well on room air. s/p antibiotics (ceftriaxone and cipro in the ED transitioned to vanc and zosyn) 6/4-6/7 at [facility]. Pt likely received only about 2 days of antibiotics at [email or web address] as he was discharged with 5 days of augmentin that was not picked up  - CTA chest with patchy opacities over R lung base- PNA vs atelectasis   - s/p CTX for treatment of aspiration pneumonia, (given additional 3 day course to finish total 5 day course)  - passed speech and swallow - pureed with mildly thick liquids  - prior team discussed with son Caden at bedside that pt will likely have recurrent pneumonia as dementia progressed and he expressed understanding. amenable to discussing hospice further once he discusses with the rest of the family.  -Now accepted to [facility] waiting for auth
reportedly had episodes of hypoxia in the ED, noted to have good waveform, spontaneously resolved, now breathing comfortably and saturating well on room air. s/p antibiotics (ceftriaxone and cipro in the ED transitioned to vanc and zosyn) 6/4-6/7 at [facility]. Pt likely received only about 2 days of antibiotics at [facility] as he was discharged with 5 days of augmentin that was not picked up  - CTA chest with patchy opacities over R lung base- PNA vs atelectasis   - continue CTX for treatment of aspiration pneumonia, will give additional 3 day course to finish total 5 day course  - passed speech and swallow - pureed with mildly thick liquids
reportedly had episodes of hypoxia in the ED, noted to have good waveform, spontaneously resolved, now breathing comfortably and saturating well on room air. s/p antibiotics (ceftriaxone and cipro in the ED transitioned to vanc and zosyn) 6/4-6/7 at [facility]. Pt likely received only about 2 days of antibiotics at [facility] as he was discharged with 5 days of augmentin that was not picked up  - CTA chest with patchy opacities over R lung base- PNA vs atelectasis   - s/p CTX for treatment of aspiration pneumonia, (given additional 3 day course to finish total 5 day course)  - passed speech and swallow - pureed with mildly thick liquids

## 2025-06-18 NOTE — PROGRESS NOTE ADULT - PROBLEM SELECTOR PROBLEM 4
RLL pneumonia

## 2025-07-03 ENCOUNTER — TRANSCRIPTION ENCOUNTER (OUTPATIENT)
Age: 89
End: 2025-07-03

## 2025-07-18 ENCOUNTER — TRANSCRIPTION ENCOUNTER (OUTPATIENT)
Age: 89
End: 2025-07-18

## 2025-08-23 ENCOUNTER — EMERGENCY (EMERGENCY)
Facility: HOSPITAL | Age: 89
LOS: 1 days | End: 2025-08-23
Attending: EMERGENCY MEDICINE | Admitting: EMERGENCY MEDICINE
Payer: MEDICARE

## 2025-08-23 VITALS
OXYGEN SATURATION: 99 % | TEMPERATURE: 97 F | RESPIRATION RATE: 18 BRPM | SYSTOLIC BLOOD PRESSURE: 133 MMHG | DIASTOLIC BLOOD PRESSURE: 72 MMHG | HEART RATE: 71 BPM | WEIGHT: 106.04 LBS

## 2025-08-23 VITALS
SYSTOLIC BLOOD PRESSURE: 122 MMHG | OXYGEN SATURATION: 100 % | HEART RATE: 60 BPM | TEMPERATURE: 98 F | RESPIRATION RATE: 18 BRPM | DIASTOLIC BLOOD PRESSURE: 66 MMHG

## 2025-08-23 DIAGNOSIS — Z98.890 OTHER SPECIFIED POSTPROCEDURAL STATES: Chronic | ICD-10-CM

## 2025-08-23 DIAGNOSIS — Z96.0 PRESENCE OF UROGENITAL IMPLANTS: Chronic | ICD-10-CM

## 2025-08-23 LAB
ALBUMIN SERPL ELPH-MCNC: 3.7 G/DL — SIGNIFICANT CHANGE UP (ref 3.3–5)
ALP SERPL-CCNC: 124 U/L — HIGH (ref 40–120)
ALT FLD-CCNC: 23 U/L — SIGNIFICANT CHANGE UP (ref 4–41)
ANION GAP SERPL CALC-SCNC: 12 MMOL/L — SIGNIFICANT CHANGE UP (ref 7–14)
APPEARANCE UR: ABNORMAL
APTT BLD: 30.8 SEC — SIGNIFICANT CHANGE UP (ref 26.1–36.8)
AST SERPL-CCNC: 23 U/L — SIGNIFICANT CHANGE UP (ref 4–40)
BACTERIA # UR AUTO: ABNORMAL /HPF
BASOPHILS # BLD AUTO: 0.02 K/UL — SIGNIFICANT CHANGE UP (ref 0–0.2)
BASOPHILS NFR BLD AUTO: 0.3 % — SIGNIFICANT CHANGE UP (ref 0–2)
BILIRUB SERPL-MCNC: 0.5 MG/DL — SIGNIFICANT CHANGE UP (ref 0.2–1.2)
BILIRUB UR-MCNC: NEGATIVE — SIGNIFICANT CHANGE UP
BUN SERPL-MCNC: 24 MG/DL — HIGH (ref 7–23)
CALCIUM SERPL-MCNC: 9.1 MG/DL — SIGNIFICANT CHANGE UP (ref 8.4–10.5)
CAST: 1 /LPF — SIGNIFICANT CHANGE UP (ref 0–4)
CHLORIDE SERPL-SCNC: 102 MMOL/L — SIGNIFICANT CHANGE UP (ref 98–107)
CO2 SERPL-SCNC: 22 MMOL/L — SIGNIFICANT CHANGE UP (ref 22–31)
COLOR SPEC: YELLOW — SIGNIFICANT CHANGE UP
CREAT SERPL-MCNC: 0.94 MG/DL — SIGNIFICANT CHANGE UP (ref 0.5–1.3)
DIFF PNL FLD: NEGATIVE — SIGNIFICANT CHANGE UP
EGFR: 77 ML/MIN/1.73M2 — SIGNIFICANT CHANGE UP
EGFR: 77 ML/MIN/1.73M2 — SIGNIFICANT CHANGE UP
EOSINOPHIL # BLD AUTO: 0.84 K/UL — HIGH (ref 0–0.5)
EOSINOPHIL NFR BLD AUTO: 11 % — HIGH (ref 0–6)
GLUCOSE SERPL-MCNC: 103 MG/DL — HIGH (ref 70–99)
GLUCOSE UR QL: NEGATIVE MG/DL — SIGNIFICANT CHANGE UP
HCT VFR BLD CALC: 42.4 % — SIGNIFICANT CHANGE UP (ref 39–50)
HGB BLD-MCNC: 13.8 G/DL — SIGNIFICANT CHANGE UP (ref 13–17)
IMM GRANULOCYTES # BLD AUTO: 0.02 K/UL — SIGNIFICANT CHANGE UP (ref 0–0.07)
IMM GRANULOCYTES NFR BLD AUTO: 0.3 % — SIGNIFICANT CHANGE UP (ref 0–0.9)
INR BLD: 1.17 RATIO — HIGH (ref 0.85–1.16)
KETONES UR QL: ABNORMAL MG/DL
LEUKOCYTE ESTERASE UR-ACNC: ABNORMAL
LYMPHOCYTES # BLD AUTO: 2.5 K/UL — SIGNIFICANT CHANGE UP (ref 1–3.3)
LYMPHOCYTES NFR BLD AUTO: 32.9 % — SIGNIFICANT CHANGE UP (ref 13–44)
MCHC RBC-ENTMCNC: 29.6 PG — SIGNIFICANT CHANGE UP (ref 27–34)
MCHC RBC-ENTMCNC: 32.5 G/DL — SIGNIFICANT CHANGE UP (ref 32–36)
MCV RBC AUTO: 90.8 FL — SIGNIFICANT CHANGE UP (ref 80–100)
MONOCYTES # BLD AUTO: 0.71 K/UL — SIGNIFICANT CHANGE UP (ref 0–0.9)
MONOCYTES NFR BLD AUTO: 9.3 % — SIGNIFICANT CHANGE UP (ref 2–14)
NEUTROPHILS # BLD AUTO: 3.52 K/UL — SIGNIFICANT CHANGE UP (ref 1.8–7.4)
NEUTROPHILS NFR BLD AUTO: 46.2 % — SIGNIFICANT CHANGE UP (ref 43–77)
NITRITE UR-MCNC: NEGATIVE — SIGNIFICANT CHANGE UP
NRBC # BLD AUTO: 0.02 K/UL — HIGH (ref 0–0)
NRBC # FLD: 0.02 K/UL — HIGH (ref 0–0)
NRBC BLD AUTO-RTO: 0 /100 WBCS — SIGNIFICANT CHANGE UP (ref 0–0)
PH UR: 8 — SIGNIFICANT CHANGE UP (ref 5–8)
PLATELET # BLD AUTO: 198 K/UL — SIGNIFICANT CHANGE UP (ref 150–400)
PMV BLD: 9.6 FL — SIGNIFICANT CHANGE UP (ref 7–13)
POTASSIUM SERPL-MCNC: 4.8 MMOL/L — SIGNIFICANT CHANGE UP (ref 3.5–5.3)
POTASSIUM SERPL-SCNC: 4.8 MMOL/L — SIGNIFICANT CHANGE UP (ref 3.5–5.3)
PROT SERPL-MCNC: 7.7 G/DL — SIGNIFICANT CHANGE UP (ref 6–8.3)
PROT UR-MCNC: SIGNIFICANT CHANGE UP MG/DL
PROTHROM AB SERPL-ACNC: 13.5 SEC — HIGH (ref 9.9–13.4)
RBC # BLD: 4.67 M/UL — SIGNIFICANT CHANGE UP (ref 4.2–5.8)
RBC # FLD: 14.2 % — SIGNIFICANT CHANGE UP (ref 10.3–14.5)
RBC CASTS # UR COMP ASSIST: 1 /HPF — SIGNIFICANT CHANGE UP (ref 0–4)
SODIUM SERPL-SCNC: 136 MMOL/L — SIGNIFICANT CHANGE UP (ref 135–145)
SP GR SPEC: 1.02 — SIGNIFICANT CHANGE UP (ref 1–1.03)
SQUAMOUS # UR AUTO: 1 /HPF — SIGNIFICANT CHANGE UP (ref 0–5)
UROBILINOGEN FLD QL: 1 MG/DL — SIGNIFICANT CHANGE UP (ref 0.2–1)
WBC # BLD: 7.61 K/UL — SIGNIFICANT CHANGE UP (ref 3.8–10.5)
WBC # FLD AUTO: 7.61 K/UL — SIGNIFICANT CHANGE UP (ref 3.8–10.5)
WBC UR QL: 34 /HPF — HIGH (ref 0–5)

## 2025-08-23 PROCEDURE — 70450 CT HEAD/BRAIN W/O DYE: CPT | Mod: 26

## 2025-08-23 PROCEDURE — 99284 EMERGENCY DEPT VISIT MOD MDM: CPT

## 2025-08-23 PROCEDURE — 73110 X-RAY EXAM OF WRIST: CPT | Mod: 26,LT

## 2025-08-23 PROCEDURE — 73130 X-RAY EXAM OF HAND: CPT | Mod: 26,LT

## 2025-08-23 PROCEDURE — 72125 CT NECK SPINE W/O DYE: CPT | Mod: 26

## 2025-08-23 PROCEDURE — 72170 X-RAY EXAM OF PELVIS: CPT | Mod: 26

## 2025-08-23 RX ORDER — ACETAMINOPHEN 500 MG/5ML
725 LIQUID (ML) ORAL ONCE
Refills: 0 | Status: COMPLETED | OUTPATIENT
Start: 2025-08-23 | End: 2025-08-23

## 2025-08-23 RX ORDER — CEFTRIAXONE 500 MG/1
1000 INJECTION, POWDER, FOR SOLUTION INTRAMUSCULAR; INTRAVENOUS ONCE
Refills: 0 | Status: COMPLETED | OUTPATIENT
Start: 2025-08-23 | End: 2025-08-23

## 2025-08-23 RX ADMIN — Medication 290 MILLIGRAM(S): at 15:09

## 2025-08-23 RX ADMIN — Medication 500 MILLILITER(S): at 15:10

## 2025-08-23 RX ADMIN — CEFTRIAXONE 100 MILLIGRAM(S): 500 INJECTION, POWDER, FOR SOLUTION INTRAMUSCULAR; INTRAVENOUS at 19:20

## 2025-08-26 LAB
-  AMOXICILLIN/CLAVULANIC ACID: SIGNIFICANT CHANGE UP
-  AMPICILLIN/SULBACTAM: SIGNIFICANT CHANGE UP
-  AMPICILLIN: SIGNIFICANT CHANGE UP
-  AZTREONAM: SIGNIFICANT CHANGE UP
-  CEFAZOLIN: SIGNIFICANT CHANGE UP
-  CEFEPIME: SIGNIFICANT CHANGE UP
-  CEFOXITIN: SIGNIFICANT CHANGE UP
-  CEFTRIAXONE: SIGNIFICANT CHANGE UP
-  CEFUROXIME: SIGNIFICANT CHANGE UP
-  CIPROFLOXACIN: SIGNIFICANT CHANGE UP
-  ERTAPENEM: SIGNIFICANT CHANGE UP
-  GENTAMICIN: SIGNIFICANT CHANGE UP
-  LEVOFLOXACIN: SIGNIFICANT CHANGE UP
-  MEROPENEM: SIGNIFICANT CHANGE UP
-  NITROFURANTOIN: SIGNIFICANT CHANGE UP
-  PIPERACILLIN/TAZOBACTAM: SIGNIFICANT CHANGE UP
-  TOBRAMYCIN: SIGNIFICANT CHANGE UP
-  TRIMETHOPRIM/SULFAMETHOXAZOLE: SIGNIFICANT CHANGE UP
CULTURE RESULTS: ABNORMAL
METHOD TYPE: SIGNIFICANT CHANGE UP
ORGANISM # SPEC MICROSCOPIC CNT: ABNORMAL
ORGANISM # SPEC MICROSCOPIC CNT: ABNORMAL
SPECIMEN SOURCE: SIGNIFICANT CHANGE UP

## (undated) DEVICE — SPONGE X-RAY 4X8"

## (undated) DEVICE — BAG URINE W METER 2L

## (undated) DEVICE — TUBING IRR SET FOR CYSTOSCOPY 77"

## (undated) DEVICE — LABELS BLANK W PEN

## (undated) DEVICE — ELCTR GROUNDING PAD ADULT COVIDIEN

## (undated) DEVICE — PACK MINOR

## (undated) DEVICE — VENODYNE/SCD SLEEVE CALF MEDIUM

## (undated) DEVICE — PREP BETADINE KIT

## (undated) DEVICE — SPONGE DISSECTOR PEANUT

## (undated) DEVICE — NDL HYPO SAFE 25G X 1.5" (ORANGE)

## (undated) DEVICE — DRSG CURITY GAUZE SPONGE 4 X 4" 12-PLY

## (undated) DEVICE — SUT PLAIN GUT FAST ABSORBING 5-0 PC-1

## (undated) DEVICE — SUT CHROMIC 3-0 27" FS-2

## (undated) DEVICE — FOLEY CATH 2-WAY 16FR 5CC LATEX COUDE RED

## (undated) DEVICE — POSITIONER STRAP ARMBOARD VELCRO TS-30

## (undated) DEVICE — SOL IRR POUR NS 0.9% 500ML

## (undated) DEVICE — VAGINAL PACKING 2 X 6"

## (undated) DEVICE — SYR LUER LOK 10CC

## (undated) DEVICE — PACK CYSTO

## (undated) DEVICE — SUT PROLENE 2-0 30" CT-2

## (undated) DEVICE — DRAPE THYROID 77" X 123"

## (undated) DEVICE — FOLEY CATH 2-WAY 12FR 5CC LATEX COUDE RED

## (undated) DEVICE — DRSG KLING 4"

## (undated) DEVICE — SUT CHROMIC 3-0 27" SH

## (undated) DEVICE — DRAIN PENROSE .25" X 18" LATEX

## (undated) DEVICE — SOL IRR POUR NS 0.9% 1000ML

## (undated) DEVICE — SUT PDS II 2-0 27" SH

## (undated) DEVICE — SUT CHROMIC 3-0 27" RB-1

## (undated) DEVICE — DRAPE 3/4 SHEET 52X76"

## (undated) DEVICE — DRAIN PENROSE .25" X 12" SILICONE

## (undated) DEVICE — FRAZIER SUCTION TIP 10FR

## (undated) DEVICE — FOLEY CATH 2-WAY 18FR 5CC LATEX COUDE RED

## (undated) DEVICE — PACK MAJOR ABDOMINAL WITH LAP

## (undated) DEVICE — PACK PERI GYN

## (undated) DEVICE — SUT VICRYL 0 18" CT-1 UNDYED (POP-OFF)

## (undated) DEVICE — GLV 7.5 PROTEXIS (WHITE)

## (undated) DEVICE — SUSPENSORY WITH LEG STRAPS XL

## (undated) DEVICE — BASIN SET DOUBLE

## (undated) DEVICE — SUT VICRYL 2-0 27" SH UNDYED

## (undated) DEVICE — TUBING SUCTION NONCONDUCTIVE 6MM X 12FT

## (undated) DEVICE — NDL HYPO REGULAR BEVEL 22G X 1.5" (TURQUOISE)

## (undated) DEVICE — DRAIN PENROSE 0.5X12" SILICONE